# Patient Record
Sex: FEMALE | Race: WHITE | Employment: OTHER | URBAN - METROPOLITAN AREA
[De-identification: names, ages, dates, MRNs, and addresses within clinical notes are randomized per-mention and may not be internally consistent; named-entity substitution may affect disease eponyms.]

---

## 2017-01-01 DIAGNOSIS — E11.9 DIABETES TYPE 2, CONTROLLED (HCC): ICD-10-CM

## 2017-01-05 ENCOUNTER — OFFICE VISIT (OUTPATIENT)
Dept: INTERNAL MEDICINE CLINIC | Age: 67
End: 2017-01-05

## 2017-01-05 VITALS
BODY MASS INDEX: 29.57 KG/M2 | RESPIRATION RATE: 16 BRPM | DIASTOLIC BLOOD PRESSURE: 78 MMHG | OXYGEN SATURATION: 98 % | HEART RATE: 100 BPM | WEIGHT: 184 LBS | HEIGHT: 66 IN | SYSTOLIC BLOOD PRESSURE: 112 MMHG

## 2017-01-05 DIAGNOSIS — Z00.00 PREVENTATIVE HEALTH CARE: ICD-10-CM

## 2017-01-05 DIAGNOSIS — E11.9 CONTROLLED TYPE 2 DIABETES MELLITUS WITHOUT COMPLICATION, WITHOUT LONG-TERM CURRENT USE OF INSULIN (HCC): Primary | ICD-10-CM

## 2017-01-05 DIAGNOSIS — F32.A DEPRESSION, UNSPECIFIED DEPRESSION TYPE: ICD-10-CM

## 2017-01-05 DIAGNOSIS — F51.01 PRIMARY INSOMNIA: ICD-10-CM

## 2017-01-05 DIAGNOSIS — M81.0 OSTEOPOROSIS: ICD-10-CM

## 2017-01-05 DIAGNOSIS — E78.2 MIXED HYPERLIPIDEMIA: ICD-10-CM

## 2017-01-05 DIAGNOSIS — G43.009 MIGRAINE WITHOUT AURA AND WITHOUT STATUS MIGRAINOSUS, NOT INTRACTABLE: ICD-10-CM

## 2017-01-05 DIAGNOSIS — E11.9 CONTROLLED TYPE 2 DIABETES MELLITUS WITHOUT COMPLICATION, WITHOUT LONG-TERM CURRENT USE OF INSULIN (HCC): ICD-10-CM

## 2017-01-05 LAB
A/G RATIO: 1.7 (ref 1.1–2.2)
ALBUMIN SERPL-MCNC: 4.6 G/DL (ref 3.4–5)
ALP BLD-CCNC: 88 U/L (ref 40–129)
ALT SERPL-CCNC: 60 U/L (ref 10–40)
ANION GAP SERPL CALCULATED.3IONS-SCNC: 17 MMOL/L (ref 3–16)
AST SERPL-CCNC: 34 U/L (ref 15–37)
BASOPHILS ABSOLUTE: 0.1 K/UL (ref 0–0.2)
BASOPHILS RELATIVE PERCENT: 1.2 %
BILIRUB SERPL-MCNC: 0.5 MG/DL (ref 0–1)
BUN BLDV-MCNC: 17 MG/DL (ref 7–20)
CALCIUM SERPL-MCNC: 10.7 MG/DL (ref 8.3–10.6)
CHLORIDE BLD-SCNC: 101 MMOL/L (ref 99–110)
CHOLESTEROL, TOTAL: 280 MG/DL (ref 0–199)
CO2: 20 MMOL/L (ref 21–32)
CREAT SERPL-MCNC: 0.8 MG/DL (ref 0.6–1.2)
EOSINOPHILS ABSOLUTE: 0.1 K/UL (ref 0–0.6)
EOSINOPHILS RELATIVE PERCENT: 1.6 %
GFR AFRICAN AMERICAN: >60
GFR NON-AFRICAN AMERICAN: >60
GLOBULIN: 2.7 G/DL
GLUCOSE BLD-MCNC: 185 MG/DL (ref 70–99)
HCT VFR BLD CALC: 47.9 % (ref 36–48)
HDLC SERPL-MCNC: 45 MG/DL (ref 40–60)
HEMOGLOBIN: 15.9 G/DL (ref 12–16)
HEPATITIS C ANTIBODY INTERPRETATION: NORMAL
LDL CHOLESTEROL CALCULATED: ABNORMAL MG/DL
LDL CHOLESTEROL DIRECT: 187 MG/DL
LYMPHOCYTES ABSOLUTE: 1.6 K/UL (ref 1–5.1)
LYMPHOCYTES RELATIVE PERCENT: 34.8 %
MCH RBC QN AUTO: 31.3 PG (ref 26–34)
MCHC RBC AUTO-ENTMCNC: 33.2 G/DL (ref 31–36)
MCV RBC AUTO: 94.2 FL (ref 80–100)
MONOCYTES ABSOLUTE: 0.4 K/UL (ref 0–1.3)
MONOCYTES RELATIVE PERCENT: 8.4 %
NEUTROPHILS ABSOLUTE: 2.6 K/UL (ref 1.7–7.7)
NEUTROPHILS RELATIVE PERCENT: 54 %
PDW BLD-RTO: 13.4 % (ref 12.4–15.4)
PLATELET # BLD: 224 K/UL (ref 135–450)
PMV BLD AUTO: 9.8 FL (ref 5–10.5)
POTASSIUM SERPL-SCNC: 4.7 MMOL/L (ref 3.5–5.1)
RBC # BLD: 5.09 M/UL (ref 4–5.2)
SODIUM BLD-SCNC: 138 MMOL/L (ref 136–145)
TOTAL PROTEIN: 7.3 G/DL (ref 6.4–8.2)
TRIGL SERPL-MCNC: 432 MG/DL (ref 0–150)
VLDLC SERPL CALC-MCNC: ABNORMAL MG/DL
WBC # BLD: 4.7 K/UL (ref 4–11)

## 2017-01-05 PROCEDURE — 99214 OFFICE O/P EST MOD 30 MIN: CPT | Performed by: INTERNAL MEDICINE

## 2017-01-05 RX ORDER — TRAZODONE HYDROCHLORIDE 100 MG/1
TABLET ORAL
Qty: 30 TABLET | Refills: 5 | Status: SHIPPED | OUTPATIENT
Start: 2017-01-05 | End: 2017-07-10 | Stop reason: SDUPTHER

## 2017-01-05 RX ORDER — ROSUVASTATIN CALCIUM 10 MG/1
10 TABLET, COATED ORAL NIGHTLY
Qty: 30 TABLET | Refills: 5 | Status: SHIPPED | OUTPATIENT
Start: 2017-01-05 | End: 2017-07-10 | Stop reason: SDUPTHER

## 2017-01-05 RX ORDER — PAROXETINE HYDROCHLORIDE 20 MG/1
TABLET, FILM COATED ORAL
Qty: 30 TABLET | Refills: 5 | Status: SHIPPED | OUTPATIENT
Start: 2017-01-05 | End: 2017-07-10 | Stop reason: SDUPTHER

## 2017-01-05 RX ORDER — TOPIRAMATE 25 MG/1
25 TABLET ORAL DAILY
Qty: 60 TABLET | Refills: 5 | Status: SHIPPED | OUTPATIENT
Start: 2017-01-05 | End: 2017-07-10 | Stop reason: SDUPTHER

## 2017-01-06 LAB
ESTIMATED AVERAGE GLUCOSE: 165.7 MG/DL
HBA1C MFR BLD: 7.4 %
HIV-1 AND HIV-2 ANTIBODIES: NORMAL

## 2017-05-06 DIAGNOSIS — E11.9 DIABETES TYPE 2, CONTROLLED (HCC): ICD-10-CM

## 2017-07-10 ENCOUNTER — OFFICE VISIT (OUTPATIENT)
Dept: INTERNAL MEDICINE CLINIC | Age: 67
End: 2017-07-10

## 2017-07-10 VITALS
SYSTOLIC BLOOD PRESSURE: 120 MMHG | DIASTOLIC BLOOD PRESSURE: 72 MMHG | HEART RATE: 88 BPM | OXYGEN SATURATION: 97 % | HEIGHT: 66 IN | RESPIRATION RATE: 16 BRPM | WEIGHT: 178 LBS | BODY MASS INDEX: 28.61 KG/M2

## 2017-07-10 DIAGNOSIS — F32.A DEPRESSION, UNSPECIFIED DEPRESSION TYPE: ICD-10-CM

## 2017-07-10 DIAGNOSIS — F51.01 PRIMARY INSOMNIA: ICD-10-CM

## 2017-07-10 DIAGNOSIS — Z00.00 ROUTINE GENERAL MEDICAL EXAMINATION AT A HEALTH CARE FACILITY: Primary | ICD-10-CM

## 2017-07-10 DIAGNOSIS — E11.9 CONTROLLED TYPE 2 DIABETES MELLITUS WITHOUT COMPLICATION, WITHOUT LONG-TERM CURRENT USE OF INSULIN (HCC): ICD-10-CM

## 2017-07-10 DIAGNOSIS — G43.009 MIGRAINE WITHOUT AURA AND WITHOUT STATUS MIGRAINOSUS, NOT INTRACTABLE: ICD-10-CM

## 2017-07-10 DIAGNOSIS — K58.0 IRRITABLE BOWEL SYNDROME WITH DIARRHEA: ICD-10-CM

## 2017-07-10 DIAGNOSIS — E78.2 MIXED HYPERLIPIDEMIA: ICD-10-CM

## 2017-07-10 PROCEDURE — G0439 PPPS, SUBSEQ VISIT: HCPCS | Performed by: INTERNAL MEDICINE

## 2017-07-10 RX ORDER — TRAZODONE HYDROCHLORIDE 100 MG/1
TABLET ORAL
Qty: 30 TABLET | Refills: 5 | Status: SHIPPED | OUTPATIENT
Start: 2017-07-10 | End: 2018-01-16 | Stop reason: SDUPTHER

## 2017-07-10 RX ORDER — TOPIRAMATE 25 MG/1
25 TABLET ORAL DAILY
Qty: 60 TABLET | Refills: 5 | Status: SHIPPED | OUTPATIENT
Start: 2017-07-10 | End: 2018-01-16 | Stop reason: SDUPTHER

## 2017-07-10 RX ORDER — PSYLLIUM HUSK/CALCIUM CARB 1 G-60 MG
1 CAPSULE ORAL 2 TIMES DAILY
Qty: 30 CAPSULE | Refills: 0 | Status: SHIPPED | OUTPATIENT
Start: 2017-07-10 | End: 2018-01-16 | Stop reason: SDUPTHER

## 2017-07-10 RX ORDER — ROSUVASTATIN CALCIUM 10 MG/1
10 TABLET, COATED ORAL NIGHTLY
Qty: 30 TABLET | Refills: 5 | Status: SHIPPED | OUTPATIENT
Start: 2017-07-10 | End: 2018-01-16 | Stop reason: SDUPTHER

## 2017-07-10 RX ORDER — PAROXETINE HYDROCHLORIDE 20 MG/1
TABLET, FILM COATED ORAL
Qty: 30 TABLET | Refills: 5 | Status: SHIPPED | OUTPATIENT
Start: 2017-07-10 | End: 2018-01-16 | Stop reason: SDUPTHER

## 2017-07-10 RX ORDER — CHOLESTYRAMINE 4 G/9G
1 POWDER, FOR SUSPENSION ORAL DAILY
Qty: 30 PACKET | Refills: 5 | Status: SHIPPED | OUTPATIENT
Start: 2017-07-10 | End: 2018-01-16 | Stop reason: ALTCHOICE

## 2017-07-10 ASSESSMENT — PATIENT HEALTH QUESTIONNAIRE - PHQ9: SUM OF ALL RESPONSES TO PHQ QUESTIONS 1-9: 0

## 2017-07-10 ASSESSMENT — LIFESTYLE VARIABLES: HOW OFTEN DO YOU HAVE A DRINK CONTAINING ALCOHOL: 0

## 2017-07-10 ASSESSMENT — ANXIETY QUESTIONNAIRES: GAD7 TOTAL SCORE: 0

## 2018-01-16 ENCOUNTER — OFFICE VISIT (OUTPATIENT)
Dept: INTERNAL MEDICINE CLINIC | Age: 68
End: 2018-01-16

## 2018-01-16 VITALS
OXYGEN SATURATION: 98 % | RESPIRATION RATE: 16 BRPM | WEIGHT: 181 LBS | DIASTOLIC BLOOD PRESSURE: 83 MMHG | BODY MASS INDEX: 29.21 KG/M2 | HEART RATE: 99 BPM | SYSTOLIC BLOOD PRESSURE: 139 MMHG

## 2018-01-16 DIAGNOSIS — F51.01 PRIMARY INSOMNIA: ICD-10-CM

## 2018-01-16 DIAGNOSIS — J11.1 INFLUENZA: ICD-10-CM

## 2018-01-16 DIAGNOSIS — K58.0 IRRITABLE BOWEL SYNDROME WITH DIARRHEA: ICD-10-CM

## 2018-01-16 DIAGNOSIS — G43.009 MIGRAINE WITHOUT AURA AND WITHOUT STATUS MIGRAINOSUS, NOT INTRACTABLE: ICD-10-CM

## 2018-01-16 DIAGNOSIS — E11.9 CONTROLLED TYPE 2 DIABETES MELLITUS WITHOUT COMPLICATION, WITHOUT LONG-TERM CURRENT USE OF INSULIN (HCC): Primary | ICD-10-CM

## 2018-01-16 DIAGNOSIS — E78.2 MIXED HYPERLIPIDEMIA: ICD-10-CM

## 2018-01-16 DIAGNOSIS — M81.0 AGE-RELATED OSTEOPOROSIS WITHOUT CURRENT PATHOLOGICAL FRACTURE: ICD-10-CM

## 2018-01-16 DIAGNOSIS — F32.A DEPRESSION, UNSPECIFIED DEPRESSION TYPE: ICD-10-CM

## 2018-01-16 PROCEDURE — G8484 FLU IMMUNIZE NO ADMIN: HCPCS | Performed by: INTERNAL MEDICINE

## 2018-01-16 PROCEDURE — 1090F PRES/ABSN URINE INCON ASSESS: CPT | Performed by: INTERNAL MEDICINE

## 2018-01-16 PROCEDURE — G8427 DOCREV CUR MEDS BY ELIG CLIN: HCPCS | Performed by: INTERNAL MEDICINE

## 2018-01-16 PROCEDURE — 4040F PNEUMOC VAC/ADMIN/RCVD: CPT | Performed by: INTERNAL MEDICINE

## 2018-01-16 PROCEDURE — G8419 CALC BMI OUT NRM PARAM NOF/U: HCPCS | Performed by: INTERNAL MEDICINE

## 2018-01-16 PROCEDURE — 3014F SCREEN MAMMO DOC REV: CPT | Performed by: INTERNAL MEDICINE

## 2018-01-16 PROCEDURE — 99214 OFFICE O/P EST MOD 30 MIN: CPT | Performed by: INTERNAL MEDICINE

## 2018-01-16 PROCEDURE — 1036F TOBACCO NON-USER: CPT | Performed by: INTERNAL MEDICINE

## 2018-01-16 PROCEDURE — 3017F COLORECTAL CA SCREEN DOC REV: CPT | Performed by: INTERNAL MEDICINE

## 2018-01-16 PROCEDURE — G8399 PT W/DXA RESULTS DOCUMENT: HCPCS | Performed by: INTERNAL MEDICINE

## 2018-01-16 PROCEDURE — 1123F ACP DISCUSS/DSCN MKR DOCD: CPT | Performed by: INTERNAL MEDICINE

## 2018-01-16 PROCEDURE — 3046F HEMOGLOBIN A1C LEVEL >9.0%: CPT | Performed by: INTERNAL MEDICINE

## 2018-01-16 RX ORDER — PAROXETINE HYDROCHLORIDE 20 MG/1
TABLET, FILM COATED ORAL
Qty: 90 TABLET | Refills: 1 | Status: SHIPPED | OUTPATIENT
Start: 2018-01-16 | End: 2018-07-17 | Stop reason: SDUPTHER

## 2018-01-16 RX ORDER — PSYLLIUM HUSK/CALCIUM CARB 1 G-60 MG
1 CAPSULE ORAL 2 TIMES DAILY
Qty: 30 CAPSULE | Refills: 0 | Status: SHIPPED | OUTPATIENT
Start: 2018-01-16 | End: 2018-07-17 | Stop reason: SDUPTHER

## 2018-01-16 RX ORDER — TOPIRAMATE 25 MG/1
25 TABLET ORAL DAILY
Qty: 90 TABLET | Refills: 1 | Status: SHIPPED | OUTPATIENT
Start: 2018-01-16 | End: 2018-07-17 | Stop reason: SDUPTHER

## 2018-01-16 RX ORDER — TRAZODONE HYDROCHLORIDE 100 MG/1
TABLET ORAL
Qty: 90 TABLET | Refills: 1 | Status: SHIPPED | OUTPATIENT
Start: 2018-01-16 | End: 2018-07-17 | Stop reason: SDUPTHER

## 2018-01-16 RX ORDER — ROSUVASTATIN CALCIUM 10 MG/1
10 TABLET, COATED ORAL NIGHTLY
Qty: 90 TABLET | Refills: 1 | Status: SHIPPED | OUTPATIENT
Start: 2018-01-16 | End: 2018-07-17 | Stop reason: SDUPTHER

## 2018-01-16 NOTE — PROGRESS NOTES
Stephie Doe  1950 01/16/18    SUBJECTIVE:    Had onset L sided then diffuse headache approx a week ago, seen in ED, lab ok and head CT. The following day w flu sx. Now slowly better but still fatigued but w/o cough or fever which have resolved now. DM- glc high in ED, due for f'/u lab. Is overdue for appt/eye exam w Dr Jennifer Bledsoe. Lab Results   Component Value Date    LABA1C 7.4 01/05/2017    LABA1C 6.5 07/05/2016    LABA1C 6.6 01/21/2016     Lab Results   Component Value Date    LABMICR 1.70 07/05/2016    LDLCALC see below 01/05/2017    CREATININE 0.8 01/11/2018       IBS- stable w prn meds. Controlled w psillium and also seeing Dr Michaela Dan, on bentyl and for UGI. Lipids:  Is continuing statin therapy and low fat diet. Tolerating medications w/o myalgias or GI upset. On statin/crestor    Mood stable on paxil w/o current anxiety, depression or panic attacks. OBJECTIVE:    /83   Pulse 99   Resp 16   Wt 181 lb (82.1 kg)   SpO2 98%   BMI 29.21 kg/m²     Physical Exam   Constitutional: She appears well-developed and well-nourished. No distress. HENT:   Head: Normocephalic and atraumatic. Nose: Nose normal.   Mouth/Throat: Oropharynx is clear and moist. No oropharyngeal exudate. Eyes: Conjunctivae and EOM are normal. Pupils are equal, round, and reactive to light. Right eye exhibits no discharge. Left eye exhibits no discharge. No scleral icterus. Neck: Neck supple. No tracheal deviation present. Cardiovascular: Normal rate, regular rhythm, normal heart sounds and intact distal pulses. Exam reveals no gallop and no friction rub. No murmur heard. Pulmonary/Chest: Effort normal and breath sounds normal. No respiratory distress. She has no wheezes. She has no rales. Abdominal: Soft. Bowel sounds are normal. She exhibits no mass. There is no tenderness. There is no rebound and no guarding. Musculoskeletal: She exhibits no edema.    Lymphadenopathy:     She has no

## 2018-03-19 ENCOUNTER — HOSPITAL ENCOUNTER (OUTPATIENT)
Dept: GENERAL RADIOLOGY | Age: 68
Discharge: HOME | End: 2018-03-19
Attending: SPECIALIST | Admitting: SPECIALIST

## 2018-03-19 DIAGNOSIS — K58.9 IRRITABLE BOWEL SYNDROME, UNSPECIFIED TYPE: ICD-10-CM

## 2018-07-17 ENCOUNTER — OFFICE VISIT (OUTPATIENT)
Dept: INTERNAL MEDICINE CLINIC | Age: 68
End: 2018-07-17

## 2018-07-17 VITALS
HEIGHT: 66 IN | WEIGHT: 170 LBS | DIASTOLIC BLOOD PRESSURE: 78 MMHG | SYSTOLIC BLOOD PRESSURE: 112 MMHG | OXYGEN SATURATION: 97 % | HEART RATE: 82 BPM | RESPIRATION RATE: 16 BRPM | BODY MASS INDEX: 27.32 KG/M2

## 2018-07-17 DIAGNOSIS — E11.9 CONTROLLED TYPE 2 DIABETES MELLITUS WITHOUT COMPLICATION, WITHOUT LONG-TERM CURRENT USE OF INSULIN (HCC): ICD-10-CM

## 2018-07-17 DIAGNOSIS — Z00.00 ROUTINE GENERAL MEDICAL EXAMINATION AT A HEALTH CARE FACILITY: Primary | ICD-10-CM

## 2018-07-17 DIAGNOSIS — F51.01 PRIMARY INSOMNIA: ICD-10-CM

## 2018-07-17 DIAGNOSIS — F34.1 DYSTHYMIA: ICD-10-CM

## 2018-07-17 DIAGNOSIS — G43.009 MIGRAINE WITHOUT AURA AND WITHOUT STATUS MIGRAINOSUS, NOT INTRACTABLE: ICD-10-CM

## 2018-07-17 DIAGNOSIS — E78.2 MIXED HYPERLIPIDEMIA: ICD-10-CM

## 2018-07-17 DIAGNOSIS — K58.0 IRRITABLE BOWEL SYNDROME WITH DIARRHEA: ICD-10-CM

## 2018-07-17 LAB
A/G RATIO: 1.8 (ref 1.1–2.2)
ALBUMIN SERPL-MCNC: 4.6 G/DL (ref 3.4–5)
ALP BLD-CCNC: 86 U/L (ref 40–129)
ALT SERPL-CCNC: 26 U/L (ref 10–40)
ANION GAP SERPL CALCULATED.3IONS-SCNC: 14 MMOL/L (ref 3–16)
AST SERPL-CCNC: 18 U/L (ref 15–37)
BASOPHILS ABSOLUTE: 0.1 K/UL (ref 0–0.2)
BASOPHILS RELATIVE PERCENT: 1.1 %
BILIRUB SERPL-MCNC: 0.3 MG/DL (ref 0–1)
BUN BLDV-MCNC: 17 MG/DL (ref 7–20)
CALCIUM SERPL-MCNC: 10.9 MG/DL (ref 8.3–10.6)
CHLORIDE BLD-SCNC: 102 MMOL/L (ref 99–110)
CHOLESTEROL, TOTAL: 232 MG/DL (ref 0–199)
CO2: 25 MMOL/L (ref 21–32)
CREAT SERPL-MCNC: 0.8 MG/DL (ref 0.6–1.2)
CREATININE URINE: 82.7 MG/DL (ref 28–259)
EOSINOPHILS ABSOLUTE: 0.1 K/UL (ref 0–0.6)
EOSINOPHILS RELATIVE PERCENT: 1.3 %
GFR AFRICAN AMERICAN: >60
GFR NON-AFRICAN AMERICAN: >60
GLOBULIN: 2.6 G/DL
GLUCOSE BLD-MCNC: 133 MG/DL (ref 70–99)
HCT VFR BLD CALC: 44.9 % (ref 36–48)
HDLC SERPL-MCNC: 48 MG/DL (ref 40–60)
HEMOGLOBIN: 15.8 G/DL (ref 12–16)
LDL CHOLESTEROL CALCULATED: ABNORMAL MG/DL
LDL CHOLESTEROL DIRECT: 154 MG/DL
LYMPHOCYTES ABSOLUTE: 2.3 K/UL (ref 1–5.1)
LYMPHOCYTES RELATIVE PERCENT: 43.9 %
MCH RBC QN AUTO: 32.9 PG (ref 26–34)
MCHC RBC AUTO-ENTMCNC: 35.2 G/DL (ref 31–36)
MCV RBC AUTO: 93.4 FL (ref 80–100)
MICROALBUMIN UR-MCNC: <1.2 MG/DL
MICROALBUMIN/CREAT UR-RTO: NORMAL MG/G (ref 0–30)
MONOCYTES ABSOLUTE: 0.4 K/UL (ref 0–1.3)
MONOCYTES RELATIVE PERCENT: 8.3 %
NEUTROPHILS ABSOLUTE: 2.4 K/UL (ref 1.7–7.7)
NEUTROPHILS RELATIVE PERCENT: 45.4 %
PDW BLD-RTO: 13.3 % (ref 12.4–15.4)
PLATELET # BLD: 217 K/UL (ref 135–450)
PMV BLD AUTO: 10 FL (ref 5–10.5)
POTASSIUM SERPL-SCNC: 4.5 MMOL/L (ref 3.5–5.1)
RBC # BLD: 4.8 M/UL (ref 4–5.2)
SODIUM BLD-SCNC: 141 MMOL/L (ref 136–145)
TOTAL PROTEIN: 7.2 G/DL (ref 6.4–8.2)
TRIGL SERPL-MCNC: 313 MG/DL (ref 0–150)
VLDLC SERPL CALC-MCNC: ABNORMAL MG/DL
WBC # BLD: 5.3 K/UL (ref 4–11)

## 2018-07-17 PROCEDURE — G0439 PPPS, SUBSEQ VISIT: HCPCS | Performed by: INTERNAL MEDICINE

## 2018-07-17 PROCEDURE — 3046F HEMOGLOBIN A1C LEVEL >9.0%: CPT | Performed by: INTERNAL MEDICINE

## 2018-07-17 PROCEDURE — 4040F PNEUMOC VAC/ADMIN/RCVD: CPT | Performed by: INTERNAL MEDICINE

## 2018-07-17 RX ORDER — TRAZODONE HYDROCHLORIDE 100 MG/1
TABLET ORAL
Qty: 90 TABLET | Refills: 1 | Status: SHIPPED | OUTPATIENT
Start: 2018-07-17 | End: 2018-11-19 | Stop reason: SDUPTHER

## 2018-07-17 RX ORDER — ROSUVASTATIN CALCIUM 10 MG/1
10 TABLET, COATED ORAL NIGHTLY
Qty: 90 TABLET | Refills: 1 | Status: SHIPPED | OUTPATIENT
Start: 2018-07-17 | End: 2018-11-19 | Stop reason: SDUPTHER

## 2018-07-17 RX ORDER — PSYLLIUM HUSK/CALCIUM CARB 1 G-60 MG
1 CAPSULE ORAL 2 TIMES DAILY
Qty: 30 CAPSULE | Refills: 0 | Status: SHIPPED | OUTPATIENT
Start: 2018-07-17 | End: 2018-11-19

## 2018-07-17 RX ORDER — PAROXETINE HYDROCHLORIDE 20 MG/1
TABLET, FILM COATED ORAL
Qty: 90 TABLET | Refills: 1 | Status: SHIPPED | OUTPATIENT
Start: 2018-07-17 | End: 2018-11-19 | Stop reason: SDUPTHER

## 2018-07-17 RX ORDER — TOPIRAMATE 25 MG/1
25 TABLET ORAL DAILY
Qty: 90 TABLET | Refills: 1 | Status: SHIPPED | OUTPATIENT
Start: 2018-07-17 | End: 2018-11-19 | Stop reason: SDUPTHER

## 2018-07-17 ASSESSMENT — ANXIETY QUESTIONNAIRES: GAD7 TOTAL SCORE: 0

## 2018-07-17 ASSESSMENT — LIFESTYLE VARIABLES: HOW OFTEN DO YOU HAVE A DRINK CONTAINING ALCOHOL: 0

## 2018-07-17 ASSESSMENT — PATIENT HEALTH QUESTIONNAIRE - PHQ9: SUM OF ALL RESPONSES TO PHQ QUESTIONS 1-9: 0

## 2018-07-17 NOTE — PROGRESS NOTES
Medicare Annual Wellness Visit  Name: Darcy Jean Date: 2018   MRN: [id number] Sex: Female   Age: 80 y.o. Ethnicity: Non-/Non    : 1950 Race: Danae Caceres is here for Medicare AWV    Screenings for behavioral, psychosocial and functional/safety risks, and cognitive dysfunction are all negative except as indicated below. These results, as well as other patient data from the [address] form, are documented in Flowsheets linked to this Encounter. DM- due for lab, has cut out sugar and is down 11#. Lab Results   Component Value Date    LABA1C 7.4 2017    LABA1C 6.5 2016    LABA1C 6.6 2016     Lab Results   Component Value Date    LABMICR 1.70 2016    LDLCALC see below 2017    CREATININE 0.8 2018   due for eye exam, seeing Dr Amber Romo. Breast CA, following w [facility]. Migraine- controlled now on topamax, at most once every 6mo. Lipids:  Is continuing statin therapy and low fat diet. Tolerating medications w/o myalgias or GI upset. ,Mood stable on paxil w/o current anxiety, depression or panic attacks. IBS- stable on prn meds. Allergies   Allergen Reactions    Codeine      \"jittery\"  Feels anxoius     Prior to Visit Medications    Medication Sig Taking? Authorizing Provider   metFORMIN (GLUCOPHAGE) 500 MG tablet TAKE ONE TABLET BY MOUTH DAILY WITH BREAKFAST Yes Saul Morales MD   Psyllium-Calcium (METAMUCIL PLUS CALCIUM) CAPS Take 1 capsule by mouth 2 times daily Take with 8 oz water.  Yes Saul Morales MD   topiramate (TOPAMAX) 25 MG tablet Take 1 tablet by mouth daily Yes Saul Morales MD   rosuvastatin (CRESTOR) 10 MG tablet Take 1 tablet by mouth nightly Yes Saul Morales MD   traZODone (DESYREL) 100 MG tablet TAKE ONE TABLET BY MOUTH ONCE NIGHTLY ([address] [facility]) Yes Saul Morales MD   PARoxetine (PAXIL) 20 MG tablet TAKE ONE TABLET BY MOUTH EVERY MORNING complication, without long-term current use of insulin ([facility] [address] - DM relatively well controlled and will continue current regimen. Screening reviewed. See orders. -     metFORMIN (GLUCOPHAGE) 500 MG tablet; TAKE ONE TABLET BY MOUTH DAILY WITH BREAKFAST  -     Hemoglobin A1C; Future  -     Lipid Panel; Future  -     Comprehensive Metabolic Panel; Future  -     CBC Auto Differential; Future  -      DIABETES FOOT EXAM  -     Microalbumin / Creatinine Urine Ratio; Future    Mixed hyperlipidemia Pt will continue to work on a low fat diet and also exercise, wt loss as appropriate. Will continue periodic monitoring of fasting lipid profile, glucose, liver function. -     rosuvastatin (CRESTOR) 10 MG tablet; Take 1 tablet by mouth nightly    Migraine without aura and without status migrainosus, not intractable - Migraines sporadic but stable, controlled on meds as noted. Continue current therapy. -     topiramate (TOPAMAX) 25 MG tablet; Take 1 tablet by mouth daily    Primary insomnia- The current medical regimen is effective;  continue present plan and medications. -     traZODone (DESYREL) 100 MG tablet; TAKE ONE TABLET BY MOUTH ONCE NIGHTLY (GENERIC FOR DESYREL)    Irritable bowel syndrome with diarrhea - rf and cont rx  -     Psyllium-Calcium (METAMUCIL PLUS CALCIUM) CAPS; Take 1 capsule by mouth 2 times daily Take with 8 oz water. Dysthymia - Mood stable on current regimen w/o any significant manifestations of severe depression or anxiety noted at this time. Cont current meds.     -     PARoxetine (PAXIL) 20 MG tablet; TAKE ONE TABLET BY MOUTH EVERY MORNING

## 2018-07-18 LAB
ESTIMATED AVERAGE GLUCOSE: 154.2 MG/DL
HBA1C MFR BLD: 7 %

## 2018-11-19 ENCOUNTER — OFFICE VISIT (OUTPATIENT)
Dept: INTERNAL MEDICINE CLINIC | Age: 68
End: 2018-11-19
Payer: MEDICARE

## 2018-11-19 VITALS
HEART RATE: 89 BPM | SYSTOLIC BLOOD PRESSURE: 122 MMHG | RESPIRATION RATE: 14 BRPM | OXYGEN SATURATION: 96 % | BODY MASS INDEX: 27.76 KG/M2 | WEIGHT: 172 LBS | DIASTOLIC BLOOD PRESSURE: 75 MMHG

## 2018-11-19 DIAGNOSIS — G43.009 MIGRAINE WITHOUT AURA AND WITHOUT STATUS MIGRAINOSUS, NOT INTRACTABLE: ICD-10-CM

## 2018-11-19 DIAGNOSIS — F34.1 DYSTHYMIA: ICD-10-CM

## 2018-11-19 DIAGNOSIS — E78.2 MIXED HYPERLIPIDEMIA: ICD-10-CM

## 2018-11-19 DIAGNOSIS — M54.41 ACUTE RIGHT-SIDED LOW BACK PAIN WITH RIGHT-SIDED SCIATICA: ICD-10-CM

## 2018-11-19 DIAGNOSIS — E11.9 CONTROLLED TYPE 2 DIABETES MELLITUS WITHOUT COMPLICATION, WITHOUT LONG-TERM CURRENT USE OF INSULIN (HCC): Primary | ICD-10-CM

## 2018-11-19 DIAGNOSIS — F51.01 PRIMARY INSOMNIA: ICD-10-CM

## 2018-11-19 PROCEDURE — 4040F PNEUMOC VAC/ADMIN/RCVD: CPT | Performed by: INTERNAL MEDICINE

## 2018-11-19 PROCEDURE — 3017F COLORECTAL CA SCREEN DOC REV: CPT | Performed by: INTERNAL MEDICINE

## 2018-11-19 PROCEDURE — 1090F PRES/ABSN URINE INCON ASSESS: CPT | Performed by: INTERNAL MEDICINE

## 2018-11-19 PROCEDURE — G8427 DOCREV CUR MEDS BY ELIG CLIN: HCPCS | Performed by: INTERNAL MEDICINE

## 2018-11-19 PROCEDURE — 1036F TOBACCO NON-USER: CPT | Performed by: INTERNAL MEDICINE

## 2018-11-19 PROCEDURE — G8419 CALC BMI OUT NRM PARAM NOF/U: HCPCS | Performed by: INTERNAL MEDICINE

## 2018-11-19 PROCEDURE — 1101F PT FALLS ASSESS-DOCD LE1/YR: CPT | Performed by: INTERNAL MEDICINE

## 2018-11-19 PROCEDURE — G8399 PT W/DXA RESULTS DOCUMENT: HCPCS | Performed by: INTERNAL MEDICINE

## 2018-11-19 PROCEDURE — 1123F ACP DISCUSS/DSCN MKR DOCD: CPT | Performed by: INTERNAL MEDICINE

## 2018-11-19 PROCEDURE — 3045F PR MOST RECENT HEMOGLOBIN A1C LEVEL 7.0-9.0%: CPT | Performed by: INTERNAL MEDICINE

## 2018-11-19 PROCEDURE — 99214 OFFICE O/P EST MOD 30 MIN: CPT | Performed by: INTERNAL MEDICINE

## 2018-11-19 PROCEDURE — 2022F DILAT RTA XM EVC RTNOPTHY: CPT | Performed by: INTERNAL MEDICINE

## 2018-11-19 PROCEDURE — G8484 FLU IMMUNIZE NO ADMIN: HCPCS | Performed by: INTERNAL MEDICINE

## 2018-11-19 RX ORDER — TRAZODONE HYDROCHLORIDE 100 MG/1
TABLET ORAL
Qty: 90 TABLET | Refills: 1 | Status: SHIPPED | OUTPATIENT
Start: 2018-11-19 | End: 2019-05-20 | Stop reason: SDUPTHER

## 2018-11-19 RX ORDER — TOPIRAMATE 25 MG/1
25 TABLET ORAL DAILY
Qty: 90 TABLET | Refills: 1 | Status: SHIPPED | OUTPATIENT
Start: 2018-11-19 | End: 2019-05-20 | Stop reason: SDUPTHER

## 2018-11-19 RX ORDER — ROSUVASTATIN CALCIUM 10 MG/1
10 TABLET, COATED ORAL NIGHTLY
Qty: 90 TABLET | Refills: 1 | Status: SHIPPED | OUTPATIENT
Start: 2018-11-19 | End: 2019-05-20 | Stop reason: SDUPTHER

## 2018-11-19 RX ORDER — PAROXETINE HYDROCHLORIDE 20 MG/1
TABLET, FILM COATED ORAL
Qty: 90 TABLET | Refills: 1 | Status: SHIPPED | OUTPATIENT
Start: 2018-11-19 | End: 2019-05-20 | Stop reason: SDUPTHER

## 2018-11-19 RX ORDER — PREDNISONE 1 MG/1
TABLET ORAL
Qty: 36 TABLET | Refills: 0 | Status: SHIPPED | OUTPATIENT
Start: 2018-11-19 | End: 2019-01-10 | Stop reason: ALTCHOICE

## 2018-11-19 NOTE — PROGRESS NOTES
Collin Hoang  1950 11/19/18    SUBJECTIVE:  Strained back bending to  hair dryer getting ready 5d ago, now w constant aching R side w rad to R thigh. No weakness, numbness or tingling. Sx sl worse w bending, and espec getting up fr sitting. Had old script norco, no relief noted. DM- due for f/u lab, last glc ok. EYE APPT IS DUE  Lab Results   Component Value Date    LABA1C 7.0 07/17/2018    LABA1C 7.4 01/05/2017    LABA1C 6.5 07/05/2016     Lab Results   Component Value Date    LABMICR <1.20 07/17/2018    LDLCALC see below 07/17/2018    CREATININE 0.8 07/17/2018     Mood stable on paxil w/o current anxiety, depression or panic attacks. Lipids:  Is continuing statin therapy and low fat diet. Tolerating medications w/o myalgias or GI upset. OBJECTIVE:    /75   Pulse 89   Resp 14   Wt 172 lb (78 kg)   SpO2 96%   BMI 27.76 kg/m²     Physical Exam   Constitutional: She appears well-developed and well-nourished. Neck: Neck supple. Cardiovascular: Normal rate, regular rhythm and normal heart sounds. Exam reveals no gallop and no friction rub. No murmur heard. Pulmonary/Chest: Effort normal and breath sounds normal. No respiratory distress. She has no wheezes. She has no rales. Abdominal: Soft. Bowel sounds are normal. She exhibits no distension. There is no tenderness. Musculoskeletal: She exhibits tenderness (R lumbar region, R SLR negative. ). She exhibits no edema. Lumbar back: She exhibits tenderness and spasm. She exhibits no bony tenderness. Back:    Neurological: She is alert. Psychiatric: She has a normal mood and affect. Vitals reviewed. ASSESSMENT:    1. Controlled type 2 diabetes mellitus without complication, without long-term current use of insulin ([address]    2. Dysthymia    3. Primary insomnia    4. Mixed hyperlipidemia    5. Migraine without aura and without status migrainosus, not intractable    6.  Acute right-sided low

## 2019-01-10 ENCOUNTER — OFFICE VISIT (OUTPATIENT)
Dept: INTERNAL MEDICINE CLINIC | Age: 69
End: 2019-01-10
Payer: MEDICARE

## 2019-01-10 VITALS
OXYGEN SATURATION: 97 % | HEART RATE: 84 BPM | SYSTOLIC BLOOD PRESSURE: 118 MMHG | RESPIRATION RATE: 16 BRPM | DIASTOLIC BLOOD PRESSURE: 70 MMHG | TEMPERATURE: 98.2 F

## 2019-01-10 DIAGNOSIS — E11.9 CONTROLLED TYPE 2 DIABETES MELLITUS WITHOUT COMPLICATION, WITHOUT LONG-TERM CURRENT USE OF INSULIN (HCC): ICD-10-CM

## 2019-01-10 DIAGNOSIS — J01.00 ACUTE NON-RECURRENT MAXILLARY SINUSITIS: Primary | ICD-10-CM

## 2019-01-10 LAB — HBA1C MFR BLD: 7.2 %

## 2019-01-10 PROCEDURE — G8427 DOCREV CUR MEDS BY ELIG CLIN: HCPCS | Performed by: INTERNAL MEDICINE

## 2019-01-10 PROCEDURE — G8484 FLU IMMUNIZE NO ADMIN: HCPCS | Performed by: INTERNAL MEDICINE

## 2019-01-10 PROCEDURE — 2022F DILAT RTA XM EVC RTNOPTHY: CPT | Performed by: INTERNAL MEDICINE

## 2019-01-10 PROCEDURE — 1036F TOBACCO NON-USER: CPT | Performed by: INTERNAL MEDICINE

## 2019-01-10 PROCEDURE — 1101F PT FALLS ASSESS-DOCD LE1/YR: CPT | Performed by: INTERNAL MEDICINE

## 2019-01-10 PROCEDURE — 99213 OFFICE O/P EST LOW 20 MIN: CPT | Performed by: INTERNAL MEDICINE

## 2019-01-10 PROCEDURE — G8399 PT W/DXA RESULTS DOCUMENT: HCPCS | Performed by: INTERNAL MEDICINE

## 2019-01-10 PROCEDURE — 1090F PRES/ABSN URINE INCON ASSESS: CPT | Performed by: INTERNAL MEDICINE

## 2019-01-10 PROCEDURE — 4040F PNEUMOC VAC/ADMIN/RCVD: CPT | Performed by: INTERNAL MEDICINE

## 2019-01-10 PROCEDURE — 3017F COLORECTAL CA SCREEN DOC REV: CPT | Performed by: INTERNAL MEDICINE

## 2019-01-10 PROCEDURE — 1123F ACP DISCUSS/DSCN MKR DOCD: CPT | Performed by: INTERNAL MEDICINE

## 2019-01-10 PROCEDURE — 3045F PR MOST RECENT HEMOGLOBIN A1C LEVEL 7.0-9.0%: CPT | Performed by: INTERNAL MEDICINE

## 2019-01-10 PROCEDURE — 83036 HEMOGLOBIN GLYCOSYLATED A1C: CPT | Performed by: INTERNAL MEDICINE

## 2019-01-10 PROCEDURE — G8419 CALC BMI OUT NRM PARAM NOF/U: HCPCS | Performed by: INTERNAL MEDICINE

## 2019-01-10 RX ORDER — METHYLPREDNISOLONE 4 MG/1
TABLET ORAL
Qty: 1 KIT | Refills: 0 | Status: SHIPPED | OUTPATIENT
Start: 2019-01-10 | End: 2019-01-16

## 2019-01-10 RX ORDER — AZITHROMYCIN 250 MG/1
250 TABLET, FILM COATED ORAL SEE ADMIN INSTRUCTIONS
Qty: 6 TABLET | Refills: 0 | Status: SHIPPED | OUTPATIENT
Start: 2019-01-10 | End: 2019-01-15

## 2019-05-20 ENCOUNTER — OFFICE VISIT (OUTPATIENT)
Dept: INTERNAL MEDICINE CLINIC | Age: 69
End: 2019-05-20
Payer: MEDICARE

## 2019-05-20 VITALS
DIASTOLIC BLOOD PRESSURE: 68 MMHG | OXYGEN SATURATION: 98 % | WEIGHT: 176.6 LBS | SYSTOLIC BLOOD PRESSURE: 112 MMHG | HEART RATE: 68 BPM | BODY MASS INDEX: 28.5 KG/M2

## 2019-05-20 DIAGNOSIS — E78.2 MIXED HYPERLIPIDEMIA: ICD-10-CM

## 2019-05-20 DIAGNOSIS — M54.41 ACUTE RIGHT-SIDED LOW BACK PAIN WITH RIGHT-SIDED SCIATICA: ICD-10-CM

## 2019-05-20 DIAGNOSIS — F51.01 PRIMARY INSOMNIA: ICD-10-CM

## 2019-05-20 DIAGNOSIS — C50.911 CARCINOMA OF RIGHT FEMALE BREAST, UNSPECIFIED ESTROGEN RECEPTOR STATUS, UNSPECIFIED SITE OF BREAST (HCC): ICD-10-CM

## 2019-05-20 DIAGNOSIS — E11.9 CONTROLLED TYPE 2 DIABETES MELLITUS WITHOUT COMPLICATION, WITHOUT LONG-TERM CURRENT USE OF INSULIN (HCC): Primary | ICD-10-CM

## 2019-05-20 DIAGNOSIS — E11.9 CONTROLLED TYPE 2 DIABETES MELLITUS WITHOUT COMPLICATION, WITHOUT LONG-TERM CURRENT USE OF INSULIN (HCC): ICD-10-CM

## 2019-05-20 DIAGNOSIS — F34.1 DYSTHYMIA: ICD-10-CM

## 2019-05-20 DIAGNOSIS — G43.009 MIGRAINE WITHOUT AURA AND WITHOUT STATUS MIGRAINOSUS, NOT INTRACTABLE: ICD-10-CM

## 2019-05-20 LAB
BASOPHILS ABSOLUTE: 0.1 K/UL (ref 0–0.2)
BASOPHILS RELATIVE PERCENT: 1.2 %
CREATININE URINE: 91.8 MG/DL (ref 28–259)
EOSINOPHILS ABSOLUTE: 0.1 K/UL (ref 0–0.6)
EOSINOPHILS RELATIVE PERCENT: 1.2 %
HCT VFR BLD CALC: 44.4 % (ref 36–48)
HEMOGLOBIN: 15.3 G/DL (ref 12–16)
LYMPHOCYTES ABSOLUTE: 1.7 K/UL (ref 1–5.1)
LYMPHOCYTES RELATIVE PERCENT: 36.7 %
MCH RBC QN AUTO: 31.5 PG (ref 26–34)
MCHC RBC AUTO-ENTMCNC: 34.5 G/DL (ref 31–36)
MCV RBC AUTO: 91.4 FL (ref 80–100)
MICROALBUMIN UR-MCNC: <1.2 MG/DL
MICROALBUMIN/CREAT UR-RTO: NORMAL MG/G (ref 0–30)
MONOCYTES ABSOLUTE: 0.3 K/UL (ref 0–1.3)
MONOCYTES RELATIVE PERCENT: 6.7 %
NEUTROPHILS ABSOLUTE: 2.5 K/UL (ref 1.7–7.7)
NEUTROPHILS RELATIVE PERCENT: 54.2 %
PDW BLD-RTO: 13.1 % (ref 12.4–15.4)
PLATELET # BLD: 202 K/UL (ref 135–450)
PMV BLD AUTO: 10 FL (ref 5–10.5)
RBC # BLD: 4.86 M/UL (ref 4–5.2)
WBC # BLD: 4.6 K/UL (ref 4–11)

## 2019-05-20 PROCEDURE — G8419 CALC BMI OUT NRM PARAM NOF/U: HCPCS | Performed by: INTERNAL MEDICINE

## 2019-05-20 PROCEDURE — 3017F COLORECTAL CA SCREEN DOC REV: CPT | Performed by: INTERNAL MEDICINE

## 2019-05-20 PROCEDURE — G8427 DOCREV CUR MEDS BY ELIG CLIN: HCPCS | Performed by: INTERNAL MEDICINE

## 2019-05-20 PROCEDURE — 3045F PR MOST RECENT HEMOGLOBIN A1C LEVEL 7.0-9.0%: CPT | Performed by: INTERNAL MEDICINE

## 2019-05-20 PROCEDURE — 1123F ACP DISCUSS/DSCN MKR DOCD: CPT | Performed by: INTERNAL MEDICINE

## 2019-05-20 PROCEDURE — 1090F PRES/ABSN URINE INCON ASSESS: CPT | Performed by: INTERNAL MEDICINE

## 2019-05-20 PROCEDURE — G8399 PT W/DXA RESULTS DOCUMENT: HCPCS | Performed by: INTERNAL MEDICINE

## 2019-05-20 PROCEDURE — 1036F TOBACCO NON-USER: CPT | Performed by: INTERNAL MEDICINE

## 2019-05-20 PROCEDURE — 4040F PNEUMOC VAC/ADMIN/RCVD: CPT | Performed by: INTERNAL MEDICINE

## 2019-05-20 PROCEDURE — 2022F DILAT RTA XM EVC RTNOPTHY: CPT | Performed by: INTERNAL MEDICINE

## 2019-05-20 PROCEDURE — 99214 OFFICE O/P EST MOD 30 MIN: CPT | Performed by: INTERNAL MEDICINE

## 2019-05-20 RX ORDER — TRAZODONE HYDROCHLORIDE 100 MG/1
TABLET ORAL
Qty: 90 TABLET | Refills: 1 | Status: SHIPPED | OUTPATIENT
Start: 2019-05-20 | End: 2019-11-20 | Stop reason: SDUPTHER

## 2019-05-20 RX ORDER — PAROXETINE HYDROCHLORIDE 20 MG/1
TABLET, FILM COATED ORAL
Qty: 90 TABLET | Refills: 1 | Status: SHIPPED | OUTPATIENT
Start: 2019-05-20 | End: 2019-11-20 | Stop reason: SDUPTHER

## 2019-05-20 RX ORDER — TOPIRAMATE 25 MG/1
25 TABLET ORAL DAILY
Qty: 90 TABLET | Refills: 1 | Status: SHIPPED | OUTPATIENT
Start: 2019-05-20 | End: 2019-11-20 | Stop reason: SDUPTHER

## 2019-05-20 RX ORDER — ROSUVASTATIN CALCIUM 10 MG/1
10 TABLET, COATED ORAL NIGHTLY
Qty: 90 TABLET | Refills: 1 | Status: SHIPPED | OUTPATIENT
Start: 2019-05-20 | End: 2019-11-20 | Stop reason: SDUPTHER

## 2019-05-20 ASSESSMENT — PATIENT HEALTH QUESTIONNAIRE - PHQ9
SUM OF ALL RESPONSES TO PHQ9 QUESTIONS 1 & 2: 0
1. LITTLE INTEREST OR PLEASURE IN DOING THINGS: 0
SUM OF ALL RESPONSES TO PHQ QUESTIONS 1-9: 0
2. FEELING DOWN, DEPRESSED OR HOPELESS: 0
SUM OF ALL RESPONSES TO PHQ QUESTIONS 1-9: 0

## 2019-05-20 NOTE — PROGRESS NOTES
Villa Snellen  1950 05/20/19    SUBJECTIVE:   DM-  Been exercising, walking the dog. Last a1c stable, lab done today and pending  Lab Results   Component Value Date    LABA1C 7.2 01/10/2019    LABA1C 7.0 07/17/2018    LABA1C 7.4 01/05/2017     Lab Results   Component Value Date    LABMICR <1.20 07/17/2018    LDLCALC see below 07/17/2018    CREATININE 0.8 07/17/2018     Breast CA- following w [facility] next for 5/30. Mood stable on paxil w/o current anxiety, depression or panic attacks. Migraines steady on topamax      OBJECTIVE:    /68   Pulse 68   Wt 176 lb 9.6 oz (80.1 kg)   SpO2 98%   BMI 28.50 kg/m²     Physical Exam   Constitutional: She appears well-developed and well-nourished. No distress. HENT:   Head: Normocephalic and atraumatic. Nose: Nose normal.   Mouth/Throat: Oropharynx is clear and moist. No oropharyngeal exudate. Eyes: Pupils are equal, round, and reactive to light. Conjunctivae and EOM are normal. Right eye exhibits no discharge. Left eye exhibits no discharge. No scleral icterus. Neck: Neck supple. No tracheal deviation present. Cardiovascular: Normal rate, regular rhythm, normal heart sounds and intact distal pulses. Exam reveals no gallop and no friction rub. No murmur heard. Pulmonary/Chest: Effort normal and breath sounds normal. No respiratory distress. She has no wheezes. She has no rales. Abdominal: Soft. Bowel sounds are normal. She exhibits no distension and no mass. There is no tenderness. There is no rebound and no guarding. Musculoskeletal: She exhibits no edema. Lymphadenopathy:     She has no cervical adenopathy. Neurological: She is alert. She has normal reflexes. Skin: Skin is warm and dry. Psychiatric: She has a normal mood and affect. Vitals reviewed. ASSESSMENT:    1. Controlled type 2 diabetes mellitus without complication, without long-term current use of insulin ([address]    2. Mixed hyperlipidemia    3.  Carcinoma of right female breast, unspecified estrogen receptor status, unspecified site of breast ([facility]    4. Migraine without aura and without status migrainosus, not intractable    5. Dysthymia    6. Primary insomnia        PLAN:    Charlotte Kaufman was seen today for 6 month follow-up. Diagnoses and all orders for this visit:    Controlled type 2 diabetes mellitus without complication, without long-term current use of insulin ([facility] - DM relatively well controlled and will continue current regimen. Screening reviewed. See orders.    -     Lipid, Fasting; Future  -     Hemoglobin A1C; Future  -     Cancel: Comprehensive Metabolic Panel; Future  -      DIABETES FOOT EXAM  -     metFORMIN (GLUCOPHAGE) 500 MG tablet; TAKE ONE TABLET BY MOUTH DAILY WITH BREAKFAST    Mixed hyperlipidemia - Pt will continue to work on a low fat diet and also exercise, wt loss as appropriate. Will continue periodic monitoring of fasting lipid profile, glucose, liver function.    -     Lipid, Fasting; Future  -     Cancel: Comprehensive Metabolic Panel; Future  -     rosuvastatin (CRESTOR) 10 MG tablet; Take 1 tablet by mouth nightly    Carcinoma of right female breast, unspecified estrogen receptor status, unspecified site of breast ([facility] - for cont monitoring [facility]    Migraine without aura and without status migrainosus, not intractable - The current medical regimen is effective;  continue present plan and medications. -     topiramate (TOPAMAX) 25 MG tablet; Take 1 tablet by mouth daily    Dysthymia - Mood stable on current regimen w/o any significant manifestations of severe depression or anxiety noted at this time. Cont current meds. -     PARoxetine (PAXIL) 20 MG tablet; TAKE ONE TABLET BY MOUTH EVERY MORNING    Primary insomnia - The current medical regimen is effective;  continue present plan and medications.     -     traZODone (DESYREL) 100 MG tablet; TAKE ONE TABLET BY MOUTH ONCE NIGHTLY (GENERIC FOR DESYREL)

## 2019-05-21 LAB
A/G RATIO: 1.6 (ref 1.1–2.2)
ALBUMIN SERPL-MCNC: 4.7 G/DL (ref 3.4–5)
ALP BLD-CCNC: 81 U/L (ref 40–129)
ALT SERPL-CCNC: 26 U/L (ref 10–40)
ANION GAP SERPL CALCULATED.3IONS-SCNC: 17 MMOL/L (ref 3–16)
AST SERPL-CCNC: 19 U/L (ref 15–37)
BILIRUB SERPL-MCNC: 0.5 MG/DL (ref 0–1)
BUN BLDV-MCNC: 15 MG/DL (ref 7–20)
CALCIUM SERPL-MCNC: 10.5 MG/DL (ref 8.3–10.6)
CHLORIDE BLD-SCNC: 105 MMOL/L (ref 99–110)
CHOLESTEROL, FASTING: 241 MG/DL (ref 0–199)
CO2: 23 MMOL/L (ref 21–32)
CREAT SERPL-MCNC: 0.9 MG/DL (ref 0.6–1.2)
ESTIMATED AVERAGE GLUCOSE: 180 MG/DL
GFR AFRICAN AMERICAN: >60
GFR NON-AFRICAN AMERICAN: >60
GLOBULIN: 2.9 G/DL
GLUCOSE BLD-MCNC: 196 MG/DL (ref 70–99)
HBA1C MFR BLD: 7.9 %
HDLC SERPL-MCNC: 48 MG/DL (ref 40–60)
LDL CHOLESTEROL CALCULATED: 135 MG/DL
POTASSIUM SERPL-SCNC: 4.7 MMOL/L (ref 3.5–5.1)
SODIUM BLD-SCNC: 145 MMOL/L (ref 136–145)
TOTAL PROTEIN: 7.6 G/DL (ref 6.4–8.2)
TRIGLYCERIDE, FASTING: 290 MG/DL (ref 0–150)
VLDLC SERPL CALC-MCNC: 58 MG/DL

## 2019-11-20 ENCOUNTER — OFFICE VISIT (OUTPATIENT)
Dept: INTERNAL MEDICINE CLINIC | Age: 69
End: 2019-11-20
Payer: MEDICARE

## 2019-11-20 VITALS
BODY MASS INDEX: 27.83 KG/M2 | SYSTOLIC BLOOD PRESSURE: 116 MMHG | DIASTOLIC BLOOD PRESSURE: 84 MMHG | WEIGHT: 173.2 LBS | HEART RATE: 92 BPM | HEIGHT: 66 IN | OXYGEN SATURATION: 98 %

## 2019-11-20 DIAGNOSIS — Z12.31 VISIT FOR SCREENING MAMMOGRAM: ICD-10-CM

## 2019-11-20 DIAGNOSIS — K62.3 RECTAL PROLAPSE: ICD-10-CM

## 2019-11-20 DIAGNOSIS — F34.1 DYSTHYMIA: ICD-10-CM

## 2019-11-20 DIAGNOSIS — C50.911 CARCINOMA OF RIGHT FEMALE BREAST, UNSPECIFIED ESTROGEN RECEPTOR STATUS, UNSPECIFIED SITE OF BREAST (HCC): ICD-10-CM

## 2019-11-20 DIAGNOSIS — F51.01 PRIMARY INSOMNIA: ICD-10-CM

## 2019-11-20 DIAGNOSIS — M81.0 AGE-RELATED OSTEOPOROSIS WITHOUT CURRENT PATHOLOGICAL FRACTURE: ICD-10-CM

## 2019-11-20 DIAGNOSIS — G43.009 MIGRAINE WITHOUT AURA AND WITHOUT STATUS MIGRAINOSUS, NOT INTRACTABLE: ICD-10-CM

## 2019-11-20 DIAGNOSIS — E78.2 MIXED HYPERLIPIDEMIA: ICD-10-CM

## 2019-11-20 DIAGNOSIS — E11.9 CONTROLLED TYPE 2 DIABETES MELLITUS WITHOUT COMPLICATION, WITHOUT LONG-TERM CURRENT USE OF INSULIN (HCC): ICD-10-CM

## 2019-11-20 DIAGNOSIS — Z00.00 ROUTINE GENERAL MEDICAL EXAMINATION AT A HEALTH CARE FACILITY: Primary | ICD-10-CM

## 2019-11-20 PROCEDURE — 3017F COLORECTAL CA SCREEN DOC REV: CPT | Performed by: INTERNAL MEDICINE

## 2019-11-20 PROCEDURE — 4040F PNEUMOC VAC/ADMIN/RCVD: CPT | Performed by: INTERNAL MEDICINE

## 2019-11-20 PROCEDURE — 3045F PR MOST RECENT HEMOGLOBIN A1C LEVEL 7.0-9.0%: CPT | Performed by: INTERNAL MEDICINE

## 2019-11-20 PROCEDURE — 1123F ACP DISCUSS/DSCN MKR DOCD: CPT | Performed by: INTERNAL MEDICINE

## 2019-11-20 PROCEDURE — G0439 PPPS, SUBSEQ VISIT: HCPCS | Performed by: INTERNAL MEDICINE

## 2019-11-20 PROCEDURE — G8484 FLU IMMUNIZE NO ADMIN: HCPCS | Performed by: INTERNAL MEDICINE

## 2019-11-20 RX ORDER — ROSUVASTATIN CALCIUM 10 MG/1
10 TABLET, COATED ORAL NIGHTLY
Qty: 90 TABLET | Refills: 1 | Status: SHIPPED | OUTPATIENT
Start: 2019-11-20 | End: 2020-08-18 | Stop reason: SDUPTHER

## 2019-11-20 RX ORDER — TRAZODONE HYDROCHLORIDE 100 MG/1
TABLET ORAL
Qty: 90 TABLET | Refills: 1 | Status: SHIPPED | OUTPATIENT
Start: 2019-11-20 | End: 2020-07-06

## 2019-11-20 RX ORDER — TOPIRAMATE 25 MG/1
25 TABLET ORAL DAILY
Qty: 90 TABLET | Refills: 1 | Status: SHIPPED | OUTPATIENT
Start: 2019-11-20 | End: 2020-08-18

## 2019-11-20 RX ORDER — PAROXETINE HYDROCHLORIDE 20 MG/1
TABLET, FILM COATED ORAL
Qty: 90 TABLET | Refills: 1 | Status: SHIPPED | OUTPATIENT
Start: 2019-11-20 | End: 2020-07-27

## 2019-11-20 ASSESSMENT — PATIENT HEALTH QUESTIONNAIRE - PHQ9
SUM OF ALL RESPONSES TO PHQ QUESTIONS 1-9: 0
SUM OF ALL RESPONSES TO PHQ QUESTIONS 1-9: 0

## 2019-11-20 ASSESSMENT — LIFESTYLE VARIABLES: HOW OFTEN DO YOU HAVE A DRINK CONTAINING ALCOHOL: 0

## 2019-11-25 ENCOUNTER — TELEPHONE (OUTPATIENT)
Dept: INTERNAL MEDICINE CLINIC | Age: 69
End: 2019-11-25

## 2020-04-09 ENCOUNTER — OFFICE VISIT (OUTPATIENT)
Dept: INTERNAL MEDICINE CLINIC | Age: 70
End: 2020-04-09
Payer: MEDICARE

## 2020-04-09 VITALS
OXYGEN SATURATION: 95 % | WEIGHT: 157 LBS | RESPIRATION RATE: 16 BRPM | SYSTOLIC BLOOD PRESSURE: 122 MMHG | HEART RATE: 92 BPM | DIASTOLIC BLOOD PRESSURE: 68 MMHG | BODY MASS INDEX: 25.53 KG/M2

## 2020-04-09 PROCEDURE — 1036F TOBACCO NON-USER: CPT | Performed by: INTERNAL MEDICINE

## 2020-04-09 PROCEDURE — 1090F PRES/ABSN URINE INCON ASSESS: CPT | Performed by: INTERNAL MEDICINE

## 2020-04-09 PROCEDURE — 2022F DILAT RTA XM EVC RTNOPTHY: CPT | Performed by: INTERNAL MEDICINE

## 2020-04-09 PROCEDURE — 4040F PNEUMOC VAC/ADMIN/RCVD: CPT | Performed by: INTERNAL MEDICINE

## 2020-04-09 PROCEDURE — 3046F HEMOGLOBIN A1C LEVEL >9.0%: CPT | Performed by: INTERNAL MEDICINE

## 2020-04-09 PROCEDURE — 1123F ACP DISCUSS/DSCN MKR DOCD: CPT | Performed by: INTERNAL MEDICINE

## 2020-04-09 PROCEDURE — 99214 OFFICE O/P EST MOD 30 MIN: CPT | Performed by: INTERNAL MEDICINE

## 2020-04-09 PROCEDURE — 3017F COLORECTAL CA SCREEN DOC REV: CPT | Performed by: INTERNAL MEDICINE

## 2020-04-09 PROCEDURE — G8427 DOCREV CUR MEDS BY ELIG CLIN: HCPCS | Performed by: INTERNAL MEDICINE

## 2020-04-09 PROCEDURE — G8399 PT W/DXA RESULTS DOCUMENT: HCPCS | Performed by: INTERNAL MEDICINE

## 2020-04-09 PROCEDURE — G8417 CALC BMI ABV UP PARAM F/U: HCPCS | Performed by: INTERNAL MEDICINE

## 2020-04-09 RX ORDER — GLIPIZIDE 5 MG/1
5 TABLET ORAL 2 TIMES DAILY
Qty: 60 TABLET | Refills: 3 | Status: SHIPPED | OUTPATIENT
Start: 2020-04-09 | End: 2020-08-03

## 2020-04-09 ASSESSMENT — PATIENT HEALTH QUESTIONNAIRE - PHQ9
SUM OF ALL RESPONSES TO PHQ QUESTIONS 1-9: 0
SUM OF ALL RESPONSES TO PHQ QUESTIONS 1-9: 0
2. FEELING DOWN, DEPRESSED OR HOPELESS: 0
1. LITTLE INTEREST OR PLEASURE IN DOING THINGS: 0
SUM OF ALL RESPONSES TO PHQ9 QUESTIONS 1 & 2: 0

## 2020-04-13 ENCOUNTER — TELEPHONE (OUTPATIENT)
Dept: INTERNAL MEDICINE CLINIC | Age: 70
End: 2020-04-13

## 2020-04-13 RX ORDER — LANCETS 30 GAUGE
1 EACH MISCELLANEOUS 2 TIMES DAILY
Qty: 100 EACH | Refills: 0 | Status: SHIPPED | OUTPATIENT
Start: 2020-04-13 | End: 2022-02-16 | Stop reason: SDUPTHER

## 2020-04-13 RX ORDER — GLUCOSAMINE HCL/CHONDROITIN SU 500-400 MG
CAPSULE ORAL
Qty: 100 STRIP | Refills: 3 | Status: SHIPPED | OUTPATIENT
Start: 2020-04-13 | End: 2022-02-16 | Stop reason: SDUPTHER

## 2020-05-20 ENCOUNTER — OFFICE VISIT (OUTPATIENT)
Dept: INTERNAL MEDICINE CLINIC | Age: 70
End: 2020-05-20
Payer: MEDICARE

## 2020-05-20 VITALS
SYSTOLIC BLOOD PRESSURE: 120 MMHG | OXYGEN SATURATION: 95 % | WEIGHT: 157 LBS | HEART RATE: 82 BPM | BODY MASS INDEX: 25.53 KG/M2 | RESPIRATION RATE: 14 BRPM | DIASTOLIC BLOOD PRESSURE: 72 MMHG

## 2020-05-20 PROCEDURE — 3017F COLORECTAL CA SCREEN DOC REV: CPT | Performed by: INTERNAL MEDICINE

## 2020-05-20 PROCEDURE — 1123F ACP DISCUSS/DSCN MKR DOCD: CPT | Performed by: INTERNAL MEDICINE

## 2020-05-20 PROCEDURE — 3046F HEMOGLOBIN A1C LEVEL >9.0%: CPT | Performed by: INTERNAL MEDICINE

## 2020-05-20 PROCEDURE — G8399 PT W/DXA RESULTS DOCUMENT: HCPCS | Performed by: INTERNAL MEDICINE

## 2020-05-20 PROCEDURE — 2022F DILAT RTA XM EVC RTNOPTHY: CPT | Performed by: INTERNAL MEDICINE

## 2020-05-20 PROCEDURE — 4040F PNEUMOC VAC/ADMIN/RCVD: CPT | Performed by: INTERNAL MEDICINE

## 2020-05-20 PROCEDURE — G8427 DOCREV CUR MEDS BY ELIG CLIN: HCPCS | Performed by: INTERNAL MEDICINE

## 2020-05-20 PROCEDURE — 1090F PRES/ABSN URINE INCON ASSESS: CPT | Performed by: INTERNAL MEDICINE

## 2020-05-20 PROCEDURE — 99213 OFFICE O/P EST LOW 20 MIN: CPT | Performed by: INTERNAL MEDICINE

## 2020-05-20 PROCEDURE — 1036F TOBACCO NON-USER: CPT | Performed by: INTERNAL MEDICINE

## 2020-05-20 PROCEDURE — G8417 CALC BMI ABV UP PARAM F/U: HCPCS | Performed by: INTERNAL MEDICINE

## 2020-05-20 NOTE — PATIENT INSTRUCTIONS
For more wt loss, suggest intermittent fasting--- meaning fasting for 16 hrs and an eating window of 8 hrs. Suggest eating from 11am to finishing by 7pm, only water in between.       If sugars start dropping below 110 in am, then can decrease your metformin from 1000 to 500mg twice/day

## 2020-07-06 RX ORDER — TRAZODONE HYDROCHLORIDE 100 MG/1
TABLET ORAL
Qty: 90 TABLET | Refills: 0 | Status: SHIPPED | OUTPATIENT
Start: 2020-07-06 | End: 2020-08-18 | Stop reason: SDUPTHER

## 2020-07-27 RX ORDER — PAROXETINE HYDROCHLORIDE 20 MG/1
TABLET, FILM COATED ORAL
Qty: 90 TABLET | Refills: 0 | Status: SHIPPED | OUTPATIENT
Start: 2020-07-27 | End: 2020-08-18 | Stop reason: SDUPTHER

## 2020-08-18 ENCOUNTER — OFFICE VISIT (OUTPATIENT)
Dept: INTERNAL MEDICINE CLINIC | Age: 70
End: 2020-08-18
Payer: MEDICARE

## 2020-08-18 VITALS
SYSTOLIC BLOOD PRESSURE: 112 MMHG | OXYGEN SATURATION: 97 % | BODY MASS INDEX: 23.91 KG/M2 | DIASTOLIC BLOOD PRESSURE: 68 MMHG | WEIGHT: 147 LBS | RESPIRATION RATE: 14 BRPM | HEART RATE: 79 BPM

## 2020-08-18 LAB
A/G RATIO: 1.9 (ref 1.1–2.2)
ALBUMIN SERPL-MCNC: 4.7 G/DL (ref 3.4–5)
ALP BLD-CCNC: 59 U/L (ref 40–129)
ALT SERPL-CCNC: 16 U/L (ref 10–40)
ANION GAP SERPL CALCULATED.3IONS-SCNC: 14 MMOL/L (ref 3–16)
AST SERPL-CCNC: 15 U/L (ref 15–37)
BASOPHILS ABSOLUTE: 0.1 K/UL (ref 0–0.2)
BASOPHILS RELATIVE PERCENT: 1 %
BILIRUB SERPL-MCNC: 0.5 MG/DL (ref 0–1)
BUN BLDV-MCNC: 18 MG/DL (ref 7–20)
CALCIUM SERPL-MCNC: 10.3 MG/DL (ref 8.3–10.6)
CHLORIDE BLD-SCNC: 100 MMOL/L (ref 99–110)
CHOLESTEROL, TOTAL: 146 MG/DL (ref 0–199)
CO2: 25 MMOL/L (ref 21–32)
CREAT SERPL-MCNC: 0.6 MG/DL (ref 0.6–1.2)
EOSINOPHILS ABSOLUTE: 0.1 K/UL (ref 0–0.6)
EOSINOPHILS RELATIVE PERCENT: 2.5 %
GFR AFRICAN AMERICAN: >60
GFR NON-AFRICAN AMERICAN: >60
GLOBULIN: 2.5 G/DL
GLUCOSE BLD-MCNC: 133 MG/DL (ref 70–99)
HCT VFR BLD CALC: 45.5 % (ref 36–48)
HDLC SERPL-MCNC: 54 MG/DL (ref 40–60)
HEMOGLOBIN: 15.5 G/DL (ref 12–16)
LDL CHOLESTEROL CALCULATED: 70 MG/DL
LYMPHOCYTES ABSOLUTE: 1.6 K/UL (ref 1–5.1)
LYMPHOCYTES RELATIVE PERCENT: 31.1 %
MCH RBC QN AUTO: 31.7 PG (ref 26–34)
MCHC RBC AUTO-ENTMCNC: 34 G/DL (ref 31–36)
MCV RBC AUTO: 93.2 FL (ref 80–100)
MONOCYTES ABSOLUTE: 0.4 K/UL (ref 0–1.3)
MONOCYTES RELATIVE PERCENT: 7.9 %
NEUTROPHILS ABSOLUTE: 3 K/UL (ref 1.7–7.7)
NEUTROPHILS RELATIVE PERCENT: 57.5 %
PDW BLD-RTO: 13.3 % (ref 12.4–15.4)
PLATELET # BLD: 203 K/UL (ref 135–450)
PMV BLD AUTO: 9.8 FL (ref 5–10.5)
POTASSIUM SERPL-SCNC: 4.6 MMOL/L (ref 3.5–5.1)
RBC # BLD: 4.88 M/UL (ref 4–5.2)
SODIUM BLD-SCNC: 139 MMOL/L (ref 136–145)
TOTAL PROTEIN: 7.2 G/DL (ref 6.4–8.2)
TRIGL SERPL-MCNC: 112 MG/DL (ref 0–150)
TSH SERPL DL<=0.05 MIU/L-ACNC: 1.28 UIU/ML (ref 0.27–4.2)
VITAMIN D 25-HYDROXY: 47 NG/ML
VLDLC SERPL CALC-MCNC: 22 MG/DL
WBC # BLD: 5.2 K/UL (ref 4–11)

## 2020-08-18 PROCEDURE — G8420 CALC BMI NORM PARAMETERS: HCPCS | Performed by: INTERNAL MEDICINE

## 2020-08-18 PROCEDURE — 1036F TOBACCO NON-USER: CPT | Performed by: INTERNAL MEDICINE

## 2020-08-18 PROCEDURE — 2022F DILAT RTA XM EVC RTNOPTHY: CPT | Performed by: INTERNAL MEDICINE

## 2020-08-18 PROCEDURE — 1090F PRES/ABSN URINE INCON ASSESS: CPT | Performed by: INTERNAL MEDICINE

## 2020-08-18 PROCEDURE — 36415 COLL VENOUS BLD VENIPUNCTURE: CPT | Performed by: INTERNAL MEDICINE

## 2020-08-18 PROCEDURE — G8427 DOCREV CUR MEDS BY ELIG CLIN: HCPCS | Performed by: INTERNAL MEDICINE

## 2020-08-18 PROCEDURE — 1123F ACP DISCUSS/DSCN MKR DOCD: CPT | Performed by: INTERNAL MEDICINE

## 2020-08-18 PROCEDURE — 3017F COLORECTAL CA SCREEN DOC REV: CPT | Performed by: INTERNAL MEDICINE

## 2020-08-18 PROCEDURE — 4040F PNEUMOC VAC/ADMIN/RCVD: CPT | Performed by: INTERNAL MEDICINE

## 2020-08-18 PROCEDURE — G8399 PT W/DXA RESULTS DOCUMENT: HCPCS | Performed by: INTERNAL MEDICINE

## 2020-08-18 PROCEDURE — 99214 OFFICE O/P EST MOD 30 MIN: CPT | Performed by: INTERNAL MEDICINE

## 2020-08-18 PROCEDURE — 3046F HEMOGLOBIN A1C LEVEL >9.0%: CPT | Performed by: INTERNAL MEDICINE

## 2020-08-18 RX ORDER — ROSUVASTATIN CALCIUM 10 MG/1
10 TABLET, COATED ORAL NIGHTLY
Qty: 90 TABLET | Refills: 1 | Status: SHIPPED | OUTPATIENT
Start: 2020-08-18 | End: 2021-02-18 | Stop reason: SDUPTHER

## 2020-08-18 RX ORDER — GLIPIZIDE 5 MG/1
5 TABLET ORAL
Qty: 180 TABLET | Refills: 1 | Status: SHIPPED | OUTPATIENT
Start: 2020-08-18 | End: 2021-02-18 | Stop reason: SDUPTHER

## 2020-08-18 RX ORDER — TRAZODONE HYDROCHLORIDE 100 MG/1
100 TABLET ORAL NIGHTLY
Qty: 90 TABLET | Refills: 1 | Status: SHIPPED | OUTPATIENT
Start: 2020-08-18 | End: 2021-02-18 | Stop reason: SDUPTHER

## 2020-08-18 RX ORDER — PAROXETINE HYDROCHLORIDE 20 MG/1
20 TABLET, FILM COATED ORAL EVERY MORNING
Qty: 90 TABLET | Refills: 1 | Status: SHIPPED | OUTPATIENT
Start: 2020-08-18 | End: 2021-02-18 | Stop reason: SDUPTHER

## 2020-08-18 RX ORDER — TOPIRAMATE 25 MG/1
25 TABLET ORAL DAILY
Qty: 90 TABLET | Refills: 1 | Status: CANCELLED | OUTPATIENT
Start: 2020-08-18

## 2020-08-19 LAB
ESTIMATED AVERAGE GLUCOSE: 108.3 MG/DL
HBA1C MFR BLD: 5.4 %

## 2020-08-19 NOTE — RESULT ENCOUNTER NOTE
Chol, sugars, labs appear in stable range, DM is controlled, vit D level nl.    Thyroid fxn also normal.    Overall her chol is markedly improved and sugars, chol dropping from 290--146, sugar avg a1c from 7.9--5.4

## 2020-08-25 ENCOUNTER — HOSPITAL ENCOUNTER (OUTPATIENT)
Dept: WOMENS IMAGING | Age: 70
Discharge: HOME | End: 2020-08-25
Payer: MEDICARE

## 2020-08-25 PROCEDURE — 77080 DXA BONE DENSITY AXIAL: CPT

## 2020-08-25 PROCEDURE — 77063 BREAST TOMOSYNTHESIS BI: CPT

## 2020-08-25 NOTE — RESULT ENCOUNTER NOTE
Call pt, HER RECENT BONE DENSITY INDICATED MILD THINNING, OSTEOPENIA. THIS APPEARS TO BE IMPROVED FROM A PRIOR HX OF OSTEOPOROSIS.   CONT SUPPLEMENTAL VIT D AND CALCIUM

## 2021-02-18 ENCOUNTER — OFFICE VISIT (OUTPATIENT)
Dept: INTERNAL MEDICINE CLINIC | Age: 71
End: 2021-02-18
Payer: MEDICARE

## 2021-02-18 VITALS
HEART RATE: 81 BPM | OXYGEN SATURATION: 98 % | HEIGHT: 66 IN | SYSTOLIC BLOOD PRESSURE: 120 MMHG | WEIGHT: 144 LBS | DIASTOLIC BLOOD PRESSURE: 70 MMHG | RESPIRATION RATE: 12 BRPM | BODY MASS INDEX: 23.14 KG/M2

## 2021-02-18 DIAGNOSIS — Z00.00 ROUTINE GENERAL MEDICAL EXAMINATION AT A HEALTH CARE FACILITY: Primary | ICD-10-CM

## 2021-02-18 DIAGNOSIS — F34.1 DYSTHYMIA: ICD-10-CM

## 2021-02-18 DIAGNOSIS — F51.01 PRIMARY INSOMNIA: ICD-10-CM

## 2021-02-18 DIAGNOSIS — E11.9 CONTROLLED TYPE 2 DIABETES MELLITUS WITHOUT COMPLICATION, WITHOUT LONG-TERM CURRENT USE OF INSULIN (HCC): ICD-10-CM

## 2021-02-18 DIAGNOSIS — E78.2 MIXED HYPERLIPIDEMIA: ICD-10-CM

## 2021-02-18 DIAGNOSIS — C50.911 CARCINOMA OF RIGHT FEMALE BREAST, UNSPECIFIED ESTROGEN RECEPTOR STATUS, UNSPECIFIED SITE OF BREAST (HCC): ICD-10-CM

## 2021-02-18 LAB
A/G RATIO: 1.6 (ref 1.1–2.2)
ALBUMIN SERPL-MCNC: 4.6 G/DL (ref 3.4–5)
ALP BLD-CCNC: 69 U/L (ref 40–129)
ALT SERPL-CCNC: 14 U/L (ref 10–40)
ANION GAP SERPL CALCULATED.3IONS-SCNC: 8 MMOL/L (ref 3–16)
AST SERPL-CCNC: 12 U/L (ref 15–37)
BASOPHILS ABSOLUTE: 0.1 K/UL (ref 0–0.2)
BASOPHILS RELATIVE PERCENT: 1.2 %
BILIRUB SERPL-MCNC: 0.5 MG/DL (ref 0–1)
BUN BLDV-MCNC: 16 MG/DL (ref 7–20)
CALCIUM SERPL-MCNC: 11 MG/DL (ref 8.3–10.6)
CHLORIDE BLD-SCNC: 100 MMOL/L (ref 99–110)
CHOLESTEROL, TOTAL: 163 MG/DL (ref 0–199)
CO2: 30 MMOL/L (ref 21–32)
CREAT SERPL-MCNC: 0.6 MG/DL (ref 0.6–1.2)
CREATININE URINE: 130.5 MG/DL (ref 28–259)
EOSINOPHILS ABSOLUTE: 0.1 K/UL (ref 0–0.6)
EOSINOPHILS RELATIVE PERCENT: 1.3 %
GFR AFRICAN AMERICAN: >60
GFR NON-AFRICAN AMERICAN: >60
GLOBULIN: 2.9 G/DL
GLUCOSE BLD-MCNC: 69 MG/DL (ref 70–99)
HCT VFR BLD CALC: 44.4 % (ref 36–48)
HDLC SERPL-MCNC: 61 MG/DL (ref 40–60)
HEMOGLOBIN: 15.3 G/DL (ref 12–16)
LDL CHOLESTEROL CALCULATED: 74 MG/DL
LYMPHOCYTES ABSOLUTE: 1.8 K/UL (ref 1–5.1)
LYMPHOCYTES RELATIVE PERCENT: 32.9 %
MCH RBC QN AUTO: 31.4 PG (ref 26–34)
MCHC RBC AUTO-ENTMCNC: 34.4 G/DL (ref 31–36)
MCV RBC AUTO: 91.4 FL (ref 80–100)
MICROALBUMIN UR-MCNC: <1.2 MG/DL
MICROALBUMIN/CREAT UR-RTO: NORMAL MG/G (ref 0–30)
MONOCYTES ABSOLUTE: 0.4 K/UL (ref 0–1.3)
MONOCYTES RELATIVE PERCENT: 7.8 %
NEUTROPHILS ABSOLUTE: 3.2 K/UL (ref 1.7–7.7)
NEUTROPHILS RELATIVE PERCENT: 56.8 %
PDW BLD-RTO: 13.1 % (ref 12.4–15.4)
PLATELET # BLD: 237 K/UL (ref 135–450)
PMV BLD AUTO: 9.9 FL (ref 5–10.5)
POTASSIUM SERPL-SCNC: 4.3 MMOL/L (ref 3.5–5.1)
RBC # BLD: 4.86 M/UL (ref 4–5.2)
SODIUM BLD-SCNC: 138 MMOL/L (ref 136–145)
TOTAL PROTEIN: 7.5 G/DL (ref 6.4–8.2)
TRIGL SERPL-MCNC: 139 MG/DL (ref 0–150)
TSH SERPL DL<=0.05 MIU/L-ACNC: 1.19 UIU/ML (ref 0.27–4.2)
VLDLC SERPL CALC-MCNC: 28 MG/DL
WBC # BLD: 5.6 K/UL (ref 4–11)

## 2021-02-18 PROCEDURE — 3017F COLORECTAL CA SCREEN DOC REV: CPT | Performed by: INTERNAL MEDICINE

## 2021-02-18 PROCEDURE — G0439 PPPS, SUBSEQ VISIT: HCPCS | Performed by: INTERNAL MEDICINE

## 2021-02-18 PROCEDURE — 36415 COLL VENOUS BLD VENIPUNCTURE: CPT | Performed by: INTERNAL MEDICINE

## 2021-02-18 PROCEDURE — 1123F ACP DISCUSS/DSCN MKR DOCD: CPT | Performed by: INTERNAL MEDICINE

## 2021-02-18 PROCEDURE — G8484 FLU IMMUNIZE NO ADMIN: HCPCS | Performed by: INTERNAL MEDICINE

## 2021-02-18 PROCEDURE — 4040F PNEUMOC VAC/ADMIN/RCVD: CPT | Performed by: INTERNAL MEDICINE

## 2021-02-18 PROCEDURE — 3046F HEMOGLOBIN A1C LEVEL >9.0%: CPT | Performed by: INTERNAL MEDICINE

## 2021-02-18 RX ORDER — TRAZODONE HYDROCHLORIDE 100 MG/1
100 TABLET ORAL NIGHTLY
Qty: 90 TABLET | Refills: 1 | Status: SHIPPED | OUTPATIENT
Start: 2021-02-18 | End: 2021-08-16 | Stop reason: SDUPTHER

## 2021-02-18 RX ORDER — ROSUVASTATIN CALCIUM 10 MG/1
10 TABLET, COATED ORAL NIGHTLY
Qty: 90 TABLET | Refills: 1 | Status: SHIPPED | OUTPATIENT
Start: 2021-02-18 | End: 2021-08-16 | Stop reason: SDUPTHER

## 2021-02-18 RX ORDER — GLIPIZIDE 5 MG/1
5 TABLET ORAL
Qty: 180 TABLET | Refills: 1 | Status: SHIPPED | OUTPATIENT
Start: 2021-02-18 | End: 2021-02-19 | Stop reason: SDUPTHER

## 2021-02-18 RX ORDER — PAROXETINE HYDROCHLORIDE 20 MG/1
20 TABLET, FILM COATED ORAL EVERY MORNING
Qty: 90 TABLET | Refills: 1 | Status: SHIPPED | OUTPATIENT
Start: 2021-02-18 | End: 2021-08-16 | Stop reason: SDUPTHER

## 2021-02-18 ASSESSMENT — LIFESTYLE VARIABLES
AUDIT TOTAL SCORE: INCOMPLETE
HOW OFTEN DO YOU HAVE A DRINK CONTAINING ALCOHOL: NEVER
HOW OFTEN DO YOU HAVE A DRINK CONTAINING ALCOHOL: 0
AUDIT-C TOTAL SCORE: INCOMPLETE

## 2021-02-18 ASSESSMENT — PATIENT HEALTH QUESTIONNAIRE - PHQ9
1. LITTLE INTEREST OR PLEASURE IN DOING THINGS: 0
SUM OF ALL RESPONSES TO PHQ QUESTIONS 1-9: 0
SUM OF ALL RESPONSES TO PHQ QUESTIONS 1-9: 0

## 2021-02-18 NOTE — PATIENT INSTRUCTIONS
Personalized Preventive Plan for Ankur Eisenberg - 2/18/2021  Medicare offers a range of preventive health benefits. Some of the tests and screenings are paid in full while other may be subject to a deductible, co-insurance, and/or copay. Some of these benefits include a comprehensive review of your medical history including lifestyle, illnesses that may run in your family, and various assessments and screenings as appropriate. After reviewing your medical record and screening and assessments performed today your provider may have ordered immunizations, labs, imaging, and/or referrals for you. A list of these orders (if applicable) as well as your Preventive Care list are included within your After Visit Summary for your review. Other Preventive Recommendations:    · A preventive eye exam performed by an eye specialist is recommended every 1-2 years to screen for glaucoma; cataracts, macular degeneration, and other eye disorders. · A preventive dental visit is recommended every 6 months. · Try to get at least 150 minutes of exercise per week or 10,000 steps per day on a pedometer . · Order or download the FREE \"Exercise & Physical Activity: Your Everyday Guide\" from [employer]. Call 2-897.423.7589 or search [employer] online. · You need 3180-0941 mg of calcium and 9245-3720 IU of vitamin D per day. It is possible to meet your calcium requirement with diet alone, but a vitamin D supplement is usually necessary to meet this goal.  · When exposed to the sun, use a sunscreen that protects against both UVA and UVB radiation with an SPF of 30 or greater. Reapply every 2 to 3 hours or after sweating, drying off with a towel, or swimming. · Always wear a seat belt when traveling in a car. Always wear a helmet when riding a bicycle or motorcycle.

## 2021-02-18 NOTE — PROGRESS NOTES
Medicare Annual Wellness Visit  Name: Clarance Canavan Date: 2021   MRN: [id number] Sex: Female   Age: 72 y.o. Ethnicity: Non-/Non    : 1950 Race: Cheryl Valles is here for Medicare AWV    Screenings for behavioral, psychosocial and functional/safety risks, and cognitive dysfunction are all negative except as indicated below. These results, as well as other patient data from the [address] form, are documented in Flowsheets linked to this Encounter. DM- is maintaining diet and last a1c was much improved. Lab Results   Component Value Date    LABA1C 5.4 2020    LABA1C 7.9 2019    LABA1C 7.2 01/10/2019     Lab Results   Component Value Date    LABMICR <1.20 2019    LDLCALC 70 2020    CREATININE 0.6 2020     Lipids:  Is continuing statin therapy and low fat diet. Tolerating medications w/o myalgias or GI upset. Mood stable on paxil w/o current anxiety, depression or panic attacks. Breast CA, following w [facility] now prn since 20+ yrs. Last mammo ok . Allergies   Allergen Reactions    Codeine      \"jittery\"  Feels anxoius       Prior to Visit Medications    Medication Sig Taking? Authorizing Provider   glipiZIDE (GLUCOTROL) 5 MG tablet Take 1 tablet by mouth 2 times daily (before meals) Yes Eric Staples MD   PARoxetine (PAXIL) 20 MG tablet Take 1 tablet by mouth every morning Yes Eric Staples MD   traZODone (DESYREL) 100 MG tablet Take 1 tablet by mouth nightly Yes Eric Staples MD   metFORMIN (GLUCOPHAGE) 1000 MG tablet Take 1 tablet by mouth 2 times daily (with meals) Yes Eric Staples MD   rosuvastatin (CRESTOR) 10 MG tablet Take 1 tablet by mouth nightly Yes Eric Staples MD   Lancets MISC 1 each by Does not apply route 2 times daily Yes Eric Staples MD   blood glucose monitor strips Test twice a day & as needed for symptoms of irregular blood glucose.  Yes Aleyda Vasquez Debra Ordonez MD       Past Medical History:   Diagnosis Date    Anxiety     Breast carcinoma ([address] 1997    R breast - MAMMOGRAM ANNUALLY, NEXT DUE 5/2020.  Cervical radiculitis     Right     Depression     Diabetes type 2, controlled (HCC)     w two random sugars >126    Family history of diabetes mellitus (DM)     Mother and Father    GERD (gastroesophageal reflux disease)     UGI- sm HH , [facility] 3/26/14    Hiatal hernia     Hyperglycemia     Hyperlipidemia     Insomnia     Irritable bowel syndrome     LBP (low back pain)     Lumbar herniated disc     L L4-5 HNP noted on MRI    Menopause     female exam every 2 yrs, due for recheck w me 2015    Migraine headache     ON TOPAMAX    Osteoporosis     Prediabetes     a1c 6.4 in Dec 2014    S/P colonoscopic polypectomy 06/07/2019    Dr Donell Driver, recheck 5 yrs    TIA (transient ischemic attack)     INACTIVE PROBLEM       Past Surgical History:   Procedure Laterality Date    BREAST RECONSTRUCTION Right 4/2013    Dr Sissy Kaba  5/1995    LIANG/BSO    MASTECTOMY  7/1997    Right breast for breast cancer    TUBAL LIGATION         Family History   Problem Relation Age of Onset    High Blood Pressure Mother     Diabetes Mother     Diabetes Father     Heart Disease Father         open heart surgery    High Blood Pressure Father     Diabetes Sister     High Cholesterol Sister     Stroke Paternal Grandmother        CareTeam (Including outside providers/suppliers regularly involved in providing care):   Patient Care Team:  Mayi Graham MD as PCP - Eron Vargas MD as PCP - [facility] Empaneled Provider    Wt Readings from Last 3 Encounters:   02/18/21 144 lb (65.3 kg)   08/18/20 147 lb (66.7 kg)   05/20/20 157 lb (71.2 kg)     Vitals:    02/18/21 0954   BP: 120/70   Pulse: 81   Resp: 12   SpO2: 98%   Weight: 144 lb (65.3 kg)   Height: 5' 5.75\" (1.67 m)     Body mass index is 23.42 kg/m².     Based upon direct observation of the patient, evaluation of cognition reveals recent and remote memory intact. General Appearance: alert and oriented to person, place and time, well developed and well- nourished, in no acute distress  Skin: warm and dry, no rash or erythema  Head: normocephalic and atraumatic  Eyes: pupils equal, round, and reactive to light, extraocular eye movements intact, conjunctivae normal  Neck: supple and non-tender without mass, no thyromegaly or thyroid nodules, no cervical lymphadenopathy  Pulmonary/Chest: clear to auscultation bilaterally- no wheezes, rales or rhonchi, normal air movement, no respiratory distress  Cardiovascular: normal rate, regular rhythm, normal S1 and S2, no murmurs, rubs, clicks, or gallops, distal pulses intact, no carotid bruits  Abdomen: soft, non-tender, non-distended, normal bowel sounds, no masses or organomegaly  Extremities: no cyanosis, clubbing or edema  Musculoskeletal: normal range of motion, no joint swelling, deformity or tenderness  Neurologic:   no cranial nerve deficit, gait, coordination and speech normal  FOOT EXAM  Visual inspection:  Deformity/amputation: absent  Skin lesions/pre-ulcerative calluses: absent  Edema: right- negative, left- negative    Sensory exam:  Monofilament sensation: normal  (minimum of 5 random plantar locations tested, avoiding callused areas - > 1 area with absence of sensation is + for neuropathy)      Pulses: normal          Patient's complete Health Risk Assessment and screening values have been reviewed and are found in Flowsheets. The following problems were reviewed today and where indicated follow up appointments were made and/or referrals ordered. Positive Risk Factor Screenings with Interventions:          General Health and ACP:  General  In general, how would you say your health is?: Very Good  In the past 7 days, have you experienced any of the following?  New or Increased Pain, New or Increased Fatigue, Loneliness, Social Isolation, Stress or Anger?: None of These  Do you get the social and emotional support that you need?: Yes  Do you have a Living Will?: Yes  Advance Directives     Power of Claudia Carranza Will ACP-Advance Directive ACP-Power of     Not on File Not on File Not on File Not on File      General Health Risk Interventions:  · no issues above    Health Habits/Nutrition:  Health Habits/Nutrition  Do you exercise for at least 20 minutes 2-3 times per week?: (!) No  Have you lost any weight without trying in the past 3 months?: (!) Yes  Do you eat only one meal per day?: No  Have you seen the dentist within the past year?: Yes  Body mass index: 23.42  Health Habits/Nutrition Interventions:  · is losing wt w adherence to diet, did suggest trying more regular exercise. walks dog 2-3x/day       Personalized Preventive Plan   Current Health Maintenance Status    There is no immunization history on file for this patient. Health Maintenance   Topic Date Due    COVID-19 Vaccine (1 of 2) 07/08/1967    DTaP/Tdap/Td vaccine (1 - Tdap) 07/08/1970    Shingles Vaccine (1 of 2) 07/08/2001    Diabetic retinal exam  03/11/2016    Pneumococcal 65+ years Vaccine (1 of 1 - PPSV23) 07/08/2016    Annual Wellness Visit (AWV)  05/29/2019    Diabetic foot exam  05/20/2020    Diabetic microalbuminuria test  05/20/2020    Flu vaccine (1) 09/01/2020    A1C test (Diabetic or Prediabetic)  08/18/2021    Lipid screen  08/18/2021    Breast cancer screen  08/25/2021    Colon cancer screen colonoscopy  06/07/2029    DEXA (modify frequency per FRAX score)  Completed    Hepatitis C screen  Completed    Hepatitis A vaccine  Aged Out    Hib vaccine  Aged Out    Meningococcal (ACWY) vaccine  Aged Out     Recommendations for [employer] Due: see orders and patient instructions/AVS.  .   Recommended screening schedule for the next 5-10 years is provided to the patient in written form: see Patient Instructions/AVS.    Anisa Doty was seen today for medicare awv. Diagnoses and all orders for this visit:    Routine general medical examination at a health care facility - remains independent, functional and active, no indications/needs for other interventions noted at this time- except as noted below and also findings noted on screening medicare questions. Controlled type 2 diabetes mellitus without complication, without long-term current use of insulin ([facility] - DM relatively well controlled and will continue current regimen. Screening reviewed. See orders. -     metFORMIN (GLUCOPHAGE) 1000 MG tablet; Take 1 tablet by mouth 2 times daily (with meals)  -     glipiZIDE (GLUCOTROL) 5 MG tablet; Take 1 tablet by mouth 2 times daily (before meals)  -     Comprehensive Metabolic Panel; Future  -     CBC Auto Differential; Future  -     Lipid Panel; Future  -     Hemoglobin A1C; Future  -     Microalbumin / Creatinine Urine Ratio; Future  -      DIABETES FOOT EXAM    Mixed hyperlipidemia  - Pt will continue to work on a low fat diet and also exercise, wt loss as appropriate. Will continue periodic monitoring of fasting lipid profile, glucose, liver function. -     rosuvastatin (CRESTOR) 10 MG tablet; Take 1 tablet by mouth nightly  -     Comprehensive Metabolic Panel; Future  -     CBC Auto Differential; Future  -     Lipid Panel; Future  -     TSH without Reflex; Future    Dysthymia - Mood stable on current regimen w/o any significant manifestations of severe depression or anxiety noted at this time. Cont current meds. -     PARoxetine (PAXIL) 20 MG tablet; Take 1 tablet by mouth every morning    Primary insomnia- continue present management. Will monitor.    -     traZODone (DESYREL) 100 MG tablet;  Take 1 tablet by mouth nightly    Carcinoma of right female breast, unspecified estrogen receptor status, unspecified site of breast ([facility]- CONT ANNUAL MAMMOGRAM FOR REMAINING BREAST

## 2021-02-19 DIAGNOSIS — E11.9 CONTROLLED TYPE 2 DIABETES MELLITUS WITHOUT COMPLICATION, WITHOUT LONG-TERM CURRENT USE OF INSULIN (HCC): ICD-10-CM

## 2021-02-19 LAB
ESTIMATED AVERAGE GLUCOSE: 108.3 MG/DL
HBA1C MFR BLD: 5.4 %

## 2021-02-19 RX ORDER — GLIPIZIDE 5 MG/1
5 TABLET ORAL
Qty: 90 TABLET | Refills: 1
Start: 2021-02-19 | End: 2021-08-16 | Stop reason: SDUPTHER

## 2021-02-19 NOTE — RESULT ENCOUNTER NOTE
Chol, sugars, labs appear in stable range, DM is controlled- ACTUALLY SUGARS ARE BORDERLINE LOW SO I'D REC WE DECREASE HER GLIPIZIDE FROM 5MG BID TO 1 DAILY IN AM- Place med changes/corrections on EPIC medlist please  . notify pt please.

## 2021-03-04 ENCOUNTER — TELEPHONE (OUTPATIENT)
Dept: INTERNAL MEDICINE CLINIC | Age: 71
End: 2021-03-04

## 2021-03-04 DIAGNOSIS — E11.9 CONTROLLED TYPE 2 DIABETES MELLITUS WITHOUT COMPLICATION, WITHOUT LONG-TERM CURRENT USE OF INSULIN (HCC): ICD-10-CM

## 2021-03-04 NOTE — TELEPHONE ENCOUNTER
Spoke with patient. Verbal understanding. Writer attempted to reach patient, no answer, letter sent.

## 2021-06-29 ENCOUNTER — OFFICE VISIT (OUTPATIENT)
Dept: INTERNAL MEDICINE CLINIC | Age: 71
End: 2021-06-29
Payer: MEDICARE

## 2021-06-29 VITALS
WEIGHT: 146.2 LBS | RESPIRATION RATE: 16 BRPM | HEART RATE: 84 BPM | HEIGHT: 66 IN | DIASTOLIC BLOOD PRESSURE: 72 MMHG | BODY MASS INDEX: 23.5 KG/M2 | OXYGEN SATURATION: 97 % | SYSTOLIC BLOOD PRESSURE: 120 MMHG

## 2021-06-29 DIAGNOSIS — M75.42 SHOULDER IMPINGEMENT, LEFT: Primary | ICD-10-CM

## 2021-06-29 PROCEDURE — 4040F PNEUMOC VAC/ADMIN/RCVD: CPT | Performed by: NURSE PRACTITIONER

## 2021-06-29 PROCEDURE — 99213 OFFICE O/P EST LOW 20 MIN: CPT | Performed by: NURSE PRACTITIONER

## 2021-06-29 PROCEDURE — G8427 DOCREV CUR MEDS BY ELIG CLIN: HCPCS | Performed by: NURSE PRACTITIONER

## 2021-06-29 PROCEDURE — 1123F ACP DISCUSS/DSCN MKR DOCD: CPT | Performed by: NURSE PRACTITIONER

## 2021-06-29 PROCEDURE — G8399 PT W/DXA RESULTS DOCUMENT: HCPCS | Performed by: NURSE PRACTITIONER

## 2021-06-29 PROCEDURE — 1036F TOBACCO NON-USER: CPT | Performed by: NURSE PRACTITIONER

## 2021-06-29 PROCEDURE — G8420 CALC BMI NORM PARAMETERS: HCPCS | Performed by: NURSE PRACTITIONER

## 2021-06-29 PROCEDURE — 3017F COLORECTAL CA SCREEN DOC REV: CPT | Performed by: NURSE PRACTITIONER

## 2021-06-29 PROCEDURE — 1090F PRES/ABSN URINE INCON ASSESS: CPT | Performed by: NURSE PRACTITIONER

## 2021-06-29 RX ORDER — TIZANIDINE 2 MG/1
2 TABLET ORAL 3 TIMES DAILY PRN
Qty: 30 TABLET | Refills: 0 | Status: SHIPPED | OUTPATIENT
Start: 2021-06-29 | End: 2021-08-16

## 2021-06-29 RX ORDER — MELOXICAM 15 MG/1
15 TABLET ORAL DAILY PRN
Qty: 30 TABLET | Refills: 0 | Status: SHIPPED | OUTPATIENT
Start: 2021-06-29 | End: 2021-08-16

## 2021-06-29 ASSESSMENT — ENCOUNTER SYMPTOMS
NAUSEA: 0
DIARRHEA: 0
ABDOMINAL PAIN: 0
COUGH: 0
SINUS PAIN: 0
CHEST TIGHTNESS: 0
SHORTNESS OF BREATH: 0
APNEA: 0
SINUS PRESSURE: 0
COLOR CHANGE: 0
VOMITING: 0

## 2021-06-29 NOTE — PATIENT INSTRUCTIONS
other hand, hold your injured arm (palm outward) behind your back by the wrist. Pull your arm up gently to stretch your shoulder. 3. Advanced stretch: Put a towel over your other shoulder. Put the hand of your injured arm behind your back. Now hold the back end of the towel. With the other hand, hold the front end of the towel in front of your body. Pull gently on the front end of the towel. This will bring your hand farther up your back to stretch your shoulder. Overhead stretch   1. Standing about an arm's length away, grasp onto a solid surface. You could use a countertop, a doorknob, or the back of a sturdy chair. 2. With your knees slightly bent, bend forward with your arms straight. Lower your upper body, and let your shoulders stretch. 3. As your shoulders are able to stretch farther, you may need to take a step or two backward. 4. Hold for at least 15 to 30 seconds. Then stand up and relax. If you had stepped back during your stretch, step forward so you can keep your hands on the solid surface. 5. Repeat 2 to 4 times. Shoulder flexion (lying down)   To make a wand for this exercise, use a piece of PVC pipe or a broom handle with the broom removed. Make the wand about a foot wider than your shoulders. 1. Lie on your back, holding a wand with both hands. Your palms should face down as you hold the wand. 2. Keeping your elbows straight, slowly raise your arms over your head. Raise them until you feel a stretch in your shoulders, upper back, and chest.  3. Hold for 15 to 30 seconds. 4. Repeat 2 to 4 times. Shoulder rotation (lying down)   To make a wand for this exercise, use a piece of PVC pipe or a broom handle with the broom removed. Make the wand about a foot wider than your shoulders. 1. Lie on your back. Hold a wand with both hands with your elbows bent and palms up. 2. Keep your elbows close to your body, and move the wand across your body toward the sore arm.   3. Hold for 8 to 12 seconds. 4. Repeat 2 to 4 times. Wall climbing (to the side)   Avoid any movement that is straight to your side, and be careful not to arch your back. Your arm should stay about 30 degrees to the front of your side. 1. Stand with your side to a wall so that your fingers can just touch it at an angle about 30 degrees toward the front of your body. 2. Walk the fingers of your injured arm up the wall as high as pain permits. Try not to shrug your shoulder up toward your ear as you move your arm up. 3. Hold that position for a count of at least 15 to 20.  4. Walk your fingers back down to the starting position. 5. Repeat at least 2 to 4 times. Try to reach higher each time. Wall climbing (to the front)   During this stretching exercise, be careful not to arch your back. 1. Face a wall, and stand so your fingers can just touch it. 2. Keeping your shoulder down, walk the fingers of your injured arm up the wall as high as pain permits. (Don't shrug your shoulder up toward your ear.)  3. Hold your arm in that position for at least 15 to 30 seconds. 4. Slowly walk your fingers back down to where you started. 5. Repeat at least 2 to 4 times. Try to reach higher each time. Shoulder blade squeeze   1. Stand with your arms at your sides, and squeeze your shoulder blades together. Do not raise your shoulders up as you squeeze. 2. Hold 6 seconds. 3. Repeat 8 to 12 times. Scapular exercise: Arm reach   1. Lie flat on your back. This exercise is a very slight motion that starts with your arms raised (elbows straight, arms straight). 2. From this position, reach higher toward the [place] or ceiling. Keep your elbows straight. All motion should be from your shoulder blade only. 3. Relax your arms back to where you started. 4. Repeat 8 to 12 times. Arm raise to the side   During this strengthening exercise, your arm should stay about 30 degrees to the front of your side.   1. Slowly raise your injured arm to the side, with your thumb facing up. Raise your arm 60 degrees at the most (shoulder level is 90 degrees). 2. Hold the position for 3 to 5 seconds. Then lower your arm back to your side. If you need to, bring your \"good\" arm across your body and place it under the elbow as you lower your injured arm. Use your good arm to keep your injured arm from dropping down too fast.  3. Repeat 8 to 12 times. 4. When you first start out, don't hold any extra weight in your hand. As you get stronger, you may use a 1-pound to 2-pound dumbbell or a small can of food. Shoulder flexor and extensor exercise   These are isometric exercises. That means you contract your muscles without actually moving. 1. Push forward (flex): Stand facing a wall or doorjamb, about 6 inches or less back. Hold your injured arm against your body. Make a closed fist with your thumb on top. Then gently push your hand forward into the wall with about 25% to 50% of your strength. Don't let your body move backward as you push. Hold for about 6 seconds. Relax for a few seconds. Repeat 8 to 12 times. 2. Push backward (extend): Stand with your back flat against a wall. Your upper arm should be against the wall, with your elbow bent 90 degrees (your hand straight ahead). Push your elbow gently back against the wall with about 25% to 50% of your strength. Don't let your body move forward as you push. Hold for about 6 seconds. Relax for a few seconds. Repeat 8 to 12 times. Scapular exercise: Wall push-ups   This exercise is best done with your fingers somewhat turned out, rather than straight up and down. 1. Stand facing a wall, about 12 inches to 18 inches away. 2. Place your hands on the wall at shoulder height. 3. Slowly bend your elbows and bring your face to the wall. Keep your back and hips straight. 4. Push back to where you started. 5. Repeat 8 to 12 times.   6. When you can do this exercise against a wall comfortably, you can try it against a counter. You can then slowly progress to the end of a couch, then to a sturdy chair, and finally to the floor. Scapular exercise: Retraction   For this exercise, you will need elastic exercise material, such as surgical tubing or Thera-Band. 1. Put the band around a solid object at about waist level. (A bedpost will work well.) Each hand should hold an end of the band. 2. With your elbows at your sides and bent to 90 degrees, pull the band back. Your shoulder blades should move toward each other. Then move your arms back where you started. 3. Repeat 8 to 12 times. 4. If you have good range of motion in your shoulders, try this exercise with your arms lifted out to the sides. Keep your elbows at a 90-degree angle. Raise the elastic band up to about shoulder level. Pull the band back to move your shoulder blades toward each other. Then move your arms back where you started. Internal rotator strengthening exercise   1. Start by tying a piece of elastic exercise material to a doorknob. You can use surgical tubing or Thera-Band. 2. Stand or sit with your shoulder relaxed and your elbow bent 90 degrees. Your upper arm should rest comfortably against your side. Squeeze a rolled towel between your elbow and your body for comfort. This will help keep your arm at your side. 3. Hold one end of the elastic band in the hand of the painful arm. 4. Slowly rotate your forearm toward your body until it touches your belly. Slowly move it back to where you started. 5. Keep your elbow and upper arm firmly tucked against the towel roll or at your side. 6. Repeat 8 to 12 times. External rotator strengthening exercise   1. Start by tying a piece of elastic exercise material to a doorknob. You can use surgical tubing or Thera-Band. (You may also hold one end of the band in each hand.)  2. Stand or sit with your shoulder relaxed and your elbow bent 90 degrees. Your upper arm should rest comfortably against your side. Squeeze a rolled towel between your elbow and your body for comfort. This will help keep your arm at your side. 3. Hold one end of the elastic band with the hand of the painful arm. 4. Start with your forearm across your belly. Slowly rotate the forearm out away from your body. Keep your elbow and upper arm tucked against the towel roll or the side of your body until you begin to feel tightness in your shoulder. Slowly move your arm back to where you started. 5. Repeat 8 to 12 times. Follow-up care is a key part of your treatment and safety. Be sure to make and go to all appointments, and call your doctor if you are having problems. It's also a good idea to know your test results and keep a list of the medicines you take. Where can you learn more? Go to https://[employer]ink Searchmarianneb.[employer]. org and sign in to your [employer] account. Enter Khoa Gibson in the Celt[employer] box to learn more about \"Rotator Cuff: Exercises. \"     If you do not have an account, please click on the \"Sign Up Now\" link. Current as of: November 16, 2020               Content Version: 12.9  © 1419-5247 Healthwise, Incorporated. Care instructions adapted under license by [facility] ([facility]). If you have questions about a medical condition or this instruction, always ask your healthcare professional. [address] any warranty or liability for your use of this information.

## 2021-06-29 NOTE — PROGRESS NOTES
Zainab Handy  1950 06/29/21    Chief Complaint   Patient presents with    Shoulder Pain     left shoulder pain sx started 5 weeks ago. Pt states that she had helped her  lift a heavy piece if wood and it started hurting. Pt reports that pain got better but she recently put her arms up to stretch and reinjured her shoulder       SUBJECTIVE:      Mrs Tim Choi is a current patient of Dr Jody Han who presents to the office this morning for c/o recent left shoulder pain. States about 5 weeks ago she was helping her  move some ply wood and immediate pain to the posterior shoulder. The pain improved some over the last few weeks, but last week after stretching noticed the same pain returning. Denies any weakness, numbness of tingling in this extremity. Has been taking Ibuprofen and heat with modest benefit. Review of Systems   Constitutional: Negative for activity change, appetite change, fatigue and fever. HENT: Negative for congestion, nosebleeds, sinus pressure and sinus pain. Respiratory: Negative for apnea, cough, chest tightness and shortness of breath. Cardiovascular: Negative for chest pain and palpitations. Gastrointestinal: Negative for abdominal pain, diarrhea, nausea and vomiting. Genitourinary: Negative for difficulty urinating, flank pain and hematuria. Musculoskeletal: Positive for arthralgias. Negative for joint swelling and myalgias. Skin: Negative for color change and rash. Neurological: Negative for dizziness, light-headedness and headaches. Psychiatric/Behavioral: Negative. Negative for behavioral problems. OBJECTIVE:    /72   Pulse 84   Resp 16   Ht 5' 6\" (1.676 m)   Wt 146 lb 3.2 oz (66.3 kg)   SpO2 97%   BMI 23.60 kg/m²     Physical Exam  Constitutional:       General: She is not in acute distress. Appearance: She is well-developed. She is not diaphoretic. HENT:      Head: Normocephalic and atraumatic.    Cardiovascular: Rate and Rhythm: Normal rate and regular rhythm. Pulmonary:      Effort: Pulmonary effort is normal. No respiratory distress. Breath sounds: Normal breath sounds. Abdominal:      General: Bowel sounds are normal.      Palpations: Abdomen is soft. Tenderness: There is no abdominal tenderness. Musculoskeletal:      Left shoulder: No bony tenderness. Decreased range of motion. Cervical back: Normal range of motion and neck supple. No edema or erythema. No muscular tenderness. Comments: (+)Hawkin's/Neers   Skin:     General: Skin is warm and dry. Capillary Refill: Capillary refill takes less than 2 seconds. Neurological:      Mental Status: She is alert and oriented to person, place, and time. Coordination: Coordination normal.   Psychiatric:         Behavior: Behavior normal.         Thought Content: Thought content normal.         ASSESSMENT:    1. Shoulder impingement, left        PLAN:    Ruddy Mauricio was seen today for shoulder pain. Diagnoses and all orders for this visit:    Shoulder impingement, left- concern for likely impingement syndrome given exam findings and pain with overhead movements exclusively; start daily Mobic, Tizanidine and Physical therapy. If no improvement over next few weeks, then consider imaging.   -     meloxicam (MOBIC) 15 MG tablet; Take 1 tablet by mouth daily as needed for Pain  -     tiZANidine (ZANAFLEX) 2 MG tablet; Take 1 tablet by mouth 3 times daily as needed (shoulder pain)  -     [facility] [place]        No flowsheet data found. Return if symptoms worsen or fail to improve. Cabrera note this reports has been produced speech recognition software and may contain errors related to that system including errors in grammar, punctuation, and spelling, as well as words and phrases that may be appropriate. If there are any questions or concerns please feel free to contact the dictating provider for clarification.

## 2021-07-16 ENCOUNTER — HOSPITAL ENCOUNTER (OUTPATIENT)
Dept: PHYSICAL THERAPY | Age: 71
Setting detail: THERAPIES SERIES
Discharge: HOME | End: 2021-07-16
Payer: MEDICARE

## 2021-07-16 PROCEDURE — 97140 MANUAL THERAPY 1/> REGIONS: CPT

## 2021-07-16 PROCEDURE — 97161 PT EVAL LOW COMPLEX 20 MIN: CPT

## 2021-07-16 ASSESSMENT — PAIN DESCRIPTION - DESCRIPTORS: DESCRIPTORS: SHARP;ACHING

## 2021-07-16 ASSESSMENT — PAIN - FUNCTIONAL ASSESSMENT: PAIN_FUNCTIONAL_ASSESSMENT: PREVENTS OR INTERFERES WITH ALL ACTIVE AND SOME PASSIVE ACTIVITIES

## 2021-07-16 ASSESSMENT — PAIN SCALES - GENERAL: PAINLEVEL_OUTOF10: 0

## 2021-07-16 ASSESSMENT — PAIN DESCRIPTION - PAIN TYPE: TYPE: ACUTE PAIN

## 2021-07-16 ASSESSMENT — PAIN DESCRIPTION - ORIENTATION: ORIENTATION: LEFT

## 2021-07-16 ASSESSMENT — PAIN DESCRIPTION - ONSET: ONSET: SUDDEN

## 2021-07-16 ASSESSMENT — PAIN DESCRIPTION - PROGRESSION: CLINICAL_PROGRESSION: GRADUALLY IMPROVING

## 2021-07-16 ASSESSMENT — PAIN DESCRIPTION - FREQUENCY: FREQUENCY: INTERMITTENT

## 2021-07-16 ASSESSMENT — PAIN DESCRIPTION - LOCATION: LOCATION: SHOULDER

## 2021-07-16 NOTE — PLAN OF CARE
Outpatient Physical Therapy           [place]           [x] Phone: [phone]   Fax: [phone]  [facility]           [] Phone: [phone]   Fax: [phone]     To: Referring Practitioner: Alesha Whittaker CNP    From: Hernán Vanegas PT, PT     Patient: Chely Canseco       : 1950  Diagnosis: Diagnosis: L shoulder impingement   Treatment Diagnosis: Treatment Diagnosis: L shoulder pain, stiffness, weakness   Date: 2021    Physical Therapy Certification/Re-Certification Form  Dear Alesha Whittaker,   The following patient has been evaluated for physical therapy services and for therapy to continue, insurance requires physician review of the treatment plan initially and every 90 days. Please review the attached evaluation and/or summary of the patient's plan of care, and verify that you agree therapy should continue by signing the attached document and sending it back to our office. Assessment:    Assessment: Pt is a 81 yo female who presents w/ L shoulder pain. She has no Hx of prior pain, but this episode started 3-4 weeks ago and is resolving w/ meds/time. She demonstrates mild ROM and strength deficits w/ tissue restrictions posterior shoulder and negative impingement testing this date. She will benefit from a couple sessions of PT to address soft tissue restrictions and educate for HEP in order to restore PLOF. Prior to  of this year she had no L shoulder pain. Patient agrees with established plan of care and assisted in the development of their short term and long term goals. Patient had no adverse reaction with initial treatment and there are no barriers to learning. Demonstrates no mental or cognitive disorder.       Plan of Care/Treatment to date:  [x] Therapeutic Exercise  [x] Modalities:  [x] Therapeutic Activity     [] Ultrasound  [] Electrical Stimulation  [] Gait Training      [] Cervical Traction [] Lumbar Traction  [x] Neuromuscular Re-education    [x] Cold/hotpack [] Iontophoresis   [x] Instruction in HEP      [] Vasopneumatic    [] Dry Needling  [x] Manual Therapy               [] Aquatic Therapy       Other:          Frequency/Duration:  # Days per week: [x] 1 day # Weeks: [] 1 week [] 5 weeks     [] 2 days   [] 2 weeks [] 6 weeks     [] 3 days   [x] 3 weeks [] 7 weeks     [] 4 days   [] 4 weeks [] 8 weeks         [] 9 weeks [] 10 weeks         [] 11 weeks [] 12 weeks    Rehab Potential/Progress: [] Excellent [x] Good [] Fair  [] Poor     Goals:    Patient goals : not sure since feeling better, just stay painfree w/ normal activity  Short term goals  Time Frame for Short term goals: defer to LTG's  Long term goals  Time Frame for Long term goals : 2-3 sessions  Long term goal 1: Pt will be able to return to full activity w/o pain  Long term goal 2: Pt will be independent w/ home pain management, functional progression  Long term goal 3: Pt will have AROM and strength in L UE that is at least 90% of R UE for return to prior activity      Electronically signed by:  Hal Amin, PT, PT, MPT, ATC  7/16/2021, 8:43 AM     7/16/2021, 8:44 AM   If you have any questions or concerns, please don't hesitate to call.   Thank you for your referral.      Physician Signature:________________________________Date:_________ TIME: _____  By signing above, therapists plan is approved by physician

## 2021-07-16 NOTE — PROGRESS NOTES
Physical Therapy  Initial Assessment  Date: 2021  Patient Name: Adan Dubin  MRN: [id number]  : 1950     Treatment Diagnosis: L shoulder pain, stiffness, weakness    Restrictions       Subjective   General  Chart Reviewed: Yes  Patient assessed for rehabilitation services?: Yes  Additional Pertinent Hx: Breast CA, mastectomy, reconstruction  Referring Practitioner: Rajat Muro CNP  Diagnosis: L shoulder impingement  PT Visit Information  PT Insurance Information: [employer]  Subjective  Subjective: pain started in mid , was given muscle relaxers and this is helping. Had garage sale and was lifting and next day felt like knife in shoulder. No prior Hx shoulder issue. Pt R handed. Sometimes pain w/ OH reaching still and at the time even deep breath. sleep is ok now, but was limited. Pain Screening  Patient Currently in Pain: Yes  Pain Assessment  Pain Assessment: 0-10  Pain Level: 0 (max in last 2 weeks 2/10)  Patient's Stated Pain Goal: No pain  Pain Type: Acute pain  Pain Location: Shoulder (lateral scap border)  Pain Orientation: Left  Pain Descriptors: Arnold Held; Aching  Pain Frequency: Intermittent  Pain Onset: Sudden  Clinical Progression: Gradually improving  Functional Pain Assessment: Prevents or interferes with all active and some passive activities  Vital Signs  Patient Currently in Pain: Yes    Vision/Hearing  Vision  Vision: Impaired    Orientation  Orientation  Overall Orientation Status: Within Normal Limits    Social/Functional History  Social/Functional History  Lives With: Spouse  Type of Home: House  Occupation: Retired  Leisure & Hobbies: houswork mostly, walking    Objective     Observation/Palpation  Palpation: Mild TTP post shoulder, teres w/ TP noted    AROM LUE (degrees)  LUE General AROM: ER scratch mod limit vs R w/ some pain, T3 vs T 6 on R    Strength RUE  Strength RUE: WNL  Strength LUE  Comment: Shoulder ER 4+/5 no pain     Additional Measures  Special Tests: Negative impingement testing this date     Assessment   Conditions Requiring Skilled Therapeutic Intervention  Body structures, Functions, Activity limitations: Decreased functional mobility ; Increased pain;Decreased ROM; Decreased strength;Decreased high-level IADLs  Assessment: Pt is a 81 yo female who presents w/ L shoulder pain. She has no Hx of prior pain, but this episode started 3-4 weeks ago and is resolving w/ meds/time. She demonstrates mild ROM and strength deficits w/ tissue restrictions posterior shoulder and negative impingement testing this date. She will benefit from a couple sessions of PT to address soft tissue restrictions and educate for HEP in order to restore PLOF. Prior to June of this year she had no L shoulder pain. Patient agrees with established plan of care and assisted in the development of their short term and long term goals. Patient had no adverse reaction with initial treatment and there are no barriers to learning. Demonstrates no mental or cognitive disorder.     Treatment Diagnosis: L shoulder pain, stiffness, weakness  Prognosis: Good  Decision Making: Low Complexity  Barriers to Learning: none  REQUIRES PT FOLLOW UP: Yes  Treatment Initiated : see flow sheet         Plan   Plan  Times per week: 1x  Plan weeks: 2-3 weeks, every other week prn  Specific instructions for Next Treatment: re-check, soft tissue prn, dc as approp  Current Treatment Recommendations: Strengthening, ROM, Functional Mobility Training, Manual Therapy - Soft Tissue Mobilization, Home Exercise Program, Patient/Caregiver Education & Training       OutComes Score     DASH 9%     Goals  Short term goals  Time Frame for Short term goals: defer to LTG's  Long term goals  Time Frame for Long term goals : 2-3 sessions  Long term goal 1: Pt will be able to return to full activity w/o pain  Long term goal 2: Pt will be independent w/ home pain management, functional progression  Long term goal 3: Pt will have AROM and strength in L UE that is at least 90% of R UE for return to prior activity  Patient Goals   Patient goals : not sure since feeling better, just stay painfree w/ normal activity         Herbert Ernandez, PT   PT, MPT, ATC     7/16/2021, 8:41 AM

## 2021-07-16 NOTE — FLOWSHEET NOTE
Outpatient Physical Therapy  [place]           [x] Phone: [phone]   Fax: [phone]  Tucker Styles           [] Phone: [phone]   Fax: [phone]        Physical Therapy Daily Treatment Note  Date:  2021    Patient Name:  Sree Alston    :  1950  MRN: [id number]  Restrictions/Precautions:    Diagnosis:   Diagnosis: L shoulder impingement  Date of Injury/Surgery:   Treatment Diagnosis: Treatment Diagnosis: L shoulder pain, stiffness, weakness    Insurance/Certification information: PT Insurance Information:  [address] [employer]   Referring Physician:  Referring Practitioner: Tye Aquino CNP  Next Doctor Visit:    Plan of care signed (Y/N): pendign cosign   Outcome Measure: DASH 9%  Visit# / total visits:   -3 POC  Pain level: -2/10   Goals:     Patient goals : not sure since feeling better, just stay painfree w/ normal activity  Short term goals  Time Frame for Short term goals: defer to LTG's  Long term goals  Time Frame for Long term goals : 2-3 sessions  Long term goal 1: Pt will be able to return to full activity w/o pain  Long term goal 2: Pt will be independent w/ home pain management, functional progression  Long term goal 3: Pt will have AROM and strength in L UE that is at least 90% of R UE for return to prior activity    Summary of Evaluation: Assessment: Pt is a 81 yo female who presents w/ L shoulder pain. She has no Hx of prior pain, but this episode started 3-4 weeks ago and is resolving w/ meds/time. She demonstrates mild ROM and strength deficits w/ tissue restrictions posterior shoulder and negative impingement testing this date. She will benefit from a couple sessions of PT to address soft tissue restrictions and educate for HEP in order to restore PLOF. Prior to  of this year she had no L shoulder pain. Subjective:  See eval         Any changes in Ambulatory Summary Sheet?   None        Objective:  See thompson   COVID screening questions were asked and patient attested that there had been no contact or symptoms        Exercises: (No more than 4 columns)   Exercise/Equipment 7/16/21  #1 Date Date           WARM UP                     TABLE      Posterior shoulder stretch 30\"     tricep stretch 30\"     Taffy pull RTB 10x     B Horiz Abd RTB 10x              STANDING                                                     PROPRIOCEPTION                                    MODALITIES                      Other Therapeutic Activities/Education:  7/16/2021: Patient received education on their current pathology and how their condition effects them with their functional activities. Patient understood discussion and questions were answered. Patient understands their activity limitations and understands rational for treatment progression. Home Exercise Program:  Issued, practiced and pt demo ability to perform 7/16/2021         Manual Treatments:  STM, CFM, MFR to posterior L shoulder/scap pt in S/L      Modalities:  Use CP later prn      Communication with other providers:  POC set for cosign 7/16/21      Assessment:  Pt is a 81 yo female who presents w/ L shoulder pain. She has no Hx of prior pain, but this episode started 3-4 weeks ago and is resolving w/ meds/time. She demonstrates mild ROM and strength deficits w/ tissue restrictions posterior shoulder and negative impingement testing this date. She will benefit from a couple sessions of PT to address soft tissue restrictions and educate for HEP in order to restore PLOF. Prior to June of this year she had no L shoulder pain.       Plan for Next Session:  : re-check, soft tissue prn, oren as approp      Time In / Time Out:    3581/0709      Timed Code/Total Treatment Minutes:  20/40  1 Man 20', 1 Eval 20'      Next Progress Note due:  oren      Plan of Care Interventions:  [x] Therapeutic Exercise  [x] Modalities:  [] Therapeutic Activity     [] Ultrasound  [] Estim  [] Gait Training      [] Cervical Traction [] Lumbar Traction  [x] Neuromuscular Re-education    [x] Cold/hotpack [] Iontophoresis   [x] Instruction in HEP      [] Vasopneumatic   [] Dry Needling    [x] Manual Therapy               [] Aquatic Therapy              Electronically signed by:  Jessica Deleon PT,  PT, MPT, ATC  7/16/2021, 8:42 AM    7/16/2021, 8:43 AM

## 2021-08-05 ENCOUNTER — VIRTUAL VISIT (OUTPATIENT)
Dept: INTERNAL MEDICINE CLINIC | Age: 71
End: 2021-08-05
Payer: MEDICARE

## 2021-08-05 DIAGNOSIS — R68.89 FLU-LIKE SYMPTOMS: Primary | ICD-10-CM

## 2021-08-05 PROCEDURE — 3017F COLORECTAL CA SCREEN DOC REV: CPT | Performed by: INTERNAL MEDICINE

## 2021-08-05 PROCEDURE — G8427 DOCREV CUR MEDS BY ELIG CLIN: HCPCS | Performed by: INTERNAL MEDICINE

## 2021-08-05 PROCEDURE — G8399 PT W/DXA RESULTS DOCUMENT: HCPCS | Performed by: INTERNAL MEDICINE

## 2021-08-05 PROCEDURE — 1036F TOBACCO NON-USER: CPT | Performed by: INTERNAL MEDICINE

## 2021-08-05 PROCEDURE — G8420 CALC BMI NORM PARAMETERS: HCPCS | Performed by: INTERNAL MEDICINE

## 2021-08-05 PROCEDURE — 1090F PRES/ABSN URINE INCON ASSESS: CPT | Performed by: INTERNAL MEDICINE

## 2021-08-05 PROCEDURE — 1123F ACP DISCUSS/DSCN MKR DOCD: CPT | Performed by: INTERNAL MEDICINE

## 2021-08-05 PROCEDURE — 99213 OFFICE O/P EST LOW 20 MIN: CPT | Performed by: INTERNAL MEDICINE

## 2021-08-05 PROCEDURE — 4040F PNEUMOC VAC/ADMIN/RCVD: CPT | Performed by: INTERNAL MEDICINE

## 2021-08-05 RX ORDER — AZITHROMYCIN 250 MG/1
250 TABLET, FILM COATED ORAL SEE ADMIN INSTRUCTIONS
Qty: 6 TABLET | Refills: 0 | Status: SHIPPED | OUTPATIENT
Start: 2021-08-05 | End: 2021-08-10

## 2021-08-05 RX ORDER — PREDNISONE 1 MG/1
TABLET ORAL
Qty: 21 TABLET | Refills: 0 | Status: SHIPPED | OUTPATIENT
Start: 2021-08-05 | End: 2021-08-16 | Stop reason: ALTCHOICE

## 2021-08-05 NOTE — PROGRESS NOTES
2021    TELEHEALTH EVALUATION -- Audio/Visual (During - public health emergency)    HPI:    Yenny Razo (:  1950) has requested an audio/video evaluation for the following concern(s):    [address], MILD. NO FEVER OR CHILLS. NO SINUS CONGESTION OR COUGH, ST.  NO ILL CONTACTS, NO N/V/D.  DENIES MOSQUITO BITES. TASTE AND SMELL ARE INTACT. DENIES NECK STIFFNESS OR PHOTOPHOBIA. Review of Systems    Prior to Visit Medications    Medication Sig Taking? Authorizing Provider   meloxicam (MOBIC) 15 MG tablet Take 1 tablet by mouth daily as needed for Pain  DORIS Wood CNP   tiZANidine (ZANAFLEX) 2 MG tablet Take 1 tablet by mouth 3 times daily as needed (shoulder pain)  DORIS Wood CNP   metFORMIN (GLUCOPHAGE) 500 MG tablet Take 1 tablet by mouth daily (with breakfast)  Myah Garza MD   glipiZIDE (GLUCOTROL) 5 MG tablet Take 1 tablet by mouth every morning (before breakfast)  Myah Garza MD   rosuvastatin (CRESTOR) 10 MG tablet Take 1 tablet by mouth nightly  Myah Garza MD   PARoxetine (PAXIL) 20 MG tablet Take 1 tablet by mouth every morning  Myah Garza MD   traZODone (DESYREL) 100 MG tablet Take 1 tablet by mouth nightly  Myah Garza MD   Lancets MISC 1 each by Does not apply route 2 times daily  Myah Garza MD   blood glucose monitor strips Test twice a day & as needed for symptoms of irregular blood glucose.   Myah Garza MD       Social History     Tobacco Use    Smoking status: Never Smoker    Smokeless tobacco: Never Used   Substance Use Topics    Alcohol use: No    Drug use: No           PHYSICAL EXAMINATION:  [ INSTRUCTIONS:  \"[x]\" Indicates a positive item  \"[]\" Indicates a negative item  -- DELETE ALL ITEMS NOT EXAMINED]  Vital Signs: (As obtained by patient/caregiver or practitioner observation)    Blood pressure-  Heart rate-    Respiratory rate-    Temperature-  Pulse oximetry-     Constitutional: [] Appears well-developed and well-nourished [] No apparent distress      [] Abnormal-   Mental status  [x] Alert and awake  [] Oriented to person/place/time []Able to follow commands      Eyes:  EOM    []  Normal  [] Abnormal-  Sclera  []  Normal  [] Abnormal -         Discharge []  None visible  [] Abnormal -    HENT:   [] Normocephalic, atraumatic. [] Abnormal   [] Mouth/Throat: Mucous membranes are moist.     External Ears [] Normal  [] Abnormal-     Neck: [] No visualized mass     Pulmonary/Chest: [] Respiratory effort normal.  [] No visualized signs of difficulty breathing or respiratory distress        [] Abnormal-      Musculoskeletal:   [] Normal gait with no signs of ataxia         [] Normal range of motion of neck        [] Abnormal-       Neurological:        [] No Facial Asymmetry (Cranial nerve 7 motor function) (limited exam to video visit)          [] No gaze palsy        [] Abnormal-         Skin:        [] No significant exanthematous lesions or discoloration noted on facial skin         [] Abnormal-            Psychiatric:       [] Normal Affect [] No Hallucinations        [] Abnormal-     Other pertinent observable physical exam findings-     ASSESSMENT/PLAN:  1. Flu-like symptoms  VIRAL SYNDROME SUSPECTED AND NEED TO R/O COVID W TESTING. EMPIRIC RX ALSO AS BELOW, IF ANY WORSENING HEADACHE OR HIGH FEVERS WE'D NEED TO ALSO LATER CONSIDER FOR ED EVAL AND LP W CONCERN FOR [place]. REC FLUIDS, REST AND To call back one week if not improving.      - Covid-19 Ambulatory; Future  - azithromycin (ZITHROMAX) 250 MG tablet; Take 1 tablet by mouth See Admin Instructions for 5 days 500mg on day 1 followed by 250mg on days 2 - 5  Dispense: 6 tablet; Refill: 0  - predniSONE (DELTASONE) 5 MG tablet; Take 6 tablets by mouth on day 1, 5 on day 2, 4 on day 3, 3 on day 4, 2 on day 5, 1 on day 6. Dispense: 21 tablet;  Refill: 0      Return if symptoms worsen or fail to paulette Streeter, was evaluated through a synchronous (real-time) audio-video encounter. The patient (or guardian if applicable) is aware that this is a billable service. Verbal consent to proceed has been obtained within the past 12 months. The visit was conducted pursuant to the emergency declaration under the [address] [place] [address] and the [employer] Act. Patient identification was verified, and a caregiver was present when appropriate. The patient was located in a state where the provider was credentialed to provide care. Total time spent on this encounter: Not billed by time    --Elvis Zhong MD on 8/5/2021 at 9:48 AM    An electronic signature was used to authenticate this note.

## 2021-08-16 ENCOUNTER — OFFICE VISIT (OUTPATIENT)
Dept: INTERNAL MEDICINE CLINIC | Age: 71
End: 2021-08-16
Payer: MEDICARE

## 2021-08-16 VITALS
DIASTOLIC BLOOD PRESSURE: 74 MMHG | WEIGHT: 145 LBS | BODY MASS INDEX: 23.4 KG/M2 | RESPIRATION RATE: 14 BRPM | HEART RATE: 82 BPM | SYSTOLIC BLOOD PRESSURE: 120 MMHG | OXYGEN SATURATION: 97 %

## 2021-08-16 DIAGNOSIS — E78.2 MIXED HYPERLIPIDEMIA: ICD-10-CM

## 2021-08-16 DIAGNOSIS — E11.9 CONTROLLED TYPE 2 DIABETES MELLITUS WITHOUT COMPLICATION, WITHOUT LONG-TERM CURRENT USE OF INSULIN (HCC): Primary | ICD-10-CM

## 2021-08-16 DIAGNOSIS — F34.1 DYSTHYMIA: ICD-10-CM

## 2021-08-16 DIAGNOSIS — F51.01 PRIMARY INSOMNIA: ICD-10-CM

## 2021-08-16 DIAGNOSIS — E83.52 HYPERCALCEMIA: ICD-10-CM

## 2021-08-16 PROCEDURE — G8427 DOCREV CUR MEDS BY ELIG CLIN: HCPCS | Performed by: INTERNAL MEDICINE

## 2021-08-16 PROCEDURE — 3017F COLORECTAL CA SCREEN DOC REV: CPT | Performed by: INTERNAL MEDICINE

## 2021-08-16 PROCEDURE — 99214 OFFICE O/P EST MOD 30 MIN: CPT | Performed by: INTERNAL MEDICINE

## 2021-08-16 PROCEDURE — 1090F PRES/ABSN URINE INCON ASSESS: CPT | Performed by: INTERNAL MEDICINE

## 2021-08-16 PROCEDURE — 4040F PNEUMOC VAC/ADMIN/RCVD: CPT | Performed by: INTERNAL MEDICINE

## 2021-08-16 PROCEDURE — G8420 CALC BMI NORM PARAMETERS: HCPCS | Performed by: INTERNAL MEDICINE

## 2021-08-16 PROCEDURE — 1123F ACP DISCUSS/DSCN MKR DOCD: CPT | Performed by: INTERNAL MEDICINE

## 2021-08-16 PROCEDURE — 2022F DILAT RTA XM EVC RTNOPTHY: CPT | Performed by: INTERNAL MEDICINE

## 2021-08-16 PROCEDURE — 1036F TOBACCO NON-USER: CPT | Performed by: INTERNAL MEDICINE

## 2021-08-16 PROCEDURE — 3044F HG A1C LEVEL LT 7.0%: CPT | Performed by: INTERNAL MEDICINE

## 2021-08-16 PROCEDURE — G8399 PT W/DXA RESULTS DOCUMENT: HCPCS | Performed by: INTERNAL MEDICINE

## 2021-08-16 RX ORDER — TRAZODONE HYDROCHLORIDE 100 MG/1
100 TABLET ORAL NIGHTLY
Qty: 90 TABLET | Refills: 1 | Status: SHIPPED | OUTPATIENT
Start: 2021-08-16 | End: 2022-02-16 | Stop reason: SDUPTHER

## 2021-08-16 RX ORDER — GLIPIZIDE 5 MG/1
2.5 TABLET ORAL
Qty: 90 TABLET | Refills: 1 | Status: SHIPPED | OUTPATIENT
Start: 2021-08-16 | End: 2022-02-16 | Stop reason: SDUPTHER

## 2021-08-16 RX ORDER — PAROXETINE HYDROCHLORIDE 20 MG/1
20 TABLET, FILM COATED ORAL EVERY MORNING
Qty: 90 TABLET | Refills: 1 | Status: SHIPPED | OUTPATIENT
Start: 2021-08-16 | End: 2022-02-16 | Stop reason: SDUPTHER

## 2021-08-16 RX ORDER — ROSUVASTATIN CALCIUM 10 MG/1
10 TABLET, COATED ORAL NIGHTLY
Qty: 90 TABLET | Refills: 1 | Status: SHIPPED | OUTPATIENT
Start: 2021-08-16 | End: 2022-02-16 | Stop reason: SDUPTHER

## 2021-08-16 NOTE — PROGRESS NOTES
Obinna Rivera  1950 08/16/21    SUBJECTIVE:    DM-  Walking regularly, only on 500mg metformin daily. glc at home ~140-160 but also her a1c has been low. She can have low sugar spells in the afternoon ~4 so we discussed now trying split 500mg am to 250mg BID, also glipizide 5mg qm to 2.5mg BID. Lab Results   Component Value Date    LABA1C 5.4 02/18/2021    LABA1C 5.4 08/18/2020    LABA1C 7.9 05/20/2019     Lab Results   Component Value Date    LABMICR <1.20 02/18/2021    LDLCALC 74 02/18/2021    CREATININE 0.6 02/18/2021       Has had sl high  Calcium, we'll scr for levels of iPTH. Lipids:  Is continuing statin therapy and low fat diet. Tolerating medications w/o myalgias or GI upset. Mood stable w/o current anxiety, depression or panic attacks. On trazodone for sleep    Defers covid shot, did strongly rec to consider    OBJECTIVE:    /74   Pulse 82   Resp 14   Wt 145 lb (65.8 kg)   SpO2 97%   BMI 23.40 kg/m²     Physical Exam  Vitals reviewed. Constitutional:       Appearance: She is well-developed. HENT:      Head: Normocephalic and atraumatic. Eyes:      General: No scleral icterus. Right eye: No discharge. Left eye: No discharge. Conjunctiva/sclera: Conjunctivae normal.      Pupils: Pupils are equal, round, and reactive to light. Neck:      Thyroid: No thyromegaly. Vascular: No JVD. Trachea: No tracheal deviation. Cardiovascular:      Rate and Rhythm: Normal rate and regular rhythm. Heart sounds: Normal heart sounds. No murmur heard. No friction rub. No gallop. Pulmonary:      Effort: Pulmonary effort is normal. No respiratory distress. Breath sounds: Normal breath sounds. No wheezing or rales. Abdominal:      General: Bowel sounds are normal. There is no distension. Palpations: Abdomen is soft. There is no mass. Tenderness: There is no abdominal tenderness. There is no guarding or rebound.       Hernia: No VIT D AND iPTH LEVELS. -     PTH, INTACT; Future  -     Vitamin D 25 Hydroxy;  Future

## 2021-08-20 DIAGNOSIS — E78.2 MIXED HYPERLIPIDEMIA: ICD-10-CM

## 2021-08-20 DIAGNOSIS — E11.9 CONTROLLED TYPE 2 DIABETES MELLITUS WITHOUT COMPLICATION, WITHOUT LONG-TERM CURRENT USE OF INSULIN (HCC): ICD-10-CM

## 2021-08-20 DIAGNOSIS — E83.52 HYPERCALCEMIA: Primary | ICD-10-CM

## 2021-08-20 DIAGNOSIS — E83.52 HYPERCALCEMIA: ICD-10-CM

## 2021-08-20 LAB
A/G RATIO: 1.6 (ref 1.1–2.2)
ALBUMIN SERPL-MCNC: 4.5 G/DL (ref 3.4–5)
ALP BLD-CCNC: 89 U/L (ref 40–129)
ALT SERPL-CCNC: 13 U/L (ref 10–40)
ANION GAP SERPL CALCULATED.3IONS-SCNC: 12 MMOL/L (ref 3–16)
AST SERPL-CCNC: 17 U/L (ref 15–37)
BASOPHILS ABSOLUTE: 0 K/UL (ref 0–0.2)
BASOPHILS RELATIVE PERCENT: 0.6 %
BILIRUB SERPL-MCNC: 0.7 MG/DL (ref 0–1)
BUN BLDV-MCNC: 17 MG/DL (ref 7–20)
CALCIUM SERPL-MCNC: 11.1 MG/DL (ref 8.3–10.6)
CHLORIDE BLD-SCNC: 102 MMOL/L (ref 99–110)
CHOLESTEROL, TOTAL: 160 MG/DL (ref 0–199)
CO2: 26 MMOL/L (ref 21–32)
CREAT SERPL-MCNC: 0.9 MG/DL (ref 0.6–1.2)
EOSINOPHILS ABSOLUTE: 0 K/UL (ref 0–0.6)
EOSINOPHILS RELATIVE PERCENT: 0.9 %
GFR AFRICAN AMERICAN: >60
GFR NON-AFRICAN AMERICAN: >60
GLOBULIN: 2.8 G/DL
GLUCOSE BLD-MCNC: 150 MG/DL (ref 70–99)
HCT VFR BLD CALC: 44.4 % (ref 36–48)
HDLC SERPL-MCNC: 57 MG/DL (ref 40–60)
HEMOGLOBIN: 15.2 G/DL (ref 12–16)
LDL CHOLESTEROL CALCULATED: 81 MG/DL
LYMPHOCYTES ABSOLUTE: 1.4 K/UL (ref 1–5.1)
LYMPHOCYTES RELATIVE PERCENT: 26.7 %
MCH RBC QN AUTO: 31.5 PG (ref 26–34)
MCHC RBC AUTO-ENTMCNC: 34.3 G/DL (ref 31–36)
MCV RBC AUTO: 91.6 FL (ref 80–100)
MONOCYTES ABSOLUTE: 0.3 K/UL (ref 0–1.3)
MONOCYTES RELATIVE PERCENT: 5.8 %
NEUTROPHILS ABSOLUTE: 3.4 K/UL (ref 1.7–7.7)
NEUTROPHILS RELATIVE PERCENT: 66 %
PARATHYROID HORMONE INTACT: 38.6 PG/ML (ref 14–72)
PDW BLD-RTO: 13 % (ref 12.4–15.4)
PLATELET # BLD: 209 K/UL (ref 135–450)
PMV BLD AUTO: 9.5 FL (ref 5–10.5)
POTASSIUM SERPL-SCNC: 4.7 MMOL/L (ref 3.5–5.1)
RBC # BLD: 4.85 M/UL (ref 4–5.2)
SODIUM BLD-SCNC: 140 MMOL/L (ref 136–145)
TOTAL PROTEIN: 7.3 G/DL (ref 6.4–8.2)
TRIGL SERPL-MCNC: 112 MG/DL (ref 0–150)
VITAMIN D 25-HYDROXY: 51.7 NG/ML
VLDLC SERPL CALC-MCNC: 22 MG/DL
WBC # BLD: 5.2 K/UL (ref 4–11)

## 2021-08-20 PROCEDURE — 36415 COLL VENOUS BLD VENIPUNCTURE: CPT | Performed by: INTERNAL MEDICINE

## 2021-08-21 LAB
ESTIMATED AVERAGE GLUCOSE: 114 MG/DL
HBA1C MFR BLD: 5.6 %

## 2021-08-23 NOTE — RESULT ENCOUNTER NOTE
Chol, sugars, labs appear in stable range, DM is controlled, calcium remains sl high but her parathyroid hormone level is nl. So for now we'll monitor and rec to also try to regularly drink at least 3 glasses of water/day. notify pt please.

## 2022-02-16 ENCOUNTER — OFFICE VISIT (OUTPATIENT)
Dept: INTERNAL MEDICINE CLINIC | Age: 72
End: 2022-02-16
Payer: MEDICARE

## 2022-02-16 VITALS
HEIGHT: 66 IN | WEIGHT: 156 LBS | DIASTOLIC BLOOD PRESSURE: 62 MMHG | HEART RATE: 72 BPM | SYSTOLIC BLOOD PRESSURE: 110 MMHG | OXYGEN SATURATION: 97 % | BODY MASS INDEX: 25.07 KG/M2

## 2022-02-16 DIAGNOSIS — M19.041 PRIMARY OSTEOARTHRITIS OF RIGHT HAND: ICD-10-CM

## 2022-02-16 DIAGNOSIS — M81.0 AGE-RELATED OSTEOPOROSIS WITHOUT CURRENT PATHOLOGICAL FRACTURE: ICD-10-CM

## 2022-02-16 DIAGNOSIS — F51.01 PRIMARY INSOMNIA: ICD-10-CM

## 2022-02-16 DIAGNOSIS — E11.9 CONTROLLED TYPE 2 DIABETES MELLITUS WITHOUT COMPLICATION, WITHOUT LONG-TERM CURRENT USE OF INSULIN (HCC): ICD-10-CM

## 2022-02-16 DIAGNOSIS — E11.9 CONTROLLED TYPE 2 DIABETES MELLITUS WITHOUT COMPLICATION, WITHOUT LONG-TERM CURRENT USE OF INSULIN (HCC): Primary | ICD-10-CM

## 2022-02-16 DIAGNOSIS — E78.2 MIXED HYPERLIPIDEMIA: ICD-10-CM

## 2022-02-16 DIAGNOSIS — F34.1 DYSTHYMIA: ICD-10-CM

## 2022-02-16 DIAGNOSIS — Z12.31 VISIT FOR SCREENING MAMMOGRAM: ICD-10-CM

## 2022-02-16 DIAGNOSIS — C50.911 CARCINOMA OF RIGHT FEMALE BREAST, UNSPECIFIED ESTROGEN RECEPTOR STATUS, UNSPECIFIED SITE OF BREAST (HCC): ICD-10-CM

## 2022-02-16 LAB — HBA1C MFR BLD: 5.9 %

## 2022-02-16 PROCEDURE — 1036F TOBACCO NON-USER: CPT | Performed by: INTERNAL MEDICINE

## 2022-02-16 PROCEDURE — 83036 HEMOGLOBIN GLYCOSYLATED A1C: CPT | Performed by: INTERNAL MEDICINE

## 2022-02-16 PROCEDURE — 1123F ACP DISCUSS/DSCN MKR DOCD: CPT | Performed by: INTERNAL MEDICINE

## 2022-02-16 PROCEDURE — G8427 DOCREV CUR MEDS BY ELIG CLIN: HCPCS | Performed by: INTERNAL MEDICINE

## 2022-02-16 PROCEDURE — 3017F COLORECTAL CA SCREEN DOC REV: CPT | Performed by: INTERNAL MEDICINE

## 2022-02-16 PROCEDURE — 2022F DILAT RTA XM EVC RTNOPTHY: CPT | Performed by: INTERNAL MEDICINE

## 2022-02-16 PROCEDURE — 3044F HG A1C LEVEL LT 7.0%: CPT | Performed by: INTERNAL MEDICINE

## 2022-02-16 PROCEDURE — 1090F PRES/ABSN URINE INCON ASSESS: CPT | Performed by: INTERNAL MEDICINE

## 2022-02-16 PROCEDURE — 99214 OFFICE O/P EST MOD 30 MIN: CPT | Performed by: INTERNAL MEDICINE

## 2022-02-16 PROCEDURE — 4040F PNEUMOC VAC/ADMIN/RCVD: CPT | Performed by: INTERNAL MEDICINE

## 2022-02-16 PROCEDURE — G8417 CALC BMI ABV UP PARAM F/U: HCPCS | Performed by: INTERNAL MEDICINE

## 2022-02-16 PROCEDURE — G8399 PT W/DXA RESULTS DOCUMENT: HCPCS | Performed by: INTERNAL MEDICINE

## 2022-02-16 PROCEDURE — G8484 FLU IMMUNIZE NO ADMIN: HCPCS | Performed by: INTERNAL MEDICINE

## 2022-02-16 RX ORDER — GLIPIZIDE 5 MG/1
TABLET ORAL
Qty: 90 TABLET | Refills: 1 | OUTPATIENT
Start: 2022-02-16

## 2022-02-16 RX ORDER — ROSUVASTATIN CALCIUM 10 MG/1
10 TABLET, COATED ORAL NIGHTLY
Qty: 90 TABLET | Refills: 1 | Status: SHIPPED | OUTPATIENT
Start: 2022-02-16 | End: 2022-10-07 | Stop reason: SDUPTHER

## 2022-02-16 RX ORDER — LANCETS 30 GAUGE
1 EACH MISCELLANEOUS 2 TIMES DAILY
Qty: 100 EACH | Refills: 0 | Status: SHIPPED | OUTPATIENT
Start: 2022-02-16

## 2022-02-16 RX ORDER — TRAZODONE HYDROCHLORIDE 100 MG/1
100 TABLET ORAL NIGHTLY
Qty: 90 TABLET | Refills: 1 | Status: SHIPPED | OUTPATIENT
Start: 2022-02-16 | End: 2022-09-21

## 2022-02-16 RX ORDER — GLIPIZIDE 5 MG/1
2.5 TABLET ORAL
Qty: 90 TABLET | Refills: 1 | Status: SHIPPED | OUTPATIENT
Start: 2022-02-16 | End: 2022-05-06 | Stop reason: DRUGHIGH

## 2022-02-16 RX ORDER — PAROXETINE HYDROCHLORIDE 20 MG/1
20 TABLET, FILM COATED ORAL EVERY MORNING
Qty: 90 TABLET | Refills: 1 | Status: SHIPPED | OUTPATIENT
Start: 2022-02-16 | End: 2022-09-23

## 2022-02-16 RX ORDER — GLUCOSAMINE HCL/CHONDROITIN SU 500-400 MG
CAPSULE ORAL
Qty: 100 STRIP | Refills: 3 | Status: SHIPPED | OUTPATIENT
Start: 2022-02-16

## 2022-02-16 SDOH — ECONOMIC STABILITY: TRANSPORTATION INSECURITY
IN THE PAST 12 MONTHS, HAS LACK OF TRANSPORTATION KEPT YOU FROM MEETINGS, WORK, OR FROM GETTING THINGS NEEDED FOR DAILY LIVING?: NO

## 2022-02-16 SDOH — ECONOMIC STABILITY: HOUSING INSECURITY: IN THE LAST 12 MONTHS, HOW MANY PLACES HAVE YOU LIVED?: OVER 6

## 2022-02-16 SDOH — ECONOMIC STABILITY: INCOME INSECURITY: IN THE LAST 12 MONTHS, WAS THERE A TIME WHEN YOU WERE NOT ABLE TO PAY THE MORTGAGE OR RENT ON TIME?: NO

## 2022-02-16 SDOH — HEALTH STABILITY: PHYSICAL HEALTH: ON AVERAGE, HOW MANY MINUTES DO YOU ENGAGE IN EXERCISE AT THIS LEVEL?: 30 MIN

## 2022-02-16 SDOH — ECONOMIC STABILITY: TRANSPORTATION INSECURITY
IN THE PAST 12 MONTHS, HAS THE LACK OF TRANSPORTATION KEPT YOU FROM MEDICAL APPOINTMENTS OR FROM GETTING MEDICATIONS?: NO

## 2022-02-16 SDOH — ECONOMIC STABILITY: HOUSING INSECURITY
IN THE LAST 12 MONTHS, WAS THERE A TIME WHEN YOU DID NOT HAVE A STEADY PLACE TO SLEEP OR SLEPT IN A SHELTER (INCLUDING NOW)?: NO

## 2022-02-16 SDOH — ECONOMIC STABILITY: FOOD INSECURITY: WITHIN THE PAST 12 MONTHS, YOU WORRIED THAT YOUR FOOD WOULD RUN OUT BEFORE YOU GOT MONEY TO BUY MORE.: NEVER TRUE

## 2022-02-16 SDOH — ECONOMIC STABILITY: HOUSING INSECURITY: IN THE LAST 12 MONTHS, HOW MANY PLACES HAVE YOU LIVED?: 1

## 2022-02-16 SDOH — ECONOMIC STABILITY: FOOD INSECURITY: WITHIN THE PAST 12 MONTHS, THE FOOD YOU BOUGHT JUST DIDN'T LAST AND YOU DIDN'T HAVE MONEY TO GET MORE.: NEVER TRUE

## 2022-02-16 SDOH — HEALTH STABILITY: PHYSICAL HEALTH: ON AVERAGE, HOW MANY DAYS PER WEEK DO YOU ENGAGE IN MODERATE TO STRENUOUS EXERCISE (LIKE A BRISK WALK)?: 3 DAYS

## 2022-02-16 ASSESSMENT — LIFESTYLE VARIABLES
HOW OFTEN DO YOU HAVE A DRINK CONTAINING ALCOHOL: NEVER
HOW MANY STANDARD DRINKS CONTAINING ALCOHOL DO YOU HAVE ON A TYPICAL DAY: 1 OR 2

## 2022-02-16 ASSESSMENT — SOCIAL DETERMINANTS OF HEALTH (SDOH)
WITHIN THE LAST YEAR, HAVE YOU BEEN AFRAID OF YOUR PARTNER OR EX-PARTNER?: NO
WITHIN THE LAST YEAR, HAVE YOU BEEN HUMILIATED OR EMOTIONALLY ABUSED IN OTHER WAYS BY YOUR PARTNER OR EX-PARTNER?: NO
HOW HARD IS IT FOR YOU TO PAY FOR THE VERY BASICS LIKE FOOD, HOUSING, MEDICAL CARE, AND HEATING?: NOT HARD AT ALL
WITHIN THE LAST YEAR, HAVE TO BEEN RAPED OR FORCED TO HAVE ANY KIND OF SEXUAL ACTIVITY BY YOUR PARTNER OR EX-PARTNER?: NO
WITHIN THE LAST YEAR, HAVE YOU BEEN KICKED, HIT, SLAPPED, OR OTHERWISE PHYSICALLY HURT BY YOUR PARTNER OR EX-PARTNER?: NO

## 2022-02-16 NOTE — PROGRESS NOTES
Tomas Day  1950 02/16/22    SUBJECTIVE:  DM- sugars higher w snacking on pumpkin bread thr the holidays past few months but now quit. Sugars higher to 160-180. Lab Results   Component Value Date    LABA1C 5.6 08/20/2021    LABA1C 5.4 02/18/2021    LABA1C 5.4 08/18/2020     Lab Results   Component Value Date    LABMICR <1.20 02/18/2021    LDLCALC 81 08/20/2021    CREATININE 0.9 08/20/2021     BREAST CA, NO LONGER FOLLOWING W [facility], MAMMO DUE THIS YR. Sprained r knuckle/mcp #2 two months ago W SOME PERSISTENT SWELLING. R HANDED, WORSE W GRIPPING AND USE. BETTER Sameera Edman. INSOMNIA STABLE ALSO W TRAZODONE PRN. Lipids:  Is continuing statin therapy and low fat diet. Tolerating medications w/o myalgias or GI upset. Mood stable on PAXIL w/o current anxiety, depression or panic attacks. OBJECTIVE:    /62   Pulse 72   Ht 5' 6\" (1.676 m)   Wt 156 lb (70.8 kg)   SpO2 97%   BMI 25.18 kg/m²     Physical Exam  Vitals reviewed. Constitutional:       Appearance: She is well-developed. Cardiovascular:      Rate and Rhythm: Normal rate and regular rhythm. Heart sounds: Normal heart sounds. No murmur heard. No friction rub. No gallop. Pulmonary:      Effort: Pulmonary effort is normal. No respiratory distress. Breath sounds: Normal breath sounds. No wheezing or rales. Abdominal:      General: Bowel sounds are normal. There is no distension. Palpations: Abdomen is soft. Tenderness: There is no abdominal tenderness. Musculoskeletal:      Cervical back: Neck supple.    Skin:     Comments: FOOT EXAM  Visual inspection:  Deformity/amputation: absent  Skin lesions/pre-ulcerative calluses: absent  Edema: right- negative, left- negative    Sensory exam:  Monofilament sensation: normal  (minimum of 5 random plantar locations tested, avoiding callused areas - > 1 area with absence of sensation is + for neuropathy)      Pulses: normal       Neurological: General: No focal deficit present. Mental Status: She is alert. Psychiatric:         Mood and Affect: Mood normal.         ASSESSMENT:    1. Controlled type 2 diabetes mellitus without complication, without long-term current use of insulin ([address]    2. Primary osteoarthritis of right hand    3. Mixed hyperlipidemia    4. Dysthymia    5. Age-related osteoporosis without current pathological fracture    6. Primary insomnia    7. Carcinoma of right female breast, unspecified estrogen receptor status, unspecified site of breast ([address]    8. Visit for screening mammogram        PLAN:    Erick Ritter was seen today for 6 month follow-up and arthritis. Diagnoses and all orders for this visit:    Controlled type 2 diabetes mellitus without complication, without long-term current use of insulin (HCC) -  A1C STILL IN GOOD RANGE, DM relatively well controlled and will continue current regimen. Screening reviewed. See orders. NOW OFF PUMPKIN BREAD SNACKS  -     metFORMIN (GLUCOPHAGE) 500 MG tablet; Take 0.5 tablets by mouth 2 times daily (with meals)  -     Lancets MISC; 1 each by Does not apply route 2 times daily  -     glipiZIDE (GLUCOTROL) 5 MG tablet; Take 0.5 tablets by mouth 2 times daily (before meals)  -     blood glucose monitor strips; Test twice a day & as needed for symptoms of irregular blood glucose. -     Comprehensive Metabolic Panel; Future  -     CBC with Auto Differential; Future  -     Lipid Panel; Future  -     Cancel: Hemoglobin A1C; Future  -     Microalbumin / Creatinine Urine Ratio; Future  -      DIABETES FOOT EXAM  -     POCT glycosylated hemoglobin (Hb A1C)    Primary osteoarthritis of right hand-  LIKELY SPRAIN AND ALSO CHECK XRAY  -     XR HAND RIGHT (2 VIEWS); Future    Mixed hyperlipidemia - Pt will continue to work on a low fat diet and also exercise, wt loss as appropriate. Will continue periodic monitoring of fasting lipid profile, glucose, liver function.     -     rosuvastatin (CRESTOR) 10 MG tablet; Take 1 tablet by mouth nightly  -     Comprehensive Metabolic Panel; Future  -     CBC with Auto Differential; Future  -     Lipid Panel; Future    Dysthymia - Mood stable on current regimen w/o any significant manifestations of severe depression or anxiety noted at this time. Cont current meds. -     PARoxetine (PAXIL) 20 MG tablet; Take 1 tablet by mouth every morning    Age-related osteoporosis without current pathological fracture- DUE LATER THIS YR  -     DEXA BONE DENSITY AXIAL SKELETON; Future    Primary insomnia - continue present management. Will monitor.    -     traZODone (DESYREL) 100 MG tablet; Take 1 tablet by mouth nightly    Carcinoma of right female breast, unspecified estrogen receptor status, unspecified site of breast ([facility] -   -     CBC with Auto Differential; Future    Visit for screening mammogram  -     [facility] DIGITAL SCREEN W CAD BILATERAL;  Future

## 2022-03-02 ENCOUNTER — HOSPITAL ENCOUNTER (OUTPATIENT)
Dept: GENERAL RADIOLOGY | Age: 72
Discharge: HOME | End: 2022-03-02
Payer: MEDICARE

## 2022-03-02 ENCOUNTER — HOSPITAL ENCOUNTER (OUTPATIENT)
Age: 72
Discharge: HOME | End: 2022-03-02
Payer: MEDICARE

## 2022-03-02 DIAGNOSIS — M19.041 PRIMARY OSTEOARTHRITIS OF RIGHT HAND: ICD-10-CM

## 2022-03-02 PROCEDURE — 73130 X-RAY EXAM OF HAND: CPT

## 2022-03-17 DIAGNOSIS — E78.2 MIXED HYPERLIPIDEMIA: ICD-10-CM

## 2022-03-17 DIAGNOSIS — E11.9 CONTROLLED TYPE 2 DIABETES MELLITUS WITHOUT COMPLICATION, WITHOUT LONG-TERM CURRENT USE OF INSULIN (HCC): ICD-10-CM

## 2022-03-17 DIAGNOSIS — C50.911 CARCINOMA OF RIGHT FEMALE BREAST, UNSPECIFIED ESTROGEN RECEPTOR STATUS, UNSPECIFIED SITE OF BREAST (HCC): ICD-10-CM

## 2022-03-17 LAB
A/G RATIO: 1.6 (ref 1.1–2.2)
ALBUMIN SERPL-MCNC: 4.1 G/DL (ref 3.4–5)
ALP BLD-CCNC: 74 U/L (ref 40–129)
ALT SERPL-CCNC: 11 U/L (ref 10–40)
ANION GAP SERPL CALCULATED.3IONS-SCNC: 13 MMOL/L (ref 3–16)
AST SERPL-CCNC: 13 U/L (ref 15–37)
BASOPHILS ABSOLUTE: 0.1 K/UL (ref 0–0.2)
BASOPHILS RELATIVE PERCENT: 1.3 %
BILIRUB SERPL-MCNC: 0.5 MG/DL (ref 0–1)
BUN BLDV-MCNC: 17 MG/DL (ref 7–20)
CALCIUM SERPL-MCNC: 10.3 MG/DL (ref 8.3–10.6)
CHLORIDE BLD-SCNC: 104 MMOL/L (ref 99–110)
CHOLESTEROL, TOTAL: 164 MG/DL (ref 0–199)
CO2: 22 MMOL/L (ref 21–32)
CREAT SERPL-MCNC: 0.7 MG/DL (ref 0.6–1.2)
CREATININE URINE: 105.7 MG/DL (ref 28–259)
EOSINOPHILS ABSOLUTE: 0.1 K/UL (ref 0–0.6)
EOSINOPHILS RELATIVE PERCENT: 2.8 %
GFR AFRICAN AMERICAN: >60
GFR NON-AFRICAN AMERICAN: >60
GLUCOSE BLD-MCNC: 164 MG/DL (ref 70–99)
HCT VFR BLD CALC: 42.6 % (ref 36–48)
HDLC SERPL-MCNC: 43 MG/DL (ref 40–60)
HEMOGLOBIN: 14.5 G/DL (ref 12–16)
LDL CHOLESTEROL CALCULATED: 73 MG/DL
LYMPHOCYTES ABSOLUTE: 1.2 K/UL (ref 1–5.1)
LYMPHOCYTES RELATIVE PERCENT: 23.8 %
MCH RBC QN AUTO: 31.3 PG (ref 26–34)
MCHC RBC AUTO-ENTMCNC: 34 G/DL (ref 31–36)
MCV RBC AUTO: 92.1 FL (ref 80–100)
MICROALBUMIN UR-MCNC: <1.2 MG/DL
MICROALBUMIN/CREAT UR-RTO: NORMAL MG/G (ref 0–30)
MONOCYTES ABSOLUTE: 0.5 K/UL (ref 0–1.3)
MONOCYTES RELATIVE PERCENT: 9.2 %
NEUTROPHILS ABSOLUTE: 3.2 K/UL (ref 1.7–7.7)
NEUTROPHILS RELATIVE PERCENT: 62.9 %
PDW BLD-RTO: 13.2 % (ref 12.4–15.4)
PLATELET # BLD: 235 K/UL (ref 135–450)
PMV BLD AUTO: 9 FL (ref 5–10.5)
POTASSIUM SERPL-SCNC: 4.2 MMOL/L (ref 3.5–5.1)
RBC # BLD: 4.63 M/UL (ref 4–5.2)
SODIUM BLD-SCNC: 139 MMOL/L (ref 136–145)
TOTAL PROTEIN: 6.6 G/DL (ref 6.4–8.2)
TRIGL SERPL-MCNC: 242 MG/DL (ref 0–150)
VLDLC SERPL CALC-MCNC: 48 MG/DL
WBC # BLD: 5 K/UL (ref 4–11)

## 2022-03-17 PROCEDURE — 36415 COLL VENOUS BLD VENIPUNCTURE: CPT | Performed by: INTERNAL MEDICINE

## 2022-03-21 ENCOUNTER — NURSE TRIAGE (OUTPATIENT)
Dept: OTHER | Facility: CLINIC | Age: 72
End: 2022-03-21

## 2022-03-21 ENCOUNTER — OFFICE VISIT (OUTPATIENT)
Dept: INTERNAL MEDICINE CLINIC | Age: 72
End: 2022-03-21
Payer: MEDICARE

## 2022-03-21 VITALS
SYSTOLIC BLOOD PRESSURE: 122 MMHG | HEART RATE: 84 BPM | HEIGHT: 66 IN | WEIGHT: 158.25 LBS | OXYGEN SATURATION: 99 % | RESPIRATION RATE: 14 BRPM | BODY MASS INDEX: 25.43 KG/M2 | DIASTOLIC BLOOD PRESSURE: 70 MMHG

## 2022-03-21 DIAGNOSIS — R60.9 PERIPHERAL EDEMA: Primary | ICD-10-CM

## 2022-03-21 PROCEDURE — G8427 DOCREV CUR MEDS BY ELIG CLIN: HCPCS | Performed by: INTERNAL MEDICINE

## 2022-03-21 PROCEDURE — 4040F PNEUMOC VAC/ADMIN/RCVD: CPT | Performed by: INTERNAL MEDICINE

## 2022-03-21 PROCEDURE — 1123F ACP DISCUSS/DSCN MKR DOCD: CPT | Performed by: INTERNAL MEDICINE

## 2022-03-21 PROCEDURE — 1036F TOBACCO NON-USER: CPT | Performed by: INTERNAL MEDICINE

## 2022-03-21 PROCEDURE — 99213 OFFICE O/P EST LOW 20 MIN: CPT | Performed by: INTERNAL MEDICINE

## 2022-03-21 PROCEDURE — G8484 FLU IMMUNIZE NO ADMIN: HCPCS | Performed by: INTERNAL MEDICINE

## 2022-03-21 PROCEDURE — G8417 CALC BMI ABV UP PARAM F/U: HCPCS | Performed by: INTERNAL MEDICINE

## 2022-03-21 PROCEDURE — 3017F COLORECTAL CA SCREEN DOC REV: CPT | Performed by: INTERNAL MEDICINE

## 2022-03-21 PROCEDURE — G8399 PT W/DXA RESULTS DOCUMENT: HCPCS | Performed by: INTERNAL MEDICINE

## 2022-03-21 PROCEDURE — 1090F PRES/ABSN URINE INCON ASSESS: CPT | Performed by: INTERNAL MEDICINE

## 2022-03-21 RX ORDER — FUROSEMIDE 20 MG/1
20 TABLET ORAL DAILY PRN
Qty: 90 TABLET | Refills: 0 | Status: SHIPPED | OUTPATIENT
Start: 2022-03-21 | End: 2022-04-04

## 2022-03-21 ASSESSMENT — PATIENT HEALTH QUESTIONNAIRE - PHQ9
SUM OF ALL RESPONSES TO PHQ QUESTIONS 1-9: 0
SUM OF ALL RESPONSES TO PHQ9 QUESTIONS 1 & 2: 0
10. IF YOU CHECKED OFF ANY PROBLEMS, HOW DIFFICULT HAVE THESE PROBLEMS MADE IT FOR YOU TO DO YOUR WORK, TAKE CARE OF THINGS AT HOME, OR GET ALONG WITH OTHER PEOPLE: 0
SUM OF ALL RESPONSES TO PHQ QUESTIONS 1-9: 0
2. FEELING DOWN, DEPRESSED OR HOPELESS: 0
SUM OF ALL RESPONSES TO PHQ QUESTIONS 1-9: 0
8. MOVING OR SPEAKING SO SLOWLY THAT OTHER PEOPLE COULD HAVE NOTICED. OR THE OPPOSITE, BEING SO FIGETY OR RESTLESS THAT YOU HAVE BEEN MOVING AROUND A LOT MORE THAN USUAL: 0
SUM OF ALL RESPONSES TO PHQ QUESTIONS 1-9: 0
3. TROUBLE FALLING OR STAYING ASLEEP: 0
1. LITTLE INTEREST OR PLEASURE IN DOING THINGS: 0
6. FEELING BAD ABOUT YOURSELF - OR THAT YOU ARE A FAILURE OR HAVE LET YOURSELF OR YOUR FAMILY DOWN: 0
9. THOUGHTS THAT YOU WOULD BE BETTER OFF DEAD, OR OF HURTING YOURSELF: 0
4. FEELING TIRED OR HAVING LITTLE ENERGY: 0
7. TROUBLE CONCENTRATING ON THINGS, SUCH AS READING THE NEWSPAPER OR WATCHING TELEVISION: 0
5. POOR APPETITE OR OVEREATING: 0

## 2022-03-21 NOTE — TELEPHONE ENCOUNTER
Received call from Luann Pride at [facility] with Red Flag Complaint. Subjective: Caller states \"swelling in hands, feet, eyes, and legs bilaterally\"     Current Symptoms: Reports swelling in hands, feet, face. Recent R hand inj but XR (-). Erythema present in hands bilaterally. Feet/ankles/legs pain with palpation and at rest. Erythema present in LE bilaterally and gets more swollen towards end of day. Swelling in eyes and pain with palpation. Denies blurry vision, warm to touch at any area, CP, SOB, recent inj, weeping fluid, inability to walk, fever, thigh/calf pain unilaterally, swelling above knee. Onset: 5 weeks ago; worsening over the weekend    Associated Symptoms: NA    Pain Severity: 5/10; pressure; constant    Temperature: denies     What has been tried: tylenol arthritis - advised not to take due to kidney function     LMP: NA Pregnant: NA    Recommended disposition: Go to [facility]    Care advice provided, patient verbalizes understanding; denies any other questions or concerns; instructed to call back for any new or worsening symptoms. Patient/Caller agrees with recommended disposition; writer provided warm transfer to Jah Khan at [facility] for appointment scheduling     Attention Provider: Thank you for allowing me to participate in the care of your patient. The patient was connected to triage in response to information provided to the ECC/PSC. Please do not respond through this encounter as the response is not directed to a shared pool.         Reason for Disposition   Swelling of face, arm or hands (Exception: slight puffiness of fingers during hot weather)    Protocols used: LEG SWELLING AND EDEMA-ADULT-[place]

## 2022-03-21 NOTE — PROGRESS NOTES
Ramandeep Kevin  1950 03/21/22    SUBJECTIVE:  The past few months has had episodic swelling of hands and feet. Eyes can also feel puffy. Does not add much salt to food. Recheck L arm bp 120/70    OBJECTIVE:    /70   Pulse 84   Resp 14   Ht 5' 6\" (1.676 m)   Wt 158 lb 4 oz (71.8 kg)   SpO2 99%   BMI 25.54 kg/m²     Physical Exam  Vitals reviewed. Constitutional:       Appearance: She is well-developed. Cardiovascular:      Rate and Rhythm: Normal rate and regular rhythm. Heart sounds: Normal heart sounds. No murmur heard. No friction rub. No gallop. Pulmonary:      Effort: Pulmonary effort is normal. No respiratory distress. Breath sounds: Normal breath sounds. No wheezing or rales. Abdominal:      General: Bowel sounds are normal. There is no distension. Palpations: Abdomen is soft. Tenderness: There is no abdominal tenderness. Musculoskeletal:      Cervical back: Neck supple. Right lower leg: Edema present. Left lower leg: Edema present. Neurological:      Mental Status: She is alert. ASSESSMENT:    1. Peripheral edema        PLAN:    [facility] was seen today for edema. Diagnoses and all orders for this visit:    Peripheral edema- BP OK, BUT DO SUSPECT MILD FLUID OVERLOAD SO WE'LL TRY GENTLE LOW DOSE PRN LASIX, REC MAX 1-2X/WEEK, F/U LAB PRIOR TO APPT ONE MONTH FOR RE-EVAL  -     furosemide (LASIX) 20 MG tablet; Take 1 tablet by mouth daily as needed (edema, swelling of extremities)  -     Basic Metabolic Panel;  Future

## 2022-04-04 ENCOUNTER — OFFICE VISIT (OUTPATIENT)
Dept: INTERNAL MEDICINE CLINIC | Age: 72
End: 2022-04-04
Payer: MEDICARE

## 2022-04-04 ENCOUNTER — NURSE TRIAGE (OUTPATIENT)
Dept: OTHER | Facility: CLINIC | Age: 72
End: 2022-04-04

## 2022-04-04 VITALS
BODY MASS INDEX: 25.57 KG/M2 | WEIGHT: 159.13 LBS | HEIGHT: 66 IN | DIASTOLIC BLOOD PRESSURE: 74 MMHG | HEART RATE: 88 BPM | SYSTOLIC BLOOD PRESSURE: 120 MMHG | OXYGEN SATURATION: 99 % | RESPIRATION RATE: 16 BRPM

## 2022-04-04 DIAGNOSIS — R60.9 PERIPHERAL EDEMA: Primary | ICD-10-CM

## 2022-04-04 DIAGNOSIS — M25.50 ARTHRALGIA, UNSPECIFIED JOINT: ICD-10-CM

## 2022-04-04 PROCEDURE — G8427 DOCREV CUR MEDS BY ELIG CLIN: HCPCS | Performed by: INTERNAL MEDICINE

## 2022-04-04 PROCEDURE — G8399 PT W/DXA RESULTS DOCUMENT: HCPCS | Performed by: INTERNAL MEDICINE

## 2022-04-04 PROCEDURE — 99213 OFFICE O/P EST LOW 20 MIN: CPT | Performed by: INTERNAL MEDICINE

## 2022-04-04 PROCEDURE — 4040F PNEUMOC VAC/ADMIN/RCVD: CPT | Performed by: INTERNAL MEDICINE

## 2022-04-04 PROCEDURE — 1090F PRES/ABSN URINE INCON ASSESS: CPT | Performed by: INTERNAL MEDICINE

## 2022-04-04 PROCEDURE — 1123F ACP DISCUSS/DSCN MKR DOCD: CPT | Performed by: INTERNAL MEDICINE

## 2022-04-04 PROCEDURE — G8417 CALC BMI ABV UP PARAM F/U: HCPCS | Performed by: INTERNAL MEDICINE

## 2022-04-04 PROCEDURE — 3017F COLORECTAL CA SCREEN DOC REV: CPT | Performed by: INTERNAL MEDICINE

## 2022-04-04 PROCEDURE — 1036F TOBACCO NON-USER: CPT | Performed by: INTERNAL MEDICINE

## 2022-04-04 RX ORDER — HYDROCHLOROTHIAZIDE 12.5 MG/1
12.5 CAPSULE, GELATIN COATED ORAL DAILY PRN
Qty: 30 CAPSULE | Refills: 3 | Status: SHIPPED
Start: 2022-04-04 | End: 2022-04-13

## 2022-04-04 NOTE — TELEPHONE ENCOUNTER
Received call from [facility] at [facility] with [employer]. Subjective: Caller states \"she has swelling in both hands and feet and both eyes\" Was seen for this 2 weeks ago and was given lasix    Current Symptoms: see above, and it is all painful, bs has been running high    Onset: 2 weeks ago; worsening    Associated Symptoms: reduced appetite    Pain Severity: 6/10; throbbing; constant    Temperature: denies fever n/a    What has been tried: lasix she was prescribed, ibuprofen    LMP: NA Pregnant: NA    Recommended disposition: See PCP within 24 Hours , was told walk in/[facility] if no appointments    Care advice provided, patient verbalizes understanding; denies any other questions or concerns; instructed to call back for any new or worsening symptoms. Patient/Caller agrees with recommended disposition; writer provided warm transfer to chat at [facility] for appointment scheduling     Attention Provider: Thank you for allowing me to participate in the care of your patient. The patient was connected to triage in response to information provided to the ECC/PSC. Please do not respond through this encounter as the response is not directed to a shared pool.         Reason for Disposition   MODERATE hand swelling (e.g., visible swelling of hand and fingers; pitting edema)    Protocols used: HAND [facility]

## 2022-04-04 NOTE — PROGRESS NOTES
Tapan Mini  1950 04/04/22    SUBJECTIVE:    Has started lasix 20mg daily but no change in her overall edema in hands, legs, eyes/face, wrists/arms. No lt headedness. Wt is actually up a #. Sl incr urination noted. Not tried HCTZ diuretic. She also c/o some diffuse arthralgias and wondered about possible RA. DM- LAST A1CS CONSISTENTLY INDICATING GOOD CONTROL, WE'LL CONT MONITOR  Lab Results   Component Value Date    LABA1C 5.9 02/16/2022    LABA1C 5.6 08/20/2021    LABA1C 5.4 02/18/2021     Lab Results   Component Value Date    LABMICR <1.20 03/17/2022    LDLCALC 73 03/17/2022    CREATININE 0.7 03/17/2022         OBJECTIVE:    /74   Pulse 88   Resp 16   Ht 5' 6\" (1.676 m)   Wt 159 lb 2 oz (72.2 kg)   SpO2 99%   BMI 25.68 kg/m²     Physical Exam  Vitals reviewed. Constitutional:       Appearance: She is well-developed. Cardiovascular:      Rate and Rhythm: Normal rate and regular rhythm. Heart sounds: Normal heart sounds. No murmur heard. No friction rub. No gallop. Pulmonary:      Effort: Pulmonary effort is normal. No respiratory distress. Breath sounds: Normal breath sounds. No wheezing or rales. Abdominal:      General: Bowel sounds are normal. There is no distension. Palpations: Abdomen is soft. Tenderness: There is no abdominal tenderness. Musculoskeletal:         General: No tenderness (no swelling of MCPs hands). Cervical back: Neck supple. Right lower leg: Edema (TR) present. Left lower leg: Edema (TR) present. Neurological:      Mental Status: She is alert. ASSESSMENT:    1. Peripheral edema    2. Arthralgia, unspecified joint        PLAN:    Kelsea Rousseau was seen today for follow-up, edema and generalized body aches. Diagnoses and all orders for this visit:    Arthralgia, unspecified joint- W HER CONCERN FOR RA, MOTHER APPARENTLY W HX OF THIS, WE'LL CHECK LAB AS BELOW.    -     Rheumatoid Factor;  Future  - RODOLFO; Future  -     Sedimentation Rate; Future  -     C-Reactive Protein; Future  -     Uric Acid; Future    Peripheral edema- NOT RESPONDED WELL TO LOW DOSE LASIX, WE'LL TRY ALT HCTZ THEN F/U LAB IN APPROX 2WEEKS  -     Comprehensive Metabolic Panel; Future  -     hydroCHLOROthiazide (MICROZIDE) 12.5 MG capsule;  Take 1 capsule by mouth daily as needed (edema, swelling in hands and legs)

## 2022-04-13 ENCOUNTER — TELEPHONE (OUTPATIENT)
Dept: INTERNAL MEDICINE CLINIC | Age: 72
End: 2022-04-13

## 2022-04-13 DIAGNOSIS — R60.9 PERIPHERAL EDEMA: ICD-10-CM

## 2022-04-13 RX ORDER — FUROSEMIDE 40 MG/1
40 TABLET ORAL DAILY PRN
Qty: 30 TABLET | Refills: 0 | Status: SHIPPED | OUTPATIENT
Start: 2022-04-13 | End: 2022-04-26 | Stop reason: SDUPTHER

## 2022-04-13 RX ORDER — POTASSIUM CHLORIDE 20 MEQ/1
20 TABLET, EXTENDED RELEASE ORAL DAILY
Qty: 30 TABLET | Refills: 0 | Status: SHIPPED
Start: 2022-04-13 | End: 2022-04-26 | Stop reason: SINTOL

## 2022-04-13 NOTE — TELEPHONE ENCOUNTER
Patient calls- she is still having edema in her hands, ankles, feet and eyes. She has tried Lasix 20mg with no benefit and she was recently prescribed HCTZ 12.5mg and that's not working either. Please advise.

## 2022-04-13 NOTE — TELEPHONE ENCOUNTER
Karen, let's stop the hctz, switch back to lasix but TRYING A HIGHER DOSE 40MG DAILY ALONG W KCL 20MEQ DAILY, Place med changes/corrections on EPIC medlist please    IF NO BETTER IN A WEEK ASK PEG TO CALL US BACK AND WE'LL ALSO CONSIDER A SHORT STEROID TAPER IF SOME OF HER SWELLING MIGHT BE FROM AN ALLERGIC TYPE REACTION?

## 2022-04-13 NOTE — TELEPHONE ENCOUNTER
Tried to reach patient. Unsuccessful., I sent patient a [employer] message and routed scripts to pharmacy.

## 2022-04-18 ENCOUNTER — TELEPHONE (OUTPATIENT)
Dept: INTERNAL MEDICINE CLINIC | Age: 72
End: 2022-04-18

## 2022-04-18 NOTE — TELEPHONE ENCOUNTER
For three days only can try incr lasix to 80mg/day, then back to 40mg/day. If by WED no better w the leg swelling then to pls call us back. She needs to watch bp too and not take 80mg but only 97WV dose if systolic bp is <632.

## 2022-04-21 ENCOUNTER — TELEPHONE (OUTPATIENT)
Dept: INTERNAL MEDICINE CLINIC | Age: 72
End: 2022-04-21

## 2022-04-21 DIAGNOSIS — R06.00 DYSPNEA, UNSPECIFIED TYPE: Primary | ICD-10-CM

## 2022-04-21 NOTE — TELEPHONE ENCOUNTER
[id number] Mya Curran for getting all lab ordered earlier this Apr, but also ADD BNP, dx dyspnea, to screen for CHF and lastly rec we sched for f/u appt next week. I may need to do additoinal testing to be sure this is not cardiac related.

## 2022-04-21 NOTE — TELEPHONE ENCOUNTER
Patient has been taking Lasix 80mg x 3 days. She said it does help but in the mornings all the edema is back. She also wanted to know if she can have th labs done now that you ordered last OV.

## 2022-04-26 ENCOUNTER — OFFICE VISIT (OUTPATIENT)
Dept: INTERNAL MEDICINE CLINIC | Age: 72
End: 2022-04-26
Payer: MEDICARE

## 2022-04-26 VITALS
RESPIRATION RATE: 16 BRPM | HEART RATE: 101 BPM | WEIGHT: 152 LBS | HEIGHT: 66 IN | SYSTOLIC BLOOD PRESSURE: 122 MMHG | BODY MASS INDEX: 24.43 KG/M2 | DIASTOLIC BLOOD PRESSURE: 78 MMHG | OXYGEN SATURATION: 98 %

## 2022-04-26 DIAGNOSIS — R60.9 PERIPHERAL EDEMA: Primary | ICD-10-CM

## 2022-04-26 DIAGNOSIS — R60.9 PERIPHERAL EDEMA: ICD-10-CM

## 2022-04-26 DIAGNOSIS — C50.911 CARCINOMA OF RIGHT FEMALE BREAST, UNSPECIFIED ESTROGEN RECEPTOR STATUS, UNSPECIFIED SITE OF BREAST (HCC): ICD-10-CM

## 2022-04-26 DIAGNOSIS — M25.50 ARTHRALGIA, UNSPECIFIED JOINT: ICD-10-CM

## 2022-04-26 DIAGNOSIS — R06.02 SHORTNESS OF BREATH: ICD-10-CM

## 2022-04-26 LAB
A/G RATIO: 1.6 (ref 1.1–2.2)
ALBUMIN SERPL-MCNC: 4 G/DL (ref 3.4–5)
ALP BLD-CCNC: 68 U/L (ref 40–129)
ALT SERPL-CCNC: 10 U/L (ref 10–40)
ANION GAP SERPL CALCULATED.3IONS-SCNC: 12 MMOL/L (ref 3–16)
AST SERPL-CCNC: 14 U/L (ref 15–37)
BILIRUB SERPL-MCNC: 0.5 MG/DL (ref 0–1)
BUN BLDV-MCNC: 18 MG/DL (ref 7–20)
C-REACTIVE PROTEIN: 5.7 MG/L (ref 0–5.1)
CALCIUM SERPL-MCNC: 10.3 MG/DL (ref 8.3–10.6)
CHLORIDE BLD-SCNC: 100 MMOL/L (ref 99–110)
CO2: 24 MMOL/L (ref 21–32)
CREAT SERPL-MCNC: 0.8 MG/DL (ref 0.6–1.2)
GFR AFRICAN AMERICAN: >60
GFR NON-AFRICAN AMERICAN: >60
GLUCOSE BLD-MCNC: 224 MG/DL (ref 70–99)
POTASSIUM SERPL-SCNC: 4 MMOL/L (ref 3.5–5.1)
PRO-BNP: 203 PG/ML (ref 0–124)
RHEUMATOID FACTOR: 11 IU/ML
SEDIMENTATION RATE, ERYTHROCYTE: 26 MM/HR (ref 0–30)
SODIUM BLD-SCNC: 136 MMOL/L (ref 136–145)
TOTAL PROTEIN: 6.5 G/DL (ref 6.4–8.2)
URIC ACID, SERUM: 8.3 MG/DL (ref 2.6–6)

## 2022-04-26 PROCEDURE — 1090F PRES/ABSN URINE INCON ASSESS: CPT | Performed by: INTERNAL MEDICINE

## 2022-04-26 PROCEDURE — 99214 OFFICE O/P EST MOD 30 MIN: CPT | Performed by: INTERNAL MEDICINE

## 2022-04-26 PROCEDURE — 1123F ACP DISCUSS/DSCN MKR DOCD: CPT | Performed by: INTERNAL MEDICINE

## 2022-04-26 PROCEDURE — 36415 COLL VENOUS BLD VENIPUNCTURE: CPT | Performed by: INTERNAL MEDICINE

## 2022-04-26 PROCEDURE — 4040F PNEUMOC VAC/ADMIN/RCVD: CPT | Performed by: INTERNAL MEDICINE

## 2022-04-26 PROCEDURE — 3017F COLORECTAL CA SCREEN DOC REV: CPT | Performed by: INTERNAL MEDICINE

## 2022-04-26 PROCEDURE — G8399 PT W/DXA RESULTS DOCUMENT: HCPCS | Performed by: INTERNAL MEDICINE

## 2022-04-26 PROCEDURE — G8427 DOCREV CUR MEDS BY ELIG CLIN: HCPCS | Performed by: INTERNAL MEDICINE

## 2022-04-26 PROCEDURE — G8420 CALC BMI NORM PARAMETERS: HCPCS | Performed by: INTERNAL MEDICINE

## 2022-04-26 PROCEDURE — 1036F TOBACCO NON-USER: CPT | Performed by: INTERNAL MEDICINE

## 2022-04-26 RX ORDER — FUROSEMIDE 40 MG/1
40 TABLET ORAL DAILY
Qty: 90 TABLET | Refills: 1 | Status: SHIPPED | OUTPATIENT
Start: 2022-04-26 | End: 2022-09-23

## 2022-04-26 NOTE — PATIENT INSTRUCTIONS
For arthritis, joint aches stop the ibuprofen and can try extra strength tylenol up to 3x/day or 2 regular strength tylenol up to 3x/day

## 2022-04-26 NOTE — PROGRESS NOTES
John Navy  1950 04/26/22    SUBJECTIVE:  Continues to have edema, swelling of legs/ankles and also L>R hand despite incr doses of diuretic, even for 3d of lasix. No sx cp but can have sporadic SOB. She also c/o some night sweat off and on for past few months    Remote hx of breast CA is noted. NOW FOLLOWING W [facility] PRN    Of note, past few months for her swelling has also been on ibuprofen ~2/d, advised to stop and switch to tylenol as NSAIDs can cause fluid retention  OBJECTIVE:    /78   Pulse 101   Resp 16   Ht 5' 6\" (1.676 m)   Wt 152 lb (68.9 kg)   SpO2 98%   Breastfeeding No   BMI 24.53 kg/m²     Physical Exam  Vitals reviewed. Constitutional:       Appearance: She is well-developed. Cardiovascular:      Rate and Rhythm: Normal rate and regular rhythm. Heart sounds: Normal heart sounds. No murmur heard. No friction rub. No gallop. Pulmonary:      Effort: Pulmonary effort is normal. No respiratory distress. Breath sounds: Normal breath sounds. No wheezing or rales. Abdominal:      General: Bowel sounds are normal. There is no distension. Palpations: Abdomen is soft. Tenderness: There is no abdominal tenderness. Musculoskeletal:         General: Swelling: L hand w mild edema as well>R. Cervical back: Neck supple. Right lower leg: Edema present. Left lower leg: Edema present. Neurological:      Mental Status: She is alert. ASSESSMENT:    1. Peripheral edema    2. Shortness of breath    3. Carcinoma of right female breast, unspecified estrogen receptor status, unspecified site of breast [facility])        PLAN:    Cinthya Fay was seen today for edema. Diagnoses and all orders for this visit:    Peripheral edema - CONT LASIX AND CHECK RENAL FXN, LYTES ESPEC SODIUM AND POTASSIUM. AM CONCERNED ABOUT POSSIBLE CARDIAC CAUSE AND WE'LL REFER FOR CONSULTATION ALSO  -     furosemide (LASIX) 40 MG tablet;  Take 1 tablet by mouth daily  - Cancel: Basic Metabolic Panel; Future  -     Viktor Capps MD, Cardiology, [place]    Shortness of breath CHECK CXR AND ALSO BNP SCR FOR CHF  -     Brain Natriuretic Peptide; Future  -     Viktor Capps MD, Cardiology, [place]  -     XR CHEST STANDARD (2 VW);  Future    Carcinoma of right female breast, unspecified estrogen receptor status, unspecified site of breast [facility])- ALSO CHECK CXR

## 2022-04-27 LAB — ANTI-NUCLEAR ANTIBODY (ANA): NEGATIVE

## 2022-04-28 DIAGNOSIS — R60.9 SWELLING: Primary | ICD-10-CM

## 2022-04-28 DIAGNOSIS — R68.89 FLU-LIKE SYMPTOMS: ICD-10-CM

## 2022-04-28 RX ORDER — PREDNISONE 1 MG/1
TABLET ORAL
Qty: 21 TABLET | Refills: 0 | Status: SHIPPED | OUTPATIENT
Start: 2022-04-28 | End: 2022-05-06 | Stop reason: ALTCHOICE

## 2022-05-03 ENCOUNTER — INITIAL CONSULT (OUTPATIENT)
Dept: CARDIOLOGY CLINIC | Age: 72
End: 2022-05-03
Payer: MEDICARE

## 2022-05-03 ENCOUNTER — TELEPHONE (OUTPATIENT)
Dept: CARDIOLOGY CLINIC | Age: 72
End: 2022-05-03

## 2022-05-03 VITALS
HEIGHT: 66 IN | SYSTOLIC BLOOD PRESSURE: 116 MMHG | WEIGHT: 155 LBS | BODY MASS INDEX: 24.91 KG/M2 | DIASTOLIC BLOOD PRESSURE: 68 MMHG | HEART RATE: 86 BPM

## 2022-05-03 DIAGNOSIS — R06.02 SOBOE (SHORTNESS OF BREATH ON EXERTION): Primary | ICD-10-CM

## 2022-05-03 DIAGNOSIS — E11.9 CONTROLLED TYPE 2 DIABETES MELLITUS WITHOUT COMPLICATION, WITHOUT LONG-TERM CURRENT USE OF INSULIN (HCC): ICD-10-CM

## 2022-05-03 DIAGNOSIS — R60.0 EDEMA OF LOWER EXTREMITY: ICD-10-CM

## 2022-05-03 DIAGNOSIS — I87.2 VENOUS INSUFFICIENCY: ICD-10-CM

## 2022-05-03 DIAGNOSIS — E78.5 DYSLIPIDEMIA: ICD-10-CM

## 2022-05-03 PROCEDURE — G8399 PT W/DXA RESULTS DOCUMENT: HCPCS | Performed by: INTERNAL MEDICINE

## 2022-05-03 PROCEDURE — 1123F ACP DISCUSS/DSCN MKR DOCD: CPT | Performed by: INTERNAL MEDICINE

## 2022-05-03 PROCEDURE — G8427 DOCREV CUR MEDS BY ELIG CLIN: HCPCS | Performed by: INTERNAL MEDICINE

## 2022-05-03 PROCEDURE — 1090F PRES/ABSN URINE INCON ASSESS: CPT | Performed by: INTERNAL MEDICINE

## 2022-05-03 PROCEDURE — 3017F COLORECTAL CA SCREEN DOC REV: CPT | Performed by: INTERNAL MEDICINE

## 2022-05-03 PROCEDURE — 99204 OFFICE O/P NEW MOD 45 MIN: CPT | Performed by: INTERNAL MEDICINE

## 2022-05-03 PROCEDURE — 4040F PNEUMOC VAC/ADMIN/RCVD: CPT | Performed by: INTERNAL MEDICINE

## 2022-05-03 PROCEDURE — G8417 CALC BMI ABV UP PARAM F/U: HCPCS | Performed by: INTERNAL MEDICINE

## 2022-05-03 PROCEDURE — 3044F HG A1C LEVEL LT 7.0%: CPT | Performed by: INTERNAL MEDICINE

## 2022-05-03 PROCEDURE — 1036F TOBACCO NON-USER: CPT | Performed by: INTERNAL MEDICINE

## 2022-05-03 PROCEDURE — 2022F DILAT RTA XM EVC RTNOPTHY: CPT | Performed by: INTERNAL MEDICINE

## 2022-05-03 PROCEDURE — 93000 ELECTROCARDIOGRAM COMPLETE: CPT | Performed by: INTERNAL MEDICINE

## 2022-05-03 NOTE — TELEPHONE ENCOUNTER
Called to set up appointment for ECHO, GXT & U/S x 2 as well as 1 month follow up. Left message asking for call back. Provided number.

## 2022-05-03 NOTE — PROGRESS NOTES
Louis Bang MD                                  CARDIOLOGY  NOTE        Referring Physician: Madina Roberson MD    Thank you for consultation. Chief Complaint:    Chief Complaint   Patient presents with    Consultation     Pt states she has SOB on exertion. Some swelling in feet and No surgeries or procedures scheduled. HPI:     Amber Gupta is a 80y.o. year old female presents to the clinic today to be evaluated for lower extremity edema and shortness of breath. Patient has by medical history significant for hyperlipidemia diabetes mellitus, remote history of breast cancer 22 years ago requiring chemotherapy. .      Patient mentions for the past 4 to 5 weeks is been experiencing bilateral lower extremity swelling left more than right. She also complains of occasional claudication symptoms in the left lower extremity. Patient mentions she had a dog before and she used to drink for walk however since losing her dog she has not been exerting herself. Recently she took a long walk and and experienced exertional dyspnea. Patient denies any PND or orthopnea. Denies any chest pain or palpitations. EKG shows normal sinus rhythm without any significant ST-T changes    Patient is a lifelong non-smoker denies any alcohol or drug abuse.   No significant coronary disease in immediate blood relatives      Current Outpatient Medications   Medication Sig Dispense Refill    predniSONE (DELTASONE) 5 MG tablet Take 6 tablets by mouth on day 1, 5 on day 2, 4 on day 3, 3 on day 4, 2 on day 5, 1 on day 6. 21 tablet 0    furosemide (LASIX) 40 MG tablet Take 1 tablet by mouth daily 90 tablet 1    traZODone (DESYREL) 100 MG tablet Take 1 tablet by mouth nightly 90 tablet 1    rosuvastatin (CRESTOR) 10 MG tablet Take 1 tablet by mouth nightly 90 tablet 1    PARoxetine (PAXIL) 20 MG tablet Take 1 tablet by mouth every morning 90 tablet 1    metFORMIN (GLUCOPHAGE) 500 MG tablet Take 0.5 tablets by mouth 2 times daily (with meals) (Patient taking differently: Take 500 mg by mouth every morning (before breakfast) ) 90 tablet 1    Lancets MISC 1 each by Does not apply route 2 times daily 100 each 0    glipiZIDE (GLUCOTROL) 5 MG tablet Take 0.5 tablets by mouth 2 times daily (before meals) (Patient taking differently: Take 5 mg by mouth daily ) 90 tablet 1    blood glucose monitor strips Test twice a day & as needed for symptoms of irregular blood glucose. 100 strip 3     No current facility-administered medications for this visit. Allergies:     Codeine    Patient History:    Past Medical History:   Diagnosis Date    Anxiety     Breast carcinoma ([facility] [address] 1997    R breast - MAMMOGRAM ANNUALLY, NEXT DUE 5/2020.     Cervical radiculitis     Right     Depression     Diabetes type 2, controlled ([facility] [address] 2016    w two random sugars >126    Family history of diabetes mellitus (DM)     Mother and Father    GERD (gastroesophageal reflux disease)     UGI- sm HH , done at [address] [place] [address],[address] 3/26/14    Hiatal hernia     Hypercalcemia     mild ~11, iPTH nl 8/2021    Hyperglycemia     Hyperlipidemia     Insomnia     Irritable bowel syndrome     LBP (low back pain)     Lumbar herniated disc     L L4-5 HNP noted on MRI    Menopause     female exam every 2 yrs, due for recheck w me 2015    Migraine headache     ON TOPAMAX    Osteoporosis     Prediabetes     a1c 6.4 in Dec 2014    S/P colonoscopic polypectomy 06/07/2019    Dr Glenys Farr, recheck 5 yrs    TIA (transient ischemic attack)     INACTIVE PROBLEM     Past Surgical History:   Procedure Laterality Date    BREAST RECONSTRUCTION Right 4/2013    Dr Maame Meadows  5/1995    LIANG/BSO    MASTECTOMY  7/1997    Right breast for breast cancer    TUBAL LIGATION       Family History   Problem Relation Age of Onset    High Blood Pressure Mother     Diabetes Mother     Diabetes Father     Heart Disease Father         open heart surgery    High Blood Pressure Father     Diabetes Sister     High Cholesterol Sister     Stroke Paternal Grandmother      Social History     Tobacco Use    Smoking status: Never Smoker    Smokeless tobacco: Never Used   Substance Use Topics    Alcohol use: No        Review of Systems:     · Constitutional:  No Fever or Weight Loss   · Eyes: No Decreased Vision  · ENT: No Headaches, Hearing Loss or Vertigo  · Cardiovascular: No Chest Pain,  No Shortness of breath, No Palpitations. No Edema   · Respiratory: No cough or wheezing . No Respiratory distress   · Gastrointestinal: No abdominal pain, appetite loss, blood in stools, constipation, diarrhea or heartburn  · Genitourinary: No dysuria, trouble voiding, or hematuria  · Musculoskeletal:  denies any new  joint aches , or pain   · Integumentary: No rash or pruritis  · Neurological: No TIA or stroke symptoms  · Psychiatric: No anxiety or depression  · Endocrine: No malaise, fatigue or temperature intolerance  · Hematologic/Lymphatic: No bleeding problems, blood clots or swollen lymph nodes  · Allergic/Immunologic: No nasal congestion or hives        Objective:      Physical Exam:    /68   Pulse 86   Ht 5' 6\" (1.676 m)   Wt 155 lb (70.3 kg)   BMI 25.02 kg/m²   Wt Readings from Last 3 Encounters:   05/03/22 155 lb (70.3 kg)   04/26/22 152 lb (68.9 kg)   04/04/22 159 lb 2 oz (72.2 kg)     Body mass index is 25.02 kg/m². Vitals:    05/03/22 1138   BP: 116/68   Pulse: 86        General Appearance and Constitutional: Conversant, Well developed, Well nourished, No acute distress, Non-toxic appearance. HEENT:  Normocephalic, Atraumatic, Bilateral external ears normal, Oropharynx moist, No oral exudates,   Nose normal.   Neck- Normal range of motion, No tenderness, Supple  Eyes:  EOMI, Conjunctiva normal, No discharge. Respiratory:  Normal breath sounds, No respiratory distress, No wheezing, No Rales, No Ronchi. No chest tenderness.    Cardiovascular: S1-S2, no added heart sounds, No Mumurs appreciated. No gallops, rubs. + Left > Right Pedal Edema   GI:  Bowel sounds normal, Soft, No tenderness,  :  No costovertebral angle tenderness   Musculoskeletal:  No gross deformities. Back- No tenderness  Integument:  Well hydrated, no rash   Lymphatic:  No lymphadenopathy noted   Neurologic:  Alert & oriented x 3, Normal motor function, normal sensory function, no focal deficits noted   Psychiatric:  Speech and behavior appropriate       Medical decision making and Data review:    DATA:    No results found for: TROPONINT  BNP:    Lab Results   Component Value Date    PROBNP 203 (H) 04/26/2022     PT/INR:  No results found for: PTINR  Lab Results   Component Value Date    LABA1C 5.9 02/16/2022    LABA1C 5.6 08/20/2021     Lab Results   Component Value Date    CHOL 164 03/17/2022    TRIG 242 (H) 03/17/2022    HDL 43 03/17/2022    LDLCALC 73 03/17/2022    LDLDIRECT 154 (H) 07/17/2018     Lab Results   Component Value Date    WBC 5.0 03/17/2022    HGB 14.5 03/17/2022    HCT 42.6 03/17/2022    MCV 92.1 03/17/2022     03/17/2022     TSH:   Lab Results   Component Value Date    TSH 1.19 02/18/2021     Lab Results   Component Value Date    AST 14 (L) 04/26/2022    ALT 10 04/26/2022    BILITOT 0.5 04/26/2022    ALKPHOS 68 04/26/2022         All labs, medications and tests reviewed by myself including data and history from outside source , patient and available family . 1. SOBOE (shortness of breath on exertion)    2. Edema of lower extremity    3. Controlled type 2 diabetes mellitus without complication, without long-term current use of insulin ([address]    4. Dyslipidemia    5. Venous insufficiency         Impression and Plan:      1. Lower extremity edema left more than right  2. Occasional bradycardia patient brought extremities  3. Shortness of breath with exertion    Continue with Lasix as prescribed.   Obtain lower extremity venous ultrasound to rule out venous insufficiency  Check JOS for screening  Echocardiogram to rule out structural heart disease  Exercise stress test for restratification    4. Diabetes mellitus: On metformin. Last HbA1c 5.9. Excellent glycemic controlled. 5. History of breast cancer requiring chemotherapy 23 years ago. Obtain echo to rule out cardiomyopathy associated with chemotherapy      Return in about 1 month (around 6/3/2022). Counseled extensively and medication compliance urged. We discussed that for the  prevention of ASCVD our  goal is aggressive risk modification. Patient is encouraged to exercise even a brisk walk for 30 minutes  at least 3 to 4 times a week   Various goals were discussed and questions answered. Continue current medications. Appropriate prescriptions are addressed and refills ordered. Questions answered and patient verbalizes understanding. Call for any problems, questions, or concerns.

## 2022-05-06 ENCOUNTER — TELEPHONE (OUTPATIENT)
Dept: INTERNAL MEDICINE CLINIC | Age: 72
End: 2022-05-06

## 2022-05-06 DIAGNOSIS — E11.9 CONTROLLED TYPE 2 DIABETES MELLITUS WITHOUT COMPLICATION, WITHOUT LONG-TERM CURRENT USE OF INSULIN (HCC): ICD-10-CM

## 2022-05-06 RX ORDER — ALLOPURINOL 100 MG/1
100 TABLET ORAL DAILY
Qty: 90 TABLET | Refills: 1 | Status: SHIPPED | OUTPATIENT
Start: 2022-05-06 | End: 2022-07-21 | Stop reason: SDUPTHER

## 2022-05-06 RX ORDER — GLIPIZIDE 5 MG/1
5 TABLET ORAL DAILY
Qty: 90 TABLET | Refills: 1 | Status: ON HOLD
Start: 2022-05-06 | End: 2022-10-03 | Stop reason: SDUPTHER

## 2022-05-06 NOTE — TELEPHONE ENCOUNTER
Let's start allopurinol 100mg daily. If having any significant pain also for now just rec otc ibuprofen 200mg up to 3x/day as needed.   Not for swelling but only if having pain of her joints

## 2022-05-06 NOTE — TELEPHONE ENCOUNTER
Patient calls- she wanted to know since she has finished the Prednisone taper and it didn't help she wanted to know if she needed to be on another medication for gout since her recent uric acid levels were elevated. Please advise.

## 2022-05-10 RX ORDER — POTASSIUM CHLORIDE 1500 MG/1
TABLET, EXTENDED RELEASE ORAL
Qty: 30 TABLET | Refills: 0 | OUTPATIENT
Start: 2022-05-10

## 2022-05-12 ENCOUNTER — PROCEDURE VISIT (OUTPATIENT)
Dept: CARDIOLOGY CLINIC | Age: 72
End: 2022-05-12
Payer: MEDICARE

## 2022-05-12 ENCOUNTER — PROCEDURE VISIT (OUTPATIENT)
Dept: CARDIOLOGY CLINIC | Age: 72
End: 2022-05-12

## 2022-05-12 VITALS
DIASTOLIC BLOOD PRESSURE: 60 MMHG | BODY MASS INDEX: 24.91 KG/M2 | WEIGHT: 155 LBS | HEART RATE: 106 BPM | HEIGHT: 66 IN | SYSTOLIC BLOOD PRESSURE: 118 MMHG

## 2022-05-12 DIAGNOSIS — R06.02 SOB (SHORTNESS OF BREATH): ICD-10-CM

## 2022-05-12 DIAGNOSIS — E11.9 CONTROLLED TYPE 2 DIABETES MELLITUS WITHOUT COMPLICATION, WITHOUT LONG-TERM CURRENT USE OF INSULIN (HCC): ICD-10-CM

## 2022-05-12 DIAGNOSIS — R06.02 SOBOE (SHORTNESS OF BREATH ON EXERTION): Primary | ICD-10-CM

## 2022-05-12 DIAGNOSIS — I87.2 VENOUS INSUFFICIENCY: ICD-10-CM

## 2022-05-12 DIAGNOSIS — R07.9 CHEST PAIN, UNSPECIFIED TYPE: ICD-10-CM

## 2022-05-12 DIAGNOSIS — R60.0 EDEMA OF LOWER EXTREMITY: ICD-10-CM

## 2022-05-12 DIAGNOSIS — E78.5 DYSLIPIDEMIA: ICD-10-CM

## 2022-05-12 LAB
LV EF: 58 %
LVEF MODALITY: NORMAL

## 2022-05-12 PROCEDURE — 93015 CV STRESS TEST SUPVJ I&R: CPT | Performed by: INTERNAL MEDICINE

## 2022-05-12 PROCEDURE — 93306 TTE W/DOPPLER COMPLETE: CPT | Performed by: INTERNAL MEDICINE

## 2022-05-17 ENCOUNTER — PROCEDURE VISIT (OUTPATIENT)
Dept: CARDIOLOGY CLINIC | Age: 72
End: 2022-05-17

## 2022-05-17 ENCOUNTER — TELEPHONE (OUTPATIENT)
Dept: CARDIOLOGY CLINIC | Age: 72
End: 2022-05-17

## 2022-05-17 ENCOUNTER — PROCEDURE VISIT (OUTPATIENT)
Dept: CARDIOLOGY CLINIC | Age: 72
End: 2022-05-17
Payer: MEDICARE

## 2022-05-17 DIAGNOSIS — E11.9 CONTROLLED TYPE 2 DIABETES MELLITUS WITHOUT COMPLICATION, WITHOUT LONG-TERM CURRENT USE OF INSULIN (HCC): ICD-10-CM

## 2022-05-17 DIAGNOSIS — I87.2 VENOUS INSUFFICIENCY: ICD-10-CM

## 2022-05-17 DIAGNOSIS — E78.5 DYSLIPIDEMIA: ICD-10-CM

## 2022-05-17 DIAGNOSIS — R60.0 EDEMA OF LOWER EXTREMITY: ICD-10-CM

## 2022-05-17 DIAGNOSIS — I73.9 PAD (PERIPHERAL ARTERY DISEASE) (HCC): ICD-10-CM

## 2022-05-17 DIAGNOSIS — R06.02 SOBOE (SHORTNESS OF BREATH ON EXERTION): ICD-10-CM

## 2022-05-17 PROCEDURE — 93970 EXTREMITY STUDY: CPT | Performed by: INTERNAL MEDICINE

## 2022-05-17 PROCEDURE — 93922 UPR/L XTREMITY ART 2 LEVELS: CPT | Performed by: INTERNAL MEDICINE

## 2022-05-17 NOTE — TELEPHONE ENCOUNTER
5/12/2022 ECHO        Summary   Left ventricular function and size is normal, EF is estimated at 55-60%. Mild left ventricular hypertrophy. Grade I diastolic dysfunction. No regional wall motion abnormalities were detected. Sclerotic, but non-stenotic aortic valve. Mitral annular calcification is present. Mild mitral, tricuspid and moderate aortic regurgitation is present. Mild Pulmonary hypertension noted with RVSP of 43mmHg.    There is a small (trivial) pericardial effusion    PT NOTIFIED OF RESULTS AND ADVISED TO KEEP APPT

## 2022-05-18 ENCOUNTER — TELEPHONE (OUTPATIENT)
Dept: CARDIOLOGY CLINIC | Age: 72
End: 2022-05-18

## 2022-05-19 NOTE — TELEPHONE ENCOUNTER
5/17/2022  JOS     Normal bilateral lower extremity ankle brachial indices. 5/17/2022  VL DUP LOWER EXTREMITY VENOUS BILATERAL        Summary        No evidence of DVT or SVT in the bilateral common femoral vein, femoral    vein, popliteal vein, greater saphenous vein or small saphenous vein.    Significant reflux noted of the Right GSV SFJ (0.7s), GSV Knee (2.0s).  Significant reflux noted in the Left GSV SFJ (1.8s).      Recommendations        Advice thigh high pressure stockings, 20 to 30 mm of Hg.    Keep feet elevated.    Increase walking.    Significant changes or pathology noted. Schedule for OV for follow up. Pt notified of results and advised to keep appt.

## 2022-05-26 ENCOUNTER — OFFICE VISIT (OUTPATIENT)
Dept: INTERNAL MEDICINE CLINIC | Age: 72
End: 2022-05-26
Payer: MEDICARE

## 2022-05-26 VITALS
RESPIRATION RATE: 14 BRPM | WEIGHT: 151 LBS | SYSTOLIC BLOOD PRESSURE: 122 MMHG | BODY MASS INDEX: 24.37 KG/M2 | HEART RATE: 92 BPM | OXYGEN SATURATION: 97 % | DIASTOLIC BLOOD PRESSURE: 78 MMHG

## 2022-05-26 DIAGNOSIS — M79.641 PAIN IN BOTH HANDS: ICD-10-CM

## 2022-05-26 DIAGNOSIS — R60.9 PERIPHERAL EDEMA: ICD-10-CM

## 2022-05-26 DIAGNOSIS — E11.9 CONTROLLED TYPE 2 DIABETES MELLITUS WITHOUT COMPLICATION, WITHOUT LONG-TERM CURRENT USE OF INSULIN (HCC): ICD-10-CM

## 2022-05-26 DIAGNOSIS — E79.0 HYPERURICEMIA: Primary | ICD-10-CM

## 2022-05-26 DIAGNOSIS — M79.642 PAIN IN BOTH HANDS: ICD-10-CM

## 2022-05-26 PROCEDURE — 3017F COLORECTAL CA SCREEN DOC REV: CPT | Performed by: INTERNAL MEDICINE

## 2022-05-26 PROCEDURE — 99214 OFFICE O/P EST MOD 30 MIN: CPT | Performed by: INTERNAL MEDICINE

## 2022-05-26 PROCEDURE — G8399 PT W/DXA RESULTS DOCUMENT: HCPCS | Performed by: INTERNAL MEDICINE

## 2022-05-26 PROCEDURE — 1036F TOBACCO NON-USER: CPT | Performed by: INTERNAL MEDICINE

## 2022-05-26 PROCEDURE — 2022F DILAT RTA XM EVC RTNOPTHY: CPT | Performed by: INTERNAL MEDICINE

## 2022-05-26 PROCEDURE — 1123F ACP DISCUSS/DSCN MKR DOCD: CPT | Performed by: INTERNAL MEDICINE

## 2022-05-26 PROCEDURE — 3044F HG A1C LEVEL LT 7.0%: CPT | Performed by: INTERNAL MEDICINE

## 2022-05-26 PROCEDURE — G8427 DOCREV CUR MEDS BY ELIG CLIN: HCPCS | Performed by: INTERNAL MEDICINE

## 2022-05-26 PROCEDURE — G8420 CALC BMI NORM PARAMETERS: HCPCS | Performed by: INTERNAL MEDICINE

## 2022-05-26 PROCEDURE — 1090F PRES/ABSN URINE INCON ASSESS: CPT | Performed by: INTERNAL MEDICINE

## 2022-05-26 NOTE — PROGRESS NOTES
John Navy  1950 05/26/22    SUBJECTIVE:    Her ankles and hands still swell but on diuretic her wt is down 7-8# the past three mo. Did have sl high uric acid level and now on allopurinol  Also does have diuresis on lasix. Glc, sugars run 160-220. Lab Results   Component Value Date    LABA1C 5.9 02/16/2022    LABA1C 5.6 08/20/2021    LABA1C 5.4 02/18/2021     Lab Results   Component Value Date    LABMICR <1.20 03/17/2022    LDLCALC 73 03/17/2022    CREATININE 0.8 04/26/2022     Did see cardiology and started compression stockings, stress testing and echo were benign. OBJECTIVE:    /78   Pulse 92   Resp 14   Wt 151 lb (68.5 kg)   SpO2 97%   BMI 24.37 kg/m²     Physical Exam  Vitals reviewed. Constitutional:       Appearance: She is well-developed. Cardiovascular:      Rate and Rhythm: Normal rate and regular rhythm. Heart sounds: Normal heart sounds. No murmur heard. No friction rub. No gallop. Pulmonary:      Effort: Pulmonary effort is normal. No respiratory distress. Breath sounds: Normal breath sounds. No wheezing or rales. Abdominal:      General: Bowel sounds are normal. There is no distension. Palpations: Abdomen is soft. Tenderness: There is no abdominal tenderness. Musculoskeletal:         General: No tenderness (NO DEFINITE SYNOVITIS, SWELLING/TENDERNESS OF HAND JOINTS/WRISTS). Cervical back: Neck supple. Right lower leg: Edema (TR) present. Left lower leg: Edema (TR) present. Neurological:      Mental Status: She is alert. ASSESSMENT:    1. Hyperuricemia    2. Controlled type 2 diabetes mellitus without complication, without long-term current use of insulin ([address]    3. Peripheral edema    4. Pain in both hands        PLAN:    Cinthya Fay was seen today for edema and discuss medications. Diagnoses and all orders for this visit:    Hyperuricemia- HAD ELEVATED UR ACID LEVEL, ARTHRALGIAS AND MILDLY ELEVATED CRP, ? POSSIBLE GOUT, POSSIBLE SERONEG RA OR OTHER RHEUMATOLOGIC ILLNESS? RECHECK LAB, REFER FOR EVAL RHEUMATOLOGY  -     Uric Acid; Future  -     C-Reactive Protein; Future  -     Amb External Referral To Rheumatology    Controlled type 2 diabetes mellitus without complication, without long-term current use of insulin ([facility] [address] - DM relatively well controlled and will continue current regimen. Screening reviewed. See orders. -     Comprehensive Metabolic Panel; Future  -     Lipid Panel; Future  -     Hemoglobin A1C; Future    Peripheral edema- WT IS DOWN, CONT DIURETIC, MONITOR RENAL FXN  -     Comprehensive Metabolic Panel; Future    Pain in both hands- POSSIBLE SUE/VASC DZ AS NOTED?   REFER RHEUMA  -     Amb External Referral To Rheumatology

## 2022-05-31 ENCOUNTER — TELEPHONE (OUTPATIENT)
Dept: INTERNAL MEDICINE CLINIC | Age: 72
End: 2022-05-31

## 2022-05-31 NOTE — TELEPHONE ENCOUNTER
Called patient for AWV since she was due on 2/19/21. States she wants to wait to see PCP for her scheduled AWV in 8/2022.

## 2022-06-07 ENCOUNTER — OFFICE VISIT (OUTPATIENT)
Dept: CARDIOLOGY CLINIC | Age: 72
End: 2022-06-07
Payer: MEDICARE

## 2022-06-07 VITALS
HEIGHT: 66 IN | DIASTOLIC BLOOD PRESSURE: 56 MMHG | HEART RATE: 64 BPM | WEIGHT: 149.2 LBS | BODY MASS INDEX: 23.98 KG/M2 | SYSTOLIC BLOOD PRESSURE: 110 MMHG | OXYGEN SATURATION: 99 %

## 2022-06-07 DIAGNOSIS — E78.5 DYSLIPIDEMIA: ICD-10-CM

## 2022-06-07 DIAGNOSIS — I87.2 VENOUS INSUFFICIENCY: ICD-10-CM

## 2022-06-07 DIAGNOSIS — R60.0 EDEMA OF LOWER EXTREMITY: Primary | ICD-10-CM

## 2022-06-07 DIAGNOSIS — R06.02 SHORTNESS OF BREATH: ICD-10-CM

## 2022-06-07 PROCEDURE — 1090F PRES/ABSN URINE INCON ASSESS: CPT | Performed by: INTERNAL MEDICINE

## 2022-06-07 PROCEDURE — 99214 OFFICE O/P EST MOD 30 MIN: CPT | Performed by: INTERNAL MEDICINE

## 2022-06-07 PROCEDURE — 1036F TOBACCO NON-USER: CPT | Performed by: INTERNAL MEDICINE

## 2022-06-07 PROCEDURE — 1123F ACP DISCUSS/DSCN MKR DOCD: CPT | Performed by: INTERNAL MEDICINE

## 2022-06-07 PROCEDURE — G8399 PT W/DXA RESULTS DOCUMENT: HCPCS | Performed by: INTERNAL MEDICINE

## 2022-06-07 PROCEDURE — 3017F COLORECTAL CA SCREEN DOC REV: CPT | Performed by: INTERNAL MEDICINE

## 2022-06-07 PROCEDURE — G8420 CALC BMI NORM PARAMETERS: HCPCS | Performed by: INTERNAL MEDICINE

## 2022-06-07 PROCEDURE — G8427 DOCREV CUR MEDS BY ELIG CLIN: HCPCS | Performed by: INTERNAL MEDICINE

## 2022-06-07 NOTE — PROGRESS NOTES
Jessica Saldana MD                                  CARDIOLOGY  NOTE         Chief Complaint:    Chief Complaint   Patient presents with    Follow-up     Swelling in legs, ankles and feet bilat. Worse in the left    Results       Echocardiogram 5/12/2022     Left ventricular function and size is normal, EF is estimated at 55-60%. Mild left ventricular hypertrophy. Grade I diastolic dysfunction. No regional wall motion abnormalities were detected. Sclerotic, but non-stenotic aortic valve. Mitral annular calcification is present. Mild mitral, tricuspid and moderate aortic regurgitation is present. Mild Pulmonary hypertension noted with RVSP of 43mmHg. There is a small (trivial) pericardial effusion. JOS 5/17/2022        Normal bilateral lower extremity ankle brachial indices. Venous UItrasound 5/17/2022     No evidence of DVT or SVT in the bilateral common femoral vein, femoral    vein, popliteal vein, greater saphenous vein or small saphenous vein.    Significant reflux noted of the Right GSV SFJ (0.7s), GSV Knee (2.0s).  Significant reflux noted in the Left GSV SFJ (1.8s).  Recommendations        Advice thigh high pressure stockings, 20 to 30 mm of Hg.    Keep feet elevated.    Increase walking.    Significant changes or pathology noted. Schedule for OV for follow up. ETT 5/12/2022     Limited Exercise capacity. 5 METs work load. Physiological BP response to exercise. ETT NEGATIVE FOR iSCHEMIA / ARRHYTHMIA    HPI:     Karlene Bermudez is a 80y.o. year old female presents to the clinic today to be evaluated for lower extremity edema and shortness of breath. Patient has by medical history significant for hyperlipidemia diabetes mellitus, remote history of breast cancer 22 years ago requiring chemotherapy. .      Patient mentions for the past 4 to 5 weeks is been experiencing bilateral lower extremity swelling left more than right.   She also complains of occasional claudication symptoms in the left lower extremity. Patient mentions she had a dog before and she used to drink for walk however since losing her dog she has not been exerting herself. Recently she took a long walk and and experienced exertional dyspnea. Patient denies any PND or orthopnea. Denies any chest pain or palpitations. EKG shows normal sinus rhythm without any significant ST-T changes    Patient is a lifelong non-smoker denies any alcohol or drug abuse. No significant coronary disease in immediate blood relatives      Current Outpatient Medications   Medication Sig Dispense Refill    metFORMIN (GLUCOPHAGE) 500 MG tablet Take 1 tablet by mouth every morning (before breakfast) 90 tablet 1    glipiZIDE (GLUCOTROL) 5 MG tablet Take 1 tablet by mouth daily 90 tablet 1    allopurinol (ZYLOPRIM) 100 MG tablet Take 1 tablet by mouth daily 90 tablet 1    furosemide (LASIX) 40 MG tablet Take 1 tablet by mouth daily 90 tablet 1    traZODone (DESYREL) 100 MG tablet Take 1 tablet by mouth nightly 90 tablet 1    rosuvastatin (CRESTOR) 10 MG tablet Take 1 tablet by mouth nightly 90 tablet 1    PARoxetine (PAXIL) 20 MG tablet Take 1 tablet by mouth every morning 90 tablet 1    Lancets MISC 1 each by Does not apply route 2 times daily 100 each 0    blood glucose monitor strips Test twice a day & as needed for symptoms of irregular blood glucose. 100 strip 3     No current facility-administered medications for this visit. Allergies:     Codeine    Patient History:    Past Medical History:   Diagnosis Date    Anxiety     Breast carcinoma ([address] 1997    R breast - MAMMOGRAM ANNUALLY, NEXT DUE 5/2020.     Cervical radiculitis     Right     Chest pain     Depression     Diabetes type 2, controlled ([address] 2016    w two random sugars >126    Family history of diabetes mellitus (DM)     Mother and Father    GERD (gastroesophageal reflux disease)     UGI- sm HH , [facility] 3/26/14    Hiatal hernia     Hx of Doppler ultrasound 05/17/2022    Normal bilateral lower extremity ankle brachial indices.  Hx of Doppler ultrasound 05/17/2022    Significant reflux noted of the Right GSV SFJ (0.7s), GSV Knee (2.0s). Significant reflux noted in the Left GSV SFJ (1.8s).  Hx of echocardiogram 05/12/2022    EF is estimated at 55-60%. Mitral annular calcification is present. Mild mitral, tricuspid and moderate aortic regurgitation is present. Mild Pulmonary hypertension noted with RVSP of 43mmHg.  Hypercalcemia     mild ~11, iPTH nl 8/2021    Hyperglycemia     Hyperlipidemia     Insomnia     Irritable bowel syndrome     LBP (low back pain)     Lumbar herniated disc     L L4-5 HNP noted on MRI    Menopause     female exam every 2 yrs, due for recheck w me 2015    Migraine headache     ON TOPAMAX    Osteoporosis     Prediabetes     a1c 6.4 in Dec 2014    S/P colonoscopic polypectomy 06/07/2019    Dr Janice Murry, recheck 5 yrs    TIA (transient ischemic attack)     INACTIVE PROBLEM     Past Surgical History:   Procedure Laterality Date    BREAST RECONSTRUCTION Right 4/2013    Dr Jordi Mccartney  5/1995    LIANG/BSO    MASTECTOMY  7/1997    Right breast for breast cancer    TUBAL LIGATION       Family History   Problem Relation Age of Onset    High Blood Pressure Mother     Diabetes Mother     Diabetes Father     Heart Disease Father         open heart surgery    High Blood Pressure Father     Diabetes Sister     High Cholesterol Sister     Stroke Paternal Grandmother      Social History     Tobacco Use    Smoking status: Never Smoker    Smokeless tobacco: Never Used   Substance Use Topics    Alcohol use: No        Review of Systems:     · Constitutional:  No Fever or Weight Loss   · Eyes: No Decreased Vision  · ENT: No Headaches, Hearing Loss or Vertigo  · Cardiovascular: No Chest Pain,  No Shortness of breath, No Palpitations. No Edema   · Respiratory: No cough or wheezing .  No Respiratory distress   · Gastrointestinal: No abdominal pain, appetite loss, blood in stools, constipation, diarrhea or heartburn  · Genitourinary: No dysuria, trouble voiding, or hematuria  · Musculoskeletal:  denies any new  joint aches , or pain   · Integumentary: No rash or pruritis  · Neurological: No TIA or stroke symptoms  · Psychiatric: No anxiety or depression  · Endocrine: No malaise, fatigue or temperature intolerance  · Hematologic/Lymphatic: No bleeding problems, blood clots or swollen lymph nodes  · Allergic/Immunologic: No nasal congestion or hives        Objective:      Physical Exam:    BP (!) 110/56 (Site: Left Upper Arm, Position: Sitting, Cuff Size: Medium Adult)   Pulse 64   Ht 5' 6\" (1.676 m)   Wt 149 lb 3.2 oz (67.7 kg)   SpO2 99%   BMI 24.08 kg/m²   Wt Readings from Last 3 Encounters:   06/07/22 149 lb 3.2 oz (67.7 kg)   05/26/22 151 lb (68.5 kg)   05/12/22 155 lb (70.3 kg)     Body mass index is 24.08 kg/m². Vitals:    06/07/22 1548   BP: (!) 110/56   Pulse:    SpO2:         General Appearance and Constitutional: Conversant, Well developed, Well nourished, No acute distress, Non-toxic appearance. HEENT:  Normocephalic, Atraumatic, Bilateral external ears normal, Oropharynx moist, No oral exudates,   Nose normal.   Neck- Normal range of motion, No tenderness, Supple  Eyes:  EOMI, Conjunctiva normal, No discharge. Respiratory:  Normal breath sounds, No respiratory distress, No wheezing, No Rales, No Ronchi. No chest tenderness. Cardiovascular: S1-S2, no added heart sounds, No Mumurs appreciated. No gallops, rubs. + Left > Right Pedal Edema   GI:  Bowel sounds normal, Soft, No tenderness,  :  No costovertebral angle tenderness   Musculoskeletal:  No gross deformities.  Back- No tenderness  Integument:  Well hydrated, no rash   Lymphatic:  No lymphadenopathy noted   Neurologic:  Alert & oriented x 3, Normal motor function, normal sensory function, no focal deficits noted   Psychiatric:  Speech and behavior appropriate       Medical decision making and Data review:    DATA:    No results found for: TROPONINT  BNP:    Lab Results   Component Value Date    PROBNP 203 (H) 04/26/2022     PT/INR:  No results found for: PTINR  Lab Results   Component Value Date    LABA1C 5.9 02/16/2022    LABA1C 5.6 08/20/2021     Lab Results   Component Value Date    CHOL 164 03/17/2022    TRIG 242 (H) 03/17/2022    HDL 43 03/17/2022    LDLCALC 73 03/17/2022    LDLDIRECT 154 (H) 07/17/2018     Lab Results   Component Value Date    WBC 5.0 03/17/2022    HGB 14.5 03/17/2022    HCT 42.6 03/17/2022    MCV 92.1 03/17/2022     03/17/2022     TSH:   Lab Results   Component Value Date    TSH 1.19 02/18/2021     Lab Results   Component Value Date    AST 14 (L) 04/26/2022    ALT 10 04/26/2022    BILITOT 0.5 04/26/2022    ALKPHOS 68 04/26/2022         All labs, medications and tests reviewed by myself including data and history from outside source , patient and available family . 1. Edema of lower extremity    2. Shortness of breath    3. Venous insufficiency    4. Dyslipidemia         Impression and Plan:      1. Lower extremity edema left more than right  2. Caludication   3. Shortness of breath with exertion  4. Mild pulmonary hypertension    Continue with Lasix   Significant venous insufficiency noted lower extremity. Advise compression stocking 20 to 30 mmHg, increase exercise, keep legs elevated. Refer to Dr. Jonathan Cho for evaluation, possible ablation. ABIs screening is negative for PAD  Echocardiogram shows preserved LV ejection fraction. Mild valvular heart disease. Pulmonary hypertension with RVSP of 43 mmHg. Exercise stress test negative for ischemia on EKG tracing. Patient received ~ 5 METS of workload. Poor functional capacity is only exercised for 3 minutes      5. Diabetes mellitus: On metformin. Last HbA1c 5.9. Excellent glycemic controlled. 6. Hyperlipidemia: Continue with Crestor 10 mg p.o. daily    7. History of breast cancer requiring chemotherapy 23 years ago. Obtain echo to rule out cardiomyopathy associated with chemotherapy      Return in about 6 months (around 12/7/2022). Counseled extensively and medication compliance urged. We discussed that for the  prevention of ASCVD our  goal is aggressive risk modification. Patient is encouraged to exercise even a brisk walk for 30 minutes  at least 3 to 4 times a week   Various goals were discussed and questions answered. Continue current medications. Appropriate prescriptions are addressed and refills ordered. Questions answered and patient verbalizes understanding. Call for any problems, questions, or concerns.

## 2022-06-08 ENCOUNTER — TELEPHONE (OUTPATIENT)
Dept: CARDIOLOGY CLINIC | Age: 72
End: 2022-06-08

## 2022-06-08 NOTE — TELEPHONE ENCOUNTER
Patient states that Dr. Ricks Manner referred and that a [facility] would be calling .

## 2022-06-10 DIAGNOSIS — E11.9 CONTROLLED TYPE 2 DIABETES MELLITUS WITHOUT COMPLICATION, WITHOUT LONG-TERM CURRENT USE OF INSULIN (HCC): ICD-10-CM

## 2022-06-10 DIAGNOSIS — E79.0 HYPERURICEMIA: ICD-10-CM

## 2022-06-10 DIAGNOSIS — R60.9 PERIPHERAL EDEMA: ICD-10-CM

## 2022-06-10 LAB
A/G RATIO: 1.7 (ref 1.1–2.2)
ALBUMIN SERPL-MCNC: 4 G/DL (ref 3.4–5)
ALP BLD-CCNC: 72 U/L (ref 40–129)
ALT SERPL-CCNC: 8 U/L (ref 10–40)
ANION GAP SERPL CALCULATED.3IONS-SCNC: 12 MMOL/L (ref 3–16)
AST SERPL-CCNC: 15 U/L (ref 15–37)
BILIRUB SERPL-MCNC: 0.3 MG/DL (ref 0–1)
BUN BLDV-MCNC: 24 MG/DL (ref 7–20)
C-REACTIVE PROTEIN: 9 MG/L (ref 0–5.1)
CALCIUM SERPL-MCNC: 11.1 MG/DL (ref 8.3–10.6)
CHLORIDE BLD-SCNC: 101 MMOL/L (ref 99–110)
CHOLESTEROL, TOTAL: 190 MG/DL (ref 0–199)
CO2: 24 MMOL/L (ref 21–32)
CREAT SERPL-MCNC: 0.9 MG/DL (ref 0.6–1.2)
GFR AFRICAN AMERICAN: >60
GFR NON-AFRICAN AMERICAN: >60
GLUCOSE BLD-MCNC: 143 MG/DL (ref 70–99)
HDLC SERPL-MCNC: 33 MG/DL (ref 40–60)
LDL CHOLESTEROL CALCULATED: ABNORMAL MG/DL
LDL CHOLESTEROL DIRECT: 87 MG/DL
POTASSIUM SERPL-SCNC: 5 MMOL/L (ref 3.5–5.1)
SODIUM BLD-SCNC: 137 MMOL/L (ref 136–145)
TOTAL PROTEIN: 6.4 G/DL (ref 6.4–8.2)
TRIGL SERPL-MCNC: 402 MG/DL (ref 0–150)
URIC ACID, SERUM: 7.1 MG/DL (ref 2.6–6)
VLDLC SERPL CALC-MCNC: ABNORMAL MG/DL

## 2022-06-10 PROCEDURE — 36415 COLL VENOUS BLD VENIPUNCTURE: CPT | Performed by: INTERNAL MEDICINE

## 2022-06-11 LAB
ESTIMATED AVERAGE GLUCOSE: 148.5 MG/DL
HBA1C MFR BLD: 6.8 %

## 2022-06-28 ENCOUNTER — INITIAL CONSULT (OUTPATIENT)
Dept: CARDIOLOGY CLINIC | Age: 72
End: 2022-06-28
Payer: MEDICARE

## 2022-06-28 VITALS
HEART RATE: 81 BPM | BODY MASS INDEX: 23.85 KG/M2 | HEIGHT: 66 IN | WEIGHT: 148.4 LBS | SYSTOLIC BLOOD PRESSURE: 120 MMHG | DIASTOLIC BLOOD PRESSURE: 70 MMHG

## 2022-06-28 DIAGNOSIS — I87.2 VENOUS INSUFFICIENCY: ICD-10-CM

## 2022-06-28 DIAGNOSIS — E78.2 MIXED HYPERLIPIDEMIA: ICD-10-CM

## 2022-06-28 DIAGNOSIS — R60.0 EDEMA OF LOWER EXTREMITY: ICD-10-CM

## 2022-06-28 DIAGNOSIS — I83.893 VARICOSE VEINS OF BOTH LEGS WITH EDEMA: Primary | ICD-10-CM

## 2022-06-28 PROCEDURE — 99213 OFFICE O/P EST LOW 20 MIN: CPT | Performed by: INTERNAL MEDICINE

## 2022-06-28 PROCEDURE — 3017F COLORECTAL CA SCREEN DOC REV: CPT | Performed by: INTERNAL MEDICINE

## 2022-06-28 PROCEDURE — 1090F PRES/ABSN URINE INCON ASSESS: CPT | Performed by: INTERNAL MEDICINE

## 2022-06-28 PROCEDURE — 1123F ACP DISCUSS/DSCN MKR DOCD: CPT | Performed by: INTERNAL MEDICINE

## 2022-06-28 PROCEDURE — G8420 CALC BMI NORM PARAMETERS: HCPCS | Performed by: INTERNAL MEDICINE

## 2022-06-28 PROCEDURE — G8399 PT W/DXA RESULTS DOCUMENT: HCPCS | Performed by: INTERNAL MEDICINE

## 2022-06-28 PROCEDURE — 1036F TOBACCO NON-USER: CPT | Performed by: INTERNAL MEDICINE

## 2022-06-28 PROCEDURE — G8427 DOCREV CUR MEDS BY ELIG CLIN: HCPCS | Performed by: INTERNAL MEDICINE

## 2022-06-28 NOTE — LETTER
2022  1:30 PM  Patient Name: Irvin Quintana  : 1950  MRN# [id number]    REASON FOR VISIT: venous consult alam pt     Patient Active Problem List    Diagnosis Date Noted    Venous insufficiency 2022    Shortness of breath 2022    Edema of lower extremity 2022    Chest pain 2022    Peripheral edema 2022    Carcinoma of right female breast, unspecified estrogen receptor status, unspecified site of breast [facility] 2022    Hypercalcemia     Diabetes [facility], [facility]     Migraine headache     GERD (gastroesophageal reflux disease)     Irritable bowel syndrome     Lumbar herniated disc     Low back pain     Hyperlipidemia     History of breast cancer        Allergies: Codeine      Current Outpatient Medications   Medication Sig Dispense Refill    metFORMIN (GLUCOPHAGE) 500 MG tablet Take 1 tablet by mouth every morning (before breakfast) 90 tablet 1    glipiZIDE (GLUCOTROL) 5 MG tablet Take 1 tablet by mouth daily 90 tablet 1    allopurinol (ZYLOPRIM) 100 MG tablet Take 1 tablet by mouth daily 90 tablet 1    furosemide (LASIX) 40 MG tablet Take 1 tablet by mouth daily 90 tablet 1    traZODone (DESYREL) 100 MG tablet Take 1 tablet by mouth nightly 90 tablet 1    rosuvastatin (CRESTOR) 10 MG tablet Take 1 tablet by mouth nightly 90 tablet 1    PARoxetine (PAXIL) 20 MG tablet Take 1 tablet by mouth every morning 90 tablet 1    Lancets MISC 1 each by Does not apply route 2 times daily 100 each 0    blood glucose monitor strips Test twice a day & as needed for symptoms of irregular blood glucose. 100 strip 3     No current facility-administered medications for this visit.          Lab Review   No results found for: CKTOTAL, CKMB, CKMBINDEX, TROPONINT  BNP:    Lab Results   Component Value Date    PROBNP 203 2022     PT/INR:  No results found for: INR  Lab Results   Component Value Date    LABA1C 6.8 06/10/2022    LABA1C 5.9 2022     Lab Results   Component Value Date    WBC 5.0 03/17/2022    WBC 5.2 08/20/2021    HCT 42.6 03/17/2022    HCT 44.4 08/20/2021    MCV 92.1 03/17/2022    MCV 91.6 08/20/2021     03/17/2022     08/20/2021     Lab Results   Component Value Date    CHOL 190 06/10/2022    CHOL 164 03/17/2022    TRIG 402 (H) 06/10/2022    TRIG 242 (H) 03/17/2022    HDL 33 (L) 06/10/2022    HDL 43 03/17/2022    LDLCALC see below 06/10/2022    LDLCALC 73 03/17/2022    LDLDIRECT 87 06/10/2022    LDLDIRECT 154 (H) 07/17/2018     Lab Results   Component Value Date    ALT 8 (L) 06/10/2022    ALT 10 04/26/2022    AST 15 06/10/2022    AST 14 (L) 04/26/2022     BMP:    Lab Results   Component Value Date     06/10/2022     04/26/2022    K 5.0 06/10/2022    K 4.0 04/26/2022     06/10/2022     04/26/2022    CO2 24 06/10/2022    CO2 24 04/26/2022    BUN 24 06/10/2022    BUN 18 04/26/2022    CREATININE 0.9 06/10/2022    CREATININE 0.8 04/26/2022     CMP:   Lab Results   Component Value Date     06/10/2022     04/26/2022    K 5.0 06/10/2022    K 4.0 04/26/2022     06/10/2022     04/26/2022    CO2 24 06/10/2022    CO2 24 04/26/2022    BUN 24 06/10/2022    BUN 18 04/26/2022    CREATININE 0.9 06/10/2022    CREATININE 0.8 04/26/2022    PROT 6.4 06/10/2022    PROT 6.5 04/26/2022    PROT 7.4 09/14/2012     TSH:    Lab Results   Component Value Date    TSH 1.19 02/18/2021    TSH 1.28 08/18/2020       Smoke: What:                           How much:    Alcohol: How Much:     Caffeine: Pop:         Tea:            Coffee:                Chocolate:    Exercise:    Last Visit:  Complaints:  Changes:    LAST EKG: NONE    VENOUS DOPPLER: 5/17/22  No evidence of DVT or SVT in the bilateral common femoral vein, femoral    vein, popliteal vein, greater saphenous vein or small saphenous vein.    Significant reflux noted of the Right GSV SFJ (0.7s), GSV Knee (2.0s).     Significant reflux noted in the Left GSV SFJ (1.8s). HOLTER/ EVENT MONITOR: NONE       STRESS TEST:  5/2022  Overall Impression:   Limited Exercise capacity. 5 METs work load. Physiological BP response to exercise. ETT NEGATIVE FOR iSCHEMIA / ARRHYTHMIA    ECHO: 5/22  Left ventricular function and size is normal, EF is estimated at 55-60%. Mild left ventricular hypertrophy. Grade I diastolic dysfunction. No regional wall motion abnormalities were detected. Sclerotic, but non-stenotic aortic valve. Mitral annular calcification is present. Mild mitral, tricuspid and moderate aortic regurgitation is present. Mild Pulmonary hypertension noted with RVSP of 43mmHg. There is a small (trivial) pericardial effusion. CAROTID: NONE    MUGA: NONE    LAST PACER CHECK: NONE    CARDIAC CATH: NONE    Amio Protocol:    CHADS: [id number] Score for Atrial Fibrillation Stroke Risk   Risk   Factors  Component Value   C CHF No 0   H HTN No 0   A2 Age >= 76 No,  (70 y.o.) 0   D DM Yes 1   S2 Prior Stroke/TIA No 0   V Vascular Disease No 0   A Age 74-69 Yes,  (70 y.o.) 1   Sc Sex female 1    [id number]  Score  3   Score last updated 6/28/22 0:26 AM EDT    Click here for a link to the UpToDate guideline \"Atrial Fibrillation: Anticoagulation therapy to prevent embolization    Disclaimer: Risk Score calculation is dependent on accuracy of patient problem list and past encounter diagnosis.

## 2022-06-28 NOTE — PROGRESS NOTES
CARDIAC CONSULT NOTE       Darryl Judge  80 y.o.  female    Chief Complaint   Patient presents with    Edema       Referring physician:  Miguel Angel Marquez MD     Primary care physician:  Miguel Angel Marquez MD    History of Present Illness:     Darryl Judge is a 80 y.o. female referred for evaluation and management of CVI. Patient C/O Leg edema, skin discoloration, night time cramps, aching itching & tired legs. This has been going on several months. She has been wearing compression stockings. Does not smoke. has a past medical history of Anxiety, Breast carcinoma ([facility] Cervical radiculitis, Chest pain, Depression, Diabetes type 2, controlled ([facility] Family history of diabetes mellitus (DM), GERD (gastroesophageal reflux disease), Hiatal hernia, Hx of Doppler ultrasound, Hx of Doppler ultrasound, Hx of echocardiogram, Hypercalcemia, Hyperglycemia, Hyperlipidemia, Insomnia, Irritable bowel syndrome, LBP (low back pain), Lumbar herniated disc, Menopause, Migraine headache, Osteoporosis, Prediabetes, S/P colonoscopic polypectomy, and TIA (transient ischemic attack). has a past surgical history that includes Tubal ligation; Mastectomy (7/1997); Hysterectomy (5/1995); and Breast reconstruction (Right, 4/2013). reports that she has never smoked. She has never used smokeless tobacco. She reports that she does not drink alcohol and does not use drugs. family history includes Diabetes in her father, mother, and sister; Heart Disease in her father; High Blood Pressure in her father and mother; High Cholesterol in her sister; Stroke in her paternal grandmother. Review of Systems:   1. Cardiovascular: No chest pain, dyspnea on exertion, palpitations or loss of consciousness  2. Respiratory: No cough or wheezing    3. Musculoskeletal:  No gait disturbance, weakness, muscle cramps, aches & pains or joint complaints  4.  Neurological: No TIA or stroke symptoms  5. Psychiatric: No anxiety or depression  6. Hematologic/Lymphatic: No bleeding problems, blood clots or swollen lymph nodes    Physical Examination:    /70   Pulse 81   Ht 5' 6\" (1.676 m)   Wt 148 lb 6.4 oz (67.3 kg)   BMI 23.95 kg/m²    Wt Readings from Last 3 Encounters:   06/28/22 148 lb 6.4 oz (67.3 kg)   06/07/22 149 lb 3.2 oz (67.7 kg)   05/26/22 151 lb (68.5 kg)     Body mass index is 23.95 kg/m². General Appearance:  Non-obese/Well Nourished  1. Skin: It is warm & dry. No rashes noted. 2. Eyes: No conjunctival Pallor seen. No jaundice noted. 3. Neck: is supple there is no elevation of JVD. No thyromegaly  4. Respiratory:  · Resp Assessment: No abnormal findings. · Resp Auscultation: Vesicular breath sounds without rales or wheezing. 5. Cardiovascular:  · Auscultation: Normal S1 & S2, No prominent murmurs  · Carotid Arteries: No bruits present  · Abdominal Aorta: Non-palpable  · Pedal Pulses: 2+ and equal   6. Abdomen:  · No masses or tenderness  · Liver/Spleen: No Abnormalities Noted, no organomegaly. 7. Musculoskeletal: No joint deformities. No muscle wasting  8. Extremities:  ·  No Cyanosis or Clubbing. No significant edema   9. Rectal / genital:  ·  Deferred  10.  Neurological/Psychiatric:  · Oriented to time, place, and person  · Non-anxious    No results found for: CKTOTAL, CKMB, CKMBINDEX, TROPONINT  BNP:    Lab Results   Component Value Date    PROBNP 203 04/26/2022     PT/INR:  No results found for: [facility]  Lab Results   Component Value Date    LABA1C 6.8 06/10/2022    LABA1C 5.9 02/16/2022     Lab Results   Component Value Date    CHOL 190 06/10/2022    CHOL 164 03/17/2022    TRIG 402 (H) 06/10/2022    TRIG 242 (H) 03/17/2022    HDL 33 (L) 06/10/2022    HDL 43 03/17/2022    LDLCALC see below 06/10/2022    LDLCALC 73 03/17/2022    LDLDIRECT 87 06/10/2022    LDLDIRECT 154 (H) 07/17/2018     Lab Results   Component Value Date    ALT 8 (L) 06/10/2022    ALT 10 04/26/2022 AST 15 06/10/2022    AST 14 (L) 04/26/2022     BMP:    Lab Results   Component Value Date     06/10/2022     04/26/2022    K 5.0 06/10/2022    K 4.0 04/26/2022     06/10/2022     04/26/2022    CO2 24 06/10/2022    CO2 24 04/26/2022    BUN 24 06/10/2022    BUN 18 04/26/2022    CREATININE 0.9 06/10/2022    CREATININE 0.8 04/26/2022     CMP:    Lab Results   Component Value Date     06/10/2022     04/26/2022    K 5.0 06/10/2022    K 4.0 04/26/2022     06/10/2022     04/26/2022    CO2 24 06/10/2022    CO2 24 04/26/2022    BUN 24 06/10/2022    BUN 18 04/26/2022    CREATININE 0.9 06/10/2022    CREATININE 0.8 04/26/2022    PROT 6.4 06/10/2022    PROT 6.5 04/26/2022    PROT 7.4 09/14/2012     TSH:    Lab Results   Component Value Date    TSH 1.19 02/18/2021    TSH 1.28 08/18/2020     VENOUS US 5/2022      Significant reflux noted of the Right GSV SFJ (0.7s), GSV Knee (2.0s).  Significant reflux noted in the Left GSV SFJ (1.8s). QUALITY MEASURES REVIEWED:  1.CAD:Patient is taking anti platelet agent:No  Patient does not have Hx of documented CAD  2. DYSLIPIDEMIA: Patient is on cholesterol lowering medication:Yes   3. Beta-Blocker therapy for CAD, if prior Myocardial Infarction:No   4. Counselled regarding smoking cessation. No   Patient does not Smoke. 5.Anticoagulation therapy (for A.Fib) No   Does Not have A.Fib.  6.Discussed weight management strategies. Assessment, Recommendations & Tests:     CVI: Patient appear to have C4 venous disease. Patient has been using compression stockings. Recommend serial ablations of both GSVs.    I spent about 30 min. of time in review of the available data, chart Prep., interviewing patient, obtaining history, performing physical exam, going through decision making analysis for assessment & plans of management on this patient. Office Visit a month after the procedures.      Jared Posey MD, 6/28/2022 1:44 PM

## 2022-06-28 NOTE — PATIENT INSTRUCTIONS
CVI: Patient appear to have C4 venous disease. Patient has been using compression stockings. Recommend serial ablations of both GSVs.    I spent about 30 min. of time in review of the available data, chart Prep., interviewing patient, obtaining history, performing physical exam, going through decision making analysis for assessment & plans of management on this patient. Office Visit a month after the procedures.

## 2022-06-28 NOTE — LETTER
[address]  [address] [address]  Phone: [phone]  Fax: [phone]    Andreas Trevino MD    June 28, 2022     Amber Mahmood MD  [address] [facility] [address]    Patient: John Womack   MR Number: [id number]   YOB: 1950   Date of Visit: 6/28/2022       Dear Amber Mahmood: Thank you for referring Bhavana Thompson to me for evaluation/treatment. Below are the relevant portions of my assessment and plan of care. If you have questions, please do not hesitate to call me. I look forward to following Cinthya Fay along with you.     Sincerely,      Andreas Trevino MD

## 2022-06-28 NOTE — PROGRESS NOTES
Vein \"LEG PROBLEM Questionnaire\"  1. Do you have prominent leg veins? No   2. Do you have any skin discoloration? Yes  3. Do you have any healed or active sores? No  4. Do you have swelling of the legs? Yes  5. Do you have a family history of varicose veins? Yes , dad   6. Does your profession involve pro-longed        standing or heavy lifting? No  7. Have you been fighting overweight problems? No  8. Do you have restless legs? No  9. Do you have any night time cramps? Yes, sometimes  10. Do you have any of the following in your legs:        Aching, Itching and Tiredness weakness  11. If Yes - Have they worn compression stockings Yes  12.  If they have worn compression stockings 1 Months

## 2022-06-30 ENCOUNTER — TELEPHONE (OUTPATIENT)
Dept: CARDIOLOGY CLINIC | Age: 72
End: 2022-06-30

## 2022-06-30 RX ORDER — COLCHICINE 0.6 MG/1
0.6 TABLET ORAL 2 TIMES DAILY
Qty: 60 TABLET | Refills: 1 | Status: SHIPPED | OUTPATIENT
Start: 2022-06-30 | End: 2022-07-21

## 2022-06-30 NOTE — TELEPHONE ENCOUNTER
Pt.'s spouse Farida Diaz called wanting to ask further questions in reference to the procedure discussed at her visit.  Please call

## 2022-06-30 NOTE — TELEPHONE ENCOUNTER
Ref #[id number]    I called [facility] to get PA.     [id number]: no pa needed  [id number]: no pa needed  [id number]: no pa needed

## 2022-07-05 ENCOUNTER — TELEPHONE (OUTPATIENT)
Dept: CARDIOLOGY CLINIC | Age: 72
End: 2022-07-05

## 2022-07-05 NOTE — TELEPHONE ENCOUNTER
Valium 5 mg was called into the Pharmacy   5mg tablets   2 tablets with 0 refills  Patient needs to take one pill 30 mins before ablation  Then the 2nd pill when they arrive for the ablation

## 2022-07-06 ENCOUNTER — PROCEDURE VISIT (OUTPATIENT)
Dept: CARDIOLOGY CLINIC | Age: 72
End: 2022-07-06
Payer: MEDICARE

## 2022-07-06 DIAGNOSIS — E11.9 CONTROLLED TYPE 2 DIABETES MELLITUS WITHOUT COMPLICATION, WITHOUT LONG-TERM CURRENT USE OF INSULIN (HCC): ICD-10-CM

## 2022-07-06 DIAGNOSIS — E78.2 MIXED HYPERLIPIDEMIA: ICD-10-CM

## 2022-07-06 DIAGNOSIS — I83.893 VARICOSE VEINS OF BOTH LEGS WITH EDEMA: Primary | ICD-10-CM

## 2022-07-06 PROCEDURE — 36475 ENDOVENOUS RF 1ST VEIN: CPT | Performed by: INTERNAL MEDICINE

## 2022-07-06 NOTE — PROGRESS NOTES
Endovenous Radiofrequency Ablation Operative Report    7/6/2022    Subjective:  Mariano Nicholas is a 80 y.o. female    Procedure Performed:    Endovenous Radiofrequency Ablation [facility]) of the small saphenous vein on the  Left side. Indication:  Varicose veins of the Bilateral LE with inflammation    The patient was transferred to the procedure suite and the insufficient saphenous vein(s) to be treated was documented. The varicose tributary veins and suitable access sites were identified and mapped as well. The patient was then positioned supine on the procedure table. The affected limb was prepped and draped in the usual sterile fashion. The RF catheter was placed on the sterile field,flushed and wiped down, prepared, and connected by a sterile cable. The patient was placed in reverse - Trendelenburg position and local anesthesia was instilled in the skin overlying the access site. A skin incision was made overlying the identified and mapped great saphenous vein entry site. The vein was accessed using ultrasound guidance and the Seldinger technique, a guide wire was introduced through the needle, which was then exchanged over the guide wire for a 7F sheath, which was secured in place. The guide wire was removed and the sheath was flushed. The RF catheter was placed into the vein through the sheath and Preferentially, imaging was used to place the catheter tip just inferior to the superficial epigastric vein to preserve normal physiological flow in that vein. Additionally, it was confirmed by ultrasound guidance that the catheter tip was also placed a minimum of 2cm distal to the saphenofemoral junction.           After the RF catheter position was verified by ultrasound, tumescent anesthesia was infiltrated, under ultrasound guidance, precisely into the perivenous compartment along the entire length of vein from the entry site to the saphenofemoral junction until a \"halo\" of fluid was noted around the vein.    The patient was then placed in Trendelenburg position to further exsanguinate the superficial venous system. After RF catheter position was again confirmed with ultrasound imaging, and under direct external compression along the length of the heating element, RF energy was applied. The vein was segmentally ablated by heating a 7 cm sefment and then indexing the catheter forward by 6.5 cm until the treatment length is completed. Device temperature was maintained at 120+5 °C with an initial power level of 40W dropping to below 20W for each treatment. Total vein length treated 39.5 cm Total cycles of RF 7. Repeat ultrasound of the saphenous vein was performed, confirming successful treatment. The catheter and sheath were withdrawn and hemostasis established with direct pressure. After assuring hemostasis, the skin incision over the saphenous vein was closed with a bandage and a compression wrap, and/or graduated compression stocking was applied from the level of the foot to the most proximal level of the thigh. Complications: none immediate    Blood Loss: minimal    Conclusion:   Successful Endovenous Radiofrequency Ablation (EVRFA) of the Left Great saphenous.     Phuc Champagne MD, [facility]

## 2022-07-08 ENCOUNTER — PROCEDURE VISIT (OUTPATIENT)
Dept: CARDIOLOGY CLINIC | Age: 72
End: 2022-07-08
Payer: MEDICARE

## 2022-07-08 DIAGNOSIS — I87.302 IDIOPATHIC CHRONIC VENOUS HYPERTENSION OF LEFT LOWER EXTREMITY WITHOUT COMPLICATIONS: Primary | ICD-10-CM

## 2022-07-08 PROCEDURE — 93971 EXTREMITY STUDY: CPT | Performed by: INTERNAL MEDICINE

## 2022-07-11 ENCOUNTER — TELEPHONE (OUTPATIENT)
Dept: CARDIOLOGY CLINIC | Age: 72
End: 2022-07-11

## 2022-07-11 NOTE — TELEPHONE ENCOUNTER
Doppler:  Left CFV is patent with good compressibility and respirophasic signal with    good augmentation.    The Left GSV is non-compressible with no evidence of flow just past the    saphenofemoral junction to the knee     Patient verbally understood

## 2022-07-21 ENCOUNTER — OFFICE VISIT (OUTPATIENT)
Dept: INTERNAL MEDICINE CLINIC | Age: 72
End: 2022-07-21
Payer: MEDICARE

## 2022-07-21 VITALS
RESPIRATION RATE: 16 BRPM | DIASTOLIC BLOOD PRESSURE: 68 MMHG | BODY MASS INDEX: 23.24 KG/M2 | WEIGHT: 144 LBS | HEART RATE: 79 BPM | OXYGEN SATURATION: 97 % | SYSTOLIC BLOOD PRESSURE: 122 MMHG

## 2022-07-21 DIAGNOSIS — E11.9 CONTROLLED TYPE 2 DIABETES MELLITUS WITHOUT COMPLICATION, WITHOUT LONG-TERM CURRENT USE OF INSULIN (HCC): ICD-10-CM

## 2022-07-21 DIAGNOSIS — R06.00 DYSPNEA, UNSPECIFIED TYPE: ICD-10-CM

## 2022-07-21 DIAGNOSIS — E78.2 MIXED HYPERLIPIDEMIA: ICD-10-CM

## 2022-07-21 DIAGNOSIS — R60.9 PERIPHERAL EDEMA: ICD-10-CM

## 2022-07-21 DIAGNOSIS — M10.09 IDIOPATHIC GOUT OF MULTIPLE SITES, UNSPECIFIED CHRONICITY: Primary | ICD-10-CM

## 2022-07-21 DIAGNOSIS — M10.09 IDIOPATHIC GOUT OF MULTIPLE SITES, UNSPECIFIED CHRONICITY: ICD-10-CM

## 2022-07-21 DIAGNOSIS — F51.01 PRIMARY INSOMNIA: ICD-10-CM

## 2022-07-21 LAB
ANION GAP SERPL CALCULATED.3IONS-SCNC: 14 MMOL/L (ref 3–16)
BUN BLDV-MCNC: 17 MG/DL (ref 7–20)
C-REACTIVE PROTEIN: 8.3 MG/L (ref 0–5.1)
CALCIUM SERPL-MCNC: 10.2 MG/DL (ref 8.3–10.6)
CHLORIDE BLD-SCNC: 103 MMOL/L (ref 99–110)
CO2: 24 MMOL/L (ref 21–32)
CREAT SERPL-MCNC: 0.7 MG/DL (ref 0.6–1.2)
GFR AFRICAN AMERICAN: >60
GFR NON-AFRICAN AMERICAN: >60
GLUCOSE BLD-MCNC: 139 MG/DL (ref 70–99)
POTASSIUM SERPL-SCNC: 4.1 MMOL/L (ref 3.5–5.1)
SEDIMENTATION RATE, ERYTHROCYTE: 45 MM/HR (ref 0–30)
SODIUM BLD-SCNC: 141 MMOL/L (ref 136–145)
TSH SERPL DL<=0.05 MIU/L-ACNC: 2.9 UIU/ML (ref 0.27–4.2)
URIC ACID, SERUM: 5.2 MG/DL (ref 2.6–6)

## 2022-07-21 PROCEDURE — 1123F ACP DISCUSS/DSCN MKR DOCD: CPT | Performed by: INTERNAL MEDICINE

## 2022-07-21 PROCEDURE — 36415 COLL VENOUS BLD VENIPUNCTURE: CPT | Performed by: INTERNAL MEDICINE

## 2022-07-21 PROCEDURE — 3044F HG A1C LEVEL LT 7.0%: CPT | Performed by: INTERNAL MEDICINE

## 2022-07-21 PROCEDURE — 99214 OFFICE O/P EST MOD 30 MIN: CPT | Performed by: INTERNAL MEDICINE

## 2022-07-21 RX ORDER — ALLOPURINOL 100 MG/1
200 TABLET ORAL DAILY
Qty: 180 TABLET | Refills: 1 | Status: SHIPPED
Start: 2022-07-21 | End: 2022-08-23

## 2022-07-21 RX ORDER — HYDROXYZINE HYDROCHLORIDE 25 MG/1
25 TABLET, FILM COATED ORAL NIGHTLY
Qty: 30 TABLET | Refills: 3 | Status: ON HOLD
Start: 2022-07-21 | End: 2022-08-23 | Stop reason: HOSPADM

## 2022-07-21 NOTE — PROGRESS NOTES
Rebecca Crigler  1950 07/21/22    SUBJECTIVE:  cont to have swelling in legs and hands. Has had ablation on L but states no improvement  is to have R side done next month? BP is stable, on lasix and wt is also down 11# the past few months on this. Gout, no active hot joints noted and w'll f/u ur acid. Does have continued arthralgias in hands and feet. GI upset and no improvement on colcrys so this was stopped. DM - last a1c higher but was on steroids and still in acceptable range. Lab Results   Component Value Date    LABA1C 6.8 06/10/2022    LABA1C 5.9 02/16/2022    LABA1C 5.6 08/20/2021     Lab Results   Component Value Date    LABMICR <1.20 03/17/2022    LDLCALC see below 06/10/2022    CREATININE 0.9 06/10/2022     C/o periodic mild SOB and not done CXR ordered earlier this yr, we'll proceed. Insomnia persistent, minimal benefit fr trazodone, we'll also try atarax    OBJECTIVE:    /68   Pulse 79   Resp 16   Wt 144 lb (65.3 kg)   SpO2 97%   BMI 23.24 kg/m²     Physical Exam  Vitals reviewed. Constitutional:       General: She is not in acute distress. Appearance: She is well-developed. HENT:      Head: Normocephalic and atraumatic. Right Ear: External ear normal.      Left Ear: External ear normal.   Eyes:      General: No scleral icterus. Right eye: No discharge. Left eye: No discharge. Conjunctiva/sclera: Conjunctivae normal.      Pupils: Pupils are equal, round, and reactive to light. Neck:      Thyroid: No thyromegaly. Vascular: No JVD. Trachea: No tracheal deviation. Cardiovascular:      Rate and Rhythm: Normal rate and regular rhythm. Heart sounds: Normal heart sounds. No murmur heard. No friction rub. No gallop. Pulmonary:      Effort: Pulmonary effort is normal. No respiratory distress. Breath sounds: Normal breath sounds. No wheezing or rales.    Abdominal:      General: Bowel sounds are normal. There is no distension. Palpations: Abdomen is soft. There is no mass. Tenderness: There is no abdominal tenderness. There is no guarding or rebound. Musculoskeletal:      Cervical back: Neck supple. Right lower leg: Edema (tr) present. Left lower leg: Edema (tr) present. Lymphadenopathy:      Cervical: No cervical adenopathy. Skin:     General: Skin is warm and dry. Neurological:      Mental Status: She is alert. Psychiatric:         Mood and Affect: Mood normal.       ASSESSMENT:    1. Idiopathic gout of multiple sites, unspecified chronicity    2. Peripheral edema    3. Dyspnea, unspecified type    4. Mixed hyperlipidemia    5. Controlled type 2 diabetes mellitus without complication, without long-term current use of insulin ([address]    6. Primary insomnia        PLAN:    Gabe Henson was seen today for edema and fatigue. Diagnoses and all orders for this visit:    Idiopathic gout of multiple sites, unspecified chronicity- we'll recheck levels ur acid and also try incr allopurinol. If sx persist also consider refer Rheum later, Dr Kristi Ojeda is coming later this yr.  -     Uric Acid; Future  -     allopurinol (ZYLOPRIM) 100 MG tablet; Take 2 tablets by mouth in the morning.  -     Sedimentation Rate; Future  -     C-Reactive Protein; Future  -     Basic Metabolic Panel; Future    Peripheral edema- wt is down likely correlating w lasix, cont monitor. She states wants to hold off R leg ablation for now    Dyspnea, unspecified type- also check cxr    -     XR CHEST STANDARD (2 VW); Future    Mixed hyperlipidemia  - Pt will continue to work on a low fat diet and also exercise, wt loss as appropriate. Will continue periodic monitoring of fasting lipid profile, glucose, liver function.    -     TSH; Future    Controlled type 2 diabetes mellitus without complication, without long-term current use of insulin ([address]  - DM relatively well controlled and will continue current regimen. Screening reviewed.  See orders. Primary insomnia- adding prn rx for insomnia as trazodone not providing enough relief  -     hydrOXYzine HCl (ATARAX) 25 MG tablet;  Take 1 tablet by mouth nightly

## 2022-07-22 ENCOUNTER — HOSPITAL ENCOUNTER (OUTPATIENT)
Age: 72
Discharge: HOME | End: 2022-07-22
Payer: MEDICARE

## 2022-07-22 ENCOUNTER — HOSPITAL ENCOUNTER (OUTPATIENT)
Dept: GENERAL RADIOLOGY | Age: 72
Discharge: HOME | End: 2022-07-22
Payer: MEDICARE

## 2022-07-22 DIAGNOSIS — J18.9 PNEUMONIA OF RIGHT LOWER LOBE DUE TO INFECTIOUS ORGANISM: Primary | ICD-10-CM

## 2022-07-22 DIAGNOSIS — R06.00 DYSPNEA, UNSPECIFIED TYPE: ICD-10-CM

## 2022-07-22 DIAGNOSIS — J18.9 PNEUMONIA OF RIGHT LUNG DUE TO INFECTIOUS ORGANISM, UNSPECIFIED PART OF LUNG: Primary | ICD-10-CM

## 2022-07-22 PROCEDURE — 71046 X-RAY EXAM CHEST 2 VIEWS: CPT

## 2022-07-22 RX ORDER — DOXYCYCLINE HYCLATE 100 MG
100 TABLET ORAL 2 TIMES DAILY
Qty: 20 TABLET | Refills: 0 | Status: SHIPPED | OUTPATIENT
Start: 2022-07-22 | End: 2022-08-12 | Stop reason: SDUPTHER

## 2022-07-22 NOTE — RESULT ENCOUNTER NOTE
Please call pt, overall labs are improved, has nl thyroid fxn and sugars better from 143-139, uric acid level for gout also nl range now from 7.1--5. 2. STILL HAS SL HIGH INFLAMMATION, W STIFFNESS IN HER JOINTS DESPITE NEGATIVE TESTS FOR RHEUMATOID ARTHRITIS, COULD STILL HAVE AN UNDERLYING INFLAMMATORY ARTHRITIS PROCESS- UNLIKELY GOUT W NL URIC ACID LEVELS. I'D REC WE SEEK AN OPINION FROM RHEUMATOLOGY. IF SHE'S WILLING, THE BEST GROUP WOULD BE IN [place], DR [facility]. IF OK W PEG LET'S REFER, DX ARTHRALGIAS. WE'D NEED TO SEND THE LAST RODOLFO, RF, CRPs AND ESRs ALSO.

## 2022-07-22 NOTE — RESULT ENCOUNTER NOTE
Please call pt, cxr suggests may have a R sided pneumonia, less likely congestive heart failure and she already has had an extensive cardiac work up and is on diuretic pills. Rec we start doxycyline 100mg po BID for 10d then recheck CXR in two weeks, dx R sided pneumonia.

## 2022-07-29 DIAGNOSIS — M25.50 ARTHRALGIA, UNSPECIFIED JOINT: Primary | ICD-10-CM

## 2022-08-04 ENCOUNTER — TELEPHONE (OUTPATIENT)
Dept: INTERNAL MEDICINE CLINIC | Age: 72
End: 2022-08-04

## 2022-08-04 NOTE — TELEPHONE ENCOUNTER
Pt called to report the Atarax is not helping her sleep. She is taking 25 mg nightly. Please adivse.

## 2022-08-08 ENCOUNTER — PROCEDURE VISIT (OUTPATIENT)
Dept: CARDIOLOGY CLINIC | Age: 72
End: 2022-08-08
Payer: MEDICARE

## 2022-08-08 DIAGNOSIS — I87.302 IDIOPATHIC CHRONIC VENOUS HYPERTENSION OF LEFT LOWER EXTREMITY WITHOUT COMPLICATIONS: Primary | ICD-10-CM

## 2022-08-08 PROCEDURE — 93971 EXTREMITY STUDY: CPT | Performed by: INTERNAL MEDICINE

## 2022-08-10 ENCOUNTER — TELEPHONE (OUTPATIENT)
Dept: CARDIOLOGY CLINIC | Age: 72
End: 2022-08-10

## 2022-08-10 ENCOUNTER — OFFICE VISIT (OUTPATIENT)
Dept: CARDIOLOGY CLINIC | Age: 72
End: 2022-08-10
Payer: MEDICARE

## 2022-08-10 VITALS
SYSTOLIC BLOOD PRESSURE: 128 MMHG | HEIGHT: 66 IN | HEART RATE: 89 BPM | DIASTOLIC BLOOD PRESSURE: 78 MMHG | BODY MASS INDEX: 24.46 KG/M2 | WEIGHT: 152.2 LBS

## 2022-08-10 DIAGNOSIS — I83.893 VARICOSE VEINS OF BOTH LEGS WITH EDEMA: Primary | ICD-10-CM

## 2022-08-10 DIAGNOSIS — R60.0 EDEMA OF LOWER EXTREMITY: ICD-10-CM

## 2022-08-10 DIAGNOSIS — E11.9 CONTROLLED TYPE 2 DIABETES MELLITUS WITHOUT COMPLICATION, WITHOUT LONG-TERM CURRENT USE OF INSULIN (HCC): ICD-10-CM

## 2022-08-10 DIAGNOSIS — E78.2 MIXED HYPERLIPIDEMIA: ICD-10-CM

## 2022-08-10 PROCEDURE — 99213 OFFICE O/P EST LOW 20 MIN: CPT | Performed by: INTERNAL MEDICINE

## 2022-08-10 PROCEDURE — 1123F ACP DISCUSS/DSCN MKR DOCD: CPT | Performed by: INTERNAL MEDICINE

## 2022-08-10 PROCEDURE — 3044F HG A1C LEVEL LT 7.0%: CPT | Performed by: INTERNAL MEDICINE

## 2022-08-10 NOTE — TELEPHONE ENCOUNTER
Venous doppler 1 month post  Left CFV is patent with good compressibility and respirophasic signal with    good augmentation.     The Left GSV is non-compressible with no evidence of flow just past the    saphenofemoral junction to the knee     Patient verbally understood

## 2022-08-10 NOTE — LETTER
[address]  [address] [address]  Phone: [phone]  Fax: [phone]    Stacey Delcid MD    August 10, 2022     Alena Osborn MD  [address] [facility] [address]    Patient: Lissette Max   MR Number: [id number]   YOB: 1950   Date of Visit: 8/10/2022       Dear Alena Osborn: Thank you for referring Suma Diaz to me for evaluation/treatment. Below are the relevant portions of my assessment and plan of care. If you have questions, please do not hesitate to call me. I look forward to following Goldberg Phyllis along with you.     Sincerely,      Stacey Delcid MD

## 2022-08-10 NOTE — PROGRESS NOTES
OFFICE PROGRESS NOTES      Chilo Damon is a 71 y.o. female who has    CHIEF COMPLAINT AS FOLLOWS:  CHEST PAIN: Patient denies any C/O chest pains at this time. SOB: No C/O SOB at this time. LEG EDEMA: B/L Lower extremity edema is present but no change over previous. Cramoing improved. PALPITATIONS: Denies any C/O Palpitations   DIZZINESS: No C/O Dizziness   SYNCOPE: None   OTHER/ ADDITIONAL COMPLAINTS:                                     HPI: Patient is here for F/U on her  CVI S/P Ablation. Current Outpatient Medications   Medication Sig Dispense Refill    allopurinol (ZYLOPRIM) 100 MG tablet Take 2 tablets by mouth in the morning. 180 tablet 1    hydrOXYzine HCl (ATARAX) 25 MG tablet Take 1 tablet by mouth nightly 30 tablet 3    metFORMIN (GLUCOPHAGE) 500 MG tablet Take 1 tablet by mouth every morning (before breakfast) 90 tablet 1    glipiZIDE (GLUCOTROL) 5 MG tablet Take 1 tablet by mouth daily 90 tablet 1    furosemide (LASIX) 40 MG tablet Take 1 tablet by mouth daily 90 tablet 1    traZODone (DESYREL) 100 MG tablet Take 1 tablet by mouth nightly 90 tablet 1    rosuvastatin (CRESTOR) 10 MG tablet Take 1 tablet by mouth nightly 90 tablet 1    PARoxetine (PAXIL) 20 MG tablet Take 1 tablet by mouth every morning 90 tablet 1    Lancets MISC 1 each by Does not apply route 2 times daily 100 each 0    blood glucose monitor strips Test twice a day & as needed for symptoms of irregular blood glucose. 100 strip 3     No current facility-administered medications for this visit.      Allergies: Codeine  Review of Systems:    Constitutional: Negative for diaphoresis and fatigue  Respiratory: Negative for shortness of breath  Cardiovascular: Negative for chest pain, dyspnea on exertion, claudication, edema, irregular heartbeat, murmur, palpitations or shortness of breath  Musculoskeletal: Negative for muscle pain, muscular weakness, negative for pain in arm and leg or swelling in foot and leg    Objective:  /78   Pulse 89   Ht 5' 6\" (1.676 m)   Wt 152 lb 3.2 oz (69 kg)   BMI 24.57 kg/m²   Wt Readings from Last 3 Encounters:   08/10/22 152 lb 3.2 oz (69 kg)   07/21/22 144 lb (65.3 kg)   06/28/22 148 lb 6.4 oz (67.3 kg)     Body mass index is 24.57 kg/m². GENERAL - Alert, oriented, pleasant, in no apparent distress. EYES: No jaundice, no conjunctival pallor. Neck - Supple. No jugular venous distention noted. No carotid bruits. Cardiovascular - Normal S1 and S2 without obvious murmur or gallop. Extremities - No cyanosis, clubbing, THERE is significant edema with C4 changes. Pulmonary - No respiratory distress. No wheezes or rales.       MEDICAL DECISION MAKING & DATA REVIEW:    Lab Review   No results found for: TROPONINT  Lab Results   Component Value Date/Time    PROBNP 203 04/26/2022 08:34 AM     No results found for: INR  Lab Results   Component Value Date    LABA1C 6.8 06/10/2022    LABA1C 5.9 02/16/2022     Lab Results   Component Value Date    WBC 5.0 03/17/2022    WBC 5.2 08/20/2021    HCT 42.6 03/17/2022    HCT 44.4 08/20/2021    MCV 92.1 03/17/2022    MCV 91.6 08/20/2021     03/17/2022     08/20/2021     Lab Results   Component Value Date    CHOL 190 06/10/2022    CHOL 164 03/17/2022    TRIG 402 (H) 06/10/2022    TRIG 242 (H) 03/17/2022    HDL 33 (L) 06/10/2022    HDL 43 03/17/2022    LDLCALC see below 06/10/2022    LDLCALC 73 03/17/2022    LDLDIRECT 87 06/10/2022    LDLDIRECT 154 (H) 07/17/2018     Lab Results   Component Value Date    ALT 8 (L) 06/10/2022    ALT 10 04/26/2022    AST 15 06/10/2022    AST 14 (L) 04/26/2022     BMP:    Lab Results   Component Value Date/Time     07/21/2022 09:55 AM     06/10/2022 08:40 AM    K 4.1 07/21/2022 09:55 AM    K 5.0 06/10/2022 08:40 AM     07/21/2022 09:55 AM     06/10/2022 08:40 AM    CO2 24 07/21/2022 09:55 AM    CO2 24 06/10/2022 08:40 AM    BUN 17 07/21/2022 09:55 AM    BUN 24 06/10/2022 08:40 AM    CREATININE 0.7 07/21/2022 09:55 AM    CREATININE 0.9 06/10/2022 08:40 AM     CMP:   Lab Results   Component Value Date/Time     07/21/2022 09:55 AM     06/10/2022 08:40 AM    K 4.1 07/21/2022 09:55 AM    K 5.0 06/10/2022 08:40 AM     07/21/2022 09:55 AM     06/10/2022 08:40 AM    CO2 24 07/21/2022 09:55 AM    CO2 24 06/10/2022 08:40 AM    BUN 17 07/21/2022 09:55 AM    BUN 24 06/10/2022 08:40 AM    CREATININE 0.7 07/21/2022 09:55 AM    CREATININE 0.9 06/10/2022 08:40 AM    PROT 6.4 06/10/2022 08:40 AM    PROT 6.5 04/26/2022 08:34 AM    PROT 7.4 09/14/2012 08:31 AM     Lab Results   Component Value Date/Time    TSH 2.90 07/21/2022 09:55 AM    TSH 1.19 02/18/2021 10:36 AM       QUALITY MEASURES REVIEWED:  1.CAD:Patient is taking anti platelet agent:No  Patient does not have Hx of documented CAD  2. DYSLIPIDEMIA: Patient is on cholesterol lowering medication:Yes   3. Beta-Blocker therapy for CAD, if prior Myocardial Infarction:No   4. Counselled regarding smoking cessation. No   Patient does not Smoke. 5.Anticoagulation therapy (for A.Fib) No   Does Not have A.Fib.  6.Discussed weight management strategies. Assessment & Plan:  Primary / Secondary prevention is the goal by aggressive risk modification, healthy and therapeutic life style changes for cardiovascular risk reduction. CVI: S/P Left GSV ablation. 5/3/2022    No evidence of DVT or SVT in the bilateral common femoral vein, femoral    vein, popliteal vein, greater saphenous vein or small saphenous vein. Significant reflux noted of the Right GSV SFJ (0.7s), GSV Knee (2.0s). Significant reflux noted in the Left GSV SFJ (1.8s).     8/8/2022    Left CFV is patent with good compressibility and respirophasic signal with    good augmentation. The Left GSV is non-compressible with no evidence of flow just past the    saphenofemoral junction to the knee.        TESTS ORDERED:Patient wants to wait till after her visit to Rheumatologist before scheduling right GSV ABLATION. PREVIOUSLY ORDERED TESTS REVIEWED & DISCUSSED WITH THE PATIENT:     I personally reviewed & interpreted, all previously ordered tests as copied above. Latest Labs are pulled in to the note with dates. Labs, specially in Reference to Lipid profile, Cardiac testing in the form of Echo ( dated: ), stress tests ( dated: ) & other relevant cardiac testing reviewed with patient & recommendations made based on assessment of the results. Discussed role of Cardiac risk factors & effects + treatment of co morbidities with patient & advised accordingly. MEDICATIONS: List of medications patient is currently taking is reviewed in detail with the patient & family member present. Discussed any side effects or problems taking the medication. Recommend Continue present management & medications as listed. AFFIRMATION: I spent at least 20 minutes of time reviewing patient's history, previous & current medical problems & all Labs + testing. This includes chart prep even prior to the vosit. Various goals are discussed and multiple questions answered. Relevant concelling performed. Office follow up in 2 month.

## 2022-08-10 NOTE — LETTER
8/10/2022  2:45 PM     Patient Name: Veda Crigler  : 1950  MRN# [id number]    REASON FOR VISIT: 1 month post LGSv    Patient Active Problem List    Diagnosis Date Noted    Varicose veins of both legs with edema 2022    Venous insufficiency 2022    Shortness of breath 2022    Edema of lower extremity 2022    Chest pain 2022    Peripheral edema 2022    Carcinoma of right female breast, unspecified estrogen receptor status, unspecified site of breast ([facility] 2022    Hypercalcemia     Diabetes type 2, controlled ([facility]     Migraine headache     GERD (gastroesophageal reflux disease)     Irritable bowel syndrome     Lumbar herniated disc     Low back pain     Hyperlipidemia     History of breast cancer        Allergies: Codeine      Current Outpatient Medications   Medication Sig Dispense Refill    allopurinol (ZYLOPRIM) 100 MG tablet Take 2 tablets by mouth in the morning. 180 tablet 1    hydrOXYzine HCl (ATARAX) 25 MG tablet Take 1 tablet by mouth nightly 30 tablet 3    metFORMIN (GLUCOPHAGE) 500 MG tablet Take 1 tablet by mouth every morning (before breakfast) 90 tablet 1    glipiZIDE (GLUCOTROL) 5 MG tablet Take 1 tablet by mouth daily 90 tablet 1    furosemide (LASIX) 40 MG tablet Take 1 tablet by mouth daily 90 tablet 1    traZODone (DESYREL) 100 MG tablet Take 1 tablet by mouth nightly 90 tablet 1    rosuvastatin (CRESTOR) 10 MG tablet Take 1 tablet by mouth nightly 90 tablet 1    PARoxetine (PAXIL) 20 MG tablet Take 1 tablet by mouth every morning 90 tablet 1    Lancets MISC 1 each by Does not apply route 2 times daily 100 each 0    blood glucose monitor strips Test twice a day & as needed for symptoms of irregular blood glucose. 100 strip 3     No current facility-administered medications for this visit.          Lab Review   No results found for: CKTOTAL, CKMB, CKMBINDEX, TROPONINT  BNP:    Lab Results   Component Value Date/Time PROBNP 203 04/26/2022 08:34 AM     PT/INR:  No results found for: INR  Lab Results   Component Value Date    LABA1C 6.8 06/10/2022    LABA1C 5.9 02/16/2022     Lab Results   Component Value Date    WBC 5.0 03/17/2022    WBC 5.2 08/20/2021    HCT 42.6 03/17/2022    HCT 44.4 08/20/2021    MCV 92.1 03/17/2022    MCV 91.6 08/20/2021     03/17/2022     08/20/2021     Lab Results   Component Value Date    CHOL 190 06/10/2022    CHOL 164 03/17/2022    TRIG 402 (H) 06/10/2022    TRIG 242 (H) 03/17/2022    HDL 33 (L) 06/10/2022    HDL 43 03/17/2022    LDLCALC see below 06/10/2022    LDLCALC 73 03/17/2022    LDLDIRECT 87 06/10/2022    LDLDIRECT 154 (H) 07/17/2018     Lab Results   Component Value Date    ALT 8 (L) 06/10/2022    ALT 10 04/26/2022    AST 15 06/10/2022    AST 14 (L) 04/26/2022     BMP:    Lab Results   Component Value Date/Time     07/21/2022 09:55 AM     06/10/2022 08:40 AM    K 4.1 07/21/2022 09:55 AM    K 5.0 06/10/2022 08:40 AM     07/21/2022 09:55 AM     06/10/2022 08:40 AM    CO2 24 07/21/2022 09:55 AM    CO2 24 06/10/2022 08:40 AM    BUN 17 07/21/2022 09:55 AM    BUN 24 06/10/2022 08:40 AM    CREATININE 0.7 07/21/2022 09:55 AM    CREATININE 0.9 06/10/2022 08:40 AM     CMP:   Lab Results   Component Value Date/Time     07/21/2022 09:55 AM     06/10/2022 08:40 AM    K 4.1 07/21/2022 09:55 AM    K 5.0 06/10/2022 08:40 AM     07/21/2022 09:55 AM     06/10/2022 08:40 AM    CO2 24 07/21/2022 09:55 AM    CO2 24 06/10/2022 08:40 AM    BUN 17 07/21/2022 09:55 AM    BUN 24 06/10/2022 08:40 AM    CREATININE 0.7 07/21/2022 09:55 AM    CREATININE 0.9 06/10/2022 08:40 AM    PROT 6.4 06/10/2022 08:40 AM    PROT 6.5 04/26/2022 08:34 AM    PROT 7.4 09/14/2012 08:31 AM     TSH:    Lab Results   Component Value Date/Time    TSH 2.90 07/21/2022 09:55 AM    TSH 1.19 02/18/2021 10:36 AM       Smoke: What:                           How much:    Alcohol: How Much:     Caffeine: Pop:         Tea:            Coffee:                Chocolate:    Exercise:    Last Visit:2022  Complaints:   Edema   Changes:Recommend serial ablations of both GSVs    LAST EK2022    VENOUS DOPPLER: 2022  Left CFV is patent with good compressibility and respirophasic signal with    good augmentation.    The Left GSV is non-compressible with no evidence of flow just past the    saphenofemoral junction to the knee.         Still need RGSV     HOLTER/ EVENT MONITOR: NONE    STRESS TEST:  2022   Overall Impression:   Limited Exercise capacity. 5 METs work load. Physiological BP response to exercise. ETT NEGATIVE FOR iSCHEMIA / ARRHYTHMIA    ECHO:    Left ventricular function and size is normal, EF is estimated at 55-60%. Mild left ventricular hypertrophy. Grade I diastolic dysfunction. No regional wall motion abnormalities were detected. Sclerotic, but non-stenotic aortic valve. Mitral annular calcification is present. Mild mitral, tricuspid and moderate aortic regurgitation is present. Mild Pulmonary hypertension noted with RVSP of 43mmHg. There is a small (trivial) pericardial effusion    CAROTID: NONE    MUGA: NONE    LAST PACER CHECK: NONE    CARDIAC CATH: NONE    Amio Protocol:    CHADS: [id number] Score for Atrial Fibrillation Stroke Risk   Risk   Factors  Component Value   C CHF No 0   H HTN No 0   A2 Age >= 76 No,  (76 y.o.) 0   D DM Yes 1   S2 Prior Stroke/TIA No 0   V Vascular Disease No 0   A Age 74-69 Yes,  (76 y.o.) 1   Sc Sex female 1    [id number]  Score  3   Score last updated 8/10/22 4:34 AM EDT    Click here for a link to the UpToDate guideline \"Atrial Fibrillation: Anticoagulation therapy to prevent embolization    Disclaimer: Risk Score calculation is dependent on accuracy of patient problem list and past encounter diagnosis.

## 2022-08-10 NOTE — PATIENT INSTRUCTIONS
CVI: S/P Left GSV ablation. 5/3/2022    No evidence of DVT or SVT in the bilateral common femoral vein, femoral    vein, popliteal vein, greater saphenous vein or small saphenous vein. Significant reflux noted of the Right GSV SFJ (0.7s), GSV Knee (2.0s). Significant reflux noted in the Left GSV SFJ (1.8s).     8/8/2022    Left CFV is patent with good compressibility and respirophasic signal with    good augmentation. The Left GSV is non-compressible with no evidence of flow just past the    saphenofemoral junction to the knee. TESTS ORDERED:Patient wants to wait till after her visit to Rheumatologist before scheduling right GSV ABLATION. PREVIOUSLY ORDERED TESTS REVIEWED & DISCUSSED WITH THE PATIENT:     I personally reviewed & interpreted, all previously ordered tests as copied above. Latest Labs are pulled in to the note with dates. Labs, specially in Reference to Lipid profile, Cardiac testing in the form of Echo ( dated: ), stress tests ( dated: ) & other relevant cardiac testing reviewed with patient & recommendations made based on assessment of the results. Discussed role of Cardiac risk factors & effects + treatment of co morbidities with patient & advised accordingly. MEDICATIONS: List of medications patient is currently taking is reviewed in detail with the patient & family member present. Discussed any side effects or problems taking the medication. Recommend Continue present management & medications as listed. AFFIRMATION: I spent at least 20 minutes of time reviewing patient's history, previous & current medical problems & all Labs + testing. This includes chart prep even prior to the vosit. Various goals are discussed and multiple questions answered. Relevant concelling performed. Office follow up in 2 month.

## 2022-08-12 DIAGNOSIS — J18.9 PNEUMONIA OF RIGHT LOWER LOBE DUE TO INFECTIOUS ORGANISM: ICD-10-CM

## 2022-08-12 DIAGNOSIS — F51.01 PRIMARY INSOMNIA: ICD-10-CM

## 2022-08-12 RX ORDER — HYDROXYZINE HYDROCHLORIDE 25 MG/1
25 TABLET, FILM COATED ORAL NIGHTLY
Qty: 30 TABLET | Refills: 3 | OUTPATIENT
Start: 2022-08-12 | End: 2022-09-11

## 2022-08-12 RX ORDER — DOXYCYCLINE HYCLATE 100 MG
100 TABLET ORAL 2 TIMES DAILY
Qty: 20 TABLET | Refills: 0 | Status: ON HOLD
Start: 2022-08-12 | End: 2022-08-23 | Stop reason: HOSPADM

## 2022-08-15 ENCOUNTER — TELEPHONE (OUTPATIENT)
Dept: CARDIOLOGY CLINIC | Age: 72
End: 2022-08-15

## 2022-08-15 NOTE — TELEPHONE ENCOUNTER
Patient states swelling has been happening for 5 months.  She will see Dr Clifford Lake tomorrow to discuss

## 2022-08-15 NOTE — TELEPHONE ENCOUNTER
Pt called in with complaints of being SOB has been diagnosed with Pneumonia . Also having swelling of lower extremities. Scheduled appt for tomorrow but concerned if she should be doing something before then.  Please advise

## 2022-08-16 ENCOUNTER — OFFICE VISIT (OUTPATIENT)
Dept: CARDIOLOGY CLINIC | Age: 72
End: 2022-08-16
Payer: MEDICARE

## 2022-08-16 VITALS
DIASTOLIC BLOOD PRESSURE: 84 MMHG | SYSTOLIC BLOOD PRESSURE: 162 MMHG | HEIGHT: 66 IN | OXYGEN SATURATION: 96 % | WEIGHT: 153.4 LBS | BODY MASS INDEX: 24.65 KG/M2 | HEART RATE: 107 BPM

## 2022-08-16 DIAGNOSIS — I50.30 DIASTOLIC HEART FAILURE, UNSPECIFIED HF CHRONICITY (HCC): ICD-10-CM

## 2022-08-16 DIAGNOSIS — E78.5 DYSLIPIDEMIA: ICD-10-CM

## 2022-08-16 DIAGNOSIS — I83.893 VARICOSE VEINS OF BOTH LEGS WITH EDEMA: ICD-10-CM

## 2022-08-16 DIAGNOSIS — R06.02 SHORTNESS OF BREATH: Primary | ICD-10-CM

## 2022-08-16 DIAGNOSIS — I10 ESSENTIAL HYPERTENSION: ICD-10-CM

## 2022-08-16 PROCEDURE — 1123F ACP DISCUSS/DSCN MKR DOCD: CPT | Performed by: INTERNAL MEDICINE

## 2022-08-16 PROCEDURE — 99214 OFFICE O/P EST MOD 30 MIN: CPT | Performed by: INTERNAL MEDICINE

## 2022-08-16 PROCEDURE — 93000 ELECTROCARDIOGRAM COMPLETE: CPT | Performed by: INTERNAL MEDICINE

## 2022-08-16 RX ORDER — AMLODIPINE BESYLATE AND BENAZEPRIL HYDROCHLORIDE 5; 10 MG/1; MG/1
1 CAPSULE ORAL DAILY
Qty: 30 CAPSULE | Refills: 3 | Status: SHIPPED
Start: 2022-08-16 | End: 2022-08-23

## 2022-08-16 NOTE — PROGRESS NOTES
Thomas Silveira MD                                  CARDIOLOGY  NOTE         Chief Complaint:    Chief Complaint   Patient presents with    Edema     Extremities     Shortness of Breath     Can hardly breath     Follow-up: Dyspnea with exertion, pulm hypertension, essential hypertension, hyperlipidemia        Post Ablation Left GSV  8/8/2022 8/8/2022    Left CFV is patent with good compressibility and respirophasic signal with    good augmentation. The Left GSV is non-compressible with no evidence of flow just past the    saphenofemoral junction to the knee. Echocardiogram 5/12/2022     Left ventricular function and size is normal, EF is estimated at 55-60%. Mild left ventricular hypertrophy. Grade I diastolic dysfunction. No regional wall motion abnormalities were detected. Sclerotic, but non-stenotic aortic valve. Mitral annular calcification is present. Mild mitral, tricuspid and moderate aortic regurgitation is present. Mild Pulmonary hypertension noted with RVSP of 43mmHg. There is a small (trivial) pericardial effusion. JOS 5/17/2022        Normal bilateral lower extremity ankle brachial indices. Venous UItrasound 5/17/2022     No evidence of DVT or SVT in the bilateral common femoral vein, femoral    vein, popliteal vein, greater saphenous vein or small saphenous vein. Significant reflux noted of the Right GSV SFJ (0.7s), GSV Knee (2.0s). Significant reflux noted in the Left GSV SFJ (1.8s). Recommendations        Advice thigh high pressure stockings, 20 to 30 mm of Hg. Keep feet elevated. Increase walking. Significant changes or pathology noted. Schedule for OV for follow up. ETT 5/12/2022     Limited Exercise capacity. 5 METs work load. Physiological BP response to exercise.    ETT NEGATIVE FOR iSCHEMIA / ARRHYTHMIA    HPI:     Zaheer Sandra is a 71y.o. year old female presents to the clinic today to be evaluated for lower extremity edema and shortness of breath. Patient has by medical history significant for hyperlipidemia diabetes mellitus, remote history of breast cancer 22 years ago requiring chemotherapy. .      Patient mentions for the past 4 to 5 weeks is been experiencing bilateral lower extremity swelling left more than right. She also complains of occasional claudication symptoms in the left lower extremity. Patient mentions she had a dog before and she used to drink for walk however since losing her dog she has not been exerting herself. Recently she took a long walk and and experienced exertional dyspnea. Patient denies any PND or orthopnea. Denies any chest pain or palpitations. EKG shows normal sinus rhythm without any significant ST-T changes    Patient is a lifelong non-smoker denies any alcohol or drug abuse. No significant coronary disease in immediate blood relatives      Current Outpatient Medications   Medication Sig Dispense Refill    doxycycline hyclate (VIBRA-TABS) 100 MG tablet Take 1 tablet by mouth in the morning and 1 tablet before bedtime. Do all this for 10 days. 20 tablet 0    allopurinol (ZYLOPRIM) 100 MG tablet Take 2 tablets by mouth in the morning.  180 tablet 1    hydrOXYzine HCl (ATARAX) 25 MG tablet Take 1 tablet by mouth nightly 30 tablet 3    metFORMIN (GLUCOPHAGE) 500 MG tablet Take 1 tablet by mouth every morning (before breakfast) 90 tablet 1    glipiZIDE (GLUCOTROL) 5 MG tablet Take 1 tablet by mouth daily 90 tablet 1    furosemide (LASIX) 40 MG tablet Take 1 tablet by mouth daily 90 tablet 1    traZODone (DESYREL) 100 MG tablet Take 1 tablet by mouth nightly 90 tablet 1    rosuvastatin (CRESTOR) 10 MG tablet Take 1 tablet by mouth nightly 90 tablet 1    PARoxetine (PAXIL) 20 MG tablet Take 1 tablet by mouth every morning 90 tablet 1    Lancets MISC 1 each by Does not apply route 2 times daily 100 each 0    blood glucose monitor strips Test twice a day & as needed for symptoms of irregular blood glucose. 100 strip 3     No current facility-administered medications for this visit. Allergies:     Codeine    Patient History:    Past Medical History:   Diagnosis Date    Anxiety     Breast carcinoma ([facility] [address] 1997    R breast - MAMMOGRAM ANNUALLY, NEXT DUE 5/2020. Cervical radiculitis     Right     Chest pain     Depression     Diabetes type 2, controlled ([facility] [address] 2016    w two random sugars >126    Family history of diabetes mellitus (DM)     Mother and Father    GERD (gastroesophageal reflux disease)     UGI- sm HH , done at [address] [place] [address],[address] 3/26/14    Hiatal hernia     Hx of Doppler ultrasound 05/17/2022    Normal bilateral lower extremity ankle brachial indices. Hx of Doppler ultrasound 05/17/2022    Significant reflux noted of the Right GSV SFJ (0.7s), GSV Knee (2.0s). Significant reflux noted in the Left GSV SFJ (1.8s). Hx of echocardiogram 05/12/2022    EF is estimated at 55-60%. Mitral annular calcification is present. Mild mitral, tricuspid and moderate aortic regurgitation is present. Mild Pulmonary hypertension noted with RVSP of 43mmHg.     Hypercalcemia     mild ~11, iPTH nl 8/2021    Hyperglycemia     Hyperlipidemia     Insomnia     Irritable bowel syndrome     LBP (low back pain)     Lumbar herniated disc     L L4-5 HNP noted on MRI    Menopause     female exam every 2 yrs, due for recheck w me 2015    Migraine headache     ON TOPAMAX    Osteoporosis     Prediabetes     a1c 6.4 in Dec 2014    S/P colonoscopic polypectomy 06/07/2019    Dr Abril lBanca, recheck 5 yrs    TIA (transient ischemic attack)     INACTIVE PROBLEM     Past Surgical History:   Procedure Laterality Date    BREAST RECONSTRUCTION Right 4/2013    Dr José Miguel Hill ([address] [place] [address])  5/1995    LIANG/BSO    MASTECTOMY  7/1997    Right breast for breast cancer    TUBAL LIGATION       Family History   Problem Relation Age of Onset    High Blood Pressure Mother     Diabetes Mother     Diabetes Father     Heart Disease Father open heart surgery    High Blood Pressure Father     Diabetes Sister     High Cholesterol Sister     Stroke Paternal Grandmother      Social History     Tobacco Use    Smoking status: Never    Smokeless tobacco: Never   Substance Use Topics    Alcohol use: No        Review of Systems:     Constitutional:  No Fever or Weight Loss   Eyes: No Decreased Vision  ENT: No Headaches, Hearing Loss or Vertigo  Cardiovascular: No Chest Pain,  No Shortness of breath, No Palpitations. No Edema   Respiratory: No cough or wheezing . No Respiratory distress   Gastrointestinal: No abdominal pain, appetite loss, blood in stools, constipation, diarrhea or heartburn  Genitourinary: No dysuria, trouble voiding, or hematuria  Musculoskeletal:  denies any new  joint aches , or pain   Integumentary: No rash or pruritis  Neurological: No TIA or stroke symptoms  Psychiatric: No anxiety or depression  Endocrine: No malaise, fatigue or temperature intolerance  Hematologic/Lymphatic: No bleeding problems, blood clots or swollen lymph nodes  Allergic/Immunologic: No nasal congestion or hives        Objective:      Physical Exam:    BP (!) 162/84   Pulse (!) 107   Ht 5' 6\" (1.676 m)   Wt 153 lb 6.4 oz (69.6 kg)   SpO2 96%   BMI 24.76 kg/m²   Wt Readings from Last 3 Encounters:   08/16/22 153 lb 6.4 oz (69.6 kg)   08/10/22 152 lb 3.2 oz (69 kg)   07/21/22 144 lb (65.3 kg)     Body mass index is 24.76 kg/m². Vitals:    08/16/22 1452   BP: (!) 162/84   Pulse:    SpO2:         General Appearance and Constitutional: Conversant, Well developed, Well nourished, No acute distress, Non-toxic appearance. HEENT:  Normocephalic, Atraumatic, Bilateral external ears normal, Oropharynx moist, No oral exudates,   Nose normal.   Neck- Normal range of motion, No tenderness, Supple  Eyes:  EOMI, Conjunctiva normal, No discharge. Respiratory:  Normal breath sounds, No respiratory distress, No wheezing, No Rales, No Ronchi. No chest tenderness. Cardiovascular: S1-S2, no added heart sounds, No Mumurs appreciated. No gallops, rubs. + Left > Right Pedal Edema   GI:  Bowel sounds normal, Soft, No tenderness,  :  No costovertebral angle tenderness   Musculoskeletal:  No gross deformities. Back- No tenderness  Integument:  Well hydrated, no rash   Lymphatic:  No lymphadenopathy noted   Neurologic:  Alert & oriented x 3, Normal motor function, normal sensory function, no focal deficits noted   Psychiatric:  Speech and behavior appropriate       Medical decision making and Data review:    DATA:    No results found for: TROPONINT  BNP:    Lab Results   Component Value Date    PROBNP 203 (H) 04/26/2022     PT/INR:  No results found for: PTINR  Lab Results   Component Value Date    LABA1C 6.8 06/10/2022    LABA1C 5.9 02/16/2022     Lab Results   Component Value Date    CHOL 190 06/10/2022    TRIG 402 (H) 06/10/2022    HDL 33 (L) 06/10/2022    LDLCALC see below 06/10/2022    LDLDIRECT 87 06/10/2022     Lab Results   Component Value Date    WBC 5.0 03/17/2022    HGB 14.5 03/17/2022    HCT 42.6 03/17/2022    MCV 92.1 03/17/2022     03/17/2022     TSH:   Lab Results   Component Value Date    TSH 2.90 07/21/2022     Lab Results   Component Value Date    AST 15 06/10/2022    ALT 8 (L) 06/10/2022    BILITOT 0.3 06/10/2022    ALKPHOS 72 06/10/2022         All labs, medications and tests reviewed by myself including data and history from outside source , patient and available family . 1. Shortness of breath    2. Varicose veins of both legs with edema    3. Essential hypertension    4. Dyslipidemia    5.  Diastolic heart failure, unspecified HF chronicity (HCC)         Impression and Plan:      Recurrent worsening dyspnea with exertion, out of proportion to physical activity  Essential HTN - poorly controlled   Sinus Tachycardia  (EKG in office today ~ 108 bpm)  Pulm HTN   Diastolic heart failure    BP in office today 176/90  On Lasix daily, 20 mg daily  Start patient on Lotrel - Amlodipine/benazepril 5/10 mg p.o. daily    Schedule patient for left and right heart cardiac catheterization  May need pulm evaluation if work-up is unremarkable    Lower extremity edema left more than right / Venous insuffiency   Continue with Lasix   S/p CVI Ablation veins Left GSV        ABIs screening is negative for PAD  Echocardiogram shows preserved LV ejection fraction. Mild valvular heart disease. Pulmonary hypertension with RVSP of 43 mmHg. Exercise stress test negative for ischemia on EKG tracing. Patient received ~ 5 METS of workload. Poor functional capacity, only exercised for 3 minutes      Diabetes mellitus: On metformin. Last HbA1c 5.9. Excellent glycemic controlled. Hyperlipidemia: Continue with Crestor 10 mg p.o. daily    History of breast cancer requiring chemotherapy 23 years ago. No follow-ups on file. Counseled extensively and medication compliance urged. We discussed that for the  prevention of ASCVD our  goal is aggressive risk modification. Patient is encouraged to exercise even a brisk walk for 30 minutes  at least 3 to 4 times a week   Various goals were discussed and questions answered. Continue current medications. Appropriate prescriptions are addressed and refills ordered. Questions answered and patient verbalizes understanding. Call for any problems, questions, or concerns.

## 2022-08-18 ENCOUNTER — HOSPITAL ENCOUNTER (INPATIENT)
Age: 72
LOS: 5 days | Discharge: HOME | DRG: 291 | End: 2022-08-23
Attending: STUDENT IN AN ORGANIZED HEALTH CARE EDUCATION/TRAINING PROGRAM | Admitting: INTERNAL MEDICINE
Payer: MEDICARE

## 2022-08-18 ENCOUNTER — APPOINTMENT (OUTPATIENT)
Dept: INTERVENTIONAL RADIOLOGY/VASCULAR | Age: 72
DRG: 291 | End: 2022-08-18
Payer: MEDICARE

## 2022-08-18 ENCOUNTER — APPOINTMENT (OUTPATIENT)
Dept: GENERAL RADIOLOGY | Age: 72
DRG: 291 | End: 2022-08-18
Payer: MEDICARE

## 2022-08-18 ENCOUNTER — APPOINTMENT (OUTPATIENT)
Dept: CT IMAGING | Age: 72
DRG: 291 | End: 2022-08-18
Payer: MEDICARE

## 2022-08-18 DIAGNOSIS — R06.89 DYSPNEA AND RESPIRATORY ABNORMALITIES: ICD-10-CM

## 2022-08-18 DIAGNOSIS — I48.91 ATRIAL FIBRILLATION WITH RAPID VENTRICULAR RESPONSE (HCC): ICD-10-CM

## 2022-08-18 DIAGNOSIS — I31.39 PERICARDIAL EFFUSION: ICD-10-CM

## 2022-08-18 DIAGNOSIS — J90 PLEURAL EFFUSION, BILATERAL: Primary | ICD-10-CM

## 2022-08-18 DIAGNOSIS — R06.00 DYSPNEA AND RESPIRATORY ABNORMALITIES: ICD-10-CM

## 2022-08-18 LAB
ALBUMIN SERPL-MCNC: 3.6 GM/DL (ref 3.4–5)
ALP BLD-CCNC: 72 IU/L (ref 40–129)
ALT SERPL-CCNC: 8 U/L (ref 10–40)
ANION GAP SERPL CALCULATED.3IONS-SCNC: 18 MMOL/L (ref 4–16)
APTT: 32.1 SECONDS (ref 25.1–37.1)
AST SERPL-CCNC: 18 IU/L (ref 15–37)
BACTERIA: NEGATIVE /HPF
BASOPHILS ABSOLUTE: 0.1 K/CU MM
BASOPHILS RELATIVE PERCENT: 0.7 % (ref 0–1)
BILIRUB SERPL-MCNC: 0.5 MG/DL (ref 0–1)
BILIRUBIN URINE: NEGATIVE MG/DL
BLOOD, URINE: NEGATIVE
BUN BLDV-MCNC: 22 MG/DL (ref 6–23)
CALCIUM SERPL-MCNC: 9.5 MG/DL (ref 8.3–10.6)
CHLORIDE BLD-SCNC: 103 MMOL/L (ref 99–110)
CLARITY: CLEAR
CO2: 19 MMOL/L (ref 21–32)
COLOR: YELLOW
CREAT SERPL-MCNC: 0.7 MG/DL (ref 0.6–1.1)
DIFFERENTIAL TYPE: ABNORMAL
EKG ATRIAL RATE: 100 BPM
EKG ATRIAL RATE: 102 BPM
EKG ATRIAL RATE: 102 BPM
EKG ATRIAL RATE: 150 BPM
EKG DIAGNOSIS: NORMAL
EKG P AXIS: 55 DEGREES
EKG P-R INTERVAL: 126 MS
EKG P-R INTERVAL: 152 MS
EKG P-R INTERVAL: 152 MS
EKG Q-T INTERVAL: 250 MS
EKG Q-T INTERVAL: 334 MS
EKG Q-T INTERVAL: 334 MS
EKG Q-T INTERVAL: 360 MS
EKG QRS DURATION: 64 MS
EKG QRS DURATION: 70 MS
EKG QTC CALCULATION (BAZETT): 407 MS
EKG QTC CALCULATION (BAZETT): 435 MS
EKG QTC CALCULATION (BAZETT): 435 MS
EKG QTC CALCULATION (BAZETT): 464 MS
EKG R AXIS: 22 DEGREES
EKG R AXIS: 24 DEGREES
EKG R AXIS: 24 DEGREES
EKG R AXIS: 31 DEGREES
EKG T AXIS: -8 DEGREES
EKG T AXIS: 36 DEGREES
EKG T AXIS: 36 DEGREES
EKG T AXIS: 43 DEGREES
EKG VENTRICULAR RATE: 100 BPM
EKG VENTRICULAR RATE: 102 BPM
EKG VENTRICULAR RATE: 102 BPM
EKG VENTRICULAR RATE: 160 BPM
EOSINOPHILS ABSOLUTE: 0.1 K/CU MM
EOSINOPHILS RELATIVE PERCENT: 0.5 % (ref 0–3)
FLUID TYPE: NORMAL INDEX
FLUID TYPE: NORMAL INDEX
GFR AFRICAN AMERICAN: >60 ML/MIN/1.73M2
GFR NON-AFRICAN AMERICAN: >60 ML/MIN/1.73M2
GLUCOSE BLD-MCNC: 131 MG/DL (ref 70–99)
GLUCOSE BLD-MCNC: 149 MG/DL (ref 70–99)
GLUCOSE BLD-MCNC: 179 MG/DL (ref 70–99)
GLUCOSE BLD-MCNC: 198 MG/DL (ref 70–99)
GLUCOSE, FLUID: 164 MG/DL
GLUCOSE, FLUID: 166 MG/DL
GLUCOSE, URINE: NEGATIVE MG/DL
HCT VFR BLD CALC: 39.2 % (ref 37–47)
HEMOGLOBIN: 12.9 GM/DL (ref 12.5–16)
HYALINE CASTS: 5 /LPF
IMMATURE NEUTROPHIL %: 0.5 % (ref 0–0.43)
INR BLD: 1.07 INDEX
KETONES, URINE: ABNORMAL MG/DL
LACTATE DEHYDROGENASE, FLUID: 117 IU/L
LACTATE DEHYDROGENASE, FLUID: 138 IU/L
LACTATE: 1.8 MMOL/L (ref 0.4–2)
LEUKOCYTE ESTERASE, URINE: NEGATIVE
LYMPHOCYTES ABSOLUTE: 1.1 K/CU MM
LYMPHOCYTES RELATIVE PERCENT: 11 % (ref 24–44)
LYMPHOCYTES, BODY FLUID: 92 %
LYMPHOCYTES, BODY FLUID: 97 %
MAGNESIUM: 1.7 MG/DL (ref 1.8–2.4)
MCH RBC QN AUTO: 29.1 PG (ref 27–31)
MCHC RBC AUTO-ENTMCNC: 32.9 % (ref 32–36)
MCV RBC AUTO: 88.5 FL (ref 78–100)
MESOTHELIAL FLUID: 7 /100 WBC
MESOTHELIAL FLUID: 8 /100 WBC
MONOCYTE, FLUID: 3 %
MONOCYTE, FLUID: 8 %
MONOCYTES ABSOLUTE: 0.9 K/CU MM
MONOCYTES RELATIVE PERCENT: 9.4 % (ref 0–4)
MUCUS: ABNORMAL HPF
NEUTROPHIL, FLUID: 0 %
NEUTROPHIL, FLUID: 0 %
NITRITE URINE, QUANTITATIVE: NEGATIVE
NUCLEATED RBC %: 0 %
OTHER CELLS FLUID: 0
OTHER CELLS FLUID: 0
PDW BLD-RTO: 15.1 % (ref 11.7–14.9)
PH, URINE: 5.5 (ref 5–8)
PLATELET # BLD: 353 K/CU MM (ref 140–440)
PMV BLD AUTO: 10.4 FL (ref 7.5–11.1)
POTASSIUM SERPL-SCNC: 3.8 MMOL/L (ref 3.5–5.1)
PRO-BNP: 2096 PG/ML
PROTEIN FLUID: 2.3 G/DL
PROTEIN FLUID: 2.4 G/DL
PROTEIN UA: NEGATIVE MG/DL
PROTHROMBIN TIME: 13.8 SECONDS (ref 11.7–14.5)
RBC # BLD: 4.43 M/CU MM (ref 4.2–5.4)
RBC FLUID: 150 /CU MM
RBC FLUID: 598 /CU MM
RBC URINE: ABNORMAL /HPF (ref 0–6)
RENAL EPITHELIAL, UA: <1 /HPF
SARS-COV-2, NAAT: NOT DETECTED
SEGMENTED NEUTROPHILS ABSOLUTE COUNT: 7.7 K/CU MM
SEGMENTED NEUTROPHILS RELATIVE PERCENT: 77.9 % (ref 36–66)
SODIUM BLD-SCNC: 140 MMOL/L (ref 135–145)
SOURCE: NORMAL
SPECIFIC GRAVITY UA: 1.01 (ref 1–1.03)
SQUAMOUS EPITHELIAL: <1 /HPF
TOTAL IMMATURE NEUTOROPHIL: 0.05 K/CU MM
TOTAL NUCLEATED RBC: 0 K/CU MM
TOTAL PROTEIN: 6.3 GM/DL (ref 6.4–8.2)
TRANSITIONAL EPITHELIAL: <1 /HPF
TRICHOMONAS: ABNORMAL /HPF
TROPONIN T: <0.01 NG/ML
TSH HIGH SENSITIVITY: 4.41 UIU/ML (ref 0.27–4.2)
UROBILINOGEN, URINE: 0.2 MG/DL (ref 0.2–1)
WBC # BLD: 9.8 K/CU MM (ref 4–10.5)
WBC FLUID: 1059 /CU MM
WBC FLUID: 416 /CU MM
WBC UA: 1 /HPF (ref 0–5)

## 2022-08-18 PROCEDURE — 83735 ASSAY OF MAGNESIUM: CPT

## 2022-08-18 PROCEDURE — 6360000002 HC RX W HCPCS: Performed by: INTERNAL MEDICINE

## 2022-08-18 PROCEDURE — 6370000000 HC RX 637 (ALT 250 FOR IP): Performed by: PHYSICIAN ASSISTANT

## 2022-08-18 PROCEDURE — 84157 ASSAY OF PROTEIN OTHER: CPT

## 2022-08-18 PROCEDURE — 32555 ASPIRATE PLEURA W/ IMAGING: CPT

## 2022-08-18 PROCEDURE — 2140000000 HC CCU INTERMEDIATE R&B

## 2022-08-18 PROCEDURE — 93005 ELECTROCARDIOGRAM TRACING: CPT | Performed by: INTERNAL MEDICINE

## 2022-08-18 PROCEDURE — 87070 CULTURE OTHR SPECIMN AEROBIC: CPT

## 2022-08-18 PROCEDURE — 85025 COMPLETE CBC W/AUTO DIFF WBC: CPT

## 2022-08-18 PROCEDURE — 81001 URINALYSIS AUTO W/SCOPE: CPT

## 2022-08-18 PROCEDURE — 87150 DNA/RNA AMPLIFIED PROBE: CPT

## 2022-08-18 PROCEDURE — 93005 ELECTROCARDIOGRAM TRACING: CPT | Performed by: STUDENT IN AN ORGANIZED HEALTH CARE EDUCATION/TRAINING PROGRAM

## 2022-08-18 PROCEDURE — 96375 TX/PRO/DX INJ NEW DRUG ADDON: CPT

## 2022-08-18 PROCEDURE — 99285 EMERGENCY DEPT VISIT HI MDM: CPT

## 2022-08-18 PROCEDURE — 71045 X-RAY EXAM CHEST 1 VIEW: CPT

## 2022-08-18 PROCEDURE — 87205 SMEAR GRAM STAIN: CPT

## 2022-08-18 PROCEDURE — 0W993ZZ DRAINAGE OF RIGHT PLEURAL CAVITY, PERCUTANEOUS APPROACH: ICD-10-PCS | Performed by: RADIOLOGY

## 2022-08-18 PROCEDURE — 2500000003 HC RX 250 WO HCPCS: Performed by: PHYSICIAN ASSISTANT

## 2022-08-18 PROCEDURE — 80053 COMPREHEN METABOLIC PANEL: CPT

## 2022-08-18 PROCEDURE — 85730 THROMBOPLASTIN TIME PARTIAL: CPT

## 2022-08-18 PROCEDURE — 6370000000 HC RX 637 (ALT 250 FOR IP): Performed by: NURSE PRACTITIONER

## 2022-08-18 PROCEDURE — 6370000000 HC RX 637 (ALT 250 FOR IP): Performed by: INTERNAL MEDICINE

## 2022-08-18 PROCEDURE — 93010 ELECTROCARDIOGRAM REPORT: CPT | Performed by: INTERNAL MEDICINE

## 2022-08-18 PROCEDURE — 83615 LACTATE (LD) (LDH) ENZYME: CPT

## 2022-08-18 PROCEDURE — 96367 TX/PROPH/DG ADDL SEQ IV INF: CPT

## 2022-08-18 PROCEDURE — 83880 ASSAY OF NATRIURETIC PEPTIDE: CPT

## 2022-08-18 PROCEDURE — 93005 ELECTROCARDIOGRAM TRACING: CPT | Performed by: PHYSICIAN ASSISTANT

## 2022-08-18 PROCEDURE — 89051 BODY FLUID CELL COUNT: CPT

## 2022-08-18 PROCEDURE — 93308 TTE F-UP OR LMTD: CPT

## 2022-08-18 PROCEDURE — 82945 GLUCOSE OTHER FLUID: CPT

## 2022-08-18 PROCEDURE — 99223 1ST HOSP IP/OBS HIGH 75: CPT | Performed by: INTERNAL MEDICINE

## 2022-08-18 PROCEDURE — 87635 SARS-COV-2 COVID-19 AMP PRB: CPT

## 2022-08-18 PROCEDURE — 83605 ASSAY OF LACTIC ACID: CPT

## 2022-08-18 PROCEDURE — 6360000004 HC RX CONTRAST MEDICATION: Performed by: STUDENT IN AN ORGANIZED HEALTH CARE EDUCATION/TRAINING PROGRAM

## 2022-08-18 PROCEDURE — 87186 SC STD MICRODIL/AGAR DIL: CPT

## 2022-08-18 PROCEDURE — 87040 BLOOD CULTURE FOR BACTERIA: CPT

## 2022-08-18 PROCEDURE — 83036 HEMOGLOBIN GLYCOSYLATED A1C: CPT

## 2022-08-18 PROCEDURE — 0W9B3ZZ DRAINAGE OF LEFT PLEURAL CAVITY, PERCUTANEOUS APPROACH: ICD-10-PCS | Performed by: RADIOLOGY

## 2022-08-18 PROCEDURE — 84443 ASSAY THYROID STIM HORMONE: CPT

## 2022-08-18 PROCEDURE — 71275 CT ANGIOGRAPHY CHEST: CPT

## 2022-08-18 PROCEDURE — 88108 CYTOPATH CONCENTRATE TECH: CPT | Performed by: PATHOLOGY

## 2022-08-18 PROCEDURE — 82962 GLUCOSE BLOOD TEST: CPT

## 2022-08-18 PROCEDURE — 2500000003 HC RX 250 WO HCPCS: Performed by: NURSE PRACTITIONER

## 2022-08-18 PROCEDURE — 6360000002 HC RX W HCPCS: Performed by: PHYSICIAN ASSISTANT

## 2022-08-18 PROCEDURE — 2580000003 HC RX 258: Performed by: PHYSICIAN ASSISTANT

## 2022-08-18 PROCEDURE — 85610 PROTHROMBIN TIME: CPT

## 2022-08-18 PROCEDURE — 88305 TISSUE EXAM BY PATHOLOGIST: CPT | Performed by: PATHOLOGY

## 2022-08-18 PROCEDURE — 84484 ASSAY OF TROPONIN QUANT: CPT

## 2022-08-18 PROCEDURE — 96365 THER/PROPH/DIAG IV INF INIT: CPT

## 2022-08-18 RX ORDER — ACETAMINOPHEN 650 MG/1
650 SUPPOSITORY RECTAL EVERY 6 HOURS PRN
Status: DISCONTINUED | OUTPATIENT
Start: 2022-08-18 | End: 2022-08-23 | Stop reason: HOSPADM

## 2022-08-18 RX ORDER — ENOXAPARIN SODIUM 100 MG/ML
1 INJECTION SUBCUTANEOUS 2 TIMES DAILY
Status: DISCONTINUED | OUTPATIENT
Start: 2022-08-18 | End: 2022-08-18

## 2022-08-18 RX ORDER — ASPIRIN 81 MG/1
324 TABLET, CHEWABLE ORAL ONCE
Status: COMPLETED | OUTPATIENT
Start: 2022-08-18 | End: 2022-08-18

## 2022-08-18 RX ORDER — METOPROLOL TARTRATE 50 MG/1
50 TABLET, FILM COATED ORAL 2 TIMES DAILY
Status: DISCONTINUED | OUTPATIENT
Start: 2022-08-18 | End: 2022-08-23 | Stop reason: HOSPADM

## 2022-08-18 RX ORDER — FUROSEMIDE 20 MG/1
20 TABLET ORAL DAILY
Status: DISCONTINUED | OUTPATIENT
Start: 2022-08-19 | End: 2022-08-18

## 2022-08-18 RX ORDER — LISINOPRIL 5 MG/1
10 TABLET ORAL DAILY
Status: DISCONTINUED | OUTPATIENT
Start: 2022-08-18 | End: 2022-08-18

## 2022-08-18 RX ORDER — DILTIAZEM HYDROCHLORIDE 120 MG/1
120 CAPSULE, COATED, EXTENDED RELEASE ORAL DAILY
Status: DISCONTINUED | OUTPATIENT
Start: 2022-08-19 | End: 2022-08-23 | Stop reason: HOSPADM

## 2022-08-18 RX ORDER — PAROXETINE HYDROCHLORIDE 20 MG/1
20 TABLET, FILM COATED ORAL EVERY MORNING
Status: DISCONTINUED | OUTPATIENT
Start: 2022-08-19 | End: 2022-08-23 | Stop reason: HOSPADM

## 2022-08-18 RX ORDER — POLYETHYLENE GLYCOL 3350 17 G
2 POWDER IN PACKET (EA) ORAL
Status: DISCONTINUED | OUTPATIENT
Start: 2022-08-18 | End: 2022-08-23 | Stop reason: HOSPADM

## 2022-08-18 RX ORDER — ACETAMINOPHEN 325 MG/1
650 TABLET ORAL EVERY 6 HOURS PRN
Status: DISCONTINUED | OUTPATIENT
Start: 2022-08-18 | End: 2022-08-23 | Stop reason: HOSPADM

## 2022-08-18 RX ORDER — SODIUM CHLORIDE 0.9 % (FLUSH) 0.9 %
5-40 SYRINGE (ML) INJECTION EVERY 12 HOURS SCHEDULED
Status: DISCONTINUED | OUTPATIENT
Start: 2022-08-18 | End: 2022-08-23 | Stop reason: HOSPADM

## 2022-08-18 RX ORDER — ONDANSETRON 2 MG/ML
4 INJECTION INTRAMUSCULAR; INTRAVENOUS EVERY 6 HOURS PRN
Status: DISCONTINUED | OUTPATIENT
Start: 2022-08-18 | End: 2022-08-23 | Stop reason: HOSPADM

## 2022-08-18 RX ORDER — FUROSEMIDE 10 MG/ML
40 INJECTION INTRAMUSCULAR; INTRAVENOUS 2 TIMES DAILY
Status: DISCONTINUED | OUTPATIENT
Start: 2022-08-18 | End: 2022-08-22

## 2022-08-18 RX ORDER — ENOXAPARIN SODIUM 100 MG/ML
40 INJECTION SUBCUTANEOUS EVERY 24 HOURS
Status: DISCONTINUED | OUTPATIENT
Start: 2022-08-18 | End: 2022-08-18

## 2022-08-18 RX ORDER — ROSUVASTATIN CALCIUM 5 MG/1
10 TABLET, COATED ORAL NIGHTLY
Status: DISCONTINUED | OUTPATIENT
Start: 2022-08-18 | End: 2022-08-23 | Stop reason: HOSPADM

## 2022-08-18 RX ORDER — SODIUM CHLORIDE 9 MG/ML
INJECTION, SOLUTION INTRAVENOUS PRN
Status: DISCONTINUED | OUTPATIENT
Start: 2022-08-18 | End: 2022-08-23 | Stop reason: HOSPADM

## 2022-08-18 RX ORDER — AMLODIPINE BESYLATE 5 MG/1
5 TABLET ORAL DAILY
Status: DISCONTINUED | OUTPATIENT
Start: 2022-08-18 | End: 2022-08-18

## 2022-08-18 RX ORDER — INSULIN LISPRO 100 [IU]/ML
0-4 INJECTION, SOLUTION INTRAVENOUS; SUBCUTANEOUS NIGHTLY
Status: DISCONTINUED | OUTPATIENT
Start: 2022-08-18 | End: 2022-08-23 | Stop reason: HOSPADM

## 2022-08-18 RX ORDER — POLYETHYLENE GLYCOL 3350 17 G/17G
17 POWDER, FOR SOLUTION ORAL DAILY PRN
Status: DISCONTINUED | OUTPATIENT
Start: 2022-08-18 | End: 2022-08-23 | Stop reason: HOSPADM

## 2022-08-18 RX ORDER — DILTIAZEM HYDROCHLORIDE 5 MG/ML
20 INJECTION INTRAVENOUS ONCE
Status: COMPLETED | OUTPATIENT
Start: 2022-08-18 | End: 2022-08-18

## 2022-08-18 RX ORDER — TRAZODONE HYDROCHLORIDE 50 MG/1
100 TABLET ORAL NIGHTLY
Status: DISCONTINUED | OUTPATIENT
Start: 2022-08-18 | End: 2022-08-23 | Stop reason: HOSPADM

## 2022-08-18 RX ORDER — MAGNESIUM SULFATE 1 G/100ML
1000 INJECTION INTRAVENOUS ONCE
Status: COMPLETED | OUTPATIENT
Start: 2022-08-18 | End: 2022-08-18

## 2022-08-18 RX ORDER — HYDROXYZINE HYDROCHLORIDE 25 MG/1
25 TABLET, FILM COATED ORAL NIGHTLY
Status: DISCONTINUED | OUTPATIENT
Start: 2022-08-18 | End: 2022-08-23 | Stop reason: HOSPADM

## 2022-08-18 RX ORDER — ONDANSETRON 4 MG/1
4 TABLET, ORALLY DISINTEGRATING ORAL EVERY 8 HOURS PRN
Status: DISCONTINUED | OUTPATIENT
Start: 2022-08-18 | End: 2022-08-23 | Stop reason: HOSPADM

## 2022-08-18 RX ORDER — ALLOPURINOL 100 MG/1
200 TABLET ORAL DAILY
Status: DISCONTINUED | OUTPATIENT
Start: 2022-08-18 | End: 2022-08-23 | Stop reason: HOSPADM

## 2022-08-18 RX ORDER — LISINOPRIL 20 MG/1
20 TABLET ORAL DAILY
Status: DISCONTINUED | OUTPATIENT
Start: 2022-08-19 | End: 2022-08-23 | Stop reason: HOSPADM

## 2022-08-18 RX ORDER — AMLODIPINE BESYLATE AND BENAZEPRIL HYDROCHLORIDE 5; 10 MG/1; MG/1
1 CAPSULE ORAL DAILY
Status: DISCONTINUED | OUTPATIENT
Start: 2022-08-18 | End: 2022-08-18 | Stop reason: CLARIF

## 2022-08-18 RX ORDER — SODIUM CHLORIDE 0.9 % (FLUSH) 0.9 %
10 SYRINGE (ML) INJECTION PRN
Status: DISCONTINUED | OUTPATIENT
Start: 2022-08-18 | End: 2022-08-23 | Stop reason: HOSPADM

## 2022-08-18 RX ORDER — INSULIN LISPRO 100 [IU]/ML
0-4 INJECTION, SOLUTION INTRAVENOUS; SUBCUTANEOUS
Status: DISCONTINUED | OUTPATIENT
Start: 2022-08-18 | End: 2022-08-23 | Stop reason: HOSPADM

## 2022-08-18 RX ADMIN — TRAZODONE HYDROCHLORIDE 100 MG: 50 TABLET ORAL at 20:38

## 2022-08-18 RX ADMIN — DILTIAZEM HYDROCHLORIDE 30 MG: 30 TABLET, FILM COATED ORAL at 18:40

## 2022-08-18 RX ADMIN — FUROSEMIDE 40 MG: 10 INJECTION, SOLUTION INTRAMUSCULAR; INTRAVENOUS at 18:41

## 2022-08-18 RX ADMIN — ASPIRIN 81 MG CHEWABLE TABLET 324 MG: 81 TABLET CHEWABLE at 07:38

## 2022-08-18 RX ADMIN — CEFTRIAXONE SODIUM 1000 MG: 1 INJECTION, POWDER, FOR SOLUTION INTRAMUSCULAR; INTRAVENOUS at 08:38

## 2022-08-18 RX ADMIN — FUROSEMIDE 40 MG: 10 INJECTION, SOLUTION INTRAMUSCULAR; INTRAVENOUS at 13:08

## 2022-08-18 RX ADMIN — DEXTROSE MONOHYDRATE 5 MG/HR: 50 INJECTION, SOLUTION INTRAVENOUS at 12:45

## 2022-08-18 RX ADMIN — HYDROXYZINE HYDROCHLORIDE 25 MG: 25 TABLET, FILM COATED ORAL at 20:38

## 2022-08-18 RX ADMIN — MAGNESIUM SULFATE IN DEXTROSE 1000 MG: 10 INJECTION, SOLUTION INTRAVENOUS at 09:31

## 2022-08-18 RX ADMIN — DILTIAZEM HYDROCHLORIDE 20 MG: 5 INJECTION INTRAVENOUS at 07:38

## 2022-08-18 RX ADMIN — METOPROLOL TARTRATE 50 MG: 50 TABLET, FILM COATED ORAL at 15:45

## 2022-08-18 RX ADMIN — ROSUVASTATIN CALCIUM 10 MG: 5 TABLET, FILM COATED ORAL at 20:37

## 2022-08-18 RX ADMIN — ENOXAPARIN SODIUM 70 MG: 100 INJECTION SUBCUTANEOUS at 15:46

## 2022-08-18 RX ADMIN — METOPROLOL TARTRATE 50 MG: 50 TABLET, FILM COATED ORAL at 20:37

## 2022-08-18 RX ADMIN — APIXABAN 5 MG: 5 TABLET, FILM COATED ORAL at 20:37

## 2022-08-18 RX ADMIN — IOPAMIDOL 75 ML: 755 INJECTION, SOLUTION INTRAVENOUS at 09:27

## 2022-08-18 ASSESSMENT — ENCOUNTER SYMPTOMS
COUGH: 0
VOMITING: 0
EYE PAIN: 0
GASTROINTESTINAL NEGATIVE: 1
ABDOMINAL PAIN: 0
BACK PAIN: 0
ALLERGIC/IMMUNOLOGIC NEGATIVE: 1
SHORTNESS OF BREATH: 1
RHINORRHEA: 0
EYES NEGATIVE: 1
NAUSEA: 0
DIARRHEA: 0

## 2022-08-18 NOTE — H&P
V2.0  History and Physical      Name:  Amber Alvarenga /Age/Sex: 1950  (71 y.o. female)   MRN & CSN:  [id number] & [id number] Encounter Date/Time: 2022 10:41 AM EDT   Location:  ED29/ED-29 PCP: Gagan Roque MD       Hospital Day: 1    Assessment and Plan:   Amber Alvarenga is a 71 y.o. female with a pmh of hypertension, prediabetes who presents with Atrial fibrillation with RVR [facility])    Hospital Problems             Last Modified POA    * (Principal) Atrial fibrillation with RVR ([address] 2022 Yes     Atrial fibrillation with RVR  -new onset, heart rate was 170s in ER  -received cardizem 20 mg IV, current sinus rhythm with heart rate 100  -admit to medical surgical floor with cardiac monitor  -cardiology consult  -update Echocardiogram    Acute respiratory failure  Pleural effusion  -Tachypnea, O2 sat in lower 90s in room air  -CTA pulmonary indicated moderate pleural effusion in bilateral lungs, right side worse than left side  -IR consulted for possible thoracentesis and  body fluids study    Diastolic congestive heart failure  -bilateral legs and arms swelling for 4 months  -2022 echocardiogram: LVEF 55-60%   -update echocardiogram  -continue ACEI and Lasix  -cardiology will follow up     Hypertension  -continue Amlodipine-benazepril  -appreciate cardiology recommendation     Pre-diabetes  -hemoglobin a1c was 6. 8 in [place] 10/2022  -update hemoglobin a1c  -insulin sliding scale    PAD?   -reported bilateral finger tips cold and purple color  -capillary refill time 2-3 seconds  -appreciate cardiology evaluation    DVT prophylaxis  -Lovenox      Disposition:   Current Living situation: home  Expected Disposition: home  Estimated D/C: 2-3    Diet No diet orders on file   DVT Prophylaxis [x] Lovenox, []  Heparin, [] SCDs, [] Ambulation,  [] Eliquis, [] Xarelto   Code Status No Order   Surrogate Decision Maker/ POA      History from:     patient, spouse    History of Present Illness: Chief Complaint: Atrial fibrillation with RVR ([address]    Gigi Diaz is a 71 y.o. female with pmh of  hypertension, prediabetes who presents with shortness of breath. Pt reported bilateral legs and hand swelling for 4 months. Shortness of breath for one week and got worse today. Pt decided to visit ER. Pt was found atrial fibrillation with RVR in ER with heart rate 170s. Pt received one dose of Cardizem and returned to sinus rhythm with heart rate 100s. Pt stated she felt much better now. Her CTA pulmonary ruled out PE. But indicated moderate bilateral pleural effusion with adjacent partial collapse of the lower lobes, mild cardiomegaly and a small pericardial effusion measuring up to 3 cm. Pt will be admitted to hospital for further evaluation. Review of Systems: Need 10 Elements   Review of Systems   Constitutional: Negative. HENT: Negative. Eyes: Negative. Respiratory:  Positive for shortness of breath. Cardiovascular:  Positive for chest pain. Gastrointestinal: Negative. Endocrine: Negative. Genitourinary: Negative. Musculoskeletal: Negative. Skin: Negative. Allergic/Immunologic: Negative. Neurological: Negative. Hematological: Negative. Psychiatric/Behavioral: Negative. Objective:   No intake or output data in the 24 hours ending 08/18/22 1143   Vitals:   Vitals:    08/18/22 0721 08/18/22 0744 08/18/22 1035 08/18/22 1131   BP:   (!) 139/106 (!) 143/84   Pulse: (!) 160  (!) 131 (!) 135   Resp: 29  25 28   Temp:  97.8 °F (36.6 °C)     TempSrc:  Oral     SpO2:   100% 96%       Medications Prior to Admission     Prior to Admission medications    Medication Sig Start Date End Date Taking? Authorizing Provider   amLODIPine-benazepril (LOTREL) 5-10 MG per capsule Take 1 capsule by mouth in the morning. . 8/16/22   Alexa De La Cruz MD   doxycycline hyclate (VIBRA-TABS) 100 MG tablet Take 1 tablet by mouth in the morning and 1 tablet before bedtime.  Do all this for 10 days. 8/12/22 8/22/22  Jean Diallo MD   allopurinol (ZYLOPRIM) 100 MG tablet Take 2 tablets by mouth in the morning. 7/21/22   Jean Diallo MD   hydrOXYzine HCl (ATARAX) 25 MG tablet Take 1 tablet by mouth nightly 7/21/22 8/20/22  Jean Diallo MD   metFORMIN (GLUCOPHAGE) 500 MG tablet Take 1 tablet by mouth every morning (before breakfast) 5/6/22   Jean Diallo MD   glipiZIDE (GLUCOTROL) 5 MG tablet Take 1 tablet by mouth daily 5/6/22   Jean Diallo MD   furosemide (LASIX) 40 MG tablet Take 1 tablet by mouth daily  Patient taking differently: Take 20 mg by mouth daily 4/26/22   Jean Diallo MD   traZODone (DESYREL) 100 MG tablet Take 1 tablet by mouth nightly 2/16/22   Jean Diallo MD   rosuvastatin (CRESTOR) 10 MG tablet Take 1 tablet by mouth nightly 2/16/22   Jean Diallo MD   PARoxetine (PAXIL) 20 MG tablet Take 1 tablet by mouth every morning 2/16/22   Jean Diallo MD   Lancets MISC 1 each by Does not apply route 2 times daily 2/16/22   Jean Diallo MD   blood glucose monitor strips Test twice a day & as needed for symptoms of irregular blood glucose. 2/16/22   Jean Diallo MD       Physical Exam: Need 8 Elements   Physical Exam  HENT:      Head: Normocephalic. Nose: Nose normal.      Mouth/Throat:      Mouth: Mucous membranes are moist.   Eyes:      Pupils: Pupils are equal, round, and reactive to light. Cardiovascular:      Rate and Rhythm: Tachycardia present. Pulmonary:      Comments: Tachypnea, diminished breathing sound in bilateral lower lungs  Abdominal:      General: Abdomen is flat. Palpations: Abdomen is soft. Musculoskeletal:         General: Swelling present. Normal range of motion. Cervical back: Normal range of motion. Comments: Mild edema in bilateral lower extremities. Skin:     General: Skin is warm. Capillary Refill: Capillary refill takes 2 to 3 seconds.       Comments: family history includes Diabetes in her father, mother, and sister; Heart Disease in her father; High Blood Pressure in her father and mother; High Cholesterol in her sister; Stroke in her paternal grandmother. Soc HX:   Social History     Socioeconomic History    Marital status:    Occupational History    Occupation: - [employer]     Comment: retired Sept 2015   Tobacco Use    Smoking status: Never    Smokeless tobacco: Never   Substance and Sexual Activity    Alcohol use: No    Drug use: No     Social Determinants of Health     Financial Resource Strain: Low Risk     Difficulty of Paying Living Expenses: Not hard at all   Food Insecurity: No Food Insecurity    Worried About [address] in the Last Year: Never true    [address] in the Last Year: Never true   Transportation Needs: No Transportation Needs    Lack of Transportation (Medical): No    Lack of Transportation (Non-Medical):  No   Physical Activity: Insufficiently Active    Days of Exercise per Week: 3 days    Minutes of Exercise per Session: 30 min   Stress: No Stress Concern Present    Feeling of Stress : Not at all   Intimate Partner Violence: Not At Risk    Fear of Current or Ex-Partner: No    Emotionally Abused: No    Physically Abused: No    Sexually Abused: No   Housing Stability: Low Risk     Unable to Pay for Housing in the Last Year: No    Number of Places Lived in the Last Year: 1    Unstable Housing in the Last Year: No       Medications:   Medications:    Infusions:   PRN Meds:     Labs      CBC:   Recent Labs     08/18/22  0710   WBC 9.8   HGB 12.9        BMP:    Recent Labs     08/18/22  0710      K 3.8      CO2 19*   BUN 22   CREATININE 0.7   GLUCOSE 198*     Hepatic:   Recent Labs     08/18/22  0710   AST 18   ALT 8*   BILITOT 0.5   ALKPHOS 72     Lipids:   Lab Results   Component Value Date/Time    CHOL 190 06/10/2022 08:40 AM    HDL 33 06/10/2022 08:40 AM    TRIG Cardiac and mediastinal silhouettes are within normal limits. No acute bony abnormality. Mild pulmonary vascular congestion without overt pulmonary edema. New bilateral pleural effusions, right larger than left along with bibasilar airspace opacities, right worse than left, which may relate to atelectasis, pneumonia or aspiration pneumonitis. CTA PULMONARY W CONTRAST    Result Date: 8/18/2022  EXAMINATION: CTA OF THE CHEST 8/18/2022 9:03 am TECHNIQUE: CTA of the chest was performed after the administration of intravenous contrast.  Multiplanar reformatted images are provided for review. MIP images are provided for review. Automated exposure control, iterative reconstruction, and/or weight based adjustment of the mA/kV was utilized to reduce the radiation dose to as low as reasonably achievable. COMPARISON: None. HISTORY: ORDERING SYSTEM PROVIDED HISTORY: LUE pain, SOB, tachycardia, eval for PE TECHNOLOGIST PROVIDED HISTORY: Reason for exam:->LUE pain, SOB, tachycardia, eval for PE Decision Support Exception - unselect if not a suspected or confirmed emergency medical condition->Emergency Medical Condition (MA) Reason for Exam: LUE pain, SOB, tachycardia, eval for PE FINDINGS: Limited evaluation of the subsegmental branches of the pulmonary arteries secondary to motion artifact. Pulmonary Arteries: Pulmonary arteries are adequately opacified for evaluation. No evidence of intraluminal filling defect to suggest pulmonary embolism. Main pulmonary artery is normal in caliber. Mediastinum: No evidence of mediastinal lymphadenopathy. Mild cardiomegaly. Simple pericardial effusion measuring up to 3 cm inferiorly. There is no acute abnormality of the thoracic aorta. Lungs/pleura: Moderate bilateral pleural effusions with adjacent partial collapse of the lower lobes bilaterally. No focal consolidation or pulmonary edema. No pneumothorax.  Upper Abdomen: A 3 cm lesion in the left hepatic lobe demonstrates peripheral, discontinuous nodular enhancement suggestive of a hemangioma. Soft Tissues/Bones: No acute bone or soft tissue abnormality. Healing lateral right 8th rib fracture. Limited evaluation of the subsegmental branches of the pulmonary arteries secondary to motion artifact. No evidence of pulmonary embolism or acute pulmonary abnormality. Moderate bilateral pleural effusions with adjacent partial collapse of the lower lobes. Mild cardiomegaly and a small pericardial effusion measuring up to 3 cm. Small hemangioma in the left hepatic lobe.        Personally reviewed Lab Studies, Imaging, and discussed case with dr. Beba Hoang    Electronically signed by Beatrice Hong CNP on 8/18/2022 at 11:43 AM

## 2022-08-18 NOTE — ED PROVIDER NOTES
[address] ENCOUNTER        Pt Name: Hasmukh Grove  MRN: [id number]  [place] 1950  Date of evaluation: 8/18/2022  Provider: Tristen Chappell PA-C  PCP: Tonio Flores MD  Note Started: 7:17 AM EDT       I have seen and evaluated this patient with my supervising physician Syd Drake DO. Triage CHIEF COMPLAINT     No chief complaint on file. HISTORY OF PRESENT ILLNESS   (Location/Symptom, Timing/Onset, Context/Setting, Quality, Duration, Modifying Factors, Severity)  Note limiting factors. Chief Complaint: seth Grove is a 71 y.o. female who presents complaint shortness of breath. She mentions some ongoing shortness of breath, and some leg swelling, earlier this week she was seen at her primary care provider's office, and had also seen her cardiologist Dr. Walter Aponte.  She mentions that she had a chest x-ray performed last week, and was told that it could be heart failure or pneumonia, she states she was given medication for this for her treatment for pneumonia, she is unsure the name of the medicine. Today, she describes some worsening shortness of breath.  mention she has had some pain over her right upper shoulder. She mentions some ongoing swelling to her legs. She denies any URI symptoms, fever, abdominal pain, urinary symptoms nausea, vomiting, diarrhea    Nursing Notes were all reviewed and agreed with or any disagreements were addressed in the HPI. REVIEW OF SYSTEMS    (2-9 systems for level 4, 10 or more for level 5)     Review of Systems   Constitutional:  Negative for chills and fever. HENT:  Negative for congestion and rhinorrhea. Eyes:  Negative for pain and visual disturbance. Respiratory:  Positive for shortness of breath. Negative for cough. Cardiovascular:  Positive for leg swelling. Negative for chest pain.    Gastrointestinal:  Negative for abdominal pain, diarrhea, nausea and vomiting. Genitourinary:  Negative for dysuria and hematuria. Musculoskeletal:  Negative for back pain and myalgias. Skin:  Negative for rash and wound. Neurological:  Negative for dizziness and light-headedness. PAST MEDICAL HISTORY     Past Medical History:   Diagnosis Date    Anxiety     Breast carcinoma ([facility] [address] 1997    R breast - MAMMOGRAM ANNUALLY, NEXT DUE 5/2020. Cervical radiculitis     Right     Chest pain     Depression     Diabetes type 2, controlled ([facility] [address] 2016    w two random sugars >126    Family history of diabetes mellitus (DM)     Mother and Father    GERD (gastroesophageal reflux disease)     UGI- sm HH , done at [address] [place] [address],[address] 3/26/14    Hiatal hernia     Hx of Doppler ultrasound 05/17/2022    Normal bilateral lower extremity ankle brachial indices. Hx of Doppler ultrasound 05/17/2022    Significant reflux noted of the Right GSV SFJ (0.7s), GSV Knee (2.0s). Significant reflux noted in the Left GSV SFJ (1.8s). Hx of echocardiogram 05/12/2022    EF is estimated at 55-60%. Mitral annular calcification is present. Mild mitral, tricuspid and moderate aortic regurgitation is present. Mild Pulmonary hypertension noted with RVSP of 43mmHg.     Hypercalcemia     mild ~11, iPTH nl 8/2021    Hyperglycemia     Hyperlipidemia     Insomnia     Irritable bowel syndrome     LBP (low back pain)     Lumbar herniated disc     L L4-5 HNP noted on MRI    Menopause     female exam every 2 yrs, due for recheck w me 2015    Migraine headache     ON TOPAMAX    Osteoporosis     Prediabetes     a1c 6.4 in Dec 2014    S/P colonoscopic polypectomy 06/07/2019    Dr Gaby Matt, recheck 5 yrs    TIA (transient ischemic attack)     INACTIVE PROBLEM       SURGICAL HISTORY     Past Surgical History:   Procedure Laterality Date    BREAST RECONSTRUCTION Right 4/2013    Dr Gwyndolyn Mortimer (CERVIX STATUS UNKNOWN)  5/1995    LIANG/BSO    MASTECTOMY  7/1997    Right breast for breast cancer    TUBAL LIGATION         CURRENTMEDICATIONS       Previous Medications    ALLOPURINOL (ZYLOPRIM) 100 MG TABLET    Take 2 tablets by mouth in the morning. AMLODIPINE-BENAZEPRIL (LOTREL) 5-10 MG PER CAPSULE    Take 1 capsule by mouth in the morning. Aliza Adamson BLOOD GLUCOSE MONITOR STRIPS    Test twice a day & as needed for symptoms of irregular blood glucose. DOXYCYCLINE HYCLATE (VIBRA-TABS) 100 MG TABLET    Take 1 tablet by mouth in the morning and 1 tablet before bedtime. Do all this for 10 days.     FUROSEMIDE (LASIX) 40 MG TABLET    Take 1 tablet by mouth daily    GLIPIZIDE (GLUCOTROL) 5 MG TABLET    Take 1 tablet by mouth daily    HYDROXYZINE HCL (ATARAX) 25 MG TABLET    Take 1 tablet by mouth nightly    LANCETS MISC    1 each by Does not apply route 2 times daily    METFORMIN (GLUCOPHAGE) 500 MG TABLET    Take 1 tablet by mouth every morning (before breakfast)    PAROXETINE (PAXIL) 20 MG TABLET    Take 1 tablet by mouth every morning    ROSUVASTATIN (CRESTOR) 10 MG TABLET    Take 1 tablet by mouth nightly    TRAZODONE (DESYREL) 100 MG TABLET    Take 1 tablet by mouth nightly       ALLERGIES     Codeine    FAMILYHISTORY       Family History   Problem Relation Age of Onset    High Blood Pressure Mother     Diabetes Mother     Diabetes Father     Heart Disease Father         open heart surgery    High Blood Pressure Father     Diabetes Sister     High Cholesterol Sister     Stroke Paternal Grandmother         SOCIAL HISTORY       Social History     Socioeconomic History    Marital status:    Occupational History    Occupation: - [employer]     Comment: retired Sept 2015   Tobacco Use    Smoking status: Never    Smokeless tobacco: Never   Substance and Sexual Activity    Alcohol use: No    Drug use: No     Social Determinants of Health     Financial Resource Strain: Low Risk     Difficulty of Paying Living Expenses: Not hard at all   Food Insecurity: No Food Insecurity    Worried About Running Out of Food in the Last Year: Never true    [address] in the Last Year: Never true   Transportation Needs: No Transportation Needs    Lack of Transportation (Medical): No    Lack of Transportation (Non-Medical): No   Physical Activity: Insufficiently Active    Days of Exercise per Week: 3 days    Minutes of Exercise per Session: 30 min   Stress: No Stress Concern Present    Feeling of Stress : Not at all   Intimate Partner Violence: Not At Risk    Fear of Current or Ex-Partner: No    Emotionally Abused: No    Physically Abused: No    Sexually Abused: No   Housing Stability: Low Risk     Unable to Pay for Housing in the Last Year: No    Number of Places Lived in the Last Year: 1    Unstable Housing in the Last Year: No       SCREENINGS    Hans Coma Scale  Eye Opening: Spontaneous  Best Verbal Response: Oriented  Best Motor Response: Obeys commands  Hans Coma Scale Score: 15        PHYSICAL EXAM    (up to 7 for level 4, 8 or more for level 5)     ED Triage Vitals   BP Temp Temp src Heart Rate Resp SpO2 Height Weight   08/18/22 0655 -- -- 08/18/22 0704 08/18/22 0704 08/18/22 0704 -- --   (!) 126/106   (!) 170 24 (!) 86 %         Physical Exam  Vitals and nursing note reviewed. Constitutional:       Appearance: Normal appearance. She is well-developed. She is not ill-appearing or diaphoretic. HENT:      Head: Normocephalic and atraumatic. Eyes:      General: No scleral icterus. Right eye: No discharge. Left eye: No discharge. Cardiovascular:      Rate and Rhythm: Tachycardia present. Rhythm irregular. Heart sounds: Normal heart sounds. No murmur heard. No friction rub. No gallop. Pulmonary:      Effort: Pulmonary effort is normal. No respiratory distress. Breath sounds: Normal breath sounds. No stridor. No wheezing or rales. Chest:      Chest wall: No tenderness.    Abdominal:      General: Bowel sounds are normal. There is no distension. Palpations: Abdomen is soft. There is no mass. Tenderness: no abdominal tenderness There is no guarding or rebound. Musculoskeletal:         General: No tenderness. Normal range of motion. Cervical back: Normal range of motion and neck supple. Skin:     General: Skin is warm and dry. Coloration: Skin is not pale. Neurological:      Mental Status: She is alert and oriented to person, place, and time. Coordination: Coordination normal.   Psychiatric:         Mood and Affect: Mood normal.         Behavior: Behavior normal.     DIAGNOSTIC RESULTS   LABS:    Labs Reviewed   CBC WITH AUTO DIFFERENTIAL - Abnormal; Notable for the following components:       Result Value    RDW 15.1 (*)     Segs Relative 77.9 (*)     Lymphocytes % 11.0 (*)     Monocytes % 9.4 (*)     Immature Neutrophil % 0.5 (*)     All other components within normal limits   COMPREHENSIVE METABOLIC PANEL W/ REFLEX TO MG FOR LOW K - Abnormal; Notable for the following components:    CO2 19 (*)     Glucose 198 (*)     Total Protein 6.3 (*)     ALT 8 (*)     Anion Gap 18 (*)     All other components within normal limits   BRAIN NATRIURETIC PEPTIDE - Abnormal; Notable for the following components:    Pro-BNP 2,096 (*)     All other components within normal limits   TSH - Abnormal; Notable for the following components:    TSH, High Sensitivity 4.410 (*)     All other components within normal limits   MAGNESIUM - Abnormal; Notable for the following components:    Magnesium 1.7 (*)     All other components within normal limits   CULTURE, BLOOD 1   CULTURE, BLOOD 2   COVID-19, RAPID   TROPONIN   LACTIC ACID   URINALYSIS WITH REFLEX TO CULTURE       When ordered, only abnormal lab results are displayed. All other labs were within normal range or not returned as of this dictation. EKG:  When ordered, EKG's are interpreted by the Emergency Department Physician in the absence of a cardiologist.  Please see their note for interpretation of EKG. RADIOLOGY:   Non-plain film images such as CT, Ultrasound and MRI are read by the radiologist. Plain radiographic images are visualized andpreliminarily interpreted by the  ED Provider with the below findings:        Interpretation perthe Radiologist below, if available at the time of this note:    CTA PULMONARY W CONTRAST   Final Result   Limited evaluation of the subsegmental branches of the pulmonary arteries   secondary to motion artifact. No evidence of pulmonary embolism or acute pulmonary abnormality. Moderate bilateral pleural effusions with adjacent partial collapse of the   lower lobes. Mild cardiomegaly and a small pericardial effusion measuring up to 3 cm. Small hemangioma in the left hepatic lobe. XR CHEST PORTABLE   Preliminary Result   Mild pulmonary vascular congestion without overt pulmonary edema. New bilateral pleural effusions, right larger than left along with bibasilar   airspace opacities, right worse than left, which may relate to atelectasis,   pneumonia or aspiration pneumonitis. No results found.       PROCEDURES   Unless otherwise noted below, none     Procedures    CRITICAL CARE TIME   N/A    CONSULTS:  IP CONSULT TO CARDIOLOGY  IP CONSULT TO HOSPITALIST      EMERGENCY DEPARTMENT COURSE and DIFFERENTIAL DIAGNOSIS/MDM:   Vitals:    Vitals:    08/18/22 0715 08/18/22 0721 08/18/22 0744 08/18/22 1035   BP:    (!) 139/106   Pulse: (!) 157 (!) 160  (!) 131   Resp: 26 29  25   Temp:   97.8 °F (36.6 °C)    TempSrc:   Oral    SpO2:    100%       Patient was given thefollowing medications:  Medications   dilTIAZem injection 20 mg (20 mg IntraVENous Given 8/18/22 0738)   aspirin chewable tablet 324 mg (324 mg Oral Given 8/18/22 0738)   cefTRIAXone (ROCEPHIN) 1,000 mg in dextrose 5 % 50 mL IVPB mini-bag (0 mg IntraVENous Stopped 8/18/22 0913)   magnesium sulfate 1000 mg in dextrose 5% 100 mL IVPB (0 mg IntraVENous Stopped 8/18/22 103)   iopamidol (ISOVUE-370) 76 % injection 75 mL (75 mLs IntraVENous Given 8/18/22 9598)         Is this patient to be included in the SEP-1 Core Measure due to severe sepsis or septic shock? No   Exclusion criteria - the patient is NOT to be included for SEP-1 Core Measure due to: Infection is not suspected    Patient is a 45-year-old female arrives via private vehicle from home with above HPI. I saw patient shortly after arrival to exam room. Per nursing, 02 sat initially was <90% and placed on nasal canula . No acute distress. Noted to be tachycardic in the 140-170s with EKG showing A. fib and RVR. Lung sounds are clear. No abdominal pain. Mild swelling to bilateral legs, no unilateral leg swelling or calf tenderness. No focal neurodeficits.  at bedside. Patient was discussed with ED attending Dr. Felicia Eastman who also had FaceTime patient. Plan: Cardizem bolus, dyspnea work-up, PE study. No obvious source of infection, but due to tachycardia and tachypnea, will plan cultures, lactic. Anticipate admission. @8128 patient did receive initial bolus of Cardizem, heart rate improved, 103. She states her symptoms have improved somewhat. Patient had been on 4 L nasal cannula since her arrival, did do a trial of room air, and decreased to 90%. Chest x-ray shows mild pulmonary vascular congestion without pulmonary edema. New bilateral pleural effusions right larger than left with bibasilar airspace opacities. Given potential pneumonia, tachypnea and tachycardia, will plan for IV antibiotics. @8967 CTA shows no pulmonary embolism, noted moderate bilateral pleural effusion, cardiomegaly and a small pericardial effusion. Reviewing EMR, patient seen cardiologist Dr. Jus Saba 2 days ago office, with plans for left and right cardiac catheterization due to worsening dyspnea on exertion. @4303 patient discussed with on-call cardiologist Dr. Abelino Fraire who agreed to see patient.   Patient discussed with hospitalist Lito Wolfe NP, who accept the patient and requested IR consult for pleural effusions. FINAL IMPRESSION      1. Dyspnea and respiratory abnormalities    2. Atrial fibrillation with rapid ventricular response (HCC)          DISPOSITION/PLAN   DISPOSITION        PATIENT REFERREDTO:  No follow-up provider specified.     DISCHARGE MEDICATIONS:  New Prescriptions    No medications on file       DISCONTINUED MEDICATIONS:  Discontinued Medications    No medications on file              (Please note that portions ofthis note were completed with a voice recognition program.  Efforts were made to edit the dictations but occasionally words are mis-transcribed.)    Charlene Ferreira PA-C (electronically signed)             Charlene Ferreira PA-C  08/18/22 0600

## 2022-08-18 NOTE — CONSULTS
CARDIOLOGY CONSULT NOTE   Reason for consultation:  SOb/Afib    Referring physician:  Karina Yeboah MD     Primary care physician: Tyler Chatterjee MD      Dear  Dr. Karina Yeboah MD   Thanks for the consult. Chief Complaints :No chief complaint on file. History of present illness:Joide is a 71 y. o.year old who presents with complains of shortness of breath lower extremity swelling ongoing for several weeks was seen by Dr. Marvin Souza due to ongoing shortness of breath and as part of unexplained shortness of breath is planning cardiac cath nevertheless she comes in today and was noted to be in A. fib she has 2+ lower extremity swelling she says her legs and hands have been swelling for the last several months. X-ray is concerning for pleural effusion also possible pericardial effusion. She is unable to lie flat she is winded she is in A. fib with heart rate up to 170s she was given Cardizem in the emergency department CT chest did not show PE but showed possible collapse of the lower lobes with effusion    Past medical history:    has a past medical history of Anxiety, Breast carcinoma ([address] Cervical radiculitis, Chest pain, Depression, Diabetes type 2, controlled ([address] Family history of diabetes mellitus (DM), GERD (gastroesophageal reflux disease), Hiatal hernia, Hx of Doppler ultrasound, Hx of Doppler ultrasound, Hx of echocardiogram, Hypercalcemia, Hyperglycemia, Hyperlipidemia, Insomnia, Irritable bowel syndrome, LBP (low back pain), Lumbar herniated disc, Menopause, Migraine headache, Osteoporosis, Prediabetes, S/P colonoscopic polypectomy, and TIA (transient ischemic attack). Past surgical history:   has a past surgical history that includes Tubal ligation; Mastectomy (7/1997); Hysterectomy (5/1995); and Breast reconstruction (Right, 4/2013). Social History:   reports that she has never smoked.  She has never used smokeless tobacco. She reports that she does not drink alcohol and does not use drugs.   Family history:   no family history of CAD, STROKE of DM at early age    Allergies   Allergen Reactions    Codeine      \"jittery\"  Feels anxoius       allopurinol (ZYLOPRIM) tablet 200 mg, Daily  hydrOXYzine HCl (ATARAX) tablet 25 mg, Nightly  [START ON 8/19/2022] PARoxetine (PAXIL) tablet 20 mg, QAM  traZODone (DESYREL) tablet 100 mg, Nightly  rosuvastatin (CRESTOR) tablet 10 mg, Nightly  sodium chloride flush 0.9 % injection 5-40 mL, 2 times per day  sodium chloride flush 0.9 % injection 10 mL, PRN  0.9 % sodium chloride infusion, PRN  enoxaparin (LOVENOX) injection 40 mg, Q24H  ondansetron (ZOFRAN-ODT) disintegrating tablet 4 mg, Q8H PRN   Or  ondansetron (ZOFRAN) injection 4 mg, Q6H PRN  polyethylene glycol (GLYCOLAX) packet 17 g, Daily PRN  nicotine polacrilex (COMMIT) lozenge 2 mg, Q1H PRN  acetaminophen (TYLENOL) tablet 650 mg, Q6H PRN   Or  acetaminophen (TYLENOL) suppository 650 mg, Q6H PRN  insulin lispro (HUMALOG) injection vial 0-4 Units, TID WC  insulin lispro (HUMALOG) injection vial 0-4 Units, Nightly  lisinopril (PRINIVIL;ZESTRIL) tablet 10 mg, Daily  dilTIAZem 100 mg in dextrose 5 % 100 mL infusion ([facility]), Continuous  furosemide (LASIX) injection 40 mg, BID      Current Facility-Administered Medications   Medication Dose Route Frequency Provider Last Rate Last Admin    allopurinol (ZYLOPRIM) tablet 200 mg  200 mg Oral Daily Gurdeep Ing, APRN - CNP        hydrOXYzine HCl (ATARAX) tablet 25 mg  25 mg Oral Nightly Gurdeep Ing, APRN - CNP        [START ON 8/19/2022] PARoxetine (PAXIL) tablet 20 mg  20 mg Oral QAM Gurdeep Ing, APRN - CNP        traZODone (DESYREL) tablet 100 mg  100 mg Oral Nightly Gurdeep Ing, APRN - CNP        rosuvastatin (CRESTOR) tablet 10 mg  10 mg Oral Nightly Gurdeep Ing, APRN - CNP        sodium chloride flush 0.9 % injection 5-40 mL  5-40 mL IntraVENous 2 times per day Gurdeep Ing, APRN - CNP        sodium chloride flush 0.9 % injection 10 mL  10 mL IntraVENous PRN Gurdeep Ing, APRN - CNP        0.9 % sodium chloride infusion   IntraVENous PRN Irving Krill, APRN - CNP        enoxaparin (LOVENOX) injection 40 mg  40 mg SubCUTAneous Q24H Irving Andresill, APRN - CNP        ondansetron (ZOFRAN-ODT) disintegrating tablet 4 mg  4 mg Oral Q8H PRN Irving Krill, APRN - CNP        Or    ondansetron [facility]) injection 4 mg  4 mg IntraVENous Q6H PRN Irving Krill, APRN - CNP        polyethylene glycol (GLYCOLAX) packet 17 g  17 g Oral Daily PRN Irving Krill, APRN - CNP        nicotine polacrilex (COMMIT) lozenge 2 mg  2 mg Oral Q1H PRN Irving Krill, APRN - CNP        acetaminophen (TYLENOL) tablet 650 mg  650 mg Oral Q6H PRN Irving Krill, APRN - CNP        Or    acetaminophen (TYLENOL) suppository 650 mg  650 mg Rectal Q6H PRN Irving Krill, APRN - CNP        insulin lispro (HUMALOG) injection vial 0-4 Units  0-4 Units SubCUTAneous TID WC Irving Krill, APRN - CNP        insulin lispro (HUMALOG) injection vial 0-4 Units  0-4 Units SubCUTAneous Nightly Irving Krill, APRN - CNP        lisinopril (PRINIVIL;ZESTRIL) tablet 10 mg  10 mg Oral Daily Irving Krill, APRN - CNP        dilTIAZem 100 mg in dextrose 5 % 100 mL infusion ([facility])  2.5-15 mg/hr IntraVENous Continuous Tania Members, APRN - CNP 5 mL/hr at 08/18/22 1245 5 mg/hr at 08/18/22 1245    furosemide (LASIX) injection 40 mg  40 mg IntraVENous BID Freda Briseno MD         Review of Systems:   Constitutional: No Fever or Weight Loss   Eyes: No Decreased Vision  ENT: No Headaches, Hearing Loss or Vertigo  Cardiovascular: As per HPI  Respiratory: As per HPI  Gastrointestinal: No abdominal pain, appetite loss, blood in stools, constipation, diarrhea or heartburn  Genitourinary: No dysuria, trouble voiding, or hematuria  Musculoskeletal:  No gait disturbance, weakness or joint complaints  Integumentary: No rash or pruritis  Neurological: No TIA or stroke symptoms  Psychiatric: No anxiety or depression  Endocrine: No malaise, fatigue or temperature intolerance  Hematologic/Lymphatic: No bleeding problems, blood clots or swollen lymph nodes  Allergic/Immunologic: No nasal congestion or hives  All systems negative except as marked. Physical Examination:    Vitals:    08/18/22 0744 08/18/22 1035 08/18/22 1131 08/18/22 1219   BP:  (!) 139/106 (!) 143/84 (!) 148/95   Pulse:  (!) 131 (!) 135 (!) 136   Resp:  25 28 19   Temp: 97.8 °F (36.6 °C)   97.9 °F (36.6 °C)   TempSrc: Oral   Oral   SpO2:  100% 96%    Height:    5' 6\" (1.676 m)       General Appearance:  No distress, conversant    Constitutional:  Well developed, Well nourished, No acute distress, Non-toxic appearance. HENT:  Normocephalic, Atraumatic, Bilateral external ears normal, Oropharynx moist, No oral exudates, Nose normal. Neck- Normal range of motion, No tenderness, Supple, No stridor,no apical-carotid delay  Lymphatics : no palpable lymph nodes  Eyes:  PERRL, EOMI, Conjunctiva normal, No discharge. Respiratory:  Normal breath sounds, No respiratory distress, No wheezing, No chest tenderness. ,no use of accessory muscles, crackles Present  Cardiovascular: (PMI) apex non displaced,no lifts no thrills, ankle swelling Present , 1+, s1 and s2 audible,Murmur. Present, JVD not noted    Abdomen /GI:  Bowel sounds normal, Soft, No tenderness, No masses, No gross visceromegaly   :  No costovertebral angle tenderness   Musculoskeletal:  No edema, no tenderness, no deformities.  Back- no tenderness  Integument:  Well hydrated, no rash   Lymphatic:  No lymphadenopathy noted   Neurologic:  Alert & oriented x 3, CN 2-12 normal, normal motor function, normal sensory function, no focal deficits noted           Medical decision making and Data review:    Lab Review   Recent Labs     08/18/22  0710   WBC 9.8   HGB 12.9   HCT 39.2         Recent Labs     08/18/22  0710      K 3.8      CO2 19*   BUN 22   CREATININE 0.7     Recent Labs     08/18/22  0710   AST 18   ALT 8*   BILITOT 0.5   ALKPHOS 72     Recent Labs     08/18/22  0710   TROPONINT <0.010 Recent Labs     08/18/22  0710   PROBNP 2,096*     Lab Results   Component Value Date    INR 1.07 08/18/2022    PROTIME 13.8 08/18/2022       EKG: (reviewed by myself)    ECHO:(reviewed by myself)    Chest Xray:(reviewed by myself)  XR CHEST (2 VW)    Result Date: 7/22/2022  EXAMINATION: TWO XRAY VIEWS OF THE CHEST 7/22/2022 10:42 am COMPARISON: 01/04/2010 HISTORY: ORDERING SYSTEM PROVIDED HISTORY: Dyspnea, unspecified type TECHNOLOGIST PROVIDED HISTORY: Reason for exam:->sob FINDINGS: Borderline cardiomegaly, bronchovascular marking prominence throughout both lung fields worst in the right basilar region. Findings are consistent with right basilar pneumonia however, underlying early/mild congestive heart failure may contribute to this appearance. No large areas of pulmonary consolidation. No pleural effusion, pneumothorax. Borderline cardiomegaly, bronchovascular marking prominence worst in the right basilar region. Appearance is most consistent with right basilar pneumonia however, early/mild congestive heart failure may contribute to this appearance. Otherwise, radiographically nonacute chest.     XR CHEST PORTABLE    Result Date: 8/18/2022  EXAMINATION: ONE XRAY VIEW OF THE CHEST 8/18/2022 7:02 am COMPARISON: 07/22/2022 HISTORY: ORDERING SYSTEM PROVIDED HISTORY: sob TECHNOLOGIST PROVIDED HISTORY: Reason for exam:->sob Reason for Exam: sob Additional signs and symptoms: sob Relevant Medical/Surgical History: sob FINDINGS: Low lung volumes likely on the basis of patient inspiratory effort. New bilateral pleural effusions, right larger than left along with associated bibasilar airspace opacities, right worse than left. There is also mild degree of pulmonary vascular congestion without overt pulmonary edema identified. No pneumothoraces are seen. Cardiac and mediastinal silhouettes are within normal limits. No acute bony abnormality.      Mild pulmonary vascular congestion without overt pulmonary edema. New bilateral pleural effusions, right larger than left along with bibasilar airspace opacities, right worse than left, which may relate to atelectasis, pneumonia or aspiration pneumonitis. CTA PULMONARY W CONTRAST    Result Date: 8/18/2022  EXAMINATION: CTA OF THE CHEST 8/18/2022 9:03 am TECHNIQUE: CTA of the chest was performed after the administration of intravenous contrast.  Multiplanar reformatted images are provided for review. MIP images are provided for review. Automated exposure control, iterative reconstruction, and/or weight based adjustment of the mA/kV was utilized to reduce the radiation dose to as low as reasonably achievable. COMPARISON: None. HISTORY: ORDERING SYSTEM PROVIDED HISTORY: LUE pain, SOB, tachycardia, eval for PE TECHNOLOGIST PROVIDED HISTORY: Reason for exam:->LUE pain, SOB, tachycardia, eval for PE Decision Support Exception - unselect if not a suspected or confirmed emergency medical condition->Emergency Medical Condition (MA) Reason for Exam: LUE pain, SOB, tachycardia, eval for PE FINDINGS: Limited evaluation of the subsegmental branches of the pulmonary arteries secondary to motion artifact. Pulmonary Arteries: Pulmonary arteries are adequately opacified for evaluation. No evidence of intraluminal filling defect to suggest pulmonary embolism. Main pulmonary artery is normal in caliber. Mediastinum: No evidence of mediastinal lymphadenopathy. Mild cardiomegaly. Simple pericardial effusion measuring up to 3 cm inferiorly. There is no acute abnormality of the thoracic aorta. Lungs/pleura: Moderate bilateral pleural effusions with adjacent partial collapse of the lower lobes bilaterally. No focal consolidation or pulmonary edema. No pneumothorax. Upper Abdomen: A 3 cm lesion in the left hepatic lobe demonstrates peripheral, discontinuous nodular enhancement suggestive of a hemangioma. Soft Tissues/Bones: No acute bone or soft tissue abnormality.   Healing lateral right 8th rib fracture. Limited evaluation of the subsegmental branches of the pulmonary arteries secondary to motion artifact. No evidence of pulmonary embolism or acute pulmonary abnormality. Moderate bilateral pleural effusions with adjacent partial collapse of the lower lobes. Mild cardiomegaly and a small pericardial effusion measuring up to 3 cm. Small hemangioma in the left hepatic lobe. VL DUP LOWER EXTREMITY VENOUS LEFT    Result Date: 8/8/2022  Vascular Lower Extremities DVT Study Procedure Demographics   Patient Name       Ani Hinds  Date of study       08/08/2022                     A   Date of Birth      1950         Gender              Female   Age                71                 Race                   Patient Number     [id number]           Room Number   Visit Number       [id number]          Height   Corporate ID       [id number]           Weight   Accession Number   [id number]   Ordering Physician Phil Pryor T                                         Interpreting        Phil Abreu MD                                        Physician  Procedure Type of Study:   Veins:Lower Extremities DVT Study, VL LOWER EXTREMITY VENOUS LEFT. Indications for Study:S/p venous ablation. Conclusions   Summary   Left CFV is patent with good compressibility and respirophasic signal with  good augmentation. The Left GSV is non-compressible with no evidence of flow just past the  saphenofemoral junction to the knee. Signature   ------------------------------------------------------------------  Electronically signed by Phil Abreu MD (Interpreting  physician) on 08/08/2022 at 01:44 PM  ------------------------------------------------------------------  Impressions Left Impression Left CFV is patent with good compressibility and respirophasic signal with good augmentation.  The Left GSV is non-compressible with no evidence of flow just past the saphenofemoral junction to the knee. Study Location:[facility]. Technical Quality:Adequate visualization. Velocities are measured in cm/s ; Diameters are measured in cm Left Lower Extremities DVT Study Measurements Left 2D Measurements +--------------+---------------+------------+----------+ ! Location      ! Compressibility! Augmentation! Thrombosis! +--------------+---------------+------------+----------+ ! Common Femoral!Yes            ! Yes         ! None      ! +--------------+---------------+------------+----------+ Left Doppler Measurements +---------+------+------+------------+ ! Location ! Signal!Reflux! Reflux (sec)! +---------+------+------+------------+ ! GSV Thigh! Absent!      !            ! +---------+------+------+------------+ ! Femoral  !Phasic!      !            ! +---------+------+------+------------+      All labs, medications and tests reviewed by myself including data  from outside source , patient and available family . Continue all other medications of all above medical condition listed as is. Impression:  Principal Problem:    Atrial fibrillation with RVR (HCC)  Active Problems:    Chest pain    Shortness of breath    Varicose veins of both legs with edema  Resolved Problems:    * No resolved hospital problems. *      Assessment: 71 y. o.year old with PMH of  has a past medical history of Anxiety, Breast carcinoma ([facility] [address] Cervical radiculitis, Chest pain, Depression, Diabetes type 2, controlled ([facility] [address] Family history of diabetes mellitus (DM), GERD (gastroesophageal reflux disease), Hiatal hernia, Hx of Doppler ultrasound, Hx of Doppler ultrasound, Hx of echocardiogram, Hypercalcemia, Hyperglycemia, Hyperlipidemia, Insomnia, Irritable bowel syndrome, LBP (low back pain), Lumbar herniated disc, Menopause, Migraine headache, Osteoporosis, Prediabetes, S/P colonoscopic polypectomy, and TIA (transient ischemic attack).       Plan and Recommendations:    Start Cardizem drip for A. tala we will plan VICKY cardioversion due to rapid heart rate and quite symptomatic she may need antiarrhythmic to keep her in sinus rhythm  Possible congestive heart failure in setting of diastolic dysfunction atrial fibrillation start Lasix 40 mg IV twice daily  CHF: Acute  Diastolic decompensated heart failure. Appears to be volume overloaded . Agree with diuresis. We can try IV Lasix 40 mg BID. Depending on response we can titrate diuretics accordingly. Strict Is and Os and Daily weights. chf nurse consult  HTN: Uncontrolled hypertension probably contributing to diastolic dysfunction and A. tala was started on Lotrel recently but would increase lisinopril to 40 mg daily hold off on amlodipine due to ankle swelling  Start metoprolol 50 twice daily  Agree with pleural tap and cytology  Pericardial effusion we will get repeat echo  DVT prophylaxis if no contraindication  6. Dyslipidemia: Check lipid panel          Thank you  much for consult and giving us the opportunity in contributing in the care of this patient. Please feel free to call me for any questions.        Moises Martinez MD, 8/18/2022 12:53 PM

## 2022-08-18 NOTE — OR NURSING
PROCEDURE PERFORMED:  Bilatoral Thoracentesis    PRIMARY INDICATION FOR PROCEDURE: Pleural Fluid    INFORMED CONSENT:  Obtained prior to procedure. Consent placed in chart. PT TRANSPORTED FROM:   3119                   TO THE IR ROOM:  Small    PT ARRIVED TO IR ROOM AT:    1330                  ASSESSMENT: Pt alert and oriented x4. Pt verbalizes understanding of procedure. BARRIER PRECAUTIONS & STERILE TECHNIQUE:               Pt remains in bed and positioned side of bed with feet dangling. Warm blankets given. Pt placed on Vital Signs Monitor along with Cardiac Monitor from floor due to screen broken on cardiac monitor. Rei Paiz Pt prepped and draped in a sterile fashion with chlorhexadine. TIME OUT AT: 0917          Name, , allergies, labs, code status and procedure reviewed during time out. PAIN/LOCAL ANESTHESIA/SEDATION MANAGEMENT:           Local: Lidocaine 1% given by Dr Doris García          Sedation:              Fentanyl:             Versed:     INTRAOPERATIVE:           ACCESS TIME:           US/FLUORO: 5 US images          WIRE USED:           SHEATH USED:           CATHETER USED: one step          FINAL IMAGE TAKEN TO CONFIRM PLACEMENT OF:           CONTRAST/CC:     A total of 1225 cc clear yellow pleural fluid removed from right side and 700 cc sent to lab. Access to right side removed at 1358. A total of 775 cc clear yellow pleural fluid removed from left side and 700 cc sent to lab. Access to left side removed at 1405.     STERILE DRESSINGS: bandaid    SPECIMENS: R side 700 cc                           L side 700 cc    EBL:    Less than 1 cc      FOLLOW- UP X-RAY:  ordered    COMPLICATIONS: none    STAFF IN ROOM: Dr Ev Crocker RN, Cynthia Rocha RN    REPORT CALLED TO: Dennis Vences RN on Unit    PT LEFT IR ROOM AT:  0910

## 2022-08-18 NOTE — PLAN OF CARE
Problem: Discharge Planning  Goal: Discharge to home or other facility with appropriate resources  Outcome: Progressing  Flowsheets (Taken 8/18/2022 1442 by Claribel Vazquez RN)  Discharge to home or other facility with appropriate resources:   Identify barriers to discharge with patient and caregiver   Identify discharge learning needs (meds, wound care, etc)   Refer to discharge planning if patient needs post-hospital services based on physician order or complex needs related to functional status, cognitive ability or social support system   Arrange for needed discharge resources and transportation as appropriate   Arrange for interpreters to assist at discharge as needed     Problem: Safety - Adult  Goal: Free from fall injury  Outcome: Progressing     Problem: ABCDS Injury Assessment  Goal: Absence of physical injury  Outcome: Progressing

## 2022-08-18 NOTE — ED PROVIDER NOTES
I independently examined and evaluated Juan A Mesa. I personally saw the patient and performed a substantive portion of the visit including all aspects of the medical decision making. In brief their history revealed patient presented to the ED with a history of shortness of breath. Physical examination significant for irregularly irregular heart rate and reduced breath sounds bilaterally. No JVD. Patient's EKG significant for atrial fibrillation with rapid ventricular response and low voltage QRS. Patient was given a dose of Cardizem in the ER with subsequent conversion of the A. fib to sinus rhythm. Patient laboratory evaluation was unremarkable. Bedside ultrasound showed mild pericardial effusion  Chest x-ray significant for bilateral opacities worse on the right side. Patient CT angiography is unremarkable for any pulmonary embolism    Next patient given antibiotics and admitted for new onset A. fib and pneumonia. CRITICAL CARE NOTE:  There was a high probability of clinically significant life-threatening deterioration of the patient's condition requiring my urgent intervention due to arrhythmias. Bedside ultrasound was performed to address this. Total critical care time is AT LEAST 30  minutes. This includes vital sign monitoring, pulse oximetry monitoring, telemetry monitoring, clinical response to the IV medications, reviewing the nursing notes, consultation time, dictation/documentation time, and interpretation of the lab work. This time excludes time spent performing procedures and separately billable procedures and family discussion time.         All decisions regarding differential diagnosis, lab/radiology/EKG interpretation, risk of significant illness, specific reasons for performing tests, management/treatment, response to management/treatment, disposition, reasons for consults, results of consults, etc. were made by myself in conjunction with the Advanced Practice Provider. For all further details of the patient's emergency department visit, please see the Advanced Practice Provider's documentation.      EKG:  Atrial fibrillation with rapid ventricular response, low voltage QRS    - Ventricular rate 160  - OH interval  *  - QRS duration 64  - QT/Qtc 250/407  - P-R-T axes * 22 -8    - ST changes: No STEMI  -    Repeat EK:17 AM  Sinus tachycardia  Ventricular rate 102  OH interval 152  QRS duration 70  QT/QTc 334/435  PRT axes * 24 36    Labs Reviewed   CBC WITH AUTO DIFFERENTIAL - Abnormal; Notable for the following components:       Result Value    RDW 15.1 (*)     Segs Relative 77.9 (*)     Lymphocytes % 11.0 (*)     Monocytes % 9.4 (*)     Immature Neutrophil % 0.5 (*)     All other components within normal limits   COMPREHENSIVE METABOLIC PANEL W/ REFLEX TO MG FOR LOW K - Abnormal; Notable for the following components:    CO2 19 (*)     Glucose 198 (*)     Total Protein 6.3 (*)     ALT 8 (*)     Anion Gap 18 (*)     All other components within normal limits   BRAIN NATRIURETIC PEPTIDE - Abnormal; Notable for the following components:    Pro-BNP 2,096 (*)     All other components within normal limits   TSH - Abnormal; Notable for the following components:    TSH, High Sensitivity 4.410 (*)     All other components within normal limits   URINALYSIS WITH REFLEX TO CULTURE - Abnormal; Notable for the following components:    Ketones, Urine TRACE (*)     Mucus, UA RARE (*)     All other components within normal limits   MAGNESIUM - Abnormal; Notable for the following components:    Magnesium 1.7 (*)     All other components within normal limits   POCT GLUCOSE - Abnormal; Notable for the following components:    POC Glucose 131 (*)     All other components within normal limits   POCT GLUCOSE - Abnormal; Notable for the following components:    POC Glucose 149 (*)     All other components within normal limits   COVID-19, RAPID   CULTURE, BLOOD 1   CULTURE, BLOOD 2 CULTURE, BODY FLUID   CULTURE, BODY FLUID   TROPONIN   LACTIC ACID   PROTIME/INR & PTT   PLEURAL/PERITONEAL FLUID PANEL   PLEURAL/PERITONEAL FLUID PANEL   HEMOGLOBIN A1C   T4, FREE   RODOLFO   ANTI-SCLERODERMA ANTIBODY   RHEUMATOID FACTOR   BASIC METABOLIC PANEL W/ REFLEX TO MG FOR LOW K     XR CHEST PORTABLE   Final Result   Status post thoracentesis with improved effusions and improved right lung   base aeration. No pneumothorax. CTA PULMONARY W CONTRAST   Final Result   Limited evaluation of the subsegmental branches of the pulmonary arteries   secondary to motion artifact. No evidence of pulmonary embolism or acute pulmonary abnormality. Moderate bilateral pleural effusions with adjacent partial collapse of the   lower lobes. Mild cardiomegaly and a small pericardial effusion measuring up to 3 cm. Small hemangioma in the left hepatic lobe. XR CHEST PORTABLE   Final Result   Mild pulmonary vascular congestion without overt pulmonary edema. New bilateral pleural effusions, right larger than left along with bibasilar   airspace opacities, right worse than left, which may relate to atelectasis,   pneumonia or aspiration pneumonitis. IR GUIDED THORACENTESIS PLEURAL    (Results Pending)     Bedside ultrasound:   Indication: Tachycardia/A. fib, low voltage QRS, mildy reduced heart sounds  Findings: Mild pericardial effusion without tamponade     Jessica Dubose DO  08/18/22 [id number]

## 2022-08-18 NOTE — ED NOTES
Medication History  [facility]    Patient Name: Ryan Shea 1950     Medication history has been completed by: Dmitry Norris CPhT    Source(s) of information: patient and retail pharmacy     Primary Care Physician: Cheyenne Holguin MD     Pharmacy: [facility]    Allergies as of 08/18/2022 - Fully Reviewed 08/16/2022   Allergen Reaction Noted    Codeine          Prior to Admission medications    Medication Sig Start Date End Date Taking? Authorizing Provider   amLODIPine-benazepril (LOTREL) 5-10 MG per capsule Take 1 capsule by mouth in the morning. . 8/16/22   Elaine Omer MD   doxycycline hyclate (VIBRA-TABS) 100 MG tablet Take 1 tablet by mouth in the morning and 1 tablet before bedtime. Do all this for 10 days. 8/12/22 8/22/22  Cheyenne Holguin MD   allopurinol (ZYLOPRIM) 100 MG tablet Take 2 tablets by mouth in the morning. 7/21/22   Cheyenne Holguin MD   hydrOXYzine HCl (ATARAX) 25 MG tablet Take 1 tablet by mouth nightly 7/21/22   Cheyenne Holguin MD   metFORMIN (GLUCOPHAGE) 500 MG tablet Take 1 tablet by mouth every morning (before breakfast) 5/6/22   Cheyenne Holguin MD   glipiZIDE (GLUCOTROL) 5 MG tablet Take 1 tablet by mouth daily 5/6/22   Cheyenne Holguin MD   furosemide (LASIX) 40 MG tablet Take 1 tablet by mouth daily  Patient taking differently: Take 20 mg by mouth daily 4/26/22   Cheyenne Holguin MD   traZODone (DESYREL) 100 MG tablet Take 1 tablet by mouth nightly 2/16/22   Cheyenne Holguin MD   rosuvastatin (CRESTOR) 10 MG tablet Take 1 tablet by mouth nightly 2/16/22   Cheyenne Holguin MD   PARoxetine (PAXIL) 20 MG tablet Take 1 tablet by mouth every morning 2/16/22   Cheyenne Holguin MD   Lancets MISC 1 each by Does not apply route 2 times daily 2/16/22   Cheyenne Holguin MD   blood glucose monitor strips Test twice a day & as needed for symptoms of irregular blood glucose.  2/16/22   Cheyenne Holguin MD Comments:  Medication list reviewed with patient, verified active prescriptions with retail pharmacy as recent claims did not populate. Patient reports she has taken first dose of medications today.     To my knowledge the above medication history is accurate as of 8/18/2022 9:41 AM.   Marya Lisa CPhT   8/18/2022 9:41 AM

## 2022-08-18 NOTE — PROGRESS NOTES
4 Eyes Skin Assessment     NAME:  Gigi Diaz  YOB: 1950  MEDICAL RECORD NUMBER:  [id number]    The patient is being assess for  Admission    I agree that 2 RN's have performed a thorough Head to Toe Skin Assessment on the patient. ALL assessment sites listed below have been assessed. Areas assessed by both nurses:    Head, Face, Ears, Shoulders, Back, Chest, Arms, Elbows, Hands, Sacrum. Buttock, Coccyx, Ischium, and Legs. Feet and Heels        Does the Patient have a Wound?  No noted wound(s)       Yohan Prevention initiated:  NA   Wound Care Orders initiated:  NA    Pressure Injury (Stage 3,4, Unstageable, DTI, NWPT, and Complex wounds) if present place referral/consult order under [de-identified] No    New and Established Ostomies if present place consult order under : No      Nurse 1 eSignature: Electronically signed by Justin Delcid RN on 8/18/22 at 2:59 PM EDT    **SHARE this note so that the co-signing nurse is able to place an eSignature**    Nurse 2 eSignature: Electronically signed by Ct Curry RN on 8/18/22 at 3:01 PM EDT

## 2022-08-19 LAB
ANION GAP SERPL CALCULATED.3IONS-SCNC: 14 MMOL/L (ref 4–16)
BUN BLDV-MCNC: 27 MG/DL (ref 6–23)
CALCIUM SERPL-MCNC: 9.2 MG/DL (ref 8.3–10.6)
CHLORIDE BLD-SCNC: 104 MMOL/L (ref 99–110)
CO2: 20 MMOL/L (ref 21–32)
CREAT SERPL-MCNC: 1.1 MG/DL (ref 0.6–1.1)
ESTIMATED AVERAGE GLUCOSE: 140 MG/DL
GFR AFRICAN AMERICAN: 59 ML/MIN/1.73M2
GFR NON-AFRICAN AMERICAN: 49 ML/MIN/1.73M2
GLUCOSE BLD-MCNC: 141 MG/DL (ref 70–99)
GLUCOSE BLD-MCNC: 156 MG/DL (ref 70–99)
GLUCOSE BLD-MCNC: 164 MG/DL (ref 70–99)
GLUCOSE BLD-MCNC: 177 MG/DL (ref 70–99)
GLUCOSE BLD-MCNC: 193 MG/DL (ref 70–99)
HBA1C MFR BLD: 6.5 % (ref 4.2–6.3)
MAGNESIUM: 2 MG/DL (ref 1.8–2.4)
POTASSIUM SERPL-SCNC: 3.2 MMOL/L (ref 3.5–5.1)
SODIUM BLD-SCNC: 138 MMOL/L (ref 135–145)

## 2022-08-19 PROCEDURE — 6370000000 HC RX 637 (ALT 250 FOR IP): Performed by: NURSE PRACTITIONER

## 2022-08-19 PROCEDURE — 2580000003 HC RX 258: Performed by: NURSE PRACTITIONER

## 2022-08-19 PROCEDURE — 80048 BASIC METABOLIC PNL TOTAL CA: CPT

## 2022-08-19 PROCEDURE — 2140000000 HC CCU INTERMEDIATE R&B

## 2022-08-19 PROCEDURE — 94761 N-INVAS EAR/PLS OXIMETRY MLT: CPT

## 2022-08-19 PROCEDURE — 6360000002 HC RX W HCPCS: Performed by: INTERNAL MEDICINE

## 2022-08-19 PROCEDURE — 6370000000 HC RX 637 (ALT 250 FOR IP): Performed by: INTERNAL MEDICINE

## 2022-08-19 PROCEDURE — 36415 COLL VENOUS BLD VENIPUNCTURE: CPT

## 2022-08-19 PROCEDURE — 82962 GLUCOSE BLOOD TEST: CPT

## 2022-08-19 PROCEDURE — 99233 SBSQ HOSP IP/OBS HIGH 50: CPT | Performed by: INTERNAL MEDICINE

## 2022-08-19 PROCEDURE — 94150 VITAL CAPACITY TEST: CPT

## 2022-08-19 PROCEDURE — 83735 ASSAY OF MAGNESIUM: CPT

## 2022-08-19 RX ORDER — POTASSIUM CHLORIDE 20 MEQ/1
40 TABLET, EXTENDED RELEASE ORAL 2 TIMES DAILY WITH MEALS
Status: DISCONTINUED | OUTPATIENT
Start: 2022-08-19 | End: 2022-08-20

## 2022-08-19 RX ADMIN — FUROSEMIDE 40 MG: 10 INJECTION, SOLUTION INTRAMUSCULAR; INTRAVENOUS at 10:28

## 2022-08-19 RX ADMIN — APIXABAN 5 MG: 5 TABLET, FILM COATED ORAL at 21:01

## 2022-08-19 RX ADMIN — DILTIAZEM HYDROCHLORIDE 120 MG: 120 CAPSULE, COATED, EXTENDED RELEASE ORAL at 09:57

## 2022-08-19 RX ADMIN — APIXABAN 5 MG: 5 TABLET, FILM COATED ORAL at 09:58

## 2022-08-19 RX ADMIN — SODIUM CHLORIDE, PRESERVATIVE FREE 10 ML: 5 INJECTION INTRAVENOUS at 10:00

## 2022-08-19 RX ADMIN — POTASSIUM CHLORIDE 40 MEQ: 1500 TABLET, EXTENDED RELEASE ORAL at 17:32

## 2022-08-19 RX ADMIN — POTASSIUM CHLORIDE 40 MEQ: 1500 TABLET, EXTENDED RELEASE ORAL at 10:03

## 2022-08-19 RX ADMIN — PAROXETINE HYDROCHLORIDE 20 MG: 20 TABLET, FILM COATED ORAL at 09:58

## 2022-08-19 RX ADMIN — METOPROLOL TARTRATE 50 MG: 50 TABLET, FILM COATED ORAL at 09:57

## 2022-08-19 RX ADMIN — ALLOPURINOL 200 MG: 100 TABLET ORAL at 09:58

## 2022-08-19 RX ADMIN — SODIUM CHLORIDE, PRESERVATIVE FREE 10 ML: 5 INJECTION INTRAVENOUS at 21:02

## 2022-08-19 RX ADMIN — METOPROLOL TARTRATE 50 MG: 50 TABLET, FILM COATED ORAL at 21:01

## 2022-08-19 RX ADMIN — LISINOPRIL 20 MG: 20 TABLET ORAL at 09:57

## 2022-08-19 RX ADMIN — HYDROXYZINE HYDROCHLORIDE 25 MG: 25 TABLET, FILM COATED ORAL at 21:01

## 2022-08-19 RX ADMIN — ROSUVASTATIN CALCIUM 10 MG: 5 TABLET, FILM COATED ORAL at 21:01

## 2022-08-19 RX ADMIN — DILTIAZEM HYDROCHLORIDE 30 MG: 30 TABLET, FILM COATED ORAL at 02:43

## 2022-08-19 RX ADMIN — FUROSEMIDE 40 MG: 10 INJECTION, SOLUTION INTRAMUSCULAR; INTRAVENOUS at 17:51

## 2022-08-19 RX ADMIN — TRAZODONE HYDROCHLORIDE 100 MG: 50 TABLET ORAL at 21:01

## 2022-08-19 ASSESSMENT — PAIN - FUNCTIONAL ASSESSMENT: PAIN_FUNCTIONAL_ASSESSMENT: ACTIVITIES ARE NOT PREVENTED

## 2022-08-19 ASSESSMENT — PAIN SCALES - GENERAL
PAINLEVEL_OUTOF10: 0
PAINLEVEL_OUTOF10: 4

## 2022-08-19 ASSESSMENT — ENCOUNTER SYMPTOMS: VOICE CHANGE: 0

## 2022-08-19 ASSESSMENT — PAIN DESCRIPTION - FREQUENCY: FREQUENCY: INTERMITTENT

## 2022-08-19 ASSESSMENT — PAIN DESCRIPTION - DESCRIPTORS: DESCRIPTORS: ACHING

## 2022-08-19 ASSESSMENT — PAIN DESCRIPTION - LOCATION: LOCATION: CHEST

## 2022-08-19 ASSESSMENT — PAIN DESCRIPTION - PAIN TYPE: TYPE: ACUTE PAIN

## 2022-08-19 ASSESSMENT — PAIN DESCRIPTION - ORIENTATION: ORIENTATION: LOWER

## 2022-08-19 NOTE — PROGRESS NOTES
V2.0  [facility] [place] [place]      Name:  Roger Mendoza /Age/Sex: 1950  (71 y.o. female)   MRN & CSN:  [id number] & [id number] Encounter Date/Time: 2022 3:39 PM EDT    Location:  [address] [place] [address] PCP: Paulina Rousseau MD       Hospital Day: 2    Assessment and Plan:   Roger Mendoza is a 71 y.o. female with diffuse swelling       Plan:    Afib RVR - resolved    CHF exacerbation  - probnp dramatically elevated  - IV diuresis    Diffuse swelling  - patient's inability to make a fist due to swelling vs tight skin  - f/u anti-scleroderma, RODOLFO, RF to evaluate rheumatologic etiology  - patient is 71 so it would be odd for a a rheumatological disease to present this late  - she states she has no family history of autoimmune disease, nor has she ever been diagnosed with one    Bilateral pleural effusions  - unlikely to be recurrence of breast cancer as effusions are bilateral in setting of elevated bnp  - fluid studies indicating transudative effusion  - unremarkable white count of around 1000/cubic mm, although predominantly lymphocytic which in the context of an exudative effusion could indicate chylothorax  - f/u pleural fluid cytology     Ppx: Eliquis  Dispo:     Subjective:     Chief Complaint: No chief complaint on file. Patient reports improved swelling, still can't make a fist bilaterally however. No further complaints. No SOB, chest pain. Review of Systems:    Review of Systems   HENT:  Negative for voice change. 10 point ROS negative except as stated above in \"subjective\" section    Objective:      Intake/Output Summary (Last 24 hours) at 2022 1539  Last data filed at 2022 1847  Gross per 24 hour   Intake --   Output 100 ml   Net -100 ml        Vitals:   Vitals:    22 1500   BP: (!) 131/55   Pulse: 77   Resp: 29   Temp: 97.4 °F (36.3 °C)   SpO2:        Physical Exam:     General: NAD  Eyes: EOMI  ENT: neck supple  Cardiovascular: Regular rate.  Respiratory: Clear to auscultation  Gastrointestinal: Soft, non tender  Genitourinary: no suprapubic tenderness  Musculoskeletal: 1+ LE edema bilaterally  Skin: warm, dry  Neuro: Alert. Psych: Mood appropriate.      Medications:   Medications:    potassium chloride  40 mEq Oral BID WC    allopurinol  200 mg Oral Daily    hydrOXYzine HCl  25 mg Oral Nightly    PARoxetine  20 mg Oral QAM    traZODone  100 mg Oral Nightly    rosuvastatin  10 mg Oral Nightly    sodium chloride flush  5-40 mL IntraVENous 2 times per day    insulin lispro  0-4 Units SubCUTAneous TID WC    insulin lispro  0-4 Units SubCUTAneous Nightly    furosemide  40 mg IntraVENous BID    lisinopril  20 mg Oral Daily    metoprolol tartrate  50 mg Oral BID    apixaban  5 mg Oral BID    dilTIAZem  120 mg Oral Daily      Infusions:    sodium chloride       PRN Meds: sodium chloride flush, 10 mL, PRN  sodium chloride, , PRN  ondansetron, 4 mg, Q8H PRN   Or  ondansetron, 4 mg, Q6H PRN  polyethylene glycol, 17 g, Daily PRN  nicotine polacrilex, 2 mg, Q1H PRN  acetaminophen, 650 mg, Q6H PRN   Or  acetaminophen, 650 mg, Q6H PRN        Labs      Recent Results (from the past 24 hour(s))   POCT Glucose    Collection Time: 08/18/22  4:48 PM   Result Value Ref Range    POC Glucose 149 (H) 70 - 99 MG/DL   POCT Glucose    Collection Time: 08/18/22  8:23 PM   Result Value Ref Range    POC Glucose 179 (H) 70 - 99 MG/DL   Basic Metabolic Panel w/ Reflex to MG    Collection Time: 08/19/22  5:15 AM   Result Value Ref Range    Sodium 138 135 - 145 MMOL/L    Potassium 3.2 (L) 3.5 - 5.1 MMOL/L    Chloride 104 99 - 110 mMol/L    CO2 20 (L) 21 - 32 MMOL/L    Anion Gap 14 4 - 16    BUN 27 (H) 6 - 23 MG/DL    Creatinine 1.1 0.6 - 1.1 MG/DL    Glucose 164 (H) 70 - 99 MG/DL    Calcium 9.2 8.3 - 10.6 MG/DL    GFR Non- 49 (L) >60 mL/min/1.73m2    GFR  59 (L) >60 mL/min/1.73m2   Magnesium    Collection Time: 08/19/22  5:15 AM   Result Value Ref Range    Magnesium 2.0 1.8 - 2.4 mg/dl   POCT Glucose    Collection Time: 08/19/22  7:53 AM   Result Value Ref Range    POC Glucose 156 (H) 70 - 99 MG/DL   POCT Glucose    Collection Time: 08/19/22 12:10 PM   Result Value Ref Range    POC Glucose 177 (H) 70 - 99 MG/DL        Imaging/Diagnostics Last 24 Hours   Echocardiogram limited    Result Date: 8/18/2022  Transthoracic Echocardiography Report (TTE)  Demographics   Patient Name       Ravi Elizabeth  Date of Study       08/18/2022                     A   Date of Birth      1950         Gender              Female   Age                71 year(s)         Race                   Patient Number     [id number]         Room Number         3119   Visit Number       [id number]   Corporate ID       [id number]   Accession Number   [id number]         [facility]   Ordering Physician Vincent Lloyd CNP        Interpreting        Vannessa Sahu MD  Procedure Type of Study   TTE procedure:ECHOCARDIOGRAM LIMITED. Procedure Date Date: 08/18/2022 Start: 02:54 PM Study Location: Portable Technical Quality: Fair visualization Indications:Congestive heart failure. Patient Status: Routine HR: 99 bpm BP: 104/60 mmHg  Conclusions   Summary  This is a limited echocardiogram.  Left ventricular systolic function is normal.  Ejection fraction is visually estimated at 50-55%. Mild left ventricular hypertrophy. Mild aortic regurgitation. Moderate circumferential pericardial effusion without evidence of cardiac  tamponade.    Signature   ------------------------------------------------------------------  Electronically signed by Vannessa Beltran MD (Interpreting  physician) on 08/18/2022 at 04:44 PM  ------------------------------------------------------------------   Findings   Left Ventricle  Left ventricular systolic function is normal.  Ejection fraction is visually estimated at 50-55%. Mild left ventricular hypertrophy. Aortic Valve  Mild aortic regurgitation. Pericardial Effusion  Moderate circumferential pericardial effusion without evidence of cardiac  tamponade.   M-Mode/2D Measurements & Calculations   LV Diastolic Dimension:  LV Systolic Dimension:  LA Dimension: 3.5 cmAO Root  3.34 cm                  2.44 cm                 Dimension: 2.8 cmLA Area:  LV FS:27 %               LV Volume Diastolic: 70 13.8 cm2  LV PW Diastolic: 9.68 cm ml  Septum Diastolic: 0.33   LV Volume Systolic: 34  cm                       ml  CO: 7.09 l/min           LV EDV/LV EDV Index: 70 RV Diastolic Dimension:                           mlLV ESV/LV ESV Index:  1.94 cm                           34 ml  LV Area Diastolic: 27    EF Calculated (A4C):    LA/Aorta: 1.25  cm2                      51.4 %  LV Area Systolic: 69.7   EF Calculated (2D):     LA volume/Index: 38 ml  cm2                      53.7 %                            LV Length: 8.46 cm                            LVOT: 2 cm  Doppler Measurements & Calculations   MV Peak E-Wave: 102  AV Peak Velocity: 222 cm/s    LVOT Peak Velocity: 127  cm/s                 AV Peak Gradient: 19.71 mmHg  cm/s  MV Peak A-Wave: 74   AV Mean Velocity: 144 cm/s    LVOT Mean Velocity: 82.1  cm/s                 AV Mean Gradient: 10 mmHg     cm/s  MV E/A Ratio: 1.38   AV VTI: 36.5 cm               LVOT Peak Gradient: 8  MV Peak Gradient:    AV Area (Continuity):1.96 cm2 mmHgLVOT Mean Gradient: 3  4.16 mmHg                                          mmHg                       LVOT VTI: 22.8 cm             Estimated RVSP: 50 mmHg  MV P1/2t: 52 msec    AV P1/2t: 282 msec            Estimated RAP:3 mmHg  MVA by PHT:4.23 cm2  Estimated PASP: 31.94 mmHg   MV E' Septal                                       TR Velocity:269 cm/s  Velocity: 6.89 cm/s                                TR Gradient:28.94 mmHg  MV E' Lateral PV Peak Velocity: 125  Velocity: 6.27 cm/s                                cm/s  MV E/E' septal: 14.8                               PV Peak Gradient: 6.25  MV E/E' lateral:                                   mmHg  16.27  MR Velocity: 430  cm/s                                               NC ED Velocity: 125 cm/s      XR CHEST PORTABLE    Result Date: 8/18/2022  EXAMINATION: ONE XRAY VIEW OF THE CHEST 8/18/2022 2:45 pm COMPARISON: Chest radiograph and CT PA 08/18/2022. HISTORY: ORDERING SYSTEM PROVIDED HISTORY: post bilat thoracentesis TECHNOLOGIST PROVIDED HISTORY: Reason for exam:->post bilat thoracentesis Reason for Exam: post bilat thoracentesis Additional signs and symptoms: na Relevant Medical/Surgical History: diabetes, breast cancer FINDINGS: Single view provided. Stable mediastinal and enlarged cardiac silhouette. Normal lung volumes. Significant interval improvement of right pleural effusion with trace residual effusion and improved right lung base atelectasis. Improved left pleural effusion with persistent mild effusion and lung base atelectasis. No pneumothorax or free subdiaphragmatic air. Status post thoracentesis with improved effusions and improved right lung base aeration. No pneumothorax. XR CHEST PORTABLE    Result Date: 8/18/2022  EXAMINATION: ONE XRAY VIEW OF THE CHEST 8/18/2022 7:02 am COMPARISON: 07/22/2022 HISTORY: ORDERING SYSTEM PROVIDED HISTORY: sob TECHNOLOGIST PROVIDED HISTORY: Reason for exam:->sob Reason for Exam: sob Additional signs and symptoms: sob Relevant Medical/Surgical History: sob FINDINGS: Low lung volumes likely on the basis of patient inspiratory effort. New bilateral pleural effusions, right larger than left along with associated bibasilar airspace opacities, right worse than left. There is also mild degree of pulmonary vascular congestion without overt pulmonary edema identified. No pneumothoraces are seen.   Cardiac and mediastinal silhouettes are within normal limits. No acute bony abnormality. Mild pulmonary vascular congestion without overt pulmonary edema. New bilateral pleural effusions, right larger than left along with bibasilar airspace opacities, right worse than left, which may relate to atelectasis, pneumonia or aspiration pneumonitis. CTA PULMONARY W CONTRAST    Result Date: 8/18/2022  EXAMINATION: CTA OF THE CHEST 8/18/2022 9:03 am TECHNIQUE: CTA of the chest was performed after the administration of intravenous contrast.  Multiplanar reformatted images are provided for review. MIP images are provided for review. Automated exposure control, iterative reconstruction, and/or weight based adjustment of the mA/kV was utilized to reduce the radiation dose to as low as reasonably achievable. COMPARISON: None. HISTORY: ORDERING SYSTEM PROVIDED HISTORY: LUE pain, SOB, tachycardia, eval for PE TECHNOLOGIST PROVIDED HISTORY: Reason for exam:->LUE pain, SOB, tachycardia, eval for PE Decision Support Exception - unselect if not a suspected or confirmed emergency medical condition->Emergency Medical Condition (MA) Reason for Exam: LUE pain, SOB, tachycardia, eval for PE FINDINGS: Limited evaluation of the subsegmental branches of the pulmonary arteries secondary to motion artifact. Pulmonary Arteries: Pulmonary arteries are adequately opacified for evaluation. No evidence of intraluminal filling defect to suggest pulmonary embolism. Main pulmonary artery is normal in caliber. Mediastinum: No evidence of mediastinal lymphadenopathy. Mild cardiomegaly. Simple pericardial effusion measuring up to 3 cm inferiorly. There is no acute abnormality of the thoracic aorta. Lungs/pleura: Moderate bilateral pleural effusions with adjacent partial collapse of the lower lobes bilaterally. No focal consolidation or pulmonary edema. No pneumothorax.  Upper Abdomen: A 3 cm lesion in the left hepatic lobe demonstrates peripheral, discontinuous effusions with thoracentesis catheter is within the. A total of 1225 cc clear serous appearing fluid removed from the right pleural space with 700 mL sample sent for laboratory evaluation. 775 mL clear serous appearing fluid removed from the left pleural space with 700 mL sample sent for laboratory evaluation. No complication suggested. Postprocedure chest radiograph ordered. Successful ultrasound guided bilateral thoracentesis with specimen sent for laboratory evaluation. No complication suggested. .       Electronically signed by Emilee Flores MD on 8/19/2022 at 3:39 PM

## 2022-08-19 NOTE — PROGRESS NOTES
Cardiology Progress Note     Admit Date:  8/18/2022    Consult reason/ Seen today for :   A. Fib  Atrial flutter  Congestive heart failure    Subjective and  Overnight Events : converted to sinus on Cardizem drip underwent pleural tap breathing is somewhat better. Feels much improved      Chief complain on admission : 71 y. o.year old who is admitted forNo chief complaint on file. Assessment / Plan:  Atrial fibrillation converted to sinus rhythm on Cardizem. Start Eliquis 5 mg twice daily we will add 10-day antiarrhythmics if he continues to have more episodes of A. Fib  Continue Cardizem 120 mg daily, metoprolol 50 mg twice daily  Possible congestive heart failure in setting of diastolic dysfunction atrial fibrillation start Lasix 40 mg IV twice daily  Status post pleural tap for pleural effusion  CHF: Heart failure with preserved EF of 50 to 45% with moderate pericardial effusion   acute  Diastolic decompensated heart failure. Appears to be volume overloaded . Agree with diuresis. We can try IV Lasix 40 mg BID. Depending on response we can titrate diuretics accordingly. Strict Is and Os and Daily weights. chf nurse consult  HTN: Uncontrolled hypertension probably contributing to diastolic dysfunction and A. fib was started on Lotrel recently but would increase lisinopril to 40 mg daily hold off on amlodipine due to ankle swelling  Follow-up pleural tap and cytology  Pericardial effusion, slightly increased compared to previous echo repeat echo in about 2 months no signs of tamponade  DVT prophylaxis if no contraindication  6.    Dyslipidemia: Check lipid panel  Will see in office discharge plan    Past medical history:    has a past medical history of Anxiety, Breast carcinoma ([facility] [address] Cervical radiculitis, Chest pain, Depression, Diabetes type 2, controlled ([facility] [address] Family history of diabetes mellitus (DM), GERD (gastroesophageal reflux disease), Hiatal hernia, Hx of Doppler ultrasound, Hx of Doppler ultrasound, Hx of echocardiogram, Hypercalcemia, Hyperglycemia, Hyperlipidemia, Insomnia, Irritable bowel syndrome, LBP (low back pain), Lumbar herniated disc, Menopause, Migraine headache, Osteoporosis, Prediabetes, S/P colonoscopic polypectomy, and TIA (transient ischemic attack). Past surgical history:   has a past surgical history that includes Tubal ligation; Mastectomy (7/1997); Hysterectomy (5/1995); and Breast reconstruction (Right, 4/2013). Social History:   reports that she has never smoked. She has never used smokeless tobacco. She reports that she does not drink alcohol and does not use drugs. Family history:  family history includes Diabetes in her father, mother, and sister; Heart Disease in her father; High Blood Pressure in her father and mother; High Cholesterol in her sister; Stroke in her paternal grandmother. Allergies   Allergen Reactions    Codeine      \"jittery\"  Feels anxoius       Review of Systems:    All 14 systems were reviewed and are negative  Except for the positive findings  which as documented     BP (!) 104/51   Pulse 78   Temp 97.2 °F (36.2 °C) (Oral)   Resp 22   Ht 5' 6\" (1.676 m)   Wt 152 lb 8 oz (69.2 kg)   SpO2 92%   BMI 24.61 kg/m²     Intake/Output Summary (Last 24 hours) at 8/19/2022 1149  Last data filed at 8/18/2022 1847  Gross per 24 hour   Intake --   Output 750 ml   Net -750 ml     Physical Exam:  Constitutional:  Well developed, Well nourished, No acute distress, Non-toxic appearance. HENT:  Normocephalic, Atraumatic, Bilateral external ears normal, Oropharynx moist, No oral exudates, Nose normal. Neck- Normal range of motion, No tenderness, Supple, No stridor. Eyes:  PERRL, EOMI, Conjunctiva normal, No discharge. Respiratory:  Normal breath sounds, No respiratory distress, No wheezing, No chest tenderness.    Cardiovascular:  Normal heart rate, Normal rhythm, No murmurs, No rubs, No gallops, JVP not elevated  Abdomen/GI:  Bowel sounds normal, Soft, No tenderness, No masses, No pulsatile masses. Musculoskeletal:  Intact distal pulses, No edema, No tenderness, No cyanosis, No clubbing. Good range of motion in all major joints. No tenderness to palpation or major deformities noted. Back- No tenderness. Integument:  Warm, Dry, No erythema, No rash. Lymphatic:  No lymphadenopathy noted. Neurologic:  Alert & oriented x 3, Normal motor function, Normal sensory function, No focal deficits noted. Psychiatric:  Affect  and  Mood :no change    Medications:    potassium chloride  40 mEq Oral BID WC    allopurinol  200 mg Oral Daily    hydrOXYzine HCl  25 mg Oral Nightly    PARoxetine  20 mg Oral QAM    traZODone  100 mg Oral Nightly    rosuvastatin  10 mg Oral Nightly    sodium chloride flush  5-40 mL IntraVENous 2 times per day    insulin lispro  0-4 Units SubCUTAneous TID WC    insulin lispro  0-4 Units SubCUTAneous Nightly    furosemide  40 mg IntraVENous BID    lisinopril  20 mg Oral Daily    metoprolol tartrate  50 mg Oral BID    apixaban  5 mg Oral BID    dilTIAZem  120 mg Oral Daily      sodium chloride       sodium chloride flush, sodium chloride, ondansetron **OR** ondansetron, polyethylene glycol, nicotine polacrilex, acetaminophen **OR** acetaminophen    Lab Data:  CBC:   Recent Labs     08/18/22  0710   WBC 9.8   HGB 12.9   HCT 39.2   MCV 88.5        BMP:   Recent Labs     08/18/22  0710 08/19/22  0515    138   K 3.8 3.2*    104   CO2 19* 20*   BUN 22 27*   CREATININE 0.7 1.1     PT/INR:   Recent Labs     08/18/22  0715   PROTIME 13.8   INR 1.07     BNP:    Recent Labs     08/18/22  0710   PROBNP 2,096*     TROPONIN:   Recent Labs     08/18/22  0710   TROPONINT <0.010        ECHO :   echocardiogram    Assessment:  71 y. o.year old who is admitted forNo chief complaint on file.    , active issues as noted below:  Impression:  Principal Problem:    Atrial fibrillation with RVR (HCC)  Active Problems:    Chest pain    Shortness of breath    Varicose veins of both legs with edema  Resolved Problems:    * No resolved hospital problems. *            All labs, medications and tests reviewed by myself , continue all other medications of all above medical condition listed as is except for changes mentioned above. Thank you very much for consult , please call with questions.     Freda Briseno MD, MD 8/19/2022 11:49 AM

## 2022-08-20 DIAGNOSIS — E11.9 CONTROLLED TYPE 2 DIABETES MELLITUS WITHOUT COMPLICATION, WITHOUT LONG-TERM CURRENT USE OF INSULIN (HCC): ICD-10-CM

## 2022-08-20 LAB
ANION GAP SERPL CALCULATED.3IONS-SCNC: 11 MMOL/L (ref 4–16)
BUN BLDV-MCNC: 30 MG/DL (ref 6–23)
CALCIUM SERPL-MCNC: 9.3 MG/DL (ref 8.3–10.6)
CHLORIDE BLD-SCNC: 108 MMOL/L (ref 99–110)
CO2: 23 MMOL/L (ref 21–32)
CREAT SERPL-MCNC: 1 MG/DL (ref 0.6–1.1)
GFR AFRICAN AMERICAN: >60 ML/MIN/1.73M2
GFR NON-AFRICAN AMERICAN: 55 ML/MIN/1.73M2
GLUCOSE BLD-MCNC: 129 MG/DL (ref 70–99)
GLUCOSE BLD-MCNC: 161 MG/DL (ref 70–99)
GLUCOSE BLD-MCNC: 177 MG/DL (ref 70–99)
GLUCOSE BLD-MCNC: 177 MG/DL (ref 70–99)
GLUCOSE BLD-MCNC: 253 MG/DL (ref 70–99)
POTASSIUM SERPL-SCNC: 4.1 MMOL/L (ref 3.5–5.1)
SODIUM BLD-SCNC: 142 MMOL/L (ref 135–145)
T4 FREE: 1.37 NG/DL (ref 0.9–1.8)
TSH HIGH SENSITIVITY: 1.46 UIU/ML (ref 0.27–4.2)

## 2022-08-20 PROCEDURE — 2140000000 HC CCU INTERMEDIATE R&B

## 2022-08-20 PROCEDURE — 84439 ASSAY OF FREE THYROXINE: CPT

## 2022-08-20 PROCEDURE — 80048 BASIC METABOLIC PNL TOTAL CA: CPT

## 2022-08-20 PROCEDURE — 36415 COLL VENOUS BLD VENIPUNCTURE: CPT

## 2022-08-20 PROCEDURE — 86430 RHEUMATOID FACTOR TEST QUAL: CPT

## 2022-08-20 PROCEDURE — 6360000002 HC RX W HCPCS: Performed by: INTERNAL MEDICINE

## 2022-08-20 PROCEDURE — 84443 ASSAY THYROID STIM HORMONE: CPT

## 2022-08-20 PROCEDURE — 86038 ANTINUCLEAR ANTIBODIES: CPT

## 2022-08-20 PROCEDURE — 94761 N-INVAS EAR/PLS OXIMETRY MLT: CPT

## 2022-08-20 PROCEDURE — 86235 NUCLEAR ANTIGEN ANTIBODY: CPT

## 2022-08-20 PROCEDURE — 6370000000 HC RX 637 (ALT 250 FOR IP): Performed by: NURSE PRACTITIONER

## 2022-08-20 PROCEDURE — 2580000003 HC RX 258: Performed by: NURSE PRACTITIONER

## 2022-08-20 PROCEDURE — 82962 GLUCOSE BLOOD TEST: CPT

## 2022-08-20 PROCEDURE — 6370000000 HC RX 637 (ALT 250 FOR IP): Performed by: INTERNAL MEDICINE

## 2022-08-20 RX ORDER — POTASSIUM CHLORIDE 20 MEQ/1
40 TABLET, EXTENDED RELEASE ORAL
Status: DISCONTINUED | OUTPATIENT
Start: 2022-08-21 | End: 2022-08-23 | Stop reason: HOSPADM

## 2022-08-20 RX ADMIN — TRAZODONE HYDROCHLORIDE 100 MG: 50 TABLET ORAL at 22:49

## 2022-08-20 RX ADMIN — DILTIAZEM HYDROCHLORIDE 120 MG: 120 CAPSULE, COATED, EXTENDED RELEASE ORAL at 09:48

## 2022-08-20 RX ADMIN — APIXABAN 5 MG: 5 TABLET, FILM COATED ORAL at 09:44

## 2022-08-20 RX ADMIN — SODIUM CHLORIDE, PRESERVATIVE FREE 10 ML: 5 INJECTION INTRAVENOUS at 22:50

## 2022-08-20 RX ADMIN — FUROSEMIDE 40 MG: 10 INJECTION, SOLUTION INTRAMUSCULAR; INTRAVENOUS at 10:08

## 2022-08-20 RX ADMIN — POTASSIUM CHLORIDE 40 MEQ: 1500 TABLET, EXTENDED RELEASE ORAL at 09:44

## 2022-08-20 RX ADMIN — APIXABAN 5 MG: 5 TABLET, FILM COATED ORAL at 22:48

## 2022-08-20 RX ADMIN — ROSUVASTATIN CALCIUM 10 MG: 5 TABLET, FILM COATED ORAL at 22:48

## 2022-08-20 RX ADMIN — SODIUM CHLORIDE, PRESERVATIVE FREE 10 ML: 5 INJECTION INTRAVENOUS at 09:50

## 2022-08-20 RX ADMIN — METOPROLOL TARTRATE 50 MG: 50 TABLET, FILM COATED ORAL at 22:49

## 2022-08-20 RX ADMIN — PAROXETINE HYDROCHLORIDE 20 MG: 20 TABLET, FILM COATED ORAL at 09:48

## 2022-08-20 RX ADMIN — SODIUM CHLORIDE, PRESERVATIVE FREE 10 ML: 5 INJECTION INTRAVENOUS at 18:22

## 2022-08-20 RX ADMIN — METOPROLOL TARTRATE 50 MG: 50 TABLET, FILM COATED ORAL at 09:44

## 2022-08-20 RX ADMIN — FUROSEMIDE 40 MG: 10 INJECTION, SOLUTION INTRAMUSCULAR; INTRAVENOUS at 18:22

## 2022-08-20 RX ADMIN — HYDROXYZINE HYDROCHLORIDE 25 MG: 25 TABLET, FILM COATED ORAL at 22:48

## 2022-08-20 ASSESSMENT — ENCOUNTER SYMPTOMS: VOICE CHANGE: 0

## 2022-08-20 NOTE — PROGRESS NOTES
Comprehensive Nutrition Assessment    Type and Reason for Visit:  Initial, Positive Nutrition Screen    Nutrition Recommendations/Plan:   Continue current diet   High protein oral nutrition supplement available as needed  Please document all po intake  Will monitor po intake, weight trends, nutrition status, poc     Malnutrition Assessment:  Malnutrition Status:  Insufficient data (08/20/22 1416)    Context:  Acute Illness       Nutrition Assessment:    Pt admitted for respiratory abnormalities, pleural effusion, Afib with RVR, PMH: osteoporosis, HTN, HLD, GERD, Breast Ca, pt currently on 4 carb/low sodium diet, 1 meal of 51-75% documented since admission, no clinically significant wt loss noted per weight hx review, no wounds, pt on 1 L NC, will follow at moderate nutrition risk    Nutrition Related Findings:    Glucose 177, Hemoglobin A1c 6.5 Wound Type: None       Current Nutrition Intake & Therapies:    Average Meal Intake: 51-75%  Average Supplements Intake: None Ordered  ADULT DIET; Regular; 4 carb choices (60 gm/meal); Low Sodium (2 gm)    Anthropometric Measures:  Height: 5' 5.98\" (167.6 cm)  Ideal Body Weight (IBW): 130 lbs (59 kg)    Current Body Weight: 152 lb 8.9 oz (69.2 kg), 117.4 % IBW. Weight Source: Bed Scale  Current BMI (kg/m2): 24.6  Weight Adjustment For: No Adjustment  BMI Categories: Normal Weight (BMI 18.5-24. 9)    Estimated Daily Nutrient Needs:  Energy Requirements Based On: Kcal/kg  Weight Used for Energy Requirements: Current  Energy (kcal/day): 5232-9218 (25-28 kcal/kg)  Weight Used for Protein Requirements: Current  Protein (g/day): 69-83 (1.0-1.2 g/kg)  Method Used for Fluid Requirements: 1 ml/kcal    Nutrition Diagnosis:   Inadequate oral intake related to acute injury/trauma as evidenced by intake 51-75%    Nutrition Interventions:   Food and/or Nutrient Delivery: Continue Current Diet, Start Oral Nutrition Supplement  Nutrition Education/Counseling: No recommendation at this time  Coordination of Nutrition Care: Continue to monitor while inpatient  Goals:     Goals: PO intake 75% or greater, by next RD assessment  Nutrition Monitoring and Evaluation:   Behavioral-Environmental Outcomes: None Identified  Food/Nutrient Intake Outcomes: Food and Nutrient Intake, Supplement Intake  Physical Signs/Symptoms Outcomes: Biochemical Data, GI Status, Hemodynamic Status, Fluid Status or Edema, Weight, Meal Time Behavior    Discharge Planning:     Too soon to determine     Phil Morrison [address], [address], [place]  Contact: [phone]

## 2022-08-20 NOTE — PROGRESS NOTES
V2.0  [facility] [place] Hospitalist Progress Note      Name:  Harjeet Vaughn /Age/Sex: 1950  (71 y.o. female)   MRN & CSN:  [id number] & [id number] Encounter Date/Time: 2022 3:39 PM EDT    Location:  4827/020-Y PCP: Julia Mcclellan MD       Hospital Day: 3    Assessment and Plan:   Harjeet Vaughn is a 71 y.o. 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 Patient reports improved swelling, still can't make a fist bilaterally however. No further complaints. No SOB, chest pain. Review of Systems:    Review of Systems   HENT:  Negative for voice change. 10 point ROS negative except as stated above in \"subjective\" section    Objective:      Intake/Output Summary (Last 24 hours) at 2022 1612  Last data filed at 2022 1536  Gross per 24 hour   Intake 150 ml   Output 1700 ml   Net -1550 ml          Vitals:   Vitals:    22 1533   BP: (!) 131/59 Pulse:    Resp:    Temp: 97.7 °F (36.5 °C)   SpO2: 93%       Physical Exam:     General: NAD  Eyes: EOMI  ENT: neck supple  Cardiovascular: Regular rate. Respiratory: Clear to auscultation  Gastrointestinal: Soft, non tender  Genitourinary: no suprapubic tenderness  Musculoskeletal: 1+ LE edema bilaterally, cannot make closed fist bilaterally 2/2 tight skin  Skin: warm, dry  Neuro: Alert. Psych: Mood appropriate.      Medications:   Medications:    [START ON 8/21/2022] potassium chloride  40 mEq Oral Daily with breakfast    allopurinol  200 mg Oral Daily    hydrOXYzine HCl  25 mg Oral Nightly    PARoxetine  20 mg Oral QAM    traZODone  100 mg Oral Nightly    rosuvastatin  10 mg Oral Nightly    sodium chloride flush  5-40 mL IntraVENous 2 times per day    insulin lispro  0-4 Units SubCUTAneous TID WC    insulin lispro  0-4 Units SubCUTAneous Nightly    furosemide  40 mg IntraVENous BID    lisinopril  20 mg Oral Daily    metoprolol tartrate  50 mg Oral BID    apixaban  5 mg Oral BID    dilTIAZem  120 mg Oral Daily      Infusions:    sodium chloride       PRN Meds: sodium chloride flush, 10 mL, PRN  sodium chloride, , PRN  ondansetron, 4 mg, Q8H PRN   Or  ondansetron, 4 mg, Q6H PRN  polyethylene glycol, 17 g, Daily PRN  nicotine polacrilex, 2 mg, Q1H PRN  acetaminophen, 650 mg, Q6H PRN   Or  acetaminophen, 650 mg, Q6H PRN      Labs      Recent Results (from the past 24 hour(s))   POCT Glucose    Collection Time: 08/19/22  5:33 PM   Result Value Ref Range    POC Glucose 141 (H) 70 - 99 MG/DL   POCT Glucose    Collection Time: 08/19/22  9:14 PM   Result Value Ref Range    POC Glucose 193 (H) 70 - 99 MG/DL   Basic Metabolic Panel w/ Reflex to MG    Collection Time: 08/20/22  5:53 AM   Result Value Ref Range    Sodium 142 135 - 145 MMOL/L    Potassium 4.1 3.5 - 5.1 MMOL/L    Chloride 108 99 - 110 mMol/L    CO2 23 21 - 32 MMOL/L    Anion Gap 11 4 - 16    BUN 30 (H) 6 - 23 MG/DL    Creatinine 1.0 0.6 - 1.1 MG/DL Glucose 177 (H) 70 - 99 MG/DL    Calcium 9.3 8.3 - 10.6 MG/DL    GFR Non- 55 (L) >60 mL/min/1.73m2    GFR African American >60 >60 mL/min/1.73m2   POCT Glucose    Collection Time: 08/20/22 10:02 AM   Result Value Ref Range    POC Glucose 161 (H) 70 - 99 MG/DL   POCT Glucose    Collection Time: 08/20/22 11:53 AM   Result Value Ref Range    POC Glucose 129 (H) 70 - 99 MG/DL        Imaging/Diagnostics Last 24 Hours   Echocardiogram limited    Result Date: 8/18/2022  Transthoracic Echocardiography Report (TTE)  Demographics   Patient Name       Nila Almazan  Date of Study       08/18/2022                     A   Date of Birth      1950         Gender              Female   Age                71 year(s)         Race                   Patient Number     [id number]         Room Number         3119   Visit Number       [id number]   Corporate ID       [id number]   Accession Number   [id number]         Damian Brannon [address] [place] [address]   Ordering Physician Rosemarie Herrera CNP        Interpreting        Vito Sahu MD  Procedure Type of Study   TTE procedure:ECHOCARDIOGRAM LIMITED. Procedure Date Date: 08/18/2022 Start: 02:54 PM Study Location: Portable Technical Quality: Fair visualization Indications:Congestive heart failure. Patient Status: Routine HR: 99 bpm BP: 104/60 mmHg  Conclusions   Summary  This is a limited echocardiogram.  Left ventricular systolic function is normal.  Ejection fraction is visually estimated at 50-55%. Mild left ventricular hypertrophy. Mild aortic regurgitation. Moderate circumferential pericardial effusion without evidence of cardiac  tamponade.    Signature   ------------------------------------------------------------------  Electronically signed by Vito Lynch MD (Interpreting  physician) on 08/18/2022 at 04:44 PM ------------------------------------------------------------------   Findings   Left Ventricle  Left ventricular systolic function is normal.  Ejection fraction is visually estimated at 50-55%. Mild left ventricular hypertrophy. Aortic Valve  Mild aortic regurgitation. Pericardial Effusion  Moderate circumferential pericardial effusion without evidence of cardiac  tamponade.   M-Mode/2D Measurements & Calculations   LV Diastolic Dimension:  LV Systolic Dimension:  LA Dimension: 3.5 cmAO Root  3.34 cm                  2.44 cm                 Dimension: 2.8 cmLA Area:  LV FS:27 %               LV Volume Diastolic: 70 74.1 cm2  LV PW Diastolic: 7.61 cm ml  Septum Diastolic: 2.57   LV Volume Systolic: 34  cm                       ml  CO: 7.09 l/min           LV EDV/LV EDV Index: 70 RV Diastolic Dimension:                           mlLV ESV/LV ESV Index:  1.94 cm                           34 ml  LV Area Diastolic: 27    EF Calculated (A4C):    LA/Aorta: 1.25  cm2                      51.4 %  LV Area Systolic: 92.5   EF Calculated (2D):     LA volume/Index: 38 ml  cm2                      53.7 %                            LV Length: 8.46 cm                            LVOT: 2 cm  Doppler Measurements & Calculations   MV Peak E-Wave: 102  AV Peak Velocity: 222 cm/s    LVOT Peak Velocity: 127  cm/s                 AV Peak Gradient: 19.71 mmHg  cm/s  MV Peak A-Wave: 74   AV Mean Velocity: 144 cm/s    LVOT Mean Velocity: 82.1  cm/s                 AV Mean Gradient: 10 mmHg     cm/s  MV E/A Ratio: 1.38   AV VTI: 36.5 cm               LVOT Peak Gradient: 8  MV Peak Gradient:    AV Area (Continuity):1.96 cm2 mmHgLVOT Mean Gradient: 3  4.16 mmHg                                          mmHg                       LVOT VTI: 22.8 cm             Estimated RVSP: 50 mmHg  MV P1/2t: 52 msec    AV P1/2t: 282 msec            Estimated RAP:3 mmHg  MVA by PHT:4.23 cm2  Estimated PASP: 31.94 mmHg   MV E' Septal TR Velocity:269 cm/s  Velocity: 6.89 cm/s                                TR Gradient:28.94 mmHg  MV E' Lateral                                      PV Peak Velocity: 125  Velocity: 6.27 cm/s                                cm/s  MV E/E' septal: 14.8                               PV Peak Gradient: 6.25  MV E/E' lateral:                                   mmHg  16.27  MR Velocity: 430  cm/s                                               RI ED Velocity: 125 cm/s      XR CHEST PORTABLE    Result Date: 8/18/2022  EXAMINATION: ONE XRAY VIEW OF THE CHEST 8/18/2022 2:45 pm COMPARISON: Chest radiograph and CT PA 08/18/2022. HISTORY: ORDERING SYSTEM PROVIDED HISTORY: post bilat thoracentesis TECHNOLOGIST PROVIDED HISTORY: Reason for exam:->post bilat thoracentesis Reason for Exam: post bilat thoracentesis Additional signs and symptoms: na Relevant Medical/Surgical History: diabetes, breast cancer FINDINGS: Single view provided. Stable mediastinal and enlarged cardiac silhouette. Normal lung volumes. Significant interval improvement of right pleural effusion with trace residual effusion and improved right lung base atelectasis. Improved left pleural effusion with persistent mild effusion and lung base atelectasis. No pneumothorax or free subdiaphragmatic air. Status post thoracentesis with improved effusions and improved right lung base aeration. No pneumothorax. XR CHEST PORTABLE    Result Date: 8/18/2022  EXAMINATION: ONE XRAY VIEW OF THE CHEST 8/18/2022 7:02 am COMPARISON: 07/22/2022 HISTORY: ORDERING SYSTEM PROVIDED HISTORY: sob TECHNOLOGIST PROVIDED HISTORY: Reason for exam:->sob Reason for Exam: sob Additional signs and symptoms: sob Relevant Medical/Surgical History: sob FINDINGS: Low lung volumes likely on the basis of patient inspiratory effort. New bilateral pleural effusions, right larger than left along with associated bibasilar airspace opacities, right worse than left.   There is also mild degree of pulmonary vascular congestion without overt pulmonary edema identified. No pneumothoraces are seen. Cardiac and mediastinal silhouettes are within normal limits. No acute bony abnormality. Mild pulmonary vascular congestion without overt pulmonary edema. New bilateral pleural effusions, right larger than left along with bibasilar airspace opacities, right worse than left, which may relate to atelectasis, pneumonia or aspiration pneumonitis. CTA PULMONARY W CONTRAST    Result Date: 8/18/2022  EXAMINATION: CTA OF THE CHEST 8/18/2022 9:03 am TECHNIQUE: CTA of the chest was performed after the administration of intravenous contrast.  Multiplanar reformatted images are provided for review. MIP images are provided for review. Automated exposure control, iterative reconstruction, and/or weight based adjustment of the mA/kV was utilized to reduce the radiation dose to as low as reasonably achievable. COMPARISON: None. HISTORY: ORDERING SYSTEM PROVIDED HISTORY: LUE pain, SOB, tachycardia, eval for PE TECHNOLOGIST PROVIDED HISTORY: Reason for exam:->LUE pain, SOB, tachycardia, eval for PE Decision Support Exception - unselect if not a suspected or confirmed emergency medical condition->Emergency Medical Condition (MA) Reason for Exam: LUE pain, SOB, tachycardia, eval for PE FINDINGS: Limited evaluation of the subsegmental branches of the pulmonary arteries secondary to motion artifact. Pulmonary Arteries: Pulmonary arteries are adequately opacified for evaluation. No evidence of intraluminal filling defect to suggest pulmonary embolism. Main pulmonary artery is normal in caliber. Mediastinum: No evidence of mediastinal lymphadenopathy. Mild cardiomegaly. Simple pericardial effusion measuring up to 3 cm inferiorly. There is no acute abnormality of the thoracic aorta. Lungs/pleura: Moderate bilateral pleural effusions with adjacent partial collapse of the lower lobes bilaterally.   No focal consolidation or pulmonary edema. No pneumothorax. Upper Abdomen: A 3 cm lesion in the left hepatic lobe demonstrates peripheral, discontinuous nodular enhancement suggestive of a hemangioma. Soft Tissues/Bones: No acute bone or soft tissue abnormality. Healing lateral right 8th rib fracture. Limited evaluation of the subsegmental branches of the pulmonary arteries secondary to motion artifact. No evidence of pulmonary embolism or acute pulmonary abnormality. Moderate bilateral pleural effusions with adjacent partial collapse of the lower lobes. Mild cardiomegaly and a small pericardial effusion measuring up to 3 cm. Small hemangioma in the left hepatic lobe. IR GUIDED THORACENTESIS PLEURAL    Result Date: 8/19/2022  PROCEDURE: ULTRASOUNDGUIDED bilateral THORACENTESIS 8/18/2022 HISTORY: ORDERING SYSTEM PROVIDED HISTORY: pleural effusions TECHNOLOGIST PROVIDED HISTORY: Pleural effusions See ip consult Reason for exam:->pleural effusions Which side should the procedure be performed?->Radiologist Recommendation TECHNIQUE: Maximum sterile barrier technique including hand hygiene, skin prep and sterile ultrasound technique utilized for procedure. Sterile ultrasound technique also utilized for procedure Ultrasound guidance required to confirm presence of pleural fluid, puncture site selection, real-time intra procedural guidance. Images made for patient's medical file. Following informed consent, pause a confirm/time-out and sequential ultrasound-guided puncture site selection, skin and ultrasound probe were prepped and draped in sterile fashion. 10 mL 1% lidocaine without epinephrine for local anesthesia the puncture sites. Sequential placement of 5 Italian trocar mounted sheath were placed in the right and left pleural spaces. Catheters were advanced off trocar introducer attached evacuated containers. Bilateral thoracentesis performed. Access catheters removed. Dressing applied. Postprocedure chest radiograph ordered. Patient tolerated procedure well. FINDINGS: Pre and intraprocedural images demonstrate moderate-large bilateral pleural effusions with thoracentesis catheter is within the. A total of 1225 cc clear serous appearing fluid removed from the right pleural space with 700 mL sample sent for laboratory evaluation. 775 mL clear serous appearing fluid removed from the left pleural space with 700 mL sample sent for laboratory evaluation. No complication suggested. Postprocedure chest radiograph ordered. Successful ultrasound guided bilateral thoracentesis with specimen sent for laboratory evaluation. No complication suggested. .       Electronically signed by Troy Light MD on 8/20/2022 at 4:12 PM

## 2022-08-20 NOTE — PLAN OF CARE
Problem: Discharge Planning  Goal: Discharge to home or other facility with appropriate resources  Outcome: Progressing  Flowsheets (Taken 8/19/2022 2043 by Dwain Parker RN)  Discharge to home or other facility with appropriate resources:   Identify barriers to discharge with patient and caregiver   Arrange for needed discharge resources and transportation as appropriate   Identify discharge learning needs (meds, wound care, etc)   Arrange for interpreters to assist at discharge as needed   Refer to discharge planning if patient needs post-hospital services based on physician order or complex needs related to functional status, cognitive ability or social support system     Problem: Safety - Adult  Goal: Free from fall injury  Outcome: Progressing     Problem: ABCDS Injury Assessment  Goal: Absence of physical injury  Outcome: Progressing     Problem: Pain  Goal: Verbalizes/displays adequate comfort level or baseline comfort level  Outcome: Progressing

## 2022-08-21 LAB
ANION GAP SERPL CALCULATED.3IONS-SCNC: 9 MMOL/L (ref 4–16)
BUN BLDV-MCNC: 33 MG/DL (ref 6–23)
CALCIUM SERPL-MCNC: 9.4 MG/DL (ref 8.3–10.6)
CHLORIDE BLD-SCNC: 106 MMOL/L (ref 99–110)
CO2: 24 MMOL/L (ref 21–32)
CREAT SERPL-MCNC: 1.1 MG/DL (ref 0.6–1.1)
CULTURE: NORMAL
CULTURE: NORMAL
GFR AFRICAN AMERICAN: 59 ML/MIN/1.73M2
GFR NON-AFRICAN AMERICAN: 49 ML/MIN/1.73M2
GLUCOSE BLD-MCNC: 182 MG/DL (ref 70–99)
GLUCOSE BLD-MCNC: 189 MG/DL (ref 70–99)
GLUCOSE BLD-MCNC: 193 MG/DL (ref 70–99)
GLUCOSE BLD-MCNC: 206 MG/DL (ref 70–99)
GLUCOSE BLD-MCNC: 224 MG/DL (ref 70–99)
Lab: NORMAL
Lab: NORMAL
POTASSIUM SERPL-SCNC: 4.4 MMOL/L (ref 3.5–5.1)
SODIUM BLD-SCNC: 139 MMOL/L (ref 135–145)
SPECIMEN: NORMAL
SPECIMEN: NORMAL

## 2022-08-21 PROCEDURE — 82962 GLUCOSE BLOOD TEST: CPT

## 2022-08-21 PROCEDURE — 6360000002 HC RX W HCPCS: Performed by: INTERNAL MEDICINE

## 2022-08-21 PROCEDURE — 80048 BASIC METABOLIC PNL TOTAL CA: CPT

## 2022-08-21 PROCEDURE — 94761 N-INVAS EAR/PLS OXIMETRY MLT: CPT

## 2022-08-21 PROCEDURE — 2140000000 HC CCU INTERMEDIATE R&B

## 2022-08-21 PROCEDURE — 36415 COLL VENOUS BLD VENIPUNCTURE: CPT

## 2022-08-21 PROCEDURE — 6370000000 HC RX 637 (ALT 250 FOR IP): Performed by: NURSE PRACTITIONER

## 2022-08-21 PROCEDURE — 6370000000 HC RX 637 (ALT 250 FOR IP): Performed by: SURGERY

## 2022-08-21 PROCEDURE — 2580000003 HC RX 258: Performed by: NURSE PRACTITIONER

## 2022-08-21 PROCEDURE — 6370000000 HC RX 637 (ALT 250 FOR IP): Performed by: INTERNAL MEDICINE

## 2022-08-21 RX ADMIN — PAROXETINE HYDROCHLORIDE 20 MG: 20 TABLET, FILM COATED ORAL at 08:51

## 2022-08-21 RX ADMIN — METOPROLOL TARTRATE 50 MG: 50 TABLET, FILM COATED ORAL at 21:12

## 2022-08-21 RX ADMIN — TRAZODONE HYDROCHLORIDE 100 MG: 50 TABLET ORAL at 21:12

## 2022-08-21 RX ADMIN — ALLOPURINOL 200 MG: 100 TABLET ORAL at 08:51

## 2022-08-21 RX ADMIN — FUROSEMIDE 40 MG: 10 INJECTION, SOLUTION INTRAMUSCULAR; INTRAVENOUS at 11:12

## 2022-08-21 RX ADMIN — SODIUM CHLORIDE, PRESERVATIVE FREE 10 ML: 5 INJECTION INTRAVENOUS at 21:13

## 2022-08-21 RX ADMIN — POTASSIUM CHLORIDE 40 MEQ: 1500 TABLET, EXTENDED RELEASE ORAL at 08:51

## 2022-08-21 RX ADMIN — LISINOPRIL 20 MG: 20 TABLET ORAL at 08:51

## 2022-08-21 RX ADMIN — INSULIN LISPRO 1 UNITS: 100 INJECTION, SOLUTION INTRAVENOUS; SUBCUTANEOUS at 12:23

## 2022-08-21 RX ADMIN — APIXABAN 5 MG: 5 TABLET, FILM COATED ORAL at 21:12

## 2022-08-21 RX ADMIN — APIXABAN 5 MG: 5 TABLET, FILM COATED ORAL at 08:51

## 2022-08-21 RX ADMIN — DILTIAZEM HYDROCHLORIDE 120 MG: 120 CAPSULE, COATED, EXTENDED RELEASE ORAL at 08:51

## 2022-08-21 RX ADMIN — ROSUVASTATIN CALCIUM 10 MG: 5 TABLET, FILM COATED ORAL at 21:12

## 2022-08-21 RX ADMIN — SODIUM CHLORIDE, PRESERVATIVE FREE 10 ML: 5 INJECTION INTRAVENOUS at 08:51

## 2022-08-21 RX ADMIN — FUROSEMIDE 40 MG: 10 INJECTION, SOLUTION INTRAMUSCULAR; INTRAVENOUS at 17:59

## 2022-08-21 RX ADMIN — INSULIN LISPRO 1 UNITS: 100 INJECTION, SOLUTION INTRAVENOUS; SUBCUTANEOUS at 18:01

## 2022-08-21 RX ADMIN — METOPROLOL TARTRATE 50 MG: 50 TABLET, FILM COATED ORAL at 08:51

## 2022-08-21 RX ADMIN — HYDROXYZINE HYDROCHLORIDE 25 MG: 25 TABLET, FILM COATED ORAL at 21:12

## 2022-08-22 LAB
ANION GAP SERPL CALCULATED.3IONS-SCNC: 9 MMOL/L (ref 4–16)
BUN BLDV-MCNC: 31 MG/DL (ref 6–23)
CALCIUM SERPL-MCNC: 9.5 MG/DL (ref 8.3–10.6)
CHLORIDE BLD-SCNC: 102 MMOL/L (ref 99–110)
CO2: 26 MMOL/L (ref 21–32)
CREAT SERPL-MCNC: 0.9 MG/DL (ref 0.6–1.1)
CULTURE: ABNORMAL
CULTURE: ABNORMAL
GFR AFRICAN AMERICAN: >60 ML/MIN/1.73M2
GFR NON-AFRICAN AMERICAN: >60 ML/MIN/1.73M2
GLUCOSE BLD-MCNC: 190 MG/DL (ref 70–99)
GLUCOSE BLD-MCNC: 191 MG/DL (ref 70–99)
GLUCOSE BLD-MCNC: 201 MG/DL (ref 70–99)
GLUCOSE BLD-MCNC: 209 MG/DL (ref 70–99)
GLUCOSE BLD-MCNC: 253 MG/DL (ref 70–99)
Lab: ABNORMAL
MAGNESIUM: 1.8 MG/DL (ref 1.8–2.4)
POTASSIUM SERPL-SCNC: 4.6 MMOL/L (ref 3.5–5.1)
REASON FOR REJECTION: NORMAL
REJECTED TEST: NORMAL
SODIUM BLD-SCNC: 137 MMOL/L (ref 135–145)
SPECIMEN: ABNORMAL

## 2022-08-22 PROCEDURE — 6370000000 HC RX 637 (ALT 250 FOR IP): Performed by: NURSE PRACTITIONER

## 2022-08-22 PROCEDURE — 6370000000 HC RX 637 (ALT 250 FOR IP): Performed by: INTERNAL MEDICINE

## 2022-08-22 PROCEDURE — 80048 BASIC METABOLIC PNL TOTAL CA: CPT

## 2022-08-22 PROCEDURE — 82962 GLUCOSE BLOOD TEST: CPT

## 2022-08-22 PROCEDURE — 36415 COLL VENOUS BLD VENIPUNCTURE: CPT

## 2022-08-22 PROCEDURE — 6370000000 HC RX 637 (ALT 250 FOR IP): Performed by: SURGERY

## 2022-08-22 PROCEDURE — 6360000002 HC RX W HCPCS: Performed by: SURGERY

## 2022-08-22 PROCEDURE — 83735 ASSAY OF MAGNESIUM: CPT

## 2022-08-22 PROCEDURE — 2700000000 HC OXYGEN THERAPY PER DAY

## 2022-08-22 PROCEDURE — 94761 N-INVAS EAR/PLS OXIMETRY MLT: CPT

## 2022-08-22 PROCEDURE — 1200000000 HC SEMI PRIVATE

## 2022-08-22 PROCEDURE — 94618 PULMONARY STRESS TESTING: CPT

## 2022-08-22 PROCEDURE — 2580000003 HC RX 258: Performed by: NURSE PRACTITIONER

## 2022-08-22 RX ORDER — FUROSEMIDE 10 MG/ML
40 INJECTION INTRAMUSCULAR; INTRAVENOUS 3 TIMES DAILY
Status: DISPENSED | OUTPATIENT
Start: 2022-08-22 | End: 2022-08-23

## 2022-08-22 RX ORDER — GLIPIZIDE 5 MG/1
TABLET ORAL
Qty: 90 TABLET | Refills: 1 | OUTPATIENT
Start: 2022-08-22

## 2022-08-22 RX ORDER — FUROSEMIDE 10 MG/ML
40 INJECTION INTRAMUSCULAR; INTRAVENOUS 3 TIMES DAILY
Status: DISCONTINUED | OUTPATIENT
Start: 2022-08-22 | End: 2022-08-22

## 2022-08-22 RX ADMIN — APIXABAN 5 MG: 5 TABLET, FILM COATED ORAL at 20:40

## 2022-08-22 RX ADMIN — APIXABAN 5 MG: 5 TABLET, FILM COATED ORAL at 09:12

## 2022-08-22 RX ADMIN — METOPROLOL TARTRATE 50 MG: 50 TABLET, FILM COATED ORAL at 09:12

## 2022-08-22 RX ADMIN — FUROSEMIDE 40 MG: 10 INJECTION, SOLUTION INTRAMUSCULAR; INTRAVENOUS at 20:40

## 2022-08-22 RX ADMIN — INSULIN LISPRO 1 UNITS: 100 INJECTION, SOLUTION INTRAVENOUS; SUBCUTANEOUS at 17:29

## 2022-08-22 RX ADMIN — FUROSEMIDE 40 MG: 10 INJECTION, SOLUTION INTRAMUSCULAR; INTRAVENOUS at 14:28

## 2022-08-22 RX ADMIN — SODIUM CHLORIDE, PRESERVATIVE FREE 10 ML: 5 INJECTION INTRAVENOUS at 20:41

## 2022-08-22 RX ADMIN — SODIUM CHLORIDE, PRESERVATIVE FREE 10 ML: 5 INJECTION INTRAVENOUS at 09:13

## 2022-08-22 RX ADMIN — DILTIAZEM HYDROCHLORIDE 120 MG: 120 CAPSULE, COATED, EXTENDED RELEASE ORAL at 09:12

## 2022-08-22 RX ADMIN — INSULIN LISPRO 1 UNITS: 100 INJECTION, SOLUTION INTRAVENOUS; SUBCUTANEOUS at 12:44

## 2022-08-22 RX ADMIN — LISINOPRIL 20 MG: 20 TABLET ORAL at 09:12

## 2022-08-22 RX ADMIN — ALLOPURINOL 200 MG: 100 TABLET ORAL at 09:12

## 2022-08-22 RX ADMIN — ROSUVASTATIN CALCIUM 10 MG: 5 TABLET, FILM COATED ORAL at 20:40

## 2022-08-22 RX ADMIN — POTASSIUM CHLORIDE 40 MEQ: 1500 TABLET, EXTENDED RELEASE ORAL at 09:12

## 2022-08-22 RX ADMIN — PAROXETINE HYDROCHLORIDE 20 MG: 20 TABLET, FILM COATED ORAL at 09:12

## 2022-08-22 RX ADMIN — TRAZODONE HYDROCHLORIDE 100 MG: 50 TABLET ORAL at 20:40

## 2022-08-22 RX ADMIN — METOPROLOL TARTRATE 50 MG: 50 TABLET, FILM COATED ORAL at 20:40

## 2022-08-22 RX ADMIN — HYDROXYZINE HYDROCHLORIDE 25 MG: 25 TABLET, FILM COATED ORAL at 20:40

## 2022-08-22 ASSESSMENT — PAIN DESCRIPTION - LOCATION
LOCATION: FOOT;HAND
LOCATION: HAND;FOOT

## 2022-08-22 ASSESSMENT — PAIN DESCRIPTION - DESCRIPTORS: DESCRIPTORS: STABBING

## 2022-08-22 ASSESSMENT — PAIN DESCRIPTION - PAIN TYPE: TYPE: CHRONIC PAIN

## 2022-08-22 ASSESSMENT — PAIN DESCRIPTION - ORIENTATION: ORIENTATION: RIGHT;LEFT

## 2022-08-22 ASSESSMENT — PAIN SCALES - GENERAL
PAINLEVEL_OUTOF10: 3
PAINLEVEL_OUTOF10: 4

## 2022-08-22 NOTE — PLAN OF CARE
Problem: Discharge Planning  Goal: Discharge to home or other facility with appropriate resources  8/22/2022 0911 by Vivienne Mondragon LPN  Outcome: Progressing  8/22/2022 0346 by Kurtis Goltz, RN  Outcome: Progressing     Problem: Safety - Adult  Goal: Free from fall injury  8/22/2022 0911 by Vivienne Mondragon LPN  Outcome: Progressing  8/22/2022 0346 by Kurtis Goltz, RN  Outcome: Progressing     Problem: ABCDS Injury Assessment  Goal: Absence of physical injury  8/22/2022 0911 by Vivienne Mondragon LPN  Outcome: Progressing  8/22/2022 0346 by Kurtis Goltz, RN  Outcome: Progressing     Problem: Pain  Goal: Verbalizes/displays adequate comfort level or baseline comfort level  8/22/2022 0911 by Vivienne Mondragon LPN  Outcome: Progressing  8/22/2022 0346 by Kurtis Goltz, RN  Outcome: Progressing     Problem: Nutrition Deficit:  Goal: Optimize nutritional status  8/22/2022 0911 by Vivienne Mondragon LPN  Outcome: Progressing  8/22/2022 0346 by Kurtis Goltz, RN  Outcome: Progressing     Problem: Chronic Conditions and Co-morbidities  Goal: Patient's chronic conditions and co-morbidity symptoms are monitored and maintained or improved  8/22/2022 0911 by Vivienne Mondragon LPN  Outcome: Progressing  8/22/2022 0346 by Kurtis Goltz, RN  Outcome: Progressing

## 2022-08-22 NOTE — PROGRESS NOTES
Blood collected and sent to lab.    Pt did not qualify for home oxygen while awake. IF the pt stays the night, and overnight Oximetry will be completed.

## 2022-08-22 NOTE — PROGRESS NOTES
8/22/2022 10:38 AM  Patient Room #: 9182/3061-Q  Patient Name: Shelly Gutierrez    (Step 1 Done by RN if possible otherwise call Pulmonary Diagnostics)  Place patient on room air at rest for at least 30 minutes. If patient falls below 88% before 30 minutes then you can record the level and stop. Record room air saturation level _93_ %. If patient is at 88% or below, they will qualify for home oxygen and you can stop. If level does not fall below 88%, fill in level above. If indicated continue to Step 2. Signature:__Wallace Tapia RRT___ Date: _08/22/2022__  (Step 2&3 Done by P)  Ambulate patient on room air until saturation falls below 89%. Record level of room air saturation with ambulation_93__ %. Next, place patient back on ___lpm oxygen and ambulate, record level __%. (Note:  this level must show improvement from room air level done with ambulation.)  If patients saturation on room air with ambulation is 88% or below AND patient shows improvement with oxygen during ambulation, they will qualify for home oxygen and you can stop. If patient does not drop below 89%, then patient should have an overnight oximetry trending on room air to see if level falls below 88%. Complete level in Step 3 below. Room air overnight oximetry level 88 % for_8__  cumulative minutes. If patients room air oxygen level is < 89% for at least 5 cumulative minutes, patient will qualify for home oxygen and you can stop. (Attach Night Trending Report)    Complete order below: Diagnosis:__CHF__  Home oxygen at:  Length of Need: ? Lifetime ?  3 Months     _2_lpm or __%   via  [x] nasal cannula  []mask  [] other: Conserving Device         []continuous []  with activity  [x]  Nocturnal   [] Portable Tanks [x]  Concentrator  [] Conserving Device        Therapist Signature:_Kristie Hill, RRT__     Date:  _8/23/2022__  Physician Signature:  __Electronically Signed in EMR_    Date:___  Ordering User: Yamilex Earl MD       NPI:  [id number]  [x] Patient Qualifies      [] Patient Does NOT qualify

## 2022-08-22 NOTE — PROGRESS NOTES
V2.0  [facility]      Name:  Pito Thompson /Age/Sex: 1950  (71 y.o. female)   MRN & CSN:  [id number] & [id number] Encounter Date/Time: 2022 3:39 PM EDT    Location:  7364/2971-J PCP: Yasir De La Cruz MD       Hospital Day: 5    Assessment and Plan:   Pito Thompson is a 71 y.o. female with diffuse swelling       Plan: IV diuresis one more day. She didn't end up putting out as much fluid as I thought she would. Perhaps overestimated degree of fluid overload. F/u CXR and pro-BNP in the AM to help decide volume status. Dr. Clarissa Bustillo said she would revisit most recent echo and add comments about RV function and PAP. Patient clinically appears to have scleroderma which puts her at risk for PAH. Patient is very weak and needs PT evaluation to determine dispo. Afib RVR - resolved    CHF exacerbation  - LV function normal  - f/u Emily interpretation of RV function and PAP (she said she would re-evaluate most recent echo and add comments about RV and PA)  - probnp dramatically elevated  - IV diuresis one more day. She didn't end up putting out as much fluid as I thought she would. Perhaps overestimated degree of fluid overload. 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 Patient reports improved swelling, still can't make a fist bilaterally however. No further complaints. No SOB, chest pain. She is very weak and requires assistance to get in and out of bed. Review of Systems:        10 point ROS negative except as stated above in \"subjective\" section    Objective: Intake/Output Summary (Last 24 hours) at 8/22/2022 1216  Last data filed at 8/21/2022 2115  Gross per 24 hour   Intake 150 ml   Output 1300 ml   Net -1150 ml          Vitals:   Vitals:    08/22/22 0900   BP: (!) 115/56   Pulse: 85   Resp: 21   Temp: 98.4 °F (36.9 °C)   SpO2: 96%       Physical Exam:     General: NAD  Eyes: EOMI  ENT: neck supple  Cardiovascular: Regular rate. Respiratory: Clear to auscultation  Gastrointestinal: Soft, non tender  Genitourinary: no suprapubic tenderness  Musculoskeletal: 1+ LE edema bilaterally, cannot make closed fist bilaterally 2/2 tight skin  Skin: warm, dry  Neuro: Alert. Psych: Mood appropriate.      Medications:   Medications:    furosemide  40 mg IntraVENous TID    potassium chloride  40 mEq Oral Daily with breakfast    allopurinol  200 mg Oral Daily    hydrOXYzine HCl  25 mg Oral Nightly    PARoxetine  20 mg Oral QAM    traZODone  100 mg Oral Nightly    rosuvastatin  10 mg Oral Nightly    sodium chloride flush  5-40 mL IntraVENous 2 times per day    insulin lispro  0-4 Units SubCUTAneous TID     insulin lispro  0-4 Units SubCUTAneous Nightly    lisinopril  20 mg Oral Daily    metoprolol tartrate  50 mg Oral BID    apixaban  5 mg Oral BID    dilTIAZem  120 mg Oral Daily      Infusions:    sodium chloride       PRN Meds: sodium chloride flush, 10 mL, PRN  sodium chloride, , PRN  ondansetron, 4 mg, Q8H PRN Or  ondansetron, 4 mg, Q6H PRN  polyethylene glycol, 17 g, Daily PRN  nicotine polacrilex, 2 mg, Q1H PRN  acetaminophen, 650 mg, Q6H PRN   Or  acetaminophen, 650 mg, Q6H PRN      Labs      Recent Results (from the past 24 hour(s))   POCT Glucose    Collection Time: 08/21/22  4:14 PM   Result Value Ref Range    POC Glucose 224 (H) 70 - 99 MG/DL   POCT Glucose    Collection Time: 08/21/22  8:06 PM   Result Value Ref Range    POC Glucose 193 (H) 70 - 99 MG/DL   POCT Glucose    Collection Time: 08/22/22  7:49 AM   Result Value Ref Range    POC Glucose 190 (H) 70 - 99 MG/DL        Imaging/Diagnostics Last 24 Hours   Echocardiogram limited    Result Date: 8/18/2022  Transthoracic Echocardiography Report (TTE)  Demographics   Patient Name       Beverley Holbrook  Date of Study       08/18/2022                     A   Date of Birth      1950         Gender              Female   Age                71 year(s)         Race                   Patient Number     [id number]         Room Number         4298   Visit Number       [id number]   Corporate ID       [id number]   Accession Number   [id number]         Damian Mitchell,                                                            [facility]   Ordering Physician Anamaria Gtz CNP        Interpreting        Linda Zapata                                        Physician           MD  Procedure Type of Study   TTE procedure:ECHOCARDIOGRAM LIMITED. Procedure Date Date: 08/18/2022 Start: 02:54 PM Study Location: Portable Technical Quality: Fair visualization Indications:Congestive heart failure. Patient Status: Routine HR: 99 bpm BP: 104/60 mmHg  Conclusions   Summary  This is a limited echocardiogram.  Left ventricular systolic function is normal.  Ejection fraction is visually estimated at 50-55%. Mild left ventricular hypertrophy. Mild aortic regurgitation. Moderate circumferential pericardial effusion without evidence of cardiac  tamponade.    Signature ------------------------------------------------------------------  Electronically signed by Oral Bhandari MD (Interpreting  physician) on 08/18/2022 at 04:44 PM  ------------------------------------------------------------------   Findings   Left Ventricle  Left ventricular systolic function is normal.  Ejection fraction is visually estimated at 50-55%. Mild left ventricular hypertrophy. Aortic Valve  Mild aortic regurgitation. Pericardial Effusion  Moderate circumferential pericardial effusion without evidence of cardiac  tamponade.   M-Mode/2D Measurements & Calculations   LV Diastolic Dimension:  LV Systolic Dimension:  LA Dimension: 3.5 cmAO Root  3.34 cm                  2.44 cm                 Dimension: 2.8 cmLA Area:  LV FS:27 %               LV Volume Diastolic: 70 89.8 cm2  LV PW Diastolic: 3.46 cm ml  Septum Diastolic: 3.92   LV Volume Systolic: 34  cm                       ml  CO: 7.09 l/min           LV EDV/LV EDV Index: 70 RV Diastolic Dimension:                           mlLV ESV/LV ESV Index:  1.94 cm                           34 ml  LV Area Diastolic: 27    EF Calculated (A4C):    LA/Aorta: 1.25  cm2                      51.4 %  LV Area Systolic: 51.3   EF Calculated (2D):     LA volume/Index: 38 ml  cm2                      53.7 %                            LV Length: 8.46 cm                            LVOT: 2 cm  Doppler Measurements & Calculations   MV Peak E-Wave: 102  AV Peak Velocity: 222 cm/s    LVOT Peak Velocity: 127  cm/s                 AV Peak Gradient: 19.71 mmHg  cm/s  MV Peak A-Wave: 74   AV Mean Velocity: 144 cm/s    LVOT Mean Velocity: 82.1  cm/s                 AV Mean Gradient: 10 mmHg     cm/s  MV E/A Ratio: 1.38   AV VTI: 36.5 cm               LVOT Peak Gradient: 8  MV Peak Gradient:    AV Area (Continuity):1.96 cm2 mmHgLVOT Mean Gradient: 3  4.16 mmHg                                          mmHg                       LVOT VTI: 22.8 cm             Estimated RVSP: 50 mmHg  MV P1/2t: 52 msec    AV P1/2t: 282 msec            Estimated RAP:3 mmHg  MVA by PHT:4.23 cm2  Estimated PASP: 31.94 mmHg   MV E' Septal                                       TR Velocity:269 cm/s  Velocity: 6.89 cm/s                                TR Gradient:28.94 mmHg  MV E' Lateral                                      PV Peak Velocity: 125  Velocity: 6.27 cm/s                                cm/s  MV E/E' septal: 14.8                               PV Peak Gradient: 6.25  MV E/E' lateral:                                   mmHg  16.27  MR Velocity: 430  cm/s                                               UT ED Velocity: 125 cm/s      XR CHEST PORTABLE    Result Date: 8/18/2022  EXAMINATION: ONE XRAY VIEW OF THE CHEST 8/18/2022 2:45 pm COMPARISON: Chest radiograph and CT PA 08/18/2022. HISTORY: ORDERING SYSTEM PROVIDED HISTORY: post bilat thoracentesis TECHNOLOGIST PROVIDED HISTORY: Reason for exam:->post bilat thoracentesis Reason for Exam: post bilat thoracentesis Additional signs and symptoms: na Relevant Medical/Surgical History: diabetes, breast cancer FINDINGS: Single view provided. Stable mediastinal and enlarged cardiac silhouette. Normal lung volumes. Significant interval improvement of right pleural effusion with trace residual effusion and improved right lung base atelectasis. Improved left pleural effusion with persistent mild effusion and lung base atelectasis. No pneumothorax or free subdiaphragmatic air. Status post thoracentesis with improved effusions and improved right lung base aeration. No pneumothorax.      XR CHEST PORTABLE    Result Date: 8/18/2022  EXAMINATION: ONE XRAY VIEW OF THE CHEST 8/18/2022 7:02 am COMPARISON: 07/22/2022 HISTORY: ORDERING SYSTEM PROVIDED HISTORY: sob TECHNOLOGIST PROVIDED HISTORY: Reason for exam:->sob Reason for Exam: sob Additional signs and symptoms: sob Relevant Medical/Surgical History: sob FINDINGS: Low lung volumes likely on the basis of patient inspiratory effort. New bilateral pleural effusions, right larger than left along with associated bibasilar airspace opacities, right worse than left. There is also mild degree of pulmonary vascular congestion without overt pulmonary edema identified. No pneumothoraces are seen. Cardiac and mediastinal silhouettes are within normal limits. No acute bony abnormality. Mild pulmonary vascular congestion without overt pulmonary edema. New bilateral pleural effusions, right larger than left along with bibasilar airspace opacities, right worse than left, which may relate to atelectasis, pneumonia or aspiration pneumonitis. CTA PULMONARY W CONTRAST    Result Date: 8/18/2022  EXAMINATION: CTA OF THE CHEST 8/18/2022 9:03 am TECHNIQUE: CTA of the chest was performed after the administration of intravenous contrast.  Multiplanar reformatted images are provided for review. MIP images are provided for review. Automated exposure control, iterative reconstruction, and/or weight based adjustment of the mA/kV was utilized to reduce the radiation dose to as low as reasonably achievable. COMPARISON: None. HISTORY: ORDERING SYSTEM PROVIDED HISTORY: LUE pain, SOB, tachycardia, eval for PE TECHNOLOGIST PROVIDED HISTORY: Reason for exam:->LUE pain, SOB, tachycardia, eval for PE Decision Support Exception - unselect if not a suspected or confirmed emergency medical condition->Emergency Medical Condition (MA) Reason for Exam: LUE pain, SOB, tachycardia, eval for PE FINDINGS: Limited evaluation of the subsegmental branches of the pulmonary arteries secondary to motion artifact. Pulmonary Arteries: Pulmonary arteries are adequately opacified for evaluation. No evidence of intraluminal filling defect to suggest pulmonary embolism. Main pulmonary artery is normal in caliber. Mediastinum: No evidence of mediastinal lymphadenopathy. Mild cardiomegaly. Simple pericardial effusion measuring up to 3 cm inferiorly.   There is no acute abnormality of the thoracic aorta. Lungs/pleura: Moderate bilateral pleural effusions with adjacent partial collapse of the lower lobes bilaterally. No focal consolidation or pulmonary edema. No pneumothorax. Upper Abdomen: A 3 cm lesion in the left hepatic lobe demonstrates peripheral, discontinuous nodular enhancement suggestive of a hemangioma. Soft Tissues/Bones: No acute bone or soft tissue abnormality. Healing lateral right 8th rib fracture. Limited evaluation of the subsegmental branches of the pulmonary arteries secondary to motion artifact. No evidence of pulmonary embolism or acute pulmonary abnormality. Moderate bilateral pleural effusions with adjacent partial collapse of the lower lobes. Mild cardiomegaly and a small pericardial effusion measuring up to 3 cm. Small hemangioma in the left hepatic lobe. IR GUIDED THORACENTESIS PLEURAL    Result Date: 8/19/2022  PROCEDURE: ULTRASOUNDGUIDED bilateral THORACENTESIS 8/18/2022 HISTORY: ORDERING SYSTEM PROVIDED HISTORY: pleural effusions TECHNOLOGIST PROVIDED HISTORY: Pleural effusions See ip consult Reason for exam:->pleural effusions Which side should the procedure be performed?->Radiologist Recommendation TECHNIQUE: Maximum sterile barrier technique including hand hygiene, skin prep and sterile ultrasound technique utilized for procedure. Sterile ultrasound technique also utilized for procedure Ultrasound guidance required to confirm presence of pleural fluid, puncture site selection, real-time intra procedural guidance. Images made for patient's medical file. Following informed consent, pause a confirm/time-out and sequential ultrasound-guided puncture site selection, skin and ultrasound probe were prepped and draped in sterile fashion. 10 mL 1% lidocaine without epinephrine for local anesthesia the puncture sites. Sequential placement of 5 [place] trocar mounted sheath were placed in the right and left pleural spaces.   Catheters were advanced

## 2022-08-22 NOTE — PROGRESS NOTES
V2.0  [facility] Hospitalist Progress Note      Name:  Kyler Hughes /Age/Sex: 1950  (71 y.o. female)   MRN & CSN:  [id number] & [id number] Encounter Date/Time: 2022 3:39 PM EDT    Location:  [facility] PCP: Gian Gomes MD       Hospital Day: 5    Assessment and Plan:   Kyler Hughes is a 71 y.o. 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 Patient reports improved swelling, still can't make a fist bilaterally however. No further complaints. No SOB, chest pain. She is very weak and requires assistance to get in and out of bed. Review of Systems:        10 point ROS negative except as stated above in \"subjective\" section    Objective:      Intake/Output Summary (Last 24 hours) at 2022 1215  Last data filed at 2022 2115  Gross per 24 hour   Intake 150 ml   Output 1300 ml   Net -1150 ml          Vitals:   Vitals:    22 0900 BP: (!) 115/56   Pulse: 85   Resp: 21   Temp: 98.4 °F (36.9 °C)   SpO2: 96%       Physical Exam:     General: NAD  Eyes: EOMI  ENT: neck supple  Cardiovascular: Regular rate. Respiratory: Clear to auscultation  Gastrointestinal: Soft, non tender  Genitourinary: no suprapubic tenderness  Musculoskeletal: 1+ LE edema bilaterally, cannot make closed fist bilaterally 2/2 tight skin  Skin: warm, dry  Neuro: Alert. Psych: Mood appropriate.      Medications:   Medications:    furosemide  40 mg IntraVENous TID    potassium chloride  40 mEq Oral Daily with breakfast    allopurinol  200 mg Oral Daily    hydrOXYzine HCl  25 mg Oral Nightly    PARoxetine  20 mg Oral QAM    traZODone  100 mg Oral Nightly    rosuvastatin  10 mg Oral Nightly    sodium chloride flush  5-40 mL IntraVENous 2 times per day    insulin lispro  0-4 Units SubCUTAneous TID WC    insulin lispro  0-4 Units SubCUTAneous Nightly    lisinopril  20 mg Oral Daily    metoprolol tartrate  50 mg Oral BID    apixaban  5 mg Oral BID    dilTIAZem  120 mg Oral Daily      Infusions:    sodium chloride       PRN Meds: sodium chloride flush, 10 mL, PRN  sodium chloride, , PRN  ondansetron, 4 mg, Q8H PRN   Or  ondansetron, 4 mg, Q6H PRN  polyethylene glycol, 17 g, Daily PRN  nicotine polacrilex, 2 mg, Q1H PRN  acetaminophen, 650 mg, Q6H PRN   Or  acetaminophen, 650 mg, Q6H PRN      Labs      Recent Results (from the past 24 hour(s))   POCT Glucose    Collection Time: 08/21/22  4:14 PM   Result Value Ref Range    POC Glucose 224 (H) 70 - 99 MG/DL   POCT Glucose    Collection Time: 08/21/22  8:06 PM   Result Value Ref Range    POC Glucose 193 (H) 70 - 99 MG/DL   POCT Glucose    Collection Time: 08/22/22  7:49 AM   Result Value Ref Range    POC Glucose 190 (H) 70 - 99 MG/DL        Imaging/Diagnostics Last 24 Hours   Echocardiogram limited    Result Date: 8/18/2022  Transthoracic Echocardiography Report (TTE)  Demographics   Patient Name       [facility]  Date of Study       08/18/2022                     A   Date of Birth      1950         Gender              Female   Age                71 year(s)         Race                   Patient Number     [id number]         Room Number         3119   Visit Number       [id number]   Corporate ID       [id number]   Accession Number   [id number]         Damian Brannon 26,                                                            [facility]   Ordering Physician Chana Birmingham CNP        Interpreting        Tricia Fernandez                                        Physician           MD  Procedure Type of Study   TTE procedure:ECHOCARDIOGRAM LIMITED. Procedure Date Date: 08/18/2022 Start: 02:54 PM Study Location: Portable Technical Quality: Fair visualization Indications:Congestive heart failure. Patient Status: Routine HR: 99 bpm BP: 104/60 mmHg  Conclusions   Summary  This is a limited echocardiogram.  Left ventricular systolic function is normal.  Ejection fraction is visually estimated at 50-55%. Mild left ventricular hypertrophy. Mild aortic regurgitation. Moderate circumferential pericardial effusion without evidence of cardiac  tamponade. Signature   ------------------------------------------------------------------  Electronically signed by Tricia Fernandez MD (Interpreting  physician) on 08/18/2022 at 04:44 PM  ------------------------------------------------------------------   Findings   Left Ventricle  Left ventricular systolic function is normal.  Ejection fraction is visually estimated at 50-55%. Mild left ventricular hypertrophy. Aortic Valve  Mild aortic regurgitation. Pericardial Effusion  Moderate circumferential pericardial effusion without evidence of cardiac  tamponade.   M-Mode/2D Measurements & Calculations   LV Diastolic Dimension:  LV Systolic Dimension:  LA Dimension: 3.5 cmAO Root  3.34 cm                  2.44 cm                 Dimension: 2.8 cmLA Area:  LV FS:27 % LV Volume Diastolic: 70 71.9 cm2  LV PW Diastolic: 9.38 cm ml  Septum Diastolic: 3.66   LV Volume Systolic: 34  cm                       ml  CO: 7.09 l/min           LV EDV/LV EDV Index: 70 RV Diastolic Dimension:                           mlLV ESV/LV ESV Index:  1.94 cm                           34 ml  LV Area Diastolic: 27    EF Calculated (A4C):    LA/Aorta: 1.25  cm2                      51.4 %  LV Area Systolic: 97.0   EF Calculated (2D):     LA volume/Index: 38 ml  cm2                      53.7 %                            LV Length: 8.46 cm                            LVOT: 2 cm  Doppler Measurements & Calculations   MV Peak E-Wave: 102  AV Peak Velocity: 222 cm/s    LVOT Peak Velocity: 127  cm/s                 AV Peak Gradient: 19.71 mmHg  cm/s  MV Peak A-Wave: 74   AV Mean Velocity: 144 cm/s    LVOT Mean Velocity: 82.1  cm/s                 AV Mean Gradient: 10 mmHg     cm/s  MV E/A Ratio: 1.38   AV VTI: 36.5 cm               LVOT Peak Gradient: 8  MV Peak Gradient:    AV Area (Continuity):1.96 cm2 mmHgLVOT Mean Gradient: 3  4.16 mmHg                                          mmHg                       LVOT VTI: 22.8 cm             Estimated RVSP: 50 mmHg  MV P1/2t: 52 msec    AV P1/2t: 282 msec            Estimated RAP:3 mmHg  MVA by PHT:4.23 cm2  Estimated PASP: 31.94 mmHg   MV E' Septal                                       TR Velocity:269 cm/s  Velocity: 6.89 cm/s                                TR Gradient:28.94 mmHg  MV E' Lateral                                      PV Peak Velocity: 125  Velocity: 6.27 cm/s                                cm/s  MV E/E' septal: 14.8                               PV Peak Gradient: 6.25  MV E/E' lateral:                                   mmHg  16.27  MR Velocity: 430  cm/s                                               NE ED Velocity: 125 cm/s      XR CHEST PORTABLE    Result Date: 8/18/2022  EXAMINATION: ONE XRAY VIEW OF THE CHEST 8/18/2022 2:45 pm COMPARISON: Chest radiograph and CT PA 08/18/2022. HISTORY: ORDERING SYSTEM PROVIDED HISTORY: post bilat thoracentesis TECHNOLOGIST PROVIDED HISTORY: Reason for exam:->post bilat thoracentesis Reason for Exam: post bilat thoracentesis Additional signs and symptoms: na Relevant Medical/Surgical History: diabetes, breast cancer FINDINGS: Single view provided. Stable mediastinal and enlarged cardiac silhouette. Normal lung volumes. Significant interval improvement of right pleural effusion with trace residual effusion and improved right lung base atelectasis. Improved left pleural effusion with persistent mild effusion and lung base atelectasis. No pneumothorax or free subdiaphragmatic air. Status post thoracentesis with improved effusions and improved right lung base aeration. No pneumothorax. XR CHEST PORTABLE    Result Date: 8/18/2022  EXAMINATION: ONE XRAY VIEW OF THE CHEST 8/18/2022 7:02 am COMPARISON: 07/22/2022 HISTORY: ORDERING SYSTEM PROVIDED HISTORY: sob TECHNOLOGIST PROVIDED HISTORY: Reason for exam:->sob Reason for Exam: sob Additional signs and symptoms: sob Relevant Medical/Surgical History: sob FINDINGS: Low lung volumes likely on the basis of patient inspiratory effort. New bilateral pleural effusions, right larger than left along with associated bibasilar airspace opacities, right worse than left. There is also mild degree of pulmonary vascular congestion without overt pulmonary edema identified. No pneumothoraces are seen. Cardiac and mediastinal silhouettes are within normal limits. No acute bony abnormality. Mild pulmonary vascular congestion without overt pulmonary edema. New bilateral pleural effusions, right larger than left along with bibasilar airspace opacities, right worse than left, which may relate to atelectasis, pneumonia or aspiration pneumonitis.      CTA PULMONARY W CONTRAST    Result Date: 8/18/2022  EXAMINATION: CTA OF THE CHEST 8/18/2022 9:03 am TECHNIQUE: CTA of the chest was performed after the administration of intravenous contrast.  Multiplanar reformatted images are provided for review. MIP images are provided for review. Automated exposure control, iterative reconstruction, and/or weight based adjustment of the mA/kV was utilized to reduce the radiation dose to as low as reasonably achievable. COMPARISON: None. HISTORY: ORDERING SYSTEM PROVIDED HISTORY: LUE pain, SOB, tachycardia, eval for PE TECHNOLOGIST PROVIDED HISTORY: Reason for exam:->LUE pain, SOB, tachycardia, eval for PE Decision Support Exception - unselect if not a suspected or confirmed emergency medical condition->Emergency Medical Condition (MA) Reason for Exam: LUE pain, SOB, tachycardia, eval for PE FINDINGS: Limited evaluation of the subsegmental branches of the pulmonary arteries secondary to motion artifact. Pulmonary Arteries: Pulmonary arteries are adequately opacified for evaluation. No evidence of intraluminal filling defect to suggest pulmonary embolism. Main pulmonary artery is normal in caliber. Mediastinum: No evidence of mediastinal lymphadenopathy. Mild cardiomegaly. Simple pericardial effusion measuring up to 3 cm inferiorly. There is no acute abnormality of the thoracic aorta. Lungs/pleura: Moderate bilateral pleural effusions with adjacent partial collapse of the lower lobes bilaterally. No focal consolidation or pulmonary edema. No pneumothorax. Upper Abdomen: A 3 cm lesion in the left hepatic lobe demonstrates peripheral, discontinuous nodular enhancement suggestive of a hemangioma. Soft Tissues/Bones: No acute bone or soft tissue abnormality. Healing lateral right 8th rib fracture. Limited evaluation of the subsegmental branches of the pulmonary arteries secondary to motion artifact. No evidence of pulmonary embolism or acute pulmonary abnormality. Moderate bilateral pleural effusions with adjacent partial collapse of the lower lobes.  Mild cardiomegaly and a small pericardial effusion measuring up to 3 cm. Small hemangioma in the left hepatic lobe. IR GUIDED THORACENTESIS PLEURAL    Result Date: 8/19/2022  PROCEDURE: ULTRASOUNDGUIDED bilateral THORACENTESIS 8/18/2022 HISTORY: ORDERING SYSTEM PROVIDED HISTORY: pleural effusions TECHNOLOGIST PROVIDED HISTORY: Pleural effusions See ip consult Reason for exam:->pleural effusions Which side should the procedure be performed?->Radiologist Recommendation TECHNIQUE: Maximum sterile barrier technique including hand hygiene, skin prep and sterile ultrasound technique utilized for procedure. Sterile ultrasound technique also utilized for procedure Ultrasound guidance required to confirm presence of pleural fluid, puncture site selection, real-time intra procedural guidance. Images made for patient's medical file. Following informed consent, pause a confirm/time-out and sequential ultrasound-guided puncture site selection, skin and ultrasound probe were prepped and draped in sterile fashion. 10 mL 1% lidocaine without epinephrine for local anesthesia the puncture sites. Sequential placement of 5 [place] trocar mounted sheath were placed in the right and left pleural spaces. Catheters were advanced off trocar introducer attached evacuated containers. Bilateral thoracentesis performed. Access catheters removed. Dressing applied. Postprocedure chest radiograph ordered. Patient tolerated procedure well. FINDINGS: Pre and intraprocedural images demonstrate moderate-large bilateral pleural effusions with thoracentesis catheter is within the. A total of 1225 cc clear serous appearing fluid removed from the right pleural space with 700 mL sample sent for laboratory evaluation. 775 mL clear serous appearing fluid removed from the left pleural space with 700 mL sample sent for laboratory evaluation. No complication suggested. Postprocedure chest radiograph ordered.      Successful ultrasound guided bilateral thoracentesis with specimen sent for laboratory evaluation. No complication suggested. .       Electronically signed by Troy Light MD on 8/22/2022 at 12:15 PM

## 2022-08-22 NOTE — PROGRESS NOTES
V2.0  [facility] [place] Hospital[place] Progress Note      Name:  Lauren Bowden /Age/Sex: 1950  (71 y.o. female)   MRN & CSN:  [id number] & [id number] Encounter Date/Time: 2022 3:39 PM EDT    Location:  [facility]/7121-I PCP: Michaela Goodwin MD       Hospital Day: 5    Assessment and Plan:   Lauren Bowden is a 71 y.o. 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 Patient reports improved swelling, still can't make a fist bilaterally however. No further complaints. No SOB, chest pain. Review of Systems:        10 point ROS negative except as stated above in \"subjective\" section    Objective:      Intake/Output Summary (Last 24 hours) at 2022 1214  Last data filed at 2022 2115  Gross per 24 hour   Intake 150 ml   Output 1300 ml   Net -1150 ml          Vitals:   Vitals:    22 0900   BP: (!) 115/56   Pulse: 85   Resp: 21   Temp: 98.4 °F (36.9 °C)   SpO2: 96%       Physical Exam:     General: NAD  Eyes: EOMI  ENT: neck supple  Cardiovascular: Regular rate. Respiratory: Clear to auscultation  Gastrointestinal: Soft, non tender  Genitourinary: no suprapubic tenderness  Musculoskeletal: 1+ LE edema bilaterally, cannot make closed fist bilaterally 2/2 tight skin  Skin: warm, dry  Neuro: Alert. Psych: Mood appropriate.      Medications:   Medications:    furosemide  40 mg IntraVENous TID    potassium chloride  40 mEq Oral Daily with breakfast    allopurinol  200 mg Oral Daily    hydrOXYzine HCl  25 mg Oral Nightly    PARoxetine  20 mg Oral QAM    traZODone  100 mg Oral Nightly    rosuvastatin  10 mg Oral Nightly    sodium chloride flush  5-40 mL IntraVENous 2 times per day    insulin lispro  0-4 Units SubCUTAneous TID WC    insulin lispro  0-4 Units SubCUTAneous Nightly    lisinopril  20 mg Oral Daily    metoprolol tartrate  50 mg Oral BID    apixaban  5 mg Oral BID    dilTIAZem  120 mg Oral Daily      Infusions:    sodium chloride       PRN Meds: sodium chloride flush, 10 mL, PRN  sodium chloride, , PRN  ondansetron, 4 mg, Q8H PRN   Or  ondansetron, 4 mg, Q6H PRN  polyethylene glycol, 17 g, Daily PRN  nicotine polacrilex, 2 mg, Q1H PRN  acetaminophen, 650 mg, Q6H PRN   Or  acetaminophen, 650 mg, Q6H PRN      Labs      Recent Results (from the past 24 hour(s))   POCT Glucose    Collection Time: 08/21/22  4:14 PM   Result Value Ref Range    POC Glucose 224 (H) 70 - 99 MG/DL   POCT Glucose    Collection Time: 08/21/22  8:06 PM   Result Value Ref Range    POC Glucose 193 (H) 70 - 99 MG/DL   POCT Glucose    Collection Time: 08/22/22  7:49 AM   Result Value Ref Range    POC Glucose 190 (H) 70 - 99 MG/DL        Imaging/Diagnostics Last 24 Hours   Echocardiogram limited    Result Date: 8/18/2022  Transthoracic Echocardiography Report (TTE)  Demographics   Patient Name       Chloe Mckeon  Date of Study       08/18/2022                     A   Date of Birth 1950         Gender              Female   Age                71 year(s)         Race                   Patient Number     [id number]         Room Number         8942   Visit Number       [id number]   Corporate ID       [id number]   Accession Number   [id number]         Damian Mitchell                                                            [facility]   Ordering Physician Otis Riddle CNP        Interpreting        Jzoef Sahu MD  Procedure Type of Study   TTE procedure:ECHOCARDIOGRAM LIMITED. Procedure Date Date: 08/18/2022 Start: 02:54 PM Study Location: Portable Technical Quality: Fair visualization Indications:Congestive heart failure. Patient Status: Routine HR: 99 bpm BP: 104/60 mmHg  Conclusions   Summary  This is a limited echocardiogram.  Left ventricular systolic function is normal.  Ejection fraction is visually estimated at 50-55%. Mild left ventricular hypertrophy. Mild aortic regurgitation. Moderate circumferential pericardial effusion without evidence of cardiac  tamponade. Signature   ------------------------------------------------------------------  Electronically signed by Jozef Rivera MD (Interpreting  physician) on 08/18/2022 at 04:44 PM  ------------------------------------------------------------------   Findings   Left Ventricle  Left ventricular systolic function is normal.  Ejection fraction is visually estimated at 50-55%. Mild left ventricular hypertrophy. Aortic Valve  Mild aortic regurgitation. Pericardial Effusion  Moderate circumferential pericardial effusion without evidence of cardiac  tamponade.   M-Mode/2D Measurements & Calculations   LV Diastolic Dimension:  LV Systolic Dimension:  LA Dimension: 3.5 cmAO Root  3.34 cm                  2.44 cm                 Dimension: 2.8 cmLA Area:  LV FS:27 %               LV Volume Diastolic: 70 35.1 cm2  LV PW Diastolic: 3.05 cm ml Septum Diastolic: 1.73   LV Volume Systolic: 34  cm                       ml  CO: 7.09 l/min           LV EDV/LV EDV Index: 70 RV Diastolic Dimension:                           mlLV ESV/LV ESV Index:  1.94 cm                           34 ml  LV Area Diastolic: 27    EF Calculated (A4C):    LA/Aorta: 1.25  cm2                      51.4 %  LV Area Systolic: 68.9   EF Calculated (2D):     LA volume/Index: 38 ml  cm2                      53.7 %                            LV Length: 8.46 cm                            LVOT: 2 cm  Doppler Measurements & Calculations   MV Peak E-Wave: 102  AV Peak Velocity: 222 cm/s    LVOT Peak Velocity: 127  cm/s                 AV Peak Gradient: 19.71 mmHg  cm/s  MV Peak A-Wave: 74   AV Mean Velocity: 144 cm/s    LVOT Mean Velocity: 82.1  cm/s                 AV Mean Gradient: 10 mmHg     cm/s  MV E/A Ratio: 1.38   AV VTI: 36.5 cm               LVOT Peak Gradient: 8  MV Peak Gradient:    AV Area (Continuity):1.96 cm2 mmHgLVOT Mean Gradient: 3  4.16 mmHg                                          mmHg                       LVOT VTI: 22.8 cm             Estimated RVSP: 50 mmHg  MV P1/2t: 52 msec    AV P1/2t: 282 msec            Estimated RAP:3 mmHg  MVA by PHT:4.23 cm2  Estimated PASP: 31.94 mmHg   MV E' Septal                                       TR Velocity:269 cm/s  Velocity: 6.89 cm/s                                TR Gradient:28.94 mmHg  MV E' Lateral                                      PV Peak Velocity: 125  Velocity: 6.27 cm/s                                cm/s  MV E/E' septal: 14.8                               PV Peak Gradient: 6.25  MV E/E' lateral:                                   mmHg  16.27  MR Velocity: 430  cm/s                                               MT ED Velocity: 125 cm/s      XR CHEST PORTABLE    Result Date: 8/18/2022  EXAMINATION: ONE XRAY VIEW OF THE CHEST 8/18/2022 2:45 pm COMPARISON: Chest radiograph and CT PA 08/18/2022.  HISTORY: ORDERING SYSTEM PROVIDED HISTORY: post bilat thoracentesis TECHNOLOGIST PROVIDED HISTORY: Reason for exam:->post bilat thoracentesis Reason for Exam: post bilat thoracentesis Additional signs and symptoms: na Relevant Medical/Surgical History: diabetes, breast cancer FINDINGS: Single view provided. Stable mediastinal and enlarged cardiac silhouette. Normal lung volumes. Significant interval improvement of right pleural effusion with trace residual effusion and improved right lung base atelectasis. Improved left pleural effusion with persistent mild effusion and lung base atelectasis. No pneumothorax or free subdiaphragmatic air. Status post thoracentesis with improved effusions and improved right lung base aeration. No pneumothorax. XR CHEST PORTABLE    Result Date: 8/18/2022  EXAMINATION: ONE XRAY VIEW OF THE CHEST 8/18/2022 7:02 am COMPARISON: 07/22/2022 HISTORY: ORDERING SYSTEM PROVIDED HISTORY: sob TECHNOLOGIST PROVIDED HISTORY: Reason for exam:->sob Reason for Exam: sob Additional signs and symptoms: sob Relevant Medical/Surgical History: sob FINDINGS: Low lung volumes likely on the basis of patient inspiratory effort. New bilateral pleural effusions, right larger than left along with associated bibasilar airspace opacities, right worse than left. There is also mild degree of pulmonary vascular congestion without overt pulmonary edema identified. No pneumothoraces are seen. Cardiac and mediastinal silhouettes are within normal limits. No acute bony abnormality. Mild pulmonary vascular congestion without overt pulmonary edema. New bilateral pleural effusions, right larger than left along with bibasilar airspace opacities, right worse than left, which may relate to atelectasis, pneumonia or aspiration pneumonitis.      CTA PULMONARY W CONTRAST    Result Date: 8/18/2022  EXAMINATION: CTA OF THE CHEST 8/18/2022 9:03 am TECHNIQUE: CTA of the chest was performed after the administration of intravenous contrast.  Multiplanar reformatted images are provided for review. MIP images are provided for review. Automated exposure control, iterative reconstruction, and/or weight based adjustment of the mA/kV was utilized to reduce the radiation dose to as low as reasonably achievable. COMPARISON: None. HISTORY: ORDERING SYSTEM PROVIDED HISTORY: LUE pain, SOB, tachycardia, eval for PE TECHNOLOGIST PROVIDED HISTORY: Reason for exam:->LUE pain, SOB, tachycardia, eval for PE Decision Support Exception - unselect if not a suspected or confirmed emergency medical condition->Emergency Medical Condition (MA) Reason for Exam: LUE pain, SOB, tachycardia, eval for PE FINDINGS: Limited evaluation of the subsegmental branches of the pulmonary arteries secondary to motion artifact. Pulmonary Arteries: Pulmonary arteries are adequately opacified for evaluation. No evidence of intraluminal filling defect to suggest pulmonary embolism. Main pulmonary artery is normal in caliber. Mediastinum: No evidence of mediastinal lymphadenopathy. Mild cardiomegaly. Simple pericardial effusion measuring up to 3 cm inferiorly. There is no acute abnormality of the thoracic aorta. Lungs/pleura: Moderate bilateral pleural effusions with adjacent partial collapse of the lower lobes bilaterally. No focal consolidation or pulmonary edema. No pneumothorax. Upper Abdomen: A 3 cm lesion in the left hepatic lobe demonstrates peripheral, discontinuous nodular enhancement suggestive of a hemangioma. Soft Tissues/Bones: No acute bone or soft tissue abnormality. Healing lateral right 8th rib fracture. Limited evaluation of the subsegmental branches of the pulmonary arteries secondary to motion artifact. No evidence of pulmonary embolism or acute pulmonary abnormality. Moderate bilateral pleural effusions with adjacent partial collapse of the lower lobes. Mild cardiomegaly and a small pericardial effusion measuring up to 3 cm. Small hemangioma in the left hepatic lobe.      IR GUIDED THORACENTESIS PLEURAL    Result Date: 8/19/2022  PROCEDURE: ULTRASOUNDGUIDED bilateral THORACENTESIS 8/18/2022 HISTORY: ORDERING SYSTEM PROVIDED HISTORY: pleural effusions TECHNOLOGIST PROVIDED HISTORY: Pleural effusions See ip consult Reason for exam:->pleural effusions Which side should the procedure be performed?->Radiologist Recommendation TECHNIQUE: Maximum sterile barrier technique including hand hygiene, skin prep and sterile ultrasound technique utilized for procedure. Sterile ultrasound technique also utilized for procedure Ultrasound guidance required to confirm presence of pleural fluid, puncture site selection, real-time intra procedural guidance. Images made for patient's medical file. Following informed consent, pause a confirm/time-out and sequential ultrasound-guided puncture site selection, skin and ultrasound probe were prepped and draped in sterile fashion. 10 mL 1% lidocaine without epinephrine for local anesthesia the puncture sites. Sequential placement of 5 Croatian trocar mounted sheath were placed in the right and left pleural spaces. Catheters were advanced off trocar introducer attached evacuated containers. Bilateral thoracentesis performed. Access catheters removed. Dressing applied. Postprocedure chest radiograph ordered. Patient tolerated procedure well. FINDINGS: Pre and intraprocedural images demonstrate moderate-large bilateral pleural effusions with thoracentesis catheter is within the. A total of 1225 cc clear serous appearing fluid removed from the right pleural space with 700 mL sample sent for laboratory evaluation. 775 mL clear serous appearing fluid removed from the left pleural space with 700 mL sample sent for laboratory evaluation. No complication suggested. Postprocedure chest radiograph ordered. Successful ultrasound guided bilateral thoracentesis with specimen sent for laboratory evaluation. No complication suggested. .       Electronically signed by Tushar Sanchez MD on 8/22/2022 at 12:14 PM

## 2022-08-22 NOTE — PLAN OF CARE

## 2022-08-23 ENCOUNTER — APPOINTMENT (OUTPATIENT)
Dept: GENERAL RADIOLOGY | Age: 72
DRG: 291 | End: 2022-08-23
Payer: MEDICARE

## 2022-08-23 VITALS
DIASTOLIC BLOOD PRESSURE: 56 MMHG | WEIGHT: 143.3 LBS | RESPIRATION RATE: 32 BRPM | BODY MASS INDEX: 23.03 KG/M2 | HEART RATE: 77 BPM | HEIGHT: 66 IN | OXYGEN SATURATION: 97 % | SYSTOLIC BLOOD PRESSURE: 128 MMHG | TEMPERATURE: 97.6 F

## 2022-08-23 LAB
CULTURE: NORMAL
GLUCOSE BLD-MCNC: 180 MG/DL (ref 70–99)
GLUCOSE BLD-MCNC: 193 MG/DL (ref 70–99)
Lab: NORMAL
PRO-BNP: 1399 PG/ML
SPECIMEN: NORMAL

## 2022-08-23 PROCEDURE — 2580000003 HC RX 258: Performed by: NURSE PRACTITIONER

## 2022-08-23 PROCEDURE — 6370000000 HC RX 637 (ALT 250 FOR IP): Performed by: NURSE PRACTITIONER

## 2022-08-23 PROCEDURE — 82962 GLUCOSE BLOOD TEST: CPT

## 2022-08-23 PROCEDURE — 6370000000 HC RX 637 (ALT 250 FOR IP): Performed by: INTERNAL MEDICINE

## 2022-08-23 PROCEDURE — 83880 ASSAY OF NATRIURETIC PEPTIDE: CPT

## 2022-08-23 PROCEDURE — 36415 COLL VENOUS BLD VENIPUNCTURE: CPT

## 2022-08-23 PROCEDURE — 6370000000 HC RX 637 (ALT 250 FOR IP): Performed by: SURGERY

## 2022-08-23 PROCEDURE — 71045 X-RAY EXAM CHEST 1 VIEW: CPT

## 2022-08-23 RX ORDER — METOPROLOL TARTRATE 50 MG/1
50 TABLET, FILM COATED ORAL 2 TIMES DAILY
Qty: 60 TABLET | Refills: 1 | Status: ON HOLD | OUTPATIENT
Start: 2022-08-23 | End: 2022-10-03 | Stop reason: HOSPADM

## 2022-08-23 RX ORDER — LISINOPRIL 20 MG/1
20 TABLET ORAL DAILY
Qty: 30 TABLET | Refills: 1 | Status: ON HOLD | OUTPATIENT
Start: 2022-08-24 | End: 2022-10-03 | Stop reason: HOSPADM

## 2022-08-23 RX ORDER — POLYETHYLENE GLYCOL 3350 17 G
2 POWDER IN PACKET (EA) ORAL
Qty: 100 EACH | Refills: 3 | OUTPATIENT
Start: 2022-08-23

## 2022-08-23 RX ORDER — POLYETHYLENE GLYCOL 3350 17 G
2 POWDER IN PACKET (EA) ORAL
Qty: 100 EACH | Refills: 3 | Status: SHIPPED | OUTPATIENT
Start: 2022-08-23 | End: 2022-09-23

## 2022-08-23 RX ORDER — DILTIAZEM HYDROCHLORIDE 120 MG/1
120 CAPSULE, COATED, EXTENDED RELEASE ORAL DAILY
Qty: 30 CAPSULE | Refills: 1 | Status: ON HOLD | OUTPATIENT
Start: 2022-08-24 | End: 2022-10-03 | Stop reason: HOSPADM

## 2022-08-23 RX ORDER — DILTIAZEM HYDROCHLORIDE 120 MG/1
120 CAPSULE, COATED, EXTENDED RELEASE ORAL DAILY
Qty: 30 CAPSULE | Refills: 1 | OUTPATIENT
Start: 2022-08-24

## 2022-08-23 RX ORDER — LISINOPRIL 20 MG/1
20 TABLET ORAL DAILY
Qty: 30 TABLET | Refills: 1 | OUTPATIENT
Start: 2022-08-24

## 2022-08-23 RX ORDER — METOPROLOL TARTRATE 50 MG/1
50 TABLET, FILM COATED ORAL 2 TIMES DAILY
Qty: 60 TABLET | Refills: 1 | OUTPATIENT
Start: 2022-08-23

## 2022-08-23 RX ADMIN — METOPROLOL TARTRATE 50 MG: 50 TABLET, FILM COATED ORAL at 09:24

## 2022-08-23 RX ADMIN — POTASSIUM CHLORIDE 40 MEQ: 1500 TABLET, EXTENDED RELEASE ORAL at 09:13

## 2022-08-23 RX ADMIN — PAROXETINE HYDROCHLORIDE 20 MG: 20 TABLET, FILM COATED ORAL at 09:12

## 2022-08-23 RX ADMIN — ALLOPURINOL 200 MG: 100 TABLET ORAL at 09:12

## 2022-08-23 RX ADMIN — APIXABAN 5 MG: 5 TABLET, FILM COATED ORAL at 09:12

## 2022-08-23 RX ADMIN — DILTIAZEM HYDROCHLORIDE 120 MG: 120 CAPSULE, COATED, EXTENDED RELEASE ORAL at 09:24

## 2022-08-23 RX ADMIN — SODIUM CHLORIDE, PRESERVATIVE FREE 10 ML: 5 INJECTION INTRAVENOUS at 09:13

## 2022-08-23 NOTE — DISCHARGE SUMMARY
Discharge Summary    Name:  Gigi Diaz /Age/Sex: 1950  (71 y.o. female)   MRN & CSN:  [id number] & [id number] Admission Date/Time: 2022  6:50 AM   Attending:  Roopa Salinas MD Discharging Physician: Roopa Salinas MD     Hospital Course:   Gigi Diaz is a 71 y.o.  female  who presents with Atrial fibrillation with RVR ([address]   Pt with history of hypertension, prediabetes who presents with shortness of breath. Pt reported bilateral legs and hand swelling for 4 months. History the heart rate was noted to be 170s. Patient did receive Cardizem and heart rate did improve. Afib RVR - resolved  Patient was started on Eliquis and antiarrhythmics were adjusted by cardiology. Continue Cardizem 120 mg daily and metoprolol 50 mg twice daily. Per patient they have an outpatient cardiac cath scheduled in September. Patient understands importance of following up with appointment. CHF exacerbation acute on chronic diastolic  -The pt was started on IV Diuresis. ProBNP improved. - LV function normal LVEF of 50 to 45%. -Cardiology saw the pt and adjusted medications as well.   -Renal functions remained stable. Possible scleroderma-myositis vs overlap syndrome  - f/u anti-scleroderma, RODOLFO, RF, anti-SSA, anti-SSB to evaluate rheumatologic etiology as out patient. - patient states she has a rheumatology appointment set up  -Patient also has weakness, did work with PT OT, the patient and the family refuse rehab. Continue PT OT as an outpatient.  -Further has been the patient has rheumatology appointment in September as well. Bilateral pleural effusions  - unlikely to be recurrence of breast cancer as effusions are bilateral in setting of elevated bnp  -The patient underwent thoracocentesis, fluid studies indicating transudative effusion  - unremarkable white count of around 1000/cubic mm,  -Patient to follow-up with PCP for cytology results.     Patient was seen in hospital for CHF.   I am prescribing oxygen because the diagnosis and testing requires the patient to have oxygen in the home. Conditions will improve or be benefited by oxygen use. The patient is able to perform good mobility and therefore requires the use of a portable oxygen system for ambulation. The patient expressed appropriate understanding of and agreement with the discharge recommendations, medications, and plan. Consults this admission:  IP CONSULT TO CARDIOLOGY  IP CONSULT TO HOSPITALIST  IP CONSULT TO [address] [place] [address]    Discharge Instruction:   Follow up appointments:   Primary care physician:  within 2 weeks  Cardiology in 2 weeks, follow-up with your cardiac cath appointment in September  Follow-up with rheumatology in 2 weeks. Follow-up with the scheduled appointment. Diet:  cardiac diet   Activity: activity as tolerated  Disposition: Discharged to:   []Home, [x]HHC, []SNF, []Acute Rehab, []Hospice   Condition on discharge: Stable    Discharge Medications:        Medication List        START taking these medications      apixaban 5 MG Tabs tablet  Commonly known as: ELIQUIS  Take 1 tablet by mouth 2 times daily     dilTIAZem 120 MG extended release capsule  Commonly known as: CARDIZEM CD  Take 1 capsule by mouth daily  Start taking on: August 24, 2022     lisinopril 20 MG tablet  Commonly known as: PRINIVIL;ZESTRIL  Take 1 tablet by mouth daily  Start taking on: August 24, 2022     metoprolol tartrate 50 MG tablet  Commonly known as: LOPRESSOR  Take 1 tablet by mouth 2 times daily     nicotine polacrilex 2 MG lozenge  Commonly known as: COMMIT  Take 1 lozenge by mouth every hour as needed for Smoking cessation            CONTINUE taking these medications      blood glucose test strips  Test twice a day & as needed for symptoms of irregular blood glucose.      furosemide 40 MG tablet  Commonly known as: Lasix  Take 1 tablet by mouth daily     glipiZIDE 5 MG tablet  Commonly known as: GLUCOTROL  Take 1 tablet by mouth daily     Lancets Misc  1 each by Does not apply route 2 times daily     metFORMIN 500 MG tablet  Commonly known as: GLUCOPHAGE  Take 1 tablet by mouth every morning (before breakfast)     PARoxetine 20 MG tablet  Commonly known as: PAXIL  Take 1 tablet by mouth every morning     rosuvastatin 10 MG tablet  Commonly known as: CRESTOR  Take 1 tablet by mouth nightly     traZODone 100 MG tablet  Commonly known as: DESYREL  Take 1 tablet by mouth nightly            STOP taking these medications      allopurinol 100 MG tablet  Commonly known as: ZYLOPRIM     amLODIPine-benazepril 5-10 MG per capsule  Commonly known as: Lotrel     doxycycline hyclate 100 MG tablet  Commonly known as: VIBRA-TABS     hydrOXYzine HCl 25 MG tablet  Commonly known as: ATARAX               Where to Get Your Medications        These medications were sent to [address] [place] [address] 25, [address] [place] [id number]      Phone: [phone]   apixaban 5 MG Tabs tablet  dilTIAZem 120 MG extended release capsule  lisinopril 20 MG tablet  metoprolol tartrate 50 MG tablet  nicotine polacrilex 2 MG lozenge         Objective Findings at Discharge:   /61   Pulse 82   Temp 98.6 °F (37 °C) (Oral)   Resp 28   Ht 5' 5.98\" (1.676 m)   Wt 143 lb 4.8 oz (65 kg)   SpO2 94%   BMI 23.14 kg/m²            PHYSICAL EXAM   General: NAD  Eyes: EOMI  ENT: neck supple  Cardiovascular: Regular rate. Respiratory: Clear to auscultation  Gastrointestinal: Soft, non tender  Genitourinary: no suprapubic tenderness  Musculoskeletal: trace LE edema bilaterally, cannot make closed fist bilaterally 2/2 tight skin  Skin: warm, dry  Neuro: Alert. Psych: Mood appropriate.      BMP/CBC  Recent Labs     08/21/22  0340 08/22/22  1429    137   K 4.4 4.6    102   CO2 24 26   BUN 33* 31*   CREATININE 1.1 0.9 IMAGING:      Discharge Time of 35 minutes    Electronically signed by Ady Patel MD on 8/23/2022 at 12:21 PM

## 2022-08-23 NOTE — PROGRESS NOTES
Doctor Please copy and paste below in your progress note per DME requirements. Patient was seen in hospital for CHF. I am prescribing oxygen because the diagnosis and testing requires the patient to have oxygen in the home. Conditions will improve or be benefited by oxygen use. The patient is able to perform good mobility and therefore requires the use of a portable oxygen system for ambulation.

## 2022-08-23 NOTE — CARE COORDINATION
Received call from [address] [facility] for help with copay on new discharge medication, diltizem. 1x voucher approved and sent to pharmacy.   Patient placed on flagged list.

## 2022-08-23 NOTE — PROGRESS NOTES
Outpatient Pharmacy Progress Note for Meds-to-Beds    Total number of Prescriptions Filled: 4  The following medications were dispensed to the patient during the discharge process:  Metoprolol  Diltiazem  Lisinopril  Eliquis    Additional Documentation:  Medication(s) were delivered to the patient's room prior to discharge  Med Assist was able to help cover the cost of the medications to provide patient with a $0.00 co-pay. A [employer] coupon card was applied to provide patient with a $0 co-pay for Eliquis      Thank you for letting us serve your patients.   [facility]    [address], [address] [facility]    Phone: [phone]    Fax: [phone]

## 2022-08-23 NOTE — PROGRESS NOTES
Pt has complete the over night trending. The results are as follows:    Time with SpO2<88:   0:08:36,   2.3%    The pt qualifies. The results are in the pt's soft chart.

## 2022-08-24 ENCOUNTER — CARE COORDINATION (OUTPATIENT)
Dept: CASE MANAGEMENT | Age: 72
End: 2022-08-24

## 2022-08-24 ENCOUNTER — TELEPHONE (OUTPATIENT)
Dept: INTERNAL MEDICINE CLINIC | Age: 72
End: 2022-08-24

## 2022-08-24 LAB
ANTI-NUCLEAR ANTIBODY (ANA): NORMAL
ENA TO SSA (RO) ANTIBODY: 4 AU/ML (ref 0–40)
ENA TO SSB (LA) ANTIBODY: 0 AU/ML (ref 0–40)
RHEUMATOID FACTOR: <10 IU/ML (ref 0–14)
SCLERODERMA (SCL-70) AB: 0 AU/ML (ref 0–40)
SSA 60 RO IGG ANTIBODY: 0 AU/ML (ref 0–40)

## 2022-08-24 NOTE — CARE COORDINATION
Care Transitions Outreach Attempt    Call within 2 business days of discharge: Yes   Attempted to reach patient for transitions of care follow up. Unable to reach patient. Patient: Blanca Fischer Patient : 1950 MRN: [id number]    Last Discharge [facility]       Date Complaint Diagnosis Description Type Department Provider    22 Shortness of Breath; Swelling  Pleural effusion, bilateral ... ED to Hosp-Admission (Discharged) (ADMITTED) [facility] [address] Mac Valdes MD; Jessica Villareal          24 Hour Transition of Care Call:    1st Attempt to contact patient for Initial 24 Hour Transition from hospital to home Call:   Patient not reached. Left HIPAA Compliant message on Voice Mail to call CTN (number given) with any questions and an update on patient's condition since discharge. Patient has PCP & nurse visit with cardiology appointment 2022. Will continue to follow. Thao Soler LPN    [phone]  [facility] / [place] [address] Coordinator        Was this an external facility discharge?  No Discharge Facility: [facility]    Noted following upcoming appointments from discharge chart review:   [facility] follow up appointment(s):   Future Appointments   Date Time Provider Drake Simmons   2022  1:30 PM Anca Ybarra MD [address] [facility]   2022  2:00 PM SCHEDULE, [facility] João Marvin [facility] [address] [facility]   2022  7:00 AM CATH LAB 01 [address] [facility]   2022  8:00 AM [facility]EDULE, [address] [facility] [address] [facility]   2022  9:45 AM Anca Ybarra MD [address] [facility]   2022  9:45 AM Rich Hull  [address] [facility]   2023  2:00 PM SCHEDULE, [facility] VASCULAR [facility]   2023 10:30 AM Steve Hull  [address] [facility] follow up appointment(s):

## 2022-08-24 NOTE — TELEPHONE ENCOUNTER
Geov[facility] Transitions Initial Follow Up Call    Outreach made within 2 business days of discharge: Yes    Patient: Harjeet Vaughn Patient : 1950   MRN: [id number]  Reason for Admission: There are no discharge diagnoses documented for the most recent discharge. Discharge Date: 22       Spoke with: patient    Discharge department/facility: [facility]    TCM Interactive Patient Contact:  Was patient able to fill all prescriptions: Yes  Was patient instructed to bring all medications to the follow-up visit: Yes  Is patient taking all medications as directed in the discharge summary?  Yes  Does patient understand their discharge instructions: Yes  Does patient have questions or concerns that need addressed prior to 7-14 day follow up office visit: no    Scheduled appointment with PCP within 7-14 days    Follow Up  Future Appointments   Date Time Provider Drake Simmons   2022  1:30 PM Julia Mcclellan MD [address] IM [facility]   2022  2:00 PM SCHEDULE, [facility]   2022  7:00 AM CATH LAB 01 [address] [facility]   2022  8:00 AM SCHEDULE, [address] AWV LPN [address] IM [facility]   2022  9:45 AM Julia Mcclellan MD [address] IM [facility]   2022  9:45 AM Bishop Kayser Hank Borne, MD [facility] Heart [facility]   2023  2:00 PM SCHEDULE, [facility] VASCULAR [facility]   2023 10:30 AM Steve Holden MD [facility] Heart [facility]       [facility]

## 2022-08-25 ENCOUNTER — CARE COORDINATION (OUTPATIENT)
Dept: CASE MANAGEMENT | Age: 72
End: 2022-08-25

## 2022-08-25 NOTE — CARE COORDINATION
Care Transitions Outreach Attempt    Call within 2 business days of discharge: Yes   Attempted to reach patient for transitions of care follow up. Unable to reach patient. Patient: Kelly Palma Patient : 1950 MRN: [id number]    Last Discharge [facility]       Date Complaint Diagnosis Description Type Department Provider    22 Shortness of Breath; Swelling  Pleural effusion, bilateral ... ED to Hosp-Admission (Discharged) (ADMITTED) [facility] 3N Mark Segura MD; Jessica Villareal          24 Hour Transition of Care Call:    2nd Attempt to contact patient for 24 Hour Transition Call - (Discharged from Hospital to Home)  Patient was not reached. Left HIPAA Compliant message on Voice Mail to call [facility] (number given) with any questions and an update on patient's condition since discharge. Has PCP appointment 2022. FINAL CALL    Rosailnda Patiño LPN    [phone]  [facility] / [place] [address] Coordinator            Was this an external facility discharge?  No Discharge Facility: [facility]    Noted following upcoming appointments from discharge chart review:   [facility] follow up appointment(s):   Future Appointments   Date Time Provider Drake Simmons   2022  1:30 PM Beck Faust MD [facility] E S IM [facility]   2022  2:00 PM SCHEDULE, Cecilia Marvin [facility]   2022  7:00 AM CATH LAB 01 [address] [facility]   2022  8:00 AM SCHEDULE, [facility] AWV LPN [facility] E S IM [facility]   2022  9:45 AM Beck Faust MD [facility] E S IM [facility]   2022  9:45 AM Aly Bishop MD [facility]   2023  2:00 PM SCHEDULE, [facility] VASCULAR [facility] MMA   2023 10:30 AM Steve Bishop MD [facility]     Non-[facility] follow up appointment(s):

## 2022-08-30 ENCOUNTER — OFFICE VISIT (OUTPATIENT)
Dept: INTERNAL MEDICINE CLINIC | Age: 72
End: 2022-08-30
Payer: MEDICARE

## 2022-08-30 ENCOUNTER — HOSPITAL ENCOUNTER (OUTPATIENT)
Age: 72
Discharge: HOME | End: 2022-08-30
Payer: MEDICARE

## 2022-08-30 ENCOUNTER — NURSE ONLY (OUTPATIENT)
Dept: CARDIOLOGY CLINIC | Age: 72
End: 2022-08-30
Payer: MEDICARE

## 2022-08-30 VITALS
SYSTOLIC BLOOD PRESSURE: 126 MMHG | RESPIRATION RATE: 16 BRPM | HEART RATE: 80 BPM | OXYGEN SATURATION: 97 % | DIASTOLIC BLOOD PRESSURE: 66 MMHG

## 2022-08-30 DIAGNOSIS — I50.30 DIASTOLIC HEART FAILURE, UNSPECIFIED HF CHRONICITY (HCC): ICD-10-CM

## 2022-08-30 DIAGNOSIS — Z09 HOSPITAL DISCHARGE FOLLOW-UP: ICD-10-CM

## 2022-08-30 DIAGNOSIS — I50.30 DIASTOLIC HEART FAILURE, UNSPECIFIED HF CHRONICITY (HCC): Primary | ICD-10-CM

## 2022-08-30 DIAGNOSIS — I31.39 PERICARDIAL EFFUSION: ICD-10-CM

## 2022-08-30 DIAGNOSIS — E11.9 CONTROLLED TYPE 2 DIABETES MELLITUS WITHOUT COMPLICATION, WITHOUT LONG-TERM CURRENT USE OF INSULIN (HCC): ICD-10-CM

## 2022-08-30 DIAGNOSIS — I48.91 ATRIAL FIBRILLATION WITH RVR (HCC): ICD-10-CM

## 2022-08-30 LAB
ABO/RH: NORMAL
ALBUMIN SERPL-MCNC: 3.5 GM/DL (ref 3.4–5)
ALP BLD-CCNC: 81 IU/L (ref 40–128)
ALT SERPL-CCNC: 11 U/L (ref 10–40)
ANION GAP SERPL CALCULATED.3IONS-SCNC: 12 MMOL/L (ref 4–16)
ANION GAP SERPL CALCULATED.3IONS-SCNC: 13 MMOL/L (ref 4–16)
ANTIBODY SCREEN: NEGATIVE
AST SERPL-CCNC: 14 IU/L (ref 15–37)
BASOPHILS ABSOLUTE: 0.1 K/CU MM
BASOPHILS RELATIVE PERCENT: 0.7 % (ref 0–1)
BILIRUB SERPL-MCNC: 0.4 MG/DL (ref 0–1)
BUN BLDV-MCNC: 28 MG/DL (ref 6–23)
BUN BLDV-MCNC: 28 MG/DL (ref 6–23)
CALCIUM SERPL-MCNC: 10.1 MG/DL (ref 8.3–10.6)
CALCIUM SERPL-MCNC: 10.3 MG/DL (ref 8.3–10.6)
CHLORIDE BLD-SCNC: 102 MMOL/L (ref 99–110)
CHLORIDE BLD-SCNC: 102 MMOL/L (ref 99–110)
CO2: 23 MMOL/L (ref 21–32)
CO2: 23 MMOL/L (ref 21–32)
COMMENT: NORMAL
CREAT SERPL-MCNC: 0.8 MG/DL (ref 0.6–1.1)
CREAT SERPL-MCNC: 0.8 MG/DL (ref 0.6–1.1)
DIFFERENTIAL TYPE: ABNORMAL
EOSINOPHILS ABSOLUTE: 0.1 K/CU MM
EOSINOPHILS RELATIVE PERCENT: 1.4 % (ref 0–3)
GFR AFRICAN AMERICAN: >60 ML/MIN/1.73M2
GFR AFRICAN AMERICAN: >60 ML/MIN/1.73M2
GFR NON-AFRICAN AMERICAN: >60 ML/MIN/1.73M2
GFR NON-AFRICAN AMERICAN: >60 ML/MIN/1.73M2
GLUCOSE BLD-MCNC: 162 MG/DL (ref 70–99)
GLUCOSE BLD-MCNC: 163 MG/DL (ref 70–99)
HCT VFR BLD CALC: 39.3 % (ref 37–47)
HEMOGLOBIN: 12.2 GM/DL (ref 12.5–16)
HIGH SENSITIVE C-REACTIVE PROTEIN: 11.2 MG/L
IMMATURE NEUTROPHIL %: 0.5 % (ref 0–0.43)
LYMPHOCYTES ABSOLUTE: 1.3 K/CU MM
LYMPHOCYTES RELATIVE PERCENT: 15.7 % (ref 24–44)
MAGNESIUM: 2.1 MG/DL (ref 1.8–2.4)
MCH RBC QN AUTO: 28.7 PG (ref 27–31)
MCHC RBC AUTO-ENTMCNC: 31 % (ref 32–36)
MCV RBC AUTO: 92.5 FL (ref 78–100)
MONOCYTES ABSOLUTE: 1 K/CU MM
MONOCYTES RELATIVE PERCENT: 11.9 % (ref 0–4)
NUCLEATED RBC %: 0 %
PDW BLD-RTO: 15.1 % (ref 11.7–14.9)
PLATELET # BLD: 389 K/CU MM (ref 140–440)
PMV BLD AUTO: 11.1 FL (ref 7.5–11.1)
POTASSIUM SERPL-SCNC: 4.5 MMOL/L (ref 3.5–5.1)
POTASSIUM SERPL-SCNC: 4.6 MMOL/L (ref 3.5–5.1)
PRO-BNP: 1383 PG/ML
RBC # BLD: 4.25 M/CU MM (ref 4.2–5.4)
SEGMENTED NEUTROPHILS ABSOLUTE COUNT: 5.8 K/CU MM
SEGMENTED NEUTROPHILS RELATIVE PERCENT: 69.8 % (ref 36–66)
SODIUM BLD-SCNC: 137 MMOL/L (ref 135–145)
SODIUM BLD-SCNC: 138 MMOL/L (ref 135–145)
TOTAL IMMATURE NEUTOROPHIL: 0.04 K/CU MM
TOTAL NUCLEATED RBC: 0 K/CU MM
TOTAL PROTEIN: 5.9 GM/DL (ref 6.4–8.2)
WBC # BLD: 8.3 K/CU MM (ref 4–10.5)

## 2022-08-30 PROCEDURE — 83735 ASSAY OF MAGNESIUM: CPT

## 2022-08-30 PROCEDURE — 86901 BLOOD TYPING SEROLOGIC RH(D): CPT

## 2022-08-30 PROCEDURE — 80053 COMPREHEN METABOLIC PANEL: CPT

## 2022-08-30 PROCEDURE — 83883 ASSAY NEPHELOMETRY NOT SPEC: CPT

## 2022-08-30 PROCEDURE — 86850 RBC ANTIBODY SCREEN: CPT

## 2022-08-30 PROCEDURE — 80048 BASIC METABOLIC PNL TOTAL CA: CPT

## 2022-08-30 PROCEDURE — 36415 COLL VENOUS BLD VENIPUNCTURE: CPT

## 2022-08-30 PROCEDURE — 83880 ASSAY OF NATRIURETIC PEPTIDE: CPT

## 2022-08-30 PROCEDURE — 99496 TRANSJ CARE MGMT HIGH F2F 7D: CPT | Performed by: INTERNAL MEDICINE

## 2022-08-30 PROCEDURE — 99211 OFF/OP EST MAY X REQ PHY/QHP: CPT | Performed by: INTERNAL MEDICINE

## 2022-08-30 PROCEDURE — 85025 COMPLETE CBC W/AUTO DIFF WBC: CPT

## 2022-08-30 PROCEDURE — 86320 SERUM IMMUNOELECTROPHORESIS: CPT

## 2022-08-30 PROCEDURE — 1111F DSCHRG MED/CURRENT MED MERGE: CPT | Performed by: INTERNAL MEDICINE

## 2022-08-30 PROCEDURE — 86141 C-REACTIVE PROTEIN HS: CPT

## 2022-08-30 PROCEDURE — 86900 BLOOD TYPING SEROLOGIC ABO: CPT

## 2022-08-30 NOTE — PROGRESS NOTES
Post-Discharge Transitional Care  Follow Up      Yadi Whitfield   YOB: 1950    Date of Office Visit:  8/30/2022  Date of Hospital Admission: 8/18/22  Date of Hospital Discharge: 8/23/22  Risk of hospital readmission (high >=14%. Medium >=10%) :Readmission Risk Score: 8.5      Care management risk score Rising risk (score 2-5) and Complex Care (Scores >=6): No Risk Score On File     Non face to face  following discharge, date last encounter closed (first attempt may have been earlier): 08/25/2022    Call initiated 2 business days of discharge: Yes    ASSESSMENT/PLAN:   Diastolic heart failure, unspecified HF chronicity ([address]- CLINICALLY IMPROVED BUT STILL SYMPTOMATIC W SOB/REECE, SOME LEG EDEMA. RECHECK BNP AND CONT F/U CARDIO  -     Brain Natriuretic Peptide; Future  Atrial fibrillation with RVR ([address]- CLINICALLY NOW BACK IN NSR AND FOR CONT F/U CARDIO, CHECK LAB  -     Magnesium; Future  Pericardial effusion- ? ETIOLOGY? ALSO SCR LAB FOR POSSIBLE AMYLOIDOSIS. LHC ALSO TO BE DONE END OF THE WEEK. -     C-Reactive Protein; Future  -     Comprehensive Metabolic Panel; Future  -     IMMUNOFIXATION SERUM PROFILE; Future  -     Kappa/Lambda Quantitative Free Light Chains, Serum; Future  -     IMMUNOFIXATION, URINE; Future  Controlled type 2 diabetes mellitus without complication, without long-term current use of insulin ([address]- CONT DM [address] discharge follow-up  -     MD DISCHARGE MEDS RECONCILED W/ CURRENT OUTPATIENT MED LIST    Medical Decision Making: high complexity  Return in about 3 weeks (around 9/20/2022) for routine. On this date 8/30/2022 I have spent 25 minutes reviewing previous notes, test results and face to face with the patient discussing the diagnosis and importance of compliance with the treatment plan as well as documenting on the day of the visit.        Subjective:   HPI:  Follow up of Hospital problems/diagnosis(es): SEE [address] [facility]    Inpatient course: Discharge summary reviewed- see chart. Hospital Course:   Fabiola Quiñones is a 71 y.o.  female  who presents with Atrial fibrillation with RVR ([address]   Pt with history of hypertension, prediabetes who presents with shortness of breath. Pt reported bilateral legs and hand swelling for 4 months. History the heart rate was noted to be 170s. Patient did receive Cardizem and heart rate did improve. Afib RVR - resolved  Patient was started on Eliquis and antiarrhythmics were adjusted by cardiology. Continue Cardizem 120 mg daily and metoprolol 50 mg twice daily. Per patient they have an outpatient cardiac cath scheduled in September. Patient understands importance of following up with appointment. CHF exacerbation acute on chronic diastolic  -The pt was started on IV Diuresis. ProBNP improved. - LV function normal LVEF of 50 to 45%. -Cardiology saw the pt and adjusted medications as well.   -Renal functions remained stable. Possible scleroderma-myositis vs overlap syndrome  - f/u anti-scleroderma, RODOLFO, RF, anti-SSA, anti-SSB to evaluate rheumatologic etiology as out patient. - patient states she has a rheumatology appointment set up  -Patient also has weakness, did work with PT OT, the patient and the family refuse rehab. Continue PT OT as an outpatient.  -Further has been the patient has rheumatology appointment in September as well. Bilateral pleural effusions  - unlikely to be recurrence of breast cancer as effusions are bilateral in setting of elevated bnp  -The patient underwent thoracocentesis, fluid studies indicating transudative effusion  - unremarkable white count of around 1000/cubic mm,  -Patient to follow-up with PCP for cytology results. Patient was seen in hospital for CHF. I am prescribing oxygen because the diagnosis and testing requires the patient to have oxygen in the home. Conditions will improve or be benefited by oxygen use.   The patient is able to perform good mobility and therefore requires the use of a portable oxygen system for ambulation. The patient expressed appropriate understanding of and agreement with the discharge recommendations, medications, and plan. Consults this admission:  IP CONSULT TO CARDIOLOGY  IP CONSULT TO HOSPITALIST  IP CONSULT TO INTERVENTIONAL RADIOLOGY      Interval history/Current status:   HAD ONSET OF AFIB W RVR AND CHF, SIGNIFICANT BILAT PL EFFUSIONS. HOSPITALIST WAS CONCERNED ABOUT A SCLERODERMA/MYOSITIS OVERLAP SYNDROME BUT SCR TESTS HAVE COME BACK NEG, SHE HAS F/U W RHEUMATLOGY NEXT WEEK IN COL. CHF, FOR [facility] END OF THIS WEEK W DR CASTRO.  BNP DOWN TO 1400 FR PRIOR 2000. SOB IMPROVED BUT STILL FEELS WEAK. SHE HAS A PERICARDIAL EFFUSION NOTED ON ECHO 4/11 OF UNCERTAIN ETIOLOGY, ASKED HER TO CHECK SPECIFICALLY W DR CASTRO FOR CAUSE AND IF NEEDS FURTHER EVALUATION. DM- RECENT SUGARS STABLE  Lab Results   Component Value Date    LABA1C 6.5 (H) 08/18/2022    LABA1C 6.8 06/10/2022    LABA1C 5.9 02/16/2022     Lab Results   Component Value Date    LABMICR <1.20 03/17/2022    LDLCALC see below 06/10/2022    CREATININE 0.9 08/22/2022         Patient Active Problem List   Diagnosis    Hyperlipidemia    History of breast cancer    Lumbar herniated disc    Low back pain    GERD (gastroesophageal reflux disease)    Irritable bowel syndrome    Migraine headache    Diabetes type 2, controlled ([address]    Hypercalcemia    Carcinoma of right female breast, unspecified estrogen receptor status, unspecified site of breast ([address]    Peripheral edema    Chest pain    Venous insufficiency    Shortness of breath    Edema of lower extremity    Varicose veins of both legs with edema    Atrial fibrillation with RVR ([address]       Medications listed as ordered at the time of discharge from hospital     Medication List            Accurate as of August 30, 2022  9:38 AM. If you have any questions, ask your nurse or doctor.                 CONTINUE taking these medications      apixaban 5 MG Tabs tablet  Commonly known as: ELIQUIS  Take 1 tablet by mouth 2 times daily     blood glucose test strips  Test twice a day & as needed for symptoms of irregular blood glucose.      dilTIAZem 120 MG extended release capsule  Commonly known as: CARDIZEM CD  Take 1 capsule by mouth daily     furosemide 40 MG tablet  Commonly known as: Lasix  Take 1 tablet by mouth daily     glipiZIDE 5 MG tablet  Commonly known as: GLUCOTROL  Take 1 tablet by mouth daily     Lancets Misc  1 each by Does not apply route 2 times daily     lisinopril 20 MG tablet  Commonly known as: PRINIVIL;ZESTRIL  Take 1 tablet by mouth daily     metFORMIN 500 MG tablet  Commonly known as: GLUCOPHAGE  Take 1 tablet by mouth every morning (before breakfast)     metoprolol tartrate 50 MG tablet  Commonly known as: LOPRESSOR  Take 1 tablet by mouth 2 times daily     nicotine polacrilex 2 MG lozenge  Commonly known as: COMMIT  Take 1 lozenge by mouth every hour as needed for Smoking cessation     PARoxetine 20 MG tablet  Commonly known as: PAXIL  Take 1 tablet by mouth every morning     rosuvastatin 10 MG tablet  Commonly known as: CRESTOR  Take 1 tablet by mouth nightly     traZODone 100 MG tablet  Commonly known as: DESYREL  Take 1 tablet by mouth nightly                Medications marked \"taking\" at this time  Outpatient Medications Marked as Taking for the 8/30/22 encounter (Office Visit) with Maria Victoria Tripp MD   Medication Sig Dispense Refill    apixaban (ELIQUIS) 5 MG TABS tablet Take 1 tablet by mouth 2 times daily 60 tablet 1    lisinopril (PRINIVIL;ZESTRIL) 20 MG tablet Take 1 tablet by mouth daily 30 tablet 1    metoprolol tartrate (LOPRESSOR) 50 MG tablet Take 1 tablet by mouth 2 times daily 60 tablet 1    dilTIAZem (CARDIZEM CD) 120 MG extended release capsule Take 1 capsule by mouth daily 30 capsule 1    nicotine polacrilex (COMMIT) 2 MG lozenge Take 1 lozenge by mouth every hour as needed for Smoking cessation 100 each 3    metFORMIN (GLUCOPHAGE) 500 MG tablet Take 1 tablet by mouth every morning (before breakfast) 90 tablet 1    glipiZIDE (GLUCOTROL) 5 MG tablet Take 1 tablet by mouth daily 90 tablet 1    furosemide (LASIX) 40 MG tablet Take 1 tablet by mouth daily 90 tablet 1    traZODone (DESYREL) 100 MG tablet Take 1 tablet by mouth nightly 90 tablet 1    rosuvastatin (CRESTOR) 10 MG tablet Take 1 tablet by mouth nightly 90 tablet 1    PARoxetine (PAXIL) 20 MG tablet Take 1 tablet by mouth every morning 90 tablet 1    Lancets MISC 1 each by Does not apply route 2 times daily 100 each 0    blood glucose monitor strips Test twice a day & as needed for symptoms of irregular blood glucose. 100 strip 3        Medications patient taking as of now reconciled against medications ordered at time of hospital discharge: Yes    A comprehensive review of systems was negative except for what was noted in the HPI.     Objective:    /66   Pulse 80   Resp 16   SpO2 97%   General Appearance: alert and oriented to person, place and time, well developed and well- nourished, in no acute distress  Skin: warm and dry, no rash or erythema  Head: normocephalic and atraumatic  Eyes: pupils equal, round, and reactive to light, extraocular eye movements intact, conjunctivae normal  Neck: supple and non-tender without mass, no thyromegaly or thyroid nodules, no cervical lymphadenopathy  Pulmonary/Chest: clear to auscultation bilaterally- no wheezes, rales or rhonchi, normal air movement, no respiratory distress  Cardiovascular: normal rate, regular rhythm, normal S1 and S2, no murmurs, rubs, clicks, or gallops, distal pulses intact, no carotid bruits  Abdomen: soft, non-tender, non-distended, normal bowel sounds, no masses or organomegaly  Extremities: no cyanosis, clubbing but w 1+ BILAT LOWER LEG EDEMA  Musculoskeletal: normal range of motion, no joint swelling, deformity or tenderness  Neurologic: no cranial nerve deficit, gait, coordination and speech normal      An electronic signature was used to authenticate this note.   --Farhat Martinez MD

## 2022-08-30 NOTE — PROGRESS NOTES
Patient was here in office & educated on 24 [facility] for Dx: SOB Pul HTN. Procedure is scheduled for 9/2/22 @ 7:00, w/arrival @ 5:45, @ [facility]. Pre-admission orders were given to patient for labs which are due 8/30/22 @ [facility]. Procedure and risks were explained to patient. Consent forms were signed. Instructions were given to patient to remain NPO after midnight the night before procedure. Patient may take morning meds the morning of procedure with small amount of water. Patient is asked to call hospital @ [phone], 1 to 2 days before procedure to pre-register. Patient was notified that procedure could be delayed due to an emergency. Patient voiced understanding.  Copies of consent, pre-testing orders, & instructions scanned into media

## 2022-08-31 NOTE — RESULT ENCOUNTER NOTE
Please call pt, her heart failure tracking # is about the same so steady since the hospital, magnesium level is normal, she does have sl high inflammation level which is likely from stress w recent hospital stay as well. We are pending lab also to screen for abnormal proteins as a potential cause for fluid around her heart and will call when results are back.

## 2022-09-01 LAB
KAPPA QUANT FREE LIGHT CHAINS: 46.46 MG/L (ref 3.3–19.4)
KAPPA/LAMBDA FREE LIGHT CHAIN RATIO: 1.29 (ref 0.26–1.65)
LAMBDA FREE LIGHT CHAINS QNT: 35.94 MG/L (ref 5.71–26.3)

## 2022-09-02 ENCOUNTER — TELEPHONE (OUTPATIENT)
Dept: CARDIOLOGY CLINIC | Age: 72
End: 2022-09-02

## 2022-09-02 DIAGNOSIS — E85.9 AMYLOIDOSIS, UNSPECIFIED TYPE (HCC): Primary | ICD-10-CM

## 2022-09-02 NOTE — TELEPHONE ENCOUNTER
Patient's  Masha Watson called stating patient's LHC had to be cancelled because patient took Eliquis. Also  stated CXR showed pleural effusion and Dr. Hernán Castro wanted Dr. Madeline Dawn to be aware of this.  advised that I will speak to Dr. Madeline Dawn to determine when to reschedule cath and call him back to advise. [facility] agreed.

## 2022-09-06 ENCOUNTER — TELEMEDICINE (OUTPATIENT)
Dept: INTERNAL MEDICINE CLINIC | Age: 72
End: 2022-09-06
Payer: MEDICARE

## 2022-09-06 ENCOUNTER — TELEPHONE (OUTPATIENT)
Dept: INTERNAL MEDICINE CLINIC | Age: 72
End: 2022-09-06

## 2022-09-06 DIAGNOSIS — Z00.00 MEDICARE ANNUAL WELLNESS VISIT, SUBSEQUENT: Primary | ICD-10-CM

## 2022-09-06 DIAGNOSIS — R32 URINARY INCONTINENCE IN FEMALE: Primary | ICD-10-CM

## 2022-09-06 PROCEDURE — G0439 PPPS, SUBSEQ VISIT: HCPCS | Performed by: INTERNAL MEDICINE

## 2022-09-06 PROCEDURE — 1123F ACP DISCUSS/DSCN MKR DOCD: CPT | Performed by: INTERNAL MEDICINE

## 2022-09-06 RX ORDER — OXYBUTYNIN CHLORIDE 5 MG/1
5 TABLET ORAL 2 TIMES DAILY
Qty: 60 TABLET | Refills: 3 | Status: SHIPPED | OUTPATIENT
Start: 2022-09-06

## 2022-09-06 ASSESSMENT — PATIENT HEALTH QUESTIONNAIRE - PHQ9
SUM OF ALL RESPONSES TO PHQ QUESTIONS 1-9: 4
2. FEELING DOWN, DEPRESSED OR HOPELESS: 0
SUM OF ALL RESPONSES TO PHQ QUESTIONS 1-9: 4
7. TROUBLE CONCENTRATING ON THINGS, SUCH AS READING THE NEWSPAPER OR WATCHING TELEVISION: 0
SUM OF ALL RESPONSES TO PHQ QUESTIONS 1-9: 4
5. POOR APPETITE OR OVEREATING: 1
1. LITTLE INTEREST OR PLEASURE IN DOING THINGS: 0
SUM OF ALL RESPONSES TO PHQ QUESTIONS 1-9: 4
9. THOUGHTS THAT YOU WOULD BE BETTER OFF DEAD, OR OF HURTING YOURSELF: 0
3. TROUBLE FALLING OR STAYING ASLEEP: 0
SUM OF ALL RESPONSES TO PHQ9 QUESTIONS 1 & 2: 0
4. FEELING TIRED OR HAVING LITTLE ENERGY: 3
8. MOVING OR SPEAKING SO SLOWLY THAT OTHER PEOPLE COULD HAVE NOTICED. OR THE OPPOSITE, BEING SO FIGETY OR RESTLESS THAT YOU HAVE BEEN MOVING AROUND A LOT MORE THAN USUAL: 0
6. FEELING BAD ABOUT YOURSELF - OR THAT YOU ARE A FAILURE OR HAVE LET YOURSELF OR YOUR FAMILY DOWN: 0
10. IF YOU CHECKED OFF ANY PROBLEMS, HOW DIFFICULT HAVE THESE PROBLEMS MADE IT FOR YOU TO DO YOUR WORK, TAKE CARE OF THINGS AT HOME, OR GET ALONG WITH OTHER PEOPLE: 0

## 2022-09-06 ASSESSMENT — LIFESTYLE VARIABLES
HOW OFTEN DO YOU HAVE A DRINK CONTAINING ALCOHOL: NEVER
HOW MANY STANDARD DRINKS CONTAINING ALCOHOL DO YOU HAVE ON A TYPICAL DAY: PATIENT DOES NOT DRINK

## 2022-09-06 NOTE — TELEPHONE ENCOUNTER
Patient's  called again to see when patient is scheduled for [facility]. Per Dr. Liana Zamora patient to be added on 9/12 as an Add on. Patient notified. Instructions reviewed with patient. Patient acknowledged understanding. Patient scheduled 9/12/22 @ 12:00 with arrival time 10:00.

## 2022-09-06 NOTE — PROGRESS NOTES
Medicare Annual Wellness Visit    Amber Alvarenga is here for Medicare AWV    Assessment & Plan   Medicare annual wellness visit, subsequent    Recommendations for Preventive Services Due: see orders and patient instructions/AVS.  Recommended screening schedule for the next 5-10 years is provided to the patient in written form: see Patient Instructions/AVS.     No follow-ups on file. Subjective       Patient's complete Health Risk Assessment and screening values have been reviewed and are found in Flowsheets. The following problems were reviewed today and where indicated follow up appointments were made and/or referrals ordered. Positive Risk Factor Screenings with Interventions:    Fall Risk:  Do you feel unsteady or are you worried about falling? : no  2 or more falls in past year?: (!) yes  Fall with injury in past year?: no   Fall Risk Interventions:    Patient declines any further evaluation/treatment for this issue    Cognitive:   Words recalled: 2 Words Recalled  Total Score Interpretation: Abnormal Mini-Cog  Did the patient refuse to take the cognition test?: No  Cognitive Impairment Interventions:  Patient declines any further evaluation/treatment for cognitive impairment  Patient did not sound as if she was cognitively impaired           General Health and ACP:  General  In general, how would you say your health is?: (!) Poor (weakness, waiting to get a heart cath)  In the past 7 days, have you experienced any of the following: New or Increased Pain, New or Increased Fatigue, Loneliness, Social Isolation, Stress or Anger?: (!) Yes  Select all that apply: (!) New or Increased Fatigue  Do you get the social and emotional support that you need?: Yes  Do you have a Living Will?: (!) No    Advance Directives       Power of  Living Will ACP-Advance Directive ACP-Power of     Not on File Not on File Not on File Not on File          General Health Risk Interventions:  Poor self-assessment of health status: Patient is currently waiting to be scheduled for a heart catheterization and is not feeling well. She has seen her PCP, and cardiology  Fatigue: patient declines any further evaluation/treatment for this issue, patient recently hospitalized for CHF and does have fatigue issues, and is awaiting a heart cath. No Living Will: Advance Care Planning addressed with patient today    Health Habits/Nutrition:  Physical Activity: Inactive    Days of Exercise per Week: 0 days    Minutes of Exercise per Session: 0 min     Have you lost any weight without trying in the past 3 months?: (!) Yes (13 lbs)     Have you seen the dentist within the past year?: (!) No  Health Habits/Nutrition Interventions:  Inadequate physical activity:  patient is not ready to increase his/her physical activity level at this time  Nutritional issues:  patient is not ready to address his/her nutritional/weight issues at this time  Dental exam overdue:  patient encouraged to make appointment with his/her dentist      ADLs:  In the past 7 days, did you need help from others to perform any of the following everyday activities: Eating, dressing, grooming, bathing, toileting, or walking/balance?: No  In the past 7 days, did you need help from others to take care of any of the following: Laundry, housekeeping, banking/finances, shopping, telephone use, food preparation, transportation, or taking medications?: (!) Yes  Select all that apply: [employer], Transportation ()  ADL Interventions:  Patient declines any further evaluation/treatment for this issue  Patient states her  has been helping her with ADLs          Objective      Patient-Reported Vitals  No data recorded          Allergies   Allergen Reactions    Codeine      \"jittery\"  Feels anxoius     Prior to Visit Medications    Medication Sig Taking?  Authorizing Provider   apixaban (ELIQUIS) 5 MG TABS tablet Take 1 tablet by mouth 2 times daily Yes Keyana Duncan MD lisinopril (PRINIVIL;ZESTRIL) 20 MG tablet Take 1 tablet by mouth daily Yes Max Barker MD   metoprolol tartrate (LOPRESSOR) 50 MG tablet Take 1 tablet by mouth 2 times daily Yes Max Barker MD   dilTIAZem (CARDIZEM CD) 120 MG extended release capsule Take 1 capsule by mouth daily Yes Max Barker MD   metFORMIN (GLUCOPHAGE) 500 MG tablet Take 1 tablet by mouth every morning (before breakfast) Yes Alexia Omalley MD   glipiZIDE (GLUCOTROL) 5 MG tablet Take 1 tablet by mouth daily Yes Alexia Omalley MD   traZODone (DESYREL) 100 MG tablet Take 1 tablet by mouth nightly Yes Alexia Omalley MD   rosuvastatin (CRESTOR) 10 MG tablet Take 1 tablet by mouth nightly Yes Alexia Omalley MD   PARoxetine (PAXIL) 20 MG tablet Take 1 tablet by mouth every morning Yes Alexia Omalley MD   Lancets MISC 1 each by Does not apply route 2 times daily Yes Alexia Omalley MD   blood glucose monitor strips Test twice a day & as needed for symptoms of irregular blood glucose. Yes Alexia Omalley MD   nicotine polacrilex (COMMIT) 2 MG lozenge Take 1 lozenge by mouth every hour as needed for Smoking cessation  Max Barker MD   amLODIPine-benazepril (LOTREL) 5-10 MG per capsule Take 1 capsule by mouth in the morning. Peg Penn MD   allopurinol (ZYLOPRIM) 100 MG tablet Take 2 tablets by mouth in the morning. Patient not taking: Reported on 8/18/2022  Aleixa Omalley MD   furosemide (LASIX) 40 MG tablet Take 1 tablet by mouth daily  Alexia Omalley MD       [facility] (Including outside providers/suppliers regularly involved in providing care):   Patient Care Team:  Alexia Omalley MD as PCP - General  Alexia Omalley MD as PCP - [facility] Provider     Reviewed and updated this visit:  Allergies  Meds             IMargarita LPN, 9/4/9572, performed the documented evaluation under the direct supervision of the attending physician.     Juan A Mes[place],

## 2022-09-06 NOTE — PATIENT INSTRUCTIONS
Personalized Preventive Plan for Fer [place] - 9/6/2022  Medicare offers a range of preventive health benefits. Some of the tests and screenings are paid in full while other may be subject to a deductible, co-insurance, and/or copay. Some of these benefits include a comprehensive review of your medical history including lifestyle, illnesses that may run in your family, and various assessments and screenings as appropriate. After reviewing your medical record and screening and assessments performed today your provider may have ordered immunizations, labs, imaging, and/or referrals for you. A list of these orders (if applicable) as well as your Preventive Care list are included within your After Visit Summary for your review. Other Preventive Recommendations:    A preventive eye exam performed by an eye specialist is recommended every 1-2 years to screen for glaucoma; cataracts, macular degeneration, and other eye disorders. A preventive dental visit is recommended every 6 months. Try to get at least 150 minutes of exercise per week or 10,000 steps per day on a pedometer . Order or download the FREE \"Exercise & Physical Activity: Your Everyday Guide\" from The "[employer]. Call 8-435.380.3434 or search The [employer] online. You need 1035-2399 mg of calcium and 9638-8614 IU of vitamin D per day. It is possible to meet your calcium requirement with diet alone, but a vitamin D supplement is usually necessary to meet this goal.  When exposed to the sun, use a sunscreen that protects against both UVA and UVB radiation with an SPF of 30 or greater. Reapply every 2 to 3 hours or after sweating, drying off with a towel, or swimming. Always wear a seat belt when traveling in a car. Always wear a helmet when riding a bicycle or motorcycle. Personalized Preventive Plan for [place] - 9/6/2022  Medicare offers a range of preventive health benefits.  Some of the tests and screenings are paid in full while other may be subject to a deductible, co-insurance, and/or copay. Some of these benefits include a comprehensive review of your medical history including lifestyle, illnesses that may run in your family, and various assessments and screenings as appropriate. After reviewing your medical record and screening and assessments performed today your provider may have ordered immunizations, labs, imaging, and/or referrals for you. A list of these orders (if applicable) as well as your Preventive Care list are included within your After Visit Summary for your review. Other Preventive Recommendations:    A preventive eye exam performed by an eye specialist is recommended every 1-2 years to screen for glaucoma; cataracts, macular degeneration, and other eye disorders. A preventive dental visit is recommended every 6 months. Try to get at least 150 minutes of exercise per week or 10,000 steps per day on a pedometer . Order or download the FREE \"Exercise & Physical Activity: Your Everyday Guide\" from The [employer]. Call 9-526.476.9659 or search The [employer] online. You need 9608-8959 mg of calcium and 1130-8725 IU of vitamin D per day. It is possible to meet your calcium requirement with diet alone, but a vitamin D supplement is usually necessary to meet this goal.  When exposed to the sun, use a sunscreen that protects against both UVA and UVB radiation with an SPF of 30 or greater. Reapply every 2 to 3 hours or after sweating, drying off with a towel, or swimming. Always wear a seat belt when traveling in a car. Always wear a helmet when riding a bicycle or motorcycle.

## 2022-09-06 NOTE — TELEPHONE ENCOUNTER
Patient states she has been having urinary incontinence prior to her last hospital encounter. She states she forgot to tell you. She has been using depends and is asking if there is something you could prescribe for her. [facility]acy is [facility]. She also reports her BP last night was 146/62, and states her hands and LE are swollen, with pitting edema in LE. She is still waiting for her heart cath to be rescheduled and advised them to call cardiology this afternoon if they don't contact her this morning. Thank you.

## 2022-09-08 DIAGNOSIS — E11.9 CONTROLLED TYPE 2 DIABETES MELLITUS WITHOUT COMPLICATION, WITHOUT LONG-TERM CURRENT USE OF INSULIN (HCC): ICD-10-CM

## 2022-09-09 ENCOUNTER — TELEPHONE (OUTPATIENT)
Dept: CARDIOLOGY CLINIC | Age: 72
End: 2022-09-09

## 2022-09-09 NOTE — TELEPHONE ENCOUNTER
Reminder call. Left message on voicemail. Instructions reviewed. Reminded to Hold Eliquis 2 days before procedure. Patient acknowledged understanding.  Reminded of F/U appointment 9/21/22 @ 9:45

## 2022-09-12 ENCOUNTER — TELEPHONE (OUTPATIENT)
Dept: CARDIOLOGY CLINIC | Age: 72
End: 2022-09-12

## 2022-09-12 ENCOUNTER — HOSPITAL ENCOUNTER (OUTPATIENT)
Dept: CARDIAC CATH/INVASIVE PROCEDURES | Age: 72
Discharge: HOME | End: 2022-09-12

## 2022-09-12 NOTE — TELEPHONE ENCOUNTER
Procedure rescheduled due to physician illness. [facility] - 9/30/22 @ 7:00  Arrival time 5:45. Instructions reviewed with patient. Patient advised to resume meds at this time. Pt acknowledged and agreed.

## 2022-09-19 ENCOUNTER — TELEPHONE (OUTPATIENT)
Dept: INTERNAL MEDICINE CLINIC | Age: 72
End: 2022-09-19

## 2022-09-21 DIAGNOSIS — F51.01 PRIMARY INSOMNIA: ICD-10-CM

## 2022-09-21 RX ORDER — TRAZODONE HYDROCHLORIDE 100 MG/1
TABLET ORAL
Qty: 90 TABLET | Refills: 1 | Status: SHIPPED | OUTPATIENT
Start: 2022-09-21 | End: 2022-09-23

## 2022-09-22 LAB
A/G RATIO: 1.3 (ref 1.2–2.2)
ALBUMIN SERPL-MCNC: 3.5 G/DL (ref 3.7–4.7)
ALP BLD-CCNC: 72 IU/L (ref 44–121)
ALT SERPL-CCNC: 8 IU/L (ref 0–32)
AST SERPL-CCNC: 13 IU/L (ref 0–40)
BILIRUB SERPL-MCNC: 0.3 MG/DL (ref 0–1.2)
BUN / CREAT RATIO: 32 (ref 12–28)
BUN BLDV-MCNC: 35 MG/DL (ref 8–27)
CALCIUM SERPL-MCNC: 10 MG/DL (ref 8.7–10.3)
CHLORIDE BLD-SCNC: 101 MMOL/L (ref 96–106)
CO2: 23 MMOL/L (ref 20–29)
CREAT SERPL-MCNC: 1.1 MG/DL (ref 0.57–1)
EGFR (CKD-EPI): 54 ML/MIN/1.73
GLOBULIN: 2.8 G/DL (ref 1.5–4.5)
GLUCOSE BLD-MCNC: 143 MG/DL (ref 65–99)
HCT VFR BLD CALC: 34.3 % (ref 34–46.6)
HEMOGLOBIN: 11.5 G/DL (ref 11.1–15.9)
MAGNESIUM: 2.1 MG/DL (ref 1.6–2.3)
MCH RBC QN AUTO: 29 PG (ref 26.6–33)
MCHC RBC AUTO-ENTMCNC: 33.5 G/DL (ref 31.5–35.7)
MCV RBC AUTO: 87 FL (ref 79–97)
PDW BLD-RTO: 14.3 % (ref 11.7–15.4)
PLATELET # BLD: 327 X10E3/UL (ref 150–450)
POTASSIUM SERPL-SCNC: 4.4 MMOL/L (ref 3.5–5.2)
RBC # BLD: 3.96 X10E6/UL (ref 3.77–5.28)
SODIUM BLD-SCNC: 139 MMOL/L (ref 134–144)
TOTAL PROTEIN: 6.3 G/DL (ref 6–8.5)
WBC # BLD: 6.4 X10E3/UL (ref 3.4–10.8)

## 2022-09-23 ENCOUNTER — APPOINTMENT (OUTPATIENT)
Dept: GENERAL RADIOLOGY | Age: 72
DRG: 280 | End: 2022-09-23
Payer: MEDICARE

## 2022-09-23 ENCOUNTER — HOSPITAL ENCOUNTER (INPATIENT)
Age: 72
LOS: 9 days | Discharge: HOME HEALTH | DRG: 280 | End: 2022-10-03
Attending: EMERGENCY MEDICINE | Admitting: STUDENT IN AN ORGANIZED HEALTH CARE EDUCATION/TRAINING PROGRAM
Payer: MEDICARE

## 2022-09-23 ENCOUNTER — TELEMEDICINE (OUTPATIENT)
Dept: INTERNAL MEDICINE CLINIC | Age: 72
End: 2022-09-23

## 2022-09-23 DIAGNOSIS — C50.911 CARCINOMA OF RIGHT FEMALE BREAST, UNSPECIFIED ESTROGEN RECEPTOR STATUS, UNSPECIFIED SITE OF BREAST (HCC): ICD-10-CM

## 2022-09-23 DIAGNOSIS — J96.01 ACUTE RESPIRATORY FAILURE WITH HYPOXIA (HCC): Primary | ICD-10-CM

## 2022-09-23 DIAGNOSIS — Z09 HOSPITAL DISCHARGE FOLLOW-UP: ICD-10-CM

## 2022-09-23 DIAGNOSIS — J90 RECURRENT LEFT PLEURAL EFFUSION: ICD-10-CM

## 2022-09-23 DIAGNOSIS — E11.9 CONTROLLED TYPE 2 DIABETES MELLITUS WITHOUT COMPLICATION, WITHOUT LONG-TERM CURRENT USE OF INSULIN (HCC): ICD-10-CM

## 2022-09-23 DIAGNOSIS — J90 PLEURAL EFFUSION: ICD-10-CM

## 2022-09-23 DIAGNOSIS — F41.9 ANXIETY AND DEPRESSION: ICD-10-CM

## 2022-09-23 DIAGNOSIS — J90 RECURRENT RIGHT PLEURAL EFFUSION: ICD-10-CM

## 2022-09-23 DIAGNOSIS — I50.30 DIASTOLIC HEART FAILURE, UNSPECIFIED HF CHRONICITY (HCC): ICD-10-CM

## 2022-09-23 DIAGNOSIS — F32.A ANXIETY AND DEPRESSION: ICD-10-CM

## 2022-09-23 DIAGNOSIS — C79.51 MALIGNANT NEOPLASM METASTATIC TO BONE (HCC): Primary | ICD-10-CM

## 2022-09-23 LAB
ALBUMIN SERPL-MCNC: 3.3 GM/DL (ref 3.4–5)
ALP BLD-CCNC: 75 IU/L (ref 40–129)
ALT SERPL-CCNC: 12 U/L (ref 10–40)
ANION GAP SERPL CALCULATED.3IONS-SCNC: 14 MMOL/L (ref 4–16)
AST SERPL-CCNC: 15 IU/L (ref 15–37)
BASOPHILS ABSOLUTE: 0.1 K/CU MM
BASOPHILS RELATIVE PERCENT: 0.6 % (ref 0–1)
BILIRUB SERPL-MCNC: 0.4 MG/DL (ref 0–1)
BUN BLDV-MCNC: 30 MG/DL (ref 6–23)
CALCIUM SERPL-MCNC: 9.7 MG/DL (ref 8.3–10.6)
CHLORIDE BLD-SCNC: 100 MMOL/L (ref 99–110)
CO2: 21 MMOL/L (ref 21–32)
CREAT SERPL-MCNC: 1.1 MG/DL (ref 0.6–1.1)
DIFFERENTIAL TYPE: ABNORMAL
EOSINOPHILS ABSOLUTE: 0.1 K/CU MM
EOSINOPHILS RELATIVE PERCENT: 0.9 % (ref 0–3)
GFR AFRICAN AMERICAN: 59 ML/MIN/1.73M2
GFR NON-AFRICAN AMERICAN: 49 ML/MIN/1.73M2
GLUCOSE BLD-MCNC: 174 MG/DL (ref 70–99)
HCT VFR BLD CALC: 35.9 % (ref 37–47)
HEMOGLOBIN: 11.4 GM/DL (ref 12.5–16)
IMMATURE NEUTROPHIL %: 0.6 % (ref 0–0.43)
LYMPHOCYTES ABSOLUTE: 1.6 K/CU MM
LYMPHOCYTES RELATIVE PERCENT: 11.9 % (ref 24–44)
MCH RBC QN AUTO: 28.6 PG (ref 27–31)
MCHC RBC AUTO-ENTMCNC: 31.8 % (ref 32–36)
MCV RBC AUTO: 90.2 FL (ref 78–100)
MONOCYTES ABSOLUTE: 1.2 K/CU MM
MONOCYTES RELATIVE PERCENT: 9 % (ref 0–4)
NUCLEATED RBC %: 0 %
PDW BLD-RTO: 14.9 % (ref 11.7–14.9)
PLATELET # BLD: 371 K/CU MM (ref 140–440)
PMV BLD AUTO: 11 FL (ref 7.5–11.1)
POTASSIUM SERPL-SCNC: 3.8 MMOL/L (ref 3.5–5.1)
PRO-BNP: 2712 PG/ML
RBC # BLD: 3.98 M/CU MM (ref 4.2–5.4)
SEGMENTED NEUTROPHILS ABSOLUTE COUNT: 10.4 K/CU MM
SEGMENTED NEUTROPHILS RELATIVE PERCENT: 77 % (ref 36–66)
SODIUM BLD-SCNC: 135 MMOL/L (ref 135–145)
TOTAL IMMATURE NEUTOROPHIL: 0.08 K/CU MM
TOTAL NUCLEATED RBC: 0 K/CU MM
TOTAL PROTEIN: 6.4 GM/DL (ref 6.4–8.2)
TROPONIN T: 0.01 NG/ML
WBC # BLD: 13.5 K/CU MM (ref 4–10.5)

## 2022-09-23 PROCEDURE — 85025 COMPLETE CBC W/AUTO DIFF WBC: CPT

## 2022-09-23 PROCEDURE — 80053 COMPREHEN METABOLIC PANEL: CPT

## 2022-09-23 PROCEDURE — 96374 THER/PROPH/DIAG INJ IV PUSH: CPT

## 2022-09-23 PROCEDURE — 93005 ELECTROCARDIOGRAM TRACING: CPT | Performed by: EMERGENCY MEDICINE

## 2022-09-23 PROCEDURE — 6370000000 HC RX 637 (ALT 250 FOR IP): Performed by: EMERGENCY MEDICINE

## 2022-09-23 PROCEDURE — 6360000002 HC RX W HCPCS: Performed by: EMERGENCY MEDICINE

## 2022-09-23 PROCEDURE — 99285 EMERGENCY DEPT VISIT HI MDM: CPT

## 2022-09-23 PROCEDURE — 83880 ASSAY OF NATRIURETIC PEPTIDE: CPT

## 2022-09-23 PROCEDURE — 71045 X-RAY EXAM CHEST 1 VIEW: CPT

## 2022-09-23 PROCEDURE — 84484 ASSAY OF TROPONIN QUANT: CPT

## 2022-09-23 RX ORDER — LORAZEPAM 0.5 MG/1
0.5 TABLET ORAL ONCE
Status: COMPLETED | OUTPATIENT
Start: 2022-09-23 | End: 2022-09-23

## 2022-09-23 RX ORDER — MIRTAZAPINE 15 MG/1
15 TABLET, FILM COATED ORAL NIGHTLY
Qty: 30 TABLET | Refills: 3 | Status: SHIPPED | OUTPATIENT
Start: 2022-09-23

## 2022-09-23 RX ORDER — FUROSEMIDE 10 MG/ML
60 INJECTION INTRAMUSCULAR; INTRAVENOUS ONCE
Status: COMPLETED | OUTPATIENT
Start: 2022-09-23 | End: 2022-09-23

## 2022-09-23 RX ORDER — ALPRAZOLAM 0.25 MG/1
0.25 TABLET ORAL 2 TIMES DAILY PRN
Qty: 40 TABLET | Refills: 1 | Status: SHIPPED | OUTPATIENT
Start: 2022-09-23 | End: 2022-10-05

## 2022-09-23 RX ADMIN — LORAZEPAM 0.5 MG: 0.5 TABLET ORAL at 23:03

## 2022-09-23 RX ADMIN — FUROSEMIDE 60 MG: 10 INJECTION, SOLUTION INTRAVENOUS at 22:36

## 2022-09-23 ASSESSMENT — PAIN - FUNCTIONAL ASSESSMENT: PAIN_FUNCTIONAL_ASSESSMENT: NONE - DENIES PAIN

## 2022-09-23 NOTE — PROGRESS NOTES
ADDENDUM 11/15/22- PLEASE NOTE THIS VISIT WAS A VIRTUAL HEALTH VISIT AS PT WAS VERY DECONDITIONED AFTER HOSPITALIZATION SO ACCESS WAS THUS PERFORMED THROUGH TELE- HEALTH CAPABILITIES.  (MMN)       Post-Discharge Transitional Care  Follow Up      Hasmukh Grove   YOB: 1950    Date of Office Visit:  9/23/2022  Date of Hospital Admission: 9/2/22  Date of Hospital Discharge: 9/2/22  Risk of hospital readmission (high >=14%. Medium >=10%) :Readmission Risk Score: 8.5      Care management risk score Rising risk (score 2-5) and Complex Care (Scores >=6): No Risk Score On File     Non face to face  following discharge, date last encounter closed (first attempt may have been earlier): 09/19/2022    Call initiated 2 business days of discharge: Yes    ASSESSMENT/PLAN:   Malignant neoplasm metastatic to bone ([facility] [address]- prob mets breast CA, pending oncology consultation for next week  Diastolic heart failure, unspecified HF chronicity ([address]- stabilized after thoracentesis and meds reviewed, is on lasix and seeing cardio w [facility]  Carcinoma of right female breast, unspecified estrogen receptor status, unspecified site of breast ([facility] [address]  Anxiety and depression- we'll switch paxil to remeron as may help w sleep, given stressful situation w dx, adding prn xanax   -     ALPRAZolam (XANAX) 0.25 MG tablet; Take 1 tablet by mouth 2 times daily as needed for Anxiety for up to 30 days. , Disp-40 tablet, R-1Normal  -     mirtazapine (REMERON) 15 MG tablet; Take 1 tablet by mouth nightly, Disp-30 tablet, R-3Normal    Medical Decision Making: high complexity  Return in about 4 weeks (around 10/21/2022) for routine. On this date 9/23/2022 I have spent 25 minutes reviewing previous notes, test results and face to face with the patient discussing the diagnosis and importance of compliance with the treatment plan as well as documenting on the day of the visit.        Subjective:   HPI:  Follow up of Hospital problems/diagnosis(es): see below    Inpatient course: Discharge summary reviewed- see chart. Emily Shepard MD - 09/16/2022 2:41 PM EDT  Formatting of this note is different from the original.  Images from the original note were not included. Discharge Summary   Name: Ld Michael   MRN: [id number] CSN: [id number]   Admitted: 9/13/2022 Discharged: 9/16/2022   Attending Physician: No att. providers found Consultants:     Dear Charles Tovar MD,   It was my pleasure to provide care for your patient, Ld Michael who was admitted to the hospital and is now being discharged. The summary of the patient's hospital course can be found below. Please do not hesitate to call me with any concerns or if you require additional information. Thank you for allowing me to participate in the care of your patient. Sincerely,   Dr. Dillon Collier    Discharge Diagnoses:  Problem   Atrial Fibrillation (Hcc)   Lytic Bone Lesions On Xray   Bilateral pleural effusions   Hypoxia   Acute On Chronic Diastolic Congestive Heart Failure (Hcc)   Type 2 Diabetes Mellitus, Without Long-Term Current Use of Insulin (Hcc)   Depression   Respiratory Failure With Hypoxia (Hcc)   Shortness of Breath       Body mass index is 22.11 kg/m². , recommend lifestyle modification, weight loss and follow-up with PCP. Past Medical History:   Diagnosis Date   Breast cancer ([facility] [address]   CHF (congestive heart failure) ([facility] [address]     Procedures: None    Disposition: To home    Hospital course:  Lawrence Tidwell presented with increasing shortness of breath, lower extremity swelling, and was admitted for acute hypoxic respiratory failure. She was started on supplemental oxygen and was found to have return of bilateral pleural effusions. She underwent drainage of the left side pleural effusion, studies were unremarkable. She was also found to have exacerbation of underlying heart failure and underwent diuresis with furosemide.  Both of these interventions helped improve her shortness of breath and she was able to be weaned off oxygen, home O2 eval prior to discharge showed no oxygen requirements. I do recommend continuing low-dose furosemide 20 mg daily as I do believe the fluid accumulations exacerbate her lower leg symptoms. During this hospitalization, there was an incidental finding of several bony lytic lesions in the ribs. Further evaluation with nuc med scan showed multiple osseous lesions concerning for metastatic disease. Oncology was consulted and evaluated further with CT chest,/abdomen/pelvis. There was initial concern for possible lesions in the liver, however MRI showed evidence that these lesions were all hemangiomas. Oncology recommends bone biopsy outpatient for further investigation of possible underlying recurrent breast cancer versus new malignancy. I also recommended that she keep her appointment with [place] cardiology for the heart cath to rule out any underlying coronary artery disease which could have been contributing to the development of heart failure. She may be interested though in getting second opinion from getting cardiology which is understandable. I also recommended that she keep her follow-up with rheumatology as there were concerns for abnormal proteins during the work-up for her pericardial effusion. She should also follow-up with vascular provider for further evaluation of lower extremity skin tightening/hardening which I suspect is due to venous disease. If this is ruled out, she could have further evaluation with dermatology. The remainder of the patient's chronic medical issues remained stable and appropriately treated with home regimens throughout this admission. On the date of discharge, the patient was found to not be in any acute distress, with vital signs within normal limits, and no new abnormalities on physical examination.  Further, the patient expressed appropriate understanding of, and agreement with, the discharge recommendations, medications, and plan. Vital Signs:  BP: 103/44 ( 1400)  Temp: 97.3 °F (36.3 °C) (756)  Pulse: 77 ( 1600)  Resp: 16 (756)  SpO2: 95 % ( 1600)  FiO2 (%): --  O2 Flow Rate (L/min): 2 L/min (756)  Cardiac (WDL): --  Cardiac Rhythm: Normal sinus rhythm (900) Temp (48hrs), Av °F (36.7 °C), Min:97.3 °F (36.3 °C), Max:98.8 °F (37.1 °C)    I/O:  Intake/Output Summary (Last 24 hours) at 2022  Last data filed at 9/15/2022 2357  Gross per 24 hour   Intake 240 ml   Output 300 ml   Net -60 ml     Physical Exam:  Constitutional: Patient is examined in hospital bed, lying comfortably. HEENT: Nasal canula in place. Moist oral mucosa. Respiratory: Lungs with continued crackles bilaterally, trace. Diminished aeration in right lower lung fields. Fair, symmetrical air movement. No increased work of breathing. Cardiovascular: Normal rate and rhythm without murmurs, rubs, or gallops. Radial pulses 2+ bilaterally. No lower extremity edema. Gastrointestinal: Abdomen is soft, non-distended, and non-tender. Bowel sounds present. Skin: Skin is warm, pink, and dry. No noted abnormal bruising, rashes, or wounds. Neurological: No appreciable weakness of the upper and lower extremities or sensory deficits. Imaging:  NM-BONE SCAN, WHOLE BODY    Result Date: 2022  IMPRESSION: Findings consistent with osseous metastatic disease within multiple ribs and vertebral bodies and possibly left pelvis. Findings consistent with degenerative changes within multiple joints. Dictated by: Ayana Cantu (Resident), DO CAMPOS, Tex Joshi MD, have reviewed the images and agree with the above interpretation. Electronically Signed by: Tex Joshi MD, 2022 1:33 PM     MRI-LIVER W/WO CONTRAST    Result Date: 9/15/2022  IMPRESSION: 1. Abnormalities noted on the computed tomography scan in the liver proved to represent hemangiomas, a benign process requiring no further follow-up.  2. Bony abnormalities at T11 and L1 suggestive of metastatic disease. 3. Bilateral pleural effusions. Electronically Signed by: Lilly Barrios MD, 9/15/2022 11:03 PM     CT-ABD/PELVIS W IV CONTRAST ONLY    Result Date: 9/15/2022  IMPRESSION: Hypodense lesions within the liver particularly left lobe concerning for evidence of metastatic disease 2. Sclerosis within the L1 vertebral body may indicate osseous metastasis. This was demonstrated on the recent bone scan. 3. Ancillary findings as above Please see above for additional details Electronically Signed by: Alexis De La O MD, 9/15/2022 9:21 AM     Discharge Medications:  Discharge Medication List as of 9/16/2022 6:05 PM       CONTINUE these medications which have CHANGED   Details   furosemide (LASIX) 40 mg tablet Take 0.5 tablets (20 mg total) by mouth daily. Do not take on days when you are ill, having vomiting/diarrhea, or are not taking good oral hydrationDisp-15 tablet, R-0, Normal         CONTINUE these medications which have NOT CHANGED   Details   apixaban (ELIQUIS) 5 mg tablet Take 5 mg by mouth 2 times a day. Historical Med     diltiazem (CARDIZEM CD) 120 mg 24 hr capsule Take 120 mg by mouth daily. Historical Med     glipiZIDE (GLUCOTROL) 5 mg tablet Take 5 mg by mouth daily. Historical Med     lisinopriL (PRINIVIL) 20 mg tablet Take 20 mg by mouth daily. Historical Med     metFORMIN (GLUCOPHAGE) 500 mg tablet Take 500 mg by mouth every morning (before breakfast). Historical Med     metoprolol tartrate (LOPRESSOR) 50 mg tablet Take 50 mg by mouth 2 times a day. Historical Med     oxybutynin (DITROPAN) 5 mg tablet Take 5 mg by mouth 2 times a day. Historical Med     paroxetine (PAXIL) 20 mg tablet Take 20 mg by mouth every morning. Historical Med     rosuvastatin (CRESTOR) 10 mg tablet Take 10 mg by mouth at bedtime. Historical Med     traZODone (DESYREL) 100 mg tablet Take 100 mg by mouth at bedtime. Historical Med         More than 30 minutes was spent in direct discharge planning    Signed: Carlene Villegas MD, 9/16/2022 7:54 PM      Electronically signed by Carlene Villegas MD at 09/18/2022 5:08 PM EDT    Interval history/Current status:   [facility], bone scan pos for bony mets, is now sched for f/u oncology 9/29 next month. Now also on home O2. Is also on lasix 40mg daily. Sleeping poorly and also w anxiety. Patient Active Problem List   Diagnosis    Hyperlipidemia    History of breast cancer    Lumbar herniated disc    Low back pain    GERD (gastroesophageal reflux disease)    Irritable bowel syndrome    Migraine headache    Diabetes type 2, controlled (HCC)    Hypercalcemia    Carcinoma of right female breast, unspecified estrogen receptor status, unspecified site of breast ([address]    Peripheral edema    Chest pain    Venous insufficiency    Shortness of breath    Edema of lower extremity    Varicose veins of both legs with edema    Atrial fibrillation with RVR ([address]    Diastolic heart failure, unspecified HF chronicity ([address]       Medications listed as ordered at the time of discharge from hospital     Medication List            Accurate as of September 23, 2022 11:39 AM. If you have any questions, ask your nurse or doctor. CONTINUE taking these medications      apixaban 5 MG Tabs tablet  Commonly known as: ELIQUIS  Take 1 tablet by mouth 2 times daily     blood glucose test strips  Test twice a day & as needed for symptoms of irregular blood glucose.      dilTIAZem 120 MG extended release capsule  Commonly known as: CARDIZEM CD  Take 1 capsule by mouth daily     glipiZIDE 5 MG tablet  Commonly known as: GLUCOTROL  Take 1 tablet by mouth daily     Lancets Misc  1 each by Does not apply route 2 times daily     lisinopril 20 MG tablet  Commonly known as: PRINIVIL;ZESTRIL  Take 1 tablet by mouth daily     metFORMIN 500 MG tablet  Commonly known as: GLUCOPHAGE  Take 1 tablet by mouth every morning (before breakfast) metoprolol tartrate 50 MG tablet  Commonly known as: LOPRESSOR  Take 1 tablet by mouth 2 times daily     oxybutynin 5 MG tablet  Commonly known as: DITROPAN  Take 1 tablet by mouth 2 times daily     PARoxetine 20 MG tablet  Commonly known as: PAXIL  Take 1 tablet by mouth every morning     rosuvastatin 10 MG tablet  Commonly known as: CRESTOR  Take 1 tablet by mouth nightly     traZODone 100 MG tablet  Commonly known as: DESYREL  TAKE ONE TABLET BY MOUTH ONCE NIGHTLY                Medications marked \"taking\" at this time  Outpatient Medications Marked as Taking for the 9/23/22 encounter (Telemedicine) with Melanie Alvarez MD   Medication Sig Dispense Refill    traZODone (DESYREL) 100 MG tablet TAKE ONE TABLET BY MOUTH ONCE NIGHTLY 90 tablet 1    oxybutynin (DITROPAN) 5 MG tablet Take 1 tablet by mouth 2 times daily 60 tablet 3    apixaban (ELIQUIS) 5 MG TABS tablet Take 1 tablet by mouth 2 times daily 60 tablet 1    lisinopril (PRINIVIL;ZESTRIL) 20 MG tablet Take 1 tablet by mouth daily 30 tablet 1    metoprolol tartrate (LOPRESSOR) 50 MG tablet Take 1 tablet by mouth 2 times daily 60 tablet 1    dilTIAZem (CARDIZEM CD) 120 MG extended release capsule Take 1 capsule by mouth daily 30 capsule 1    metFORMIN (GLUCOPHAGE) 500 MG tablet Take 1 tablet by mouth every morning (before breakfast) 90 tablet 1    glipiZIDE (GLUCOTROL) 5 MG tablet Take 1 tablet by mouth daily 90 tablet 1    rosuvastatin (CRESTOR) 10 MG tablet Take 1 tablet by mouth nightly 90 tablet 1    PARoxetine (PAXIL) 20 MG tablet Take 1 tablet by mouth every morning 90 tablet 1    Lancets MISC 1 each by Does not apply route 2 times daily 100 each 0    blood glucose monitor strips Test twice a day & as needed for symptoms of irregular blood glucose. 100 strip 3        Medications patient taking as of now reconciled against medications ordered at time of hospital discharge:  Yes    A comprehensive review of systems was negative except for what was noted in the HPI. Objective: An electronic signature was used to authenticate this note.   --Farhat Martinez MD

## 2022-09-24 PROBLEM — J96.01 ACUTE RESPIRATORY FAILURE WITH HYPOXIA (HCC): Status: ACTIVE | Noted: 2022-09-24

## 2022-09-24 PROBLEM — I50.21 ACUTE SYSTOLIC CHF (CONGESTIVE HEART FAILURE) (HCC): Status: ACTIVE | Noted: 2022-09-24

## 2022-09-24 LAB
ANION GAP SERPL CALCULATED.3IONS-SCNC: 13 MMOL/L (ref 4–16)
BUN BLDV-MCNC: 34 MG/DL (ref 6–23)
CALCIUM SERPL-MCNC: 9.4 MG/DL (ref 8.3–10.6)
CHLORIDE BLD-SCNC: 102 MMOL/L (ref 99–110)
CO2: 21 MMOL/L (ref 21–32)
CREAT SERPL-MCNC: 1.4 MG/DL (ref 0.6–1.1)
EKG ATRIAL RATE: 89 BPM
EKG DIAGNOSIS: NORMAL
EKG P AXIS: 23 DEGREES
EKG P-R INTERVAL: 124 MS
EKG Q-T INTERVAL: 350 MS
EKG QRS DURATION: 70 MS
EKG QTC CALCULATION (BAZETT): 425 MS
EKG R AXIS: 5 DEGREES
EKG T AXIS: 18 DEGREES
EKG VENTRICULAR RATE: 89 BPM
GFR AFRICAN AMERICAN: 45 ML/MIN/1.73M2
GFR NON-AFRICAN AMERICAN: 37 ML/MIN/1.73M2
GLUCOSE BLD-MCNC: 160 MG/DL (ref 70–99)
GLUCOSE BLD-MCNC: 161 MG/DL (ref 70–99)
GLUCOSE BLD-MCNC: 161 MG/DL (ref 70–99)
GLUCOSE BLD-MCNC: 214 MG/DL (ref 70–99)
MAGNESIUM: 1.8 MG/DL (ref 1.8–2.4)
POTASSIUM SERPL-SCNC: 4.1 MMOL/L (ref 3.5–5.1)
SODIUM BLD-SCNC: 136 MMOL/L (ref 135–145)
TROPONIN T: 0.04 NG/ML
TROPONIN T: 0.07 NG/ML

## 2022-09-24 PROCEDURE — 36415 COLL VENOUS BLD VENIPUNCTURE: CPT

## 2022-09-24 PROCEDURE — 82962 GLUCOSE BLOOD TEST: CPT

## 2022-09-24 PROCEDURE — 6370000000 HC RX 637 (ALT 250 FOR IP): Performed by: STUDENT IN AN ORGANIZED HEALTH CARE EDUCATION/TRAINING PROGRAM

## 2022-09-24 PROCEDURE — 2580000003 HC RX 258: Performed by: STUDENT IN AN ORGANIZED HEALTH CARE EDUCATION/TRAINING PROGRAM

## 2022-09-24 PROCEDURE — 93010 ELECTROCARDIOGRAM REPORT: CPT | Performed by: INTERNAL MEDICINE

## 2022-09-24 PROCEDURE — 83735 ASSAY OF MAGNESIUM: CPT

## 2022-09-24 PROCEDURE — 84484 ASSAY OF TROPONIN QUANT: CPT

## 2022-09-24 PROCEDURE — 99222 1ST HOSP IP/OBS MODERATE 55: CPT | Performed by: INTERNAL MEDICINE

## 2022-09-24 PROCEDURE — 94660 CPAP INITIATION&MGMT: CPT

## 2022-09-24 PROCEDURE — 99223 1ST HOSP IP/OBS HIGH 75: CPT | Performed by: INTERNAL MEDICINE

## 2022-09-24 PROCEDURE — 2140000000 HC CCU INTERMEDIATE R&B

## 2022-09-24 PROCEDURE — 2700000000 HC OXYGEN THERAPY PER DAY

## 2022-09-24 PROCEDURE — 6360000002 HC RX W HCPCS: Performed by: STUDENT IN AN ORGANIZED HEALTH CARE EDUCATION/TRAINING PROGRAM

## 2022-09-24 PROCEDURE — 80048 BASIC METABOLIC PNL TOTAL CA: CPT

## 2022-09-24 PROCEDURE — 94761 N-INVAS EAR/PLS OXIMETRY MLT: CPT

## 2022-09-24 RX ORDER — FUROSEMIDE 10 MG/ML
40 INJECTION INTRAMUSCULAR; INTRAVENOUS 3 TIMES DAILY
Status: DISCONTINUED | OUTPATIENT
Start: 2022-09-24 | End: 2022-09-24

## 2022-09-24 RX ORDER — ACETAMINOPHEN 650 MG/1
650 SUPPOSITORY RECTAL EVERY 6 HOURS PRN
Status: DISCONTINUED | OUTPATIENT
Start: 2022-09-24 | End: 2022-10-03 | Stop reason: HOSPADM

## 2022-09-24 RX ORDER — OXYBUTYNIN CHLORIDE 5 MG/1
5 TABLET ORAL 2 TIMES DAILY
Status: DISCONTINUED | OUTPATIENT
Start: 2022-09-24 | End: 2022-10-03 | Stop reason: HOSPADM

## 2022-09-24 RX ORDER — ROSUVASTATIN CALCIUM 5 MG/1
10 TABLET, COATED ORAL NIGHTLY
Status: DISCONTINUED | OUTPATIENT
Start: 2022-09-24 | End: 2022-10-03 | Stop reason: HOSPADM

## 2022-09-24 RX ORDER — INSULIN LISPRO 100 [IU]/ML
0-4 INJECTION, SOLUTION INTRAVENOUS; SUBCUTANEOUS NIGHTLY
Status: DISCONTINUED | OUTPATIENT
Start: 2022-09-24 | End: 2022-10-03 | Stop reason: SDUPTHER

## 2022-09-24 RX ORDER — ALPRAZOLAM 0.25 MG/1
0.25 TABLET ORAL 2 TIMES DAILY PRN
Status: DISCONTINUED | OUTPATIENT
Start: 2022-09-24 | End: 2022-10-03 | Stop reason: HOSPADM

## 2022-09-24 RX ORDER — FUROSEMIDE 10 MG/ML
40 INJECTION INTRAMUSCULAR; INTRAVENOUS 2 TIMES DAILY
Status: DISCONTINUED | OUTPATIENT
Start: 2022-09-25 | End: 2022-09-26

## 2022-09-24 RX ORDER — METOPROLOL TARTRATE 50 MG/1
50 TABLET, FILM COATED ORAL 2 TIMES DAILY
Status: DISCONTINUED | OUTPATIENT
Start: 2022-09-24 | End: 2022-09-28

## 2022-09-24 RX ORDER — MIRTAZAPINE 15 MG/1
15 TABLET, FILM COATED ORAL NIGHTLY
Status: DISCONTINUED | OUTPATIENT
Start: 2022-09-24 | End: 2022-10-03 | Stop reason: HOSPADM

## 2022-09-24 RX ORDER — SODIUM CHLORIDE 0.9 % (FLUSH) 0.9 %
5-40 SYRINGE (ML) INJECTION EVERY 12 HOURS SCHEDULED
Status: DISCONTINUED | OUTPATIENT
Start: 2022-09-24 | End: 2022-10-03 | Stop reason: HOSPADM

## 2022-09-24 RX ORDER — INSULIN LISPRO 100 [IU]/ML
0-4 INJECTION, SOLUTION INTRAVENOUS; SUBCUTANEOUS
Status: DISCONTINUED | OUTPATIENT
Start: 2022-09-24 | End: 2022-10-03 | Stop reason: SDUPTHER

## 2022-09-24 RX ORDER — ONDANSETRON 2 MG/ML
4 INJECTION INTRAMUSCULAR; INTRAVENOUS EVERY 6 HOURS PRN
Status: DISCONTINUED | OUTPATIENT
Start: 2022-09-24 | End: 2022-09-26 | Stop reason: SDUPTHER

## 2022-09-24 RX ORDER — ENOXAPARIN SODIUM 100 MG/ML
40 INJECTION SUBCUTANEOUS DAILY
Status: DISCONTINUED | OUTPATIENT
Start: 2022-09-24 | End: 2022-09-25

## 2022-09-24 RX ORDER — SODIUM CHLORIDE 0.9 % (FLUSH) 0.9 %
5-40 SYRINGE (ML) INJECTION PRN
Status: DISCONTINUED | OUTPATIENT
Start: 2022-09-24 | End: 2022-10-03 | Stop reason: HOSPADM

## 2022-09-24 RX ORDER — SODIUM CHLORIDE 9 MG/ML
INJECTION, SOLUTION INTRAVENOUS PRN
Status: DISCONTINUED | OUTPATIENT
Start: 2022-09-24 | End: 2022-10-03 | Stop reason: HOSPADM

## 2022-09-24 RX ORDER — ONDANSETRON 4 MG/1
4 TABLET, ORALLY DISINTEGRATING ORAL EVERY 8 HOURS PRN
Status: DISCONTINUED | OUTPATIENT
Start: 2022-09-24 | End: 2022-10-03 | Stop reason: HOSPADM

## 2022-09-24 RX ORDER — LISINOPRIL 20 MG/1
20 TABLET ORAL DAILY
Status: DISCONTINUED | OUTPATIENT
Start: 2022-09-24 | End: 2022-10-03 | Stop reason: HOSPADM

## 2022-09-24 RX ORDER — POLYETHYLENE GLYCOL 3350 17 G/17G
17 POWDER, FOR SOLUTION ORAL DAILY PRN
Status: DISCONTINUED | OUTPATIENT
Start: 2022-09-24 | End: 2022-10-03 | Stop reason: HOSPADM

## 2022-09-24 RX ORDER — ACETAMINOPHEN 325 MG/1
650 TABLET ORAL EVERY 6 HOURS PRN
Status: DISCONTINUED | OUTPATIENT
Start: 2022-09-24 | End: 2022-09-26 | Stop reason: SDUPTHER

## 2022-09-24 RX ORDER — DILTIAZEM HYDROCHLORIDE 120 MG/1
120 CAPSULE, COATED, EXTENDED RELEASE ORAL DAILY
Status: DISCONTINUED | OUTPATIENT
Start: 2022-09-24 | End: 2022-09-26

## 2022-09-24 RX ADMIN — FUROSEMIDE 40 MG: 10 INJECTION, SOLUTION INTRAMUSCULAR; INTRAVENOUS at 09:27

## 2022-09-24 RX ADMIN — METOPROLOL TARTRATE 50 MG: 50 TABLET, FILM COATED ORAL at 09:27

## 2022-09-24 RX ADMIN — SODIUM CHLORIDE, PRESERVATIVE FREE 10 ML: 5 INJECTION INTRAVENOUS at 20:09

## 2022-09-24 RX ADMIN — DILTIAZEM HYDROCHLORIDE 120 MG: 120 CAPSULE, COATED, EXTENDED RELEASE ORAL at 09:27

## 2022-09-24 RX ADMIN — SODIUM CHLORIDE, PRESERVATIVE FREE 5 ML: 5 INJECTION INTRAVENOUS at 09:27

## 2022-09-24 RX ADMIN — LISINOPRIL 20 MG: 20 TABLET ORAL at 09:27

## 2022-09-24 RX ADMIN — ENOXAPARIN SODIUM 40 MG: 40 INJECTION SUBCUTANEOUS at 09:27

## 2022-09-24 RX ADMIN — METOPROLOL TARTRATE 50 MG: 50 TABLET, FILM COATED ORAL at 20:10

## 2022-09-24 RX ADMIN — ROSUVASTATIN CALCIUM 10 MG: 5 TABLET, FILM COATED ORAL at 20:09

## 2022-09-24 RX ADMIN — OXYBUTYNIN CHLORIDE 5 MG: 5 TABLET ORAL at 09:27

## 2022-09-24 RX ADMIN — OXYBUTYNIN CHLORIDE 5 MG: 5 TABLET ORAL at 20:09

## 2022-09-24 RX ADMIN — ACETAMINOPHEN 650 MG: 325 TABLET ORAL at 20:09

## 2022-09-24 RX ADMIN — ALPRAZOLAM 0.25 MG: 0.25 TABLET ORAL at 20:09

## 2022-09-24 RX ADMIN — MIRTAZAPINE 15 MG: 15 TABLET, FILM COATED ORAL at 20:10

## 2022-09-24 NOTE — ED PROVIDER NOTES
Triage Chief Complaint:   Shortness of Breath (Started today; has recently been seen and had 2 paracentesis done; normally wears O2 at 3L/min and is currently requiring more to sub stain oxygen levels)    [place]:  Anna Brown is a 71 y.o. female that presents with difficulty breathing. The patient was recently discharged from the hospital after she was admitted for acute on chronic CHF with pleural effusion status postthoracentesis. States that shortness of breath started again today, worse with lying flat and exertion. She usually uses 2 to 3 L of oxygen at all times at home. Denies any cough, fever, chest pain, abdominal pain, nausea, vomiting. States that she has chronic swelling in her extremities. No other acute complaints. She is on 40 mg of Lasix daily. ROS:  At least 10 systems reviewed and otherwise acutely negative except as in the [address] Past Medical History:   Diagnosis Date    Anxiety     Breast carcinoma ([facility] [address] 1997    R breast - MAMMOGRAM ANNUALLY, NEXT DUE 5/2020. Cervical radiculitis     Right     Chest pain     Depression     Diabetes type 2, controlled ([address] 2016    w two random sugars >126    Family history of diabetes mellitus (DM)     Mother and Father    GERD (gastroesophageal reflux disease)     UGI- sm HH , done at [address] [place] [address],[address] 3/26/14    Hiatal hernia     Hx of Doppler ultrasound 05/17/2022    Normal bilateral lower extremity ankle brachial indices. Hx of Doppler ultrasound 05/17/2022    Significant reflux noted of the Right GSV SFJ (0.7s), GSV Knee (2.0s). Significant reflux noted in the Left GSV SFJ (1.8s). Hx of echocardiogram 05/12/2022    EF is estimated at 55-60%. Mitral annular calcification is present. Mild mitral, tricuspid and moderate aortic regurgitation is present. Mild Pulmonary hypertension noted with RVSP of 43mmHg.     Hypercalcemia     mild ~11, iPTH nl 8/2021    Hyperglycemia     Hyperlipidemia     Insomnia     Irritable bowel syndrome     LBP (low back pain) Lumbar herniated disc     L L4-5 HNP noted on MRI    Menopause     female exam every 2 yrs, due for recheck w me 2015    Migraine headache     ON TOPAMAX    Osteoporosis     Prediabetes     a1c 6.4 in Dec 2014    S/P colonoscopic polypectomy 06/07/2019    Dr Verónica Escoto, recheck 5 yrs    TIA (transient ischemic attack)     INACTIVE PROBLEM     Past Surgical History:   Procedure Laterality Date    BREAST RECONSTRUCTION Right 4/2013    Dr Joseline Ragland ([facility])  5/1995    LIANG/BSO    MASTECTOMY  7/1997    Right breast for breast cancer    TUBAL LIGATION       Family History   Problem Relation Age of Onset    High Blood Pressure Mother     Diabetes Mother     Diabetes Father     Heart Disease Father         open heart surgery    High Blood Pressure Father     Diabetes Sister     High Cholesterol Sister     Other Sister         hypochondriac    Stroke Paternal Grandmother      Social History     Socioeconomic History    Marital status:      Spouse name: Not on file    Number of children: Not on file    Years of education: Not on file    Highest education level: Not on file   Occupational History    Occupation: - [employer]     Comment: retired Sept 2015   Tobacco Use    Smoking status: Never    Smokeless tobacco: Never   Substance and Sexual Activity    Alcohol use: No    Drug use: No    Sexual activity: Not on file   Other Topics Concern    Not on file   Social History Narrative    Not on file     Social Determinants of Health     Financial Resource Strain: Low Risk     Difficulty of Paying Living Expenses: Not hard at all   Food Insecurity: No Food Insecurity    Worried About Running Out of Food in the Last Year: Never true    [address] in the Last Year: Never true   Transportation Needs: No Transportation Needs    Lack of Transportation (Medical): No    Lack of Transportation (Non-Medical):  No   Physical Activity: Inactive    Days of Exercise per Week: 0 days    Minutes of Exercise per Session: 0 min   Stress: No Stress Concern Present    Feeling of Stress : Not at all   Social Connections: Not on file   Intimate Partner Violence: Not At Risk    Fear of Current or Ex-Partner: No    Emotionally Abused: No    Physically Abused: No    Sexually Abused: No   Housing Stability: Low Risk     Unable to Pay for Housing in the Last Year: No    Number of Places Lived in the Last Year: 1    Unstable Housing in the Last Year: No     No current facility-administered medications for this encounter. Current Outpatient Medications   Medication Sig Dispense Refill    ALPRAZolam (XANAX) 0.25 MG tablet Take 1 tablet by mouth 2 times daily as needed for Anxiety for up to 30 days. 40 tablet 1    mirtazapine (REMERON) 15 MG tablet Take 1 tablet by mouth nightly 30 tablet 3    oxybutynin (DITROPAN) 5 MG tablet Take 1 tablet by mouth 2 times daily 60 tablet 3    apixaban (ELIQUIS) 5 MG TABS tablet Take 1 tablet by mouth 2 times daily 60 tablet 1    lisinopril (PRINIVIL;ZESTRIL) 20 MG tablet Take 1 tablet by mouth daily 30 tablet 1    metoprolol tartrate (LOPRESSOR) 50 MG tablet Take 1 tablet by mouth 2 times daily 60 tablet 1    dilTIAZem (CARDIZEM CD) 120 MG extended release capsule Take 1 capsule by mouth daily 30 capsule 1    metFORMIN (GLUCOPHAGE) 500 MG tablet Take 1 tablet by mouth every morning (before breakfast) 90 tablet 1    glipiZIDE (GLUCOTROL) 5 MG tablet Take 1 tablet by mouth daily 90 tablet 1    rosuvastatin (CRESTOR) 10 MG tablet Take 1 tablet by mouth nightly 90 tablet 1    Lancets MISC 1 each by Does not apply route 2 times daily 100 each 0    blood glucose monitor strips Test twice a day & as needed for symptoms of irregular blood glucose.  100 strip 3     Allergies   Allergen Reactions    Codeine      \"jittery\"  Feels anxoius       Nursing Notes Reviewed    Physical Exam:  ED Triage Vitals [09/23/22 2154]   Enc Vitals Group      BP (!) 150/101 Heart Rate 83      Resp 27      Temp 98.1 °F (36.7 °C)      Temp Source Oral      SpO2 (!) 8 %      Weight 143 lb (64.9 kg)      Height 5' 5\" (1.651 m)      Head Circumference       Peak Flow       Pain Score       Pain Loc       Pain Edu? Excl. in [address]? GENERAL APPEARANCE: Awake and alert. Cooperative. No acute distress. HEAD: Normocephalic. Atraumatic. EYES: EOM's grossly intact. Sclera anicteric. ENT: Mucous membranes are moist. Tolerates saliva. No trismus. NECK: Supple. Trachea midline. HEART: RRR. Radial pulses 2+. LUNGS: Respirations mildly tachypneic with conversational dyspnea. Diminished breath sounds bilaterally left greater than right with basilar crackles  ABDOMEN: Soft. Non-tender. No guarding or rebound. EXTREMITIES: No acute deformities. Pitting edema lower extremities  SKIN: Warm and dry. NEUROLOGICAL: No gross facial drooping. Moves all 4 extremities spontaneously. PSYCHIATRIC: Normal mood.     I have reviewed and interpreted all of the currently available lab results from this visit (if applicable):  Results for orders placed or performed during the hospital encounter of 09/23/22   CBC with Auto Differential   Result Value Ref Range    WBC 13.5 (H) 4.0 - 10.5 K/CU MM    RBC 3.98 (L) 4.2 - 5.4 M/CU MM    Hemoglobin 11.4 (L) 12.5 - 16.0 GM/DL    Hematocrit 35.9 (L) 37 - 47 %    MCV 90.2 78 - 100 FL    MCH 28.6 27 - 31 PG    MCHC 31.8 (L) 32.0 - 36.0 %    RDW 14.9 11.7 - 14.9 %    Platelets 395 491 - 439 K/CU MM    MPV 11.0 7.5 - 11.1 FL    Differential Type AUTOMATED DIFFERENTIAL     Segs Relative 77.0 (H) 36 - 66 %    Lymphocytes % 11.9 (L) 24 - 44 %    Monocytes % 9.0 (H) 0 - 4 %    Eosinophils % 0.9 0 - 3 %    Basophils % 0.6 0 - 1 %    Segs Absolute 10.4 K/CU MM    Lymphocytes Absolute 1.6 K/CU MM    Monocytes Absolute 1.2 K/CU MM    Eosinophils Absolute 0.1 K/CU MM    Basophils Absolute 0.1 K/CU MM    Nucleated RBC % 0.0 %    Total Nucleated RBC 0.0 K/CU MM    Total Immature Neutrophil 0.08 K/CU MM    Immature Neutrophil % 0.6 (H) 0 - 0.43 %   Comprehensive Metabolic Panel w/ Reflex to MG   Result Value Ref Range    Sodium 135 135 - 145 MMOL/L    Potassium 3.8 3.5 - 5.1 MMOL/L    Chloride 100 99 - 110 mMol/L    CO2 21 21 - 32 MMOL/L    BUN 30 (H) 6 - 23 MG/DL    Creatinine 1.1 0.6 - 1.1 MG/DL    Glucose 174 (H) 70 - 99 MG/DL    Calcium 9.7 8.3 - 10.6 MG/DL    Albumin 3.3 (L) 3.4 - 5.0 GM/DL    Total Protein 6.4 6.4 - 8.2 GM/DL    Total Bilirubin 0.4 0.0 - 1.0 MG/DL    ALT 12 10 - 40 U/L    AST 15 15 - 37 IU/L    Alkaline Phosphatase 75 40 - 129 IU/L    GFR Non- 49 (L) >60 mL/min/1.73m2    GFR  59 (L) >60 mL/min/1.73m2    Anion Gap 14 4 - 16   Troponin   Result Value Ref Range    Troponin T 0.012 (H) <0.01 NG/ML   Brain Natriuretic Peptide   Result Value Ref Range    Pro-BNP 2,712 (H) <300 PG/ML   EKG 12 Lead   Result Value Ref Range    Ventricular Rate 89 BPM    Atrial Rate 89 BPM    P-R Interval 124 ms    QRS Duration 70 ms    Q-T Interval 350 ms    QTc Calculation (Bazett) 425 ms    P Axis 23 degrees    R Axis 5 degrees    T Axis 18 degrees    Diagnosis       Sinus rhythm with premature atrial complexes  Otherwise normal ECG  When compared with ECG of 18-AUG-2022 12:53,  premature atrial complexes are now present  Criteria for Septal infarct are no longer present        Radiographs (if obtained):  [] The following radiograph was interpreted by myself in the absence of a radiologist:  [x] Radiologist's Report Reviewed:    EKG (if obtained): (All EKG's are interpreted by myself in the absence of a cardiologist)  12 lead EKG as interpreted by me reveals normal sinus rhythm. PACs. Axis is normal. There are no ischemic ST elevations or other suspicious ST changes;  QRS interval is narrow, QT interval is not prolonged. Final interpretation: Normal sinus rhythm. PACs.       MDM:  Plan of care is discussed thoroughly with the patient and family if present. If performed, all imaging and lab work also discussed with patient. All relevant prior results and chart reviewed if available. Patient presents as above. She is in no acute distress but does have some conversational dyspnea. She is requiring 6 L nasal cannula above her 2 to 3 L baseline. Lung sounds are diminished bilaterally and I suspect that she has acute on chronic CHF with pleural effusions similar to prior admission. Denies any chest pain at this time and EKG does not show any ischemic changes. Patient given 60 mg of IV Lasix here. Patient is afebrile. Low suspicion for underlying infectious etiology. BNP is elevated. Metabolic work-up largely unremarkable. No evidence of acute anemia. Patient does not have significant improvement after Lasix in the emergency department has some increase in respiratory rate and tachycardia. BiPAP will be applied and patient admitted to the hospitalist for further management. She has required multiple thoracenteses before in the past and will likely require again during inpatient stay. I directly delivered medical care to this critically ill patient. Timely evaluation and treatment was necessary to address the significant organ system dysfunction present in this patient. Due to high probability of clinically significant, life-threatening deterioration, the patient required my highest level of preparedness to intervene emergently and I personally spent this critical care time directly and personally managing the patient. I was involved in the stabilization of this critical patient for 40 minutes.   During this time I was physically present at the bedside during my initial exam and for re-examinations at intervals coordinating this patient's care with other physicians, examining radiographs, interpreting electrocardiograms and rhythm strips, reviewing laboratory results, discussing the patient's condition and management with the patient/family. Time billed does not include time for procedures. Clinical Impression:  1. Acute respiratory failure with hypoxia ([facility] [address]    2.  Pleural effusion      (Please note that portions of this note may have been completed with a voice recognition program. Efforts were made to edit the dictations but occasionally words are mis-transcribed.)    MD Michaela Thomas MD  09/23/22 [id number]

## 2022-09-24 NOTE — PROGRESS NOTES
Physician Progress Note      Dirk WELLS #:                  [id number]  :                       1950  ADMIT DATE:       2022 9:51 PM  [address] [place] DATE:  RESPONDING  PROVIDER #:        Alisia Kendall MD          QUERY TEXT:    Pt admitted with acute systolic CHF. Noted documentation of Acute respiratory   failure with hypoxia on 22 by Dr. Anne-Marie Hernandez, ED physician. If possible,   please document in progress notes and discharge summary:    The medical record reflects the following:  Risk Factors: recent hospitalization for SOB, recent thoracentesis, recurrent   bilat pleural effusions  Clinical Indicators:  Per ED assessment: Respirations mildly tachypneic with   conversational dyspnea. Diminished breath sounds bilaterally left greater   than right with basilar crackles. Pt requiring 6L of O2 and typically   requires 2-3L home O2. CXR: Worsening basilar opacities, bilateral effusions   and pulmonary edema  suggestive of congestive heart failure. Treatment: Lasix IV, imaging, labs, oxygen    Thank you,  Rubi Mancilla RN  [phone]  Options provided:  -- Acute respiratory failure with hypoxia confirmed present on admission  -- Acute respiratory failure with hypoxia ruled out  -- Other - I will add my own diagnosis  -- Disagree - Not applicable / Not valid  -- Disagree - Clinically unable to determine / Unknown  -- Refer to Clinical Documentation Reviewer    PROVIDER RESPONSE TEXT:    The diagnosis of Acute respiratory failure with hypoxia was confirmed as   present on admission.     Query created by: Citlaly Wisdom on 2022 9:44 AM      Electronically signed by:  Alisia Kendall MD 2022 11:12 AM

## 2022-09-24 NOTE — PROGRESS NOTES
Nutrition Education    Admit with acute CHF. Consult for heart failure diet ed. Currently on low sodium diet with 1500 ml fluid restriction. Eating meals today. Receptive to discussion on current diet order. Has been paying more attention to food choices for heart health recently. Agreeable to monitor sodium intake. Encouraged to consume adequate meals, 3 per day. Will continue to follow up during stay. Educated on low sodium diet with fluid restriction  Learners: Patient and Family  Readiness: Acceptance  Method: Explanation and Handout - heart failure nutrition handout from Nutrition Care Manual   Response: Verbalizes Understanding  Contact name and number provided.     Kiera Gastelum RD, LD  Contact Number: [phone]

## 2022-09-24 NOTE — ED NOTES
Report called to [address]. All questions answered at this time.       Deepthi Dutton RN  09/24/22 0045

## 2022-09-24 NOTE — CONSULTS
Chart reviewed full note to follow                        Name:  Diya Kramer /Age/Sex: 1950  (71 y.o. female)   MRN & CSN:  [id number] & [id number] Admission Date/Time: 2022  9:51 PM   Location:  [id number]-D PCP: Sdai Corrales, [address] [facility] Day: 2          Referring physician:  Merrill Duque MD         Reason for consultation: Congestive heart failure        Thanks for referral.    Information source: Patient    CC; short of breath      HPI:   Thank you for involving me in taking  care of Diya Kramer who  is a 71 y. o.year  Old female  Presents with history of atrial fibrillation, diabetes, CA breast with metastatic disease, now admitted complains of increasing dyspnea has been having recurrent pleural effusions , last echo showed that the patient has normal EF however had a pericardial effusion  , CT recently showed that she has large bilateral pleural effusions probably secondary to malignancy                  Past medical history:    has a past medical history of Anxiety, Breast carcinoma ([address] Cervical radiculitis, Chest pain, Depression, Diabetes type 2, controlled ([address] Family history of diabetes mellitus (DM), GERD (gastroesophageal reflux disease), Hiatal hernia, Hx of Doppler ultrasound, Hx of Doppler ultrasound, Hx of echocardiogram, Hypercalcemia, Hyperglycemia, Hyperlipidemia, Insomnia, Irritable bowel syndrome, LBP (low back pain), Lumbar herniated disc, Menopause, Migraine headache, Osteoporosis, Prediabetes, S/P colonoscopic polypectomy, and TIA (transient ischemic attack). Past surgical history:   has a past surgical history that includes Tubal ligation; Mastectomy (1997); Hysterectomy (1995); and Breast reconstruction (Right, 2013). Social History:   reports that she has never smoked. She has never used smokeless tobacco. She reports that she does not drink alcohol and does not use drugs.   Family history:  family history includes Diabetes in her father, mother, and sister; Heart Disease in her father; High Blood Pressure in her father and mother; High Cholesterol in her sister; Other in her sister; Stroke in her paternal grandmother.     Allergies   Allergen Reactions    Codeine      \"jittery\"  Feels anxoius       sodium chloride flush 0.9 % injection 5-40 mL, 2 times per day  sodium chloride flush 0.9 % injection 5-40 mL, PRN  0.9 % sodium chloride infusion, PRN  ondansetron (ZOFRAN-ODT) disintegrating tablet 4 mg, Q8H PRN   Or  ondansetron (ZOFRAN) injection 4 mg, Q6H PRN  polyethylene glycol (GLYCOLAX) packet 17 g, Daily PRN  acetaminophen (TYLENOL) tablet 650 mg, Q6H PRN   Or  acetaminophen (TYLENOL) suppository 650 mg, Q6H PRN  enoxaparin (LOVENOX) injection 40 mg, Daily  furosemide (LASIX) injection 40 mg, TID  ALPRAZolam (XANAX) tablet 0.25 mg, BID PRN  [Held by provider] apixaban (ELIQUIS) tablet 5 mg, BID  dilTIAZem (CARDIZEM CD) extended release capsule 120 mg, Daily  lisinopril (PRINIVIL;ZESTRIL) tablet 20 mg, Daily  metoprolol tartrate (LOPRESSOR) tablet 50 mg, BID  mirtazapine (REMERON) tablet 15 mg, Nightly  oxybutynin (DITROPAN) tablet 5 mg, BID  rosuvastatin (CRESTOR) tablet 10 mg, Nightly  insulin lispro (HUMALOG) injection vial 0-4 Units, TID WC  insulin lispro (HUMALOG) injection vial 0-4 Units, Nightly      Current Facility-Administered Medications   Medication Dose Route Frequency Provider Last Rate Last Admin    sodium chloride flush 0.9 % injection 5-40 mL  5-40 mL IntraVENous 2 times per day Kayla Henderson MD        sodium chloride flush 0.9 % injection 5-40 mL  5-40 mL IntraVENous PRN Kayla Henderson MD        0.9 % sodium chloride infusion   IntraVENous PRN Kayla Henderson MD        ondansetron (ZOFRAN-ODT) disintegrating tablet 4 mg  4 mg Oral Q8H PRN Kayla Henderson MD        Or    ondansetron (ZOFRAN) injection 4 mg  4 mg IntraVENous Q6H PRN Kayla Henderson MD        polyethylene glycol (GLYCOLAX) packet 17 g  17 g Oral Daily PRN Amy Lee MD        acetaminophen (TYLENOL) tablet 650 mg  650 mg Oral Q6H PRN Amy Lee MD        Or    acetaminophen (TYLENOL) suppository 650 mg  650 mg Rectal Q6H PRN Amy Lee MD        enoxaparin (LOVENOX) injection 40 mg  40 mg SubCUTAneous Daily Amy Lee MD        furosemide (LASIX) injection 40 mg  40 mg IntraVENous TID Amy Lee MD        ALPRAZolam Levar Tinajero) tablet 0.25 mg  0.25 mg Oral BID PRN Amy Lee MD        [Held by provider] apixaban (ELIQUIS) tablet 5 mg  5 mg Oral BID [facility] MARLO Martin MD        dilTIAZem (CARDIZEM CD) extended release capsule 120 mg  120 mg Oral Daily Amy Lee MD        lisinopril (PRINIVIL;ZESTRIL) tablet 20 mg  20 mg Oral Daily Triyug B Julio Cesar Martin MD        metoprolol tartrate (LOPRESSOR) tablet 50 mg  50 mg Oral BID Amy Lee MD        mirtazapine (REMERON) tablet 15 mg  15 mg Oral Nightly Amy Lee MD        oxybutynin (DITROPAN) tablet 5 mg  5 mg Oral BID Amy Lee MD        rosuvastatin (CRESTOR) tablet 10 mg  10 mg Oral Nightly Amy Lee MD        insulin lispro (HUMALOG) injection vial 0-4 Units  0-4 Units SubCUTAneous TID WC Amy Lee MD        insulin lispro (HUMALOG) injection vial 0-4 Units  0-4 Units SubCUTAneous Nightly Amy Lee MD         Review of Systems:  All 14 systems reviewed, all negative except for shortness of breath    Physical Examination:    BP (!) 167/77   Pulse 81   Temp 97 °F (36.1 °C) (Oral)   Resp 20   Ht 5' 5\" (1.651 m)   Wt 143 lb (64.9 kg)   SpO2 99%   BMI 23.80 kg/m²      Wt Readings from Last 3 Encounters:   09/23/22 143 lb (64.9 kg)   08/23/22 143 lb 4.8 oz (65 kg)   08/16/22 153 lb 6.4 oz (69.6 kg)     Body mass index is 23.8 kg/m².       General Appearance: Fair  Head: normocephalic     Eyes: normal, noninjected conjunctiva    ENT: normal mucosa, noninjected throat, normal     NECK: No JVP  No thyromegaly        Cardiovascular: No thrills palpated   Auscultation: Normal S1 and S2, no murmur   carotid bruit no   Abdominal Aorta no bruit    Respiratory:    Breath sounds diminished bilaterally    Extremities: Trace edema clubbing ,   no cyanosis    SKIN: Warm and well perfused, no pallor or cyanosis    Vascular exam:  Pedal Pulses: Palp bilaterally        Abdomen:  No masses or tenderness. No organomegaly noted. Neurological:  Oriented to time, place, and person   No focal neurological deficit noted.   Psychiatric:normal mood, no anxiety    Lab Review   Recent Results (from the past 24 hour(s))   EKG 12 Lead    Collection Time: 09/23/22  9:56 PM   Result Value Ref Range    Ventricular Rate 89 BPM    Atrial Rate 89 BPM    P-R Interval 124 ms    QRS Duration 70 ms    Q-T Interval 350 ms    QTc Calculation (Bazett) 425 ms    P Axis 23 degrees    R Axis 5 degrees    T Axis 18 degrees    Diagnosis       Sinus rhythm with premature atrial complexes  Otherwise normal ECG  When compared with ECG of 18-AUG-2022 12:53,  premature atrial complexes are now present  Criteria for Septal infarct are no longer present     CBC with Auto Differential    Collection Time: 09/23/22 10:02 PM   Result Value Ref Range    WBC 13.5 (H) 4.0 - 10.5 K/CU MM    RBC 3.98 (L) 4.2 - 5.4 M/CU MM    Hemoglobin 11.4 (L) 12.5 - 16.0 GM/DL    Hematocrit 35.9 (L) 37 - 47 %    MCV 90.2 78 - 100 FL    MCH 28.6 27 - 31 PG    MCHC 31.8 (L) 32.0 - 36.0 %    RDW 14.9 11.7 - 14.9 %    Platelets 167 901 - 167 K/CU MM    MPV 11.0 7.5 - 11.1 FL    Differential Type AUTOMATED DIFFERENTIAL     Segs Relative 77.0 (H) 36 - 66 %    Lymphocytes % 11.9 (L) 24 - 44 %    Monocytes % 9.0 (H) 0 - 4 %    Eosinophils % 0.9 0 - 3 %    Basophils % 0.6 0 - 1 %    Segs Absolute 10.4 K/CU MM    Lymphocytes Absolute 1.6 K/CU MM    Monocytes Absolute 1.2 K/CU MM Eosinophils Absolute 0.1 K/CU MM    Basophils Absolute 0.1 K/CU MM    Nucleated RBC % 0.0 %    Total Nucleated RBC 0.0 K/CU MM    Total Immature Neutrophil 0.08 K/CU MM    Immature Neutrophil % 0.6 (H) 0 - 0.43 %   Comprehensive Metabolic Panel w/ Reflex to MG    Collection Time: 09/23/22 10:02 PM   Result Value Ref Range    Sodium 135 135 - 145 MMOL/L    Potassium 3.8 3.5 - 5.1 MMOL/L    Chloride 100 99 - 110 mMol/L    CO2 21 21 - 32 MMOL/L    BUN 30 (H) 6 - 23 MG/DL    Creatinine 1.1 0.6 - 1.1 MG/DL    Glucose 174 (H) 70 - 99 MG/DL    Calcium 9.7 8.3 - 10.6 MG/DL    Albumin 3.3 (L) 3.4 - 5.0 GM/DL    Total Protein 6.4 6.4 - 8.2 GM/DL    Total Bilirubin 0.4 0.0 - 1.0 MG/DL    ALT 12 10 - 40 U/L    AST 15 15 - 37 IU/L    Alkaline Phosphatase 75 40 - 129 IU/L    GFR Non- 49 (L) >60 mL/min/1.73m2    GFR  59 (L) >60 mL/min/1.73m2    Anion Gap 14 4 - 16   Troponin    Collection Time: 09/23/22 10:02 PM   Result Value Ref Range    Troponin T 0.012 (H) <0.01 NG/ML   Brain Natriuretic Peptide    Collection Time: 09/23/22 10:02 PM   Result Value Ref Range    Pro-BNP 2,712 (H) <300 PG/ML   Basic Metabolic Panel    Collection Time: 09/24/22  4:57 AM   Result Value Ref Range    Sodium 136 135 - 145 MMOL/L    Potassium 4.1 3.5 - 5.1 MMOL/L    Chloride 102 99 - 110 mMol/L    CO2 21 21 - 32 MMOL/L    Anion Gap 13 4 - 16    BUN 34 (H) 6 - 23 MG/DL    Creatinine 1.4 (H) 0.6 - 1.1 MG/DL    Glucose 214 (H) 70 - 99 MG/DL    Calcium 9.4 8.3 - 10.6 MG/DL    GFR Non- 37 (L) >60 mL/min/1.73m2    GFR  45 (L) >60 mL/min/1.73m2   Magnesium    Collection Time: 09/24/22  4:57 AM   Result Value Ref Range    Magnesium 1.8 1.8 - 2.4 mg/dl   Troponin    Collection Time: 09/24/22  4:57 AM   Result Value Ref Range    Troponin T 0.068 (H) <0.01 NG/ML           Assessment/Recommendations:     -Dyspnea probably secondary to pulmonary etiology given that she has large pleural effusion which is probably secondary to her malignancy her ejection fraction has been normal  -Was seen for A. fib last month is in sinus rhythm now is on negative chronotropic agents patient is on Eliquis which will probably need to be held prior to thoracentesis  -Possibility of a HFpEF however looks like the pleural effusions are coming from her malignancy await oncology input  -Hyperlipidemia on Crestor 10 mg p.o. daily       Neyda Hylton MD, 9/24/2022 8:32 AM       Please note this report has been partially produced using speech recognition software and may contain errors related to that system including errors in grammar, punctuation, and spelling, as well as words and phrases that may be inappropriate. If there are any questions or concerns please feel free to contact the dictating provider for clarification.

## 2022-09-24 NOTE — CONSULTS
Pulmonary Consult Note      Reason for Consult: recurrent bilateral pleural effusion; evaluate for need of pleurex vs pelurodesis vs thoracentesis only   Requesting Physician: Timur Long MD    Subjective:   CHIEF COMPLAINT :SOB    Patient Active Problem List    Diagnosis Date Noted    Acute systolic CHF (congestive heart failure) ([address] 09/24/2022     Priority: Medium    Acute respiratory failure with hypoxia ([address] 09/24/2022     Priority: Medium    Diastolic heart failure, unspecified HF chronicity ([address] 08/30/2022     Priority: Medium    Atrial fibrillation with RVR ([address] 08/18/2022     Priority: Medium    Varicose veins of both legs with edema 06/28/2022     Priority: Medium    Venous insufficiency 06/07/2022     Priority: Medium    Shortness of breath 06/07/2022     Priority: Medium    Edema of lower extremity 06/07/2022     Priority: Medium    Chest pain 05/12/2022     Priority: Medium    Peripheral edema 03/21/2022    Carcinoma of right female breast, unspecified estrogen receptor status, unspecified site of breast ([address] 02/16/2022    Hypercalcemia      Overview Note:     mild ~11, iPTH nl 8/2021      Diabetes type 2, controlled ([address]      Overview Note:     likely, w two random sugars >126      Migraine headache     GERD (gastroesophageal reflux disease)     Irritable bowel syndrome     Lumbar herniated disc     Low back pain      Overview Note:     replace inactive diagnosis      Hyperlipidemia     History of breast cancer      Overview Note:     R breast           HPI:                The patient is a 71 y.o. female with significant past medical history of Afib, CHF, Possible scleroderma, H/O breast cancer, B/L pleural effusion   presents with complaints of SOB x 3 days getting worse. Her CXR showed Bilateral Pleural effusion. Her PBNPT is 2,712 and her [facility] showed suspected diastolic dysfunction.      Past Medical History:      Diagnosis Date    Anxiety     Breast carcinoma ([facility] 1997    R breast - MAMMOGRAM ANNUALLY, NEXT DUE 5/2020. Cervical radiculitis     Right     Chest pain     Depression     Diabetes type 2, controlled ([facility] [address] 2016    w two random sugars >126    Family history of diabetes mellitus (DM)     Mother and Father    GERD (gastroesophageal reflux disease)     UGI- sm HH , done at [address] [place] [address],[address] 3/26/14    Hiatal hernia     Hx of Doppler ultrasound 05/17/2022    Normal bilateral lower extremity ankle brachial indices. Hx of Doppler ultrasound 05/17/2022    Significant reflux noted of the Right GSV SFJ (0.7s), GSV Knee (2.0s). Significant reflux noted in the Left GSV SFJ (1.8s). Hx of echocardiogram 05/12/2022    EF is estimated at 55-60%. Mitral annular calcification is present. Mild mitral, tricuspid and moderate aortic regurgitation is present. Mild Pulmonary hypertension noted with RVSP of 43mmHg.     Hypercalcemia     mild ~11, iPTH nl 8/2021    Hyperglycemia     Hyperlipidemia     Insomnia     Irritable bowel syndrome     LBP (low back pain)     Lumbar herniated disc     L L4-5 HNP noted on MRI    Menopause     female exam every 2 yrs, due for recheck w me 2015    Migraine headache     ON TOPAMAX    Osteoporosis     Prediabetes     a1c 6.4 in Dec 2014    S/P colonoscopic polypectomy 06/07/2019    Dr Frank Dia, recheck 5 yrs    TIA (transient ischemic attack)     INACTIVE PROBLEM      Past Surgical History:        Procedure Laterality Date    BREAST RECONSTRUCTION Right 4/2013    Dr Selvin Anna (CERVIX STATUS UNKNOWN)  5/1995    LIANG/BSO    MASTECTOMY  7/1997    Right breast for breast cancer    TUBAL LIGATION       Current Medications:     sodium chloride flush  5-40 mL IntraVENous 2 times per day    enoxaparin  40 mg SubCUTAneous Daily    [Held by provider] apixaban  5 mg Oral BID    dilTIAZem  120 mg Oral Daily    lisinopril  20 mg Oral Daily    metoprolol tartrate  50 mg Oral BID    mirtazapine  15 mg Oral Nightly    oxybutynin  5 mg Oral BID    rosuvastatin  10 mg Oral Nightly    insulin lispro  0-4 Units SubCUTAneous TID WC    insulin lispro  0-4 Units SubCUTAneous Nightly    [START ON 9/25/2022] furosemide  40 mg IntraVENous BID     Allergies:    Social History:    TOBACCO:   reports that she has never smoked. She has never used smokeless tobacco.  ETOH:   reports no history of alcohol use. Patient currently lives independently    Family History:       Problem Relation Age of Onset    High Blood Pressure Mother     Diabetes Mother     Diabetes Father     Heart Disease Father         open heart surgery    High Blood Pressure Father     Diabetes Sister     High Cholesterol Sister     Other Sister         hypochondriac    Stroke Paternal Grandmother        REVIEW OF SYSTEMS:    CONSTITUTIONAL:  negative for fevers, chills, diaphoresis, activity change, appetite change, fatigue, night sweats and unexpected weight change.    EYES:  negative for blurred vision, eye discharge, visual disturbance and icterus  HEENT:  negative for hearing loss, tinnitus, ear drainage, sinus pressure, nasal congestion, epistaxis and snoring  RESPIRATORY:  See HPI  CARDIOVASCULAR:  negative for chest pain, palpitations, exertional chest pressure/discomfort, edema, syncope  GASTROINTESTINAL:  negative for nausea, vomiting, diarrhea, constipation, blood in stool and abdominal pain  GENITOURINARY:  negative for frequency, dysuria, urinary incontinence, decreased urine volume, and hematuria  HEMATOLOGIC/LYMPHATIC:  negative for easy bruising, bleeding and lymphadenopathy  ALLERGIC/IMMUNOLOGIC:  negative for recurrent infections, angioedema, anaphylaxis and drug reactions  ENDOCRINE:  negative for weight changes and diabetic symptoms including polyuria, polydipsia and polyphagia  MUSCULOSKELETAL:  negative for  pain, joint swelling, decreased range of motion and muscle weakness  NEUROLOGICAL:  negative for headaches, slurred speech, unilateral weakness  PSYCHIATRIC/BEHAVIORAL: negative for hallucinations, behavioral problems, confusion and agitation. Objective:   PHYSICAL EXAM:      VITALS:  /62   Pulse 83   Temp 97.7 °F (36.5 °C) (Oral)   Resp 27   Ht 5' 5\" (1.651 m)   Wt 143 lb (64.9 kg)   SpO2 97%   BMI 23.80 kg/m²   24HR INTAKE/OUTPUT:    Intake/Output Summary (Last 24 hours) at 2022 1600  Last data filed at 2022 1413  Gross per 24 hour   Intake 1050 ml   Output 450 ml   Net 600 ml     CURRENT PULSE OXIMETRY:  SpO2: 97 %  24HR PULSE OXIMETRY RANGE:  SpO2  Av.4 %  Min: 8 %  Max: 100 %    CONSTITUTIONAL:  awake, alert, cooperative, no apparent distress, and appears stated age  NECK:  Supple, symmetrical, trachea midline, no adenopathy, thyroid symmetric, not enlarged and no tenderness, skin normal  LUNGS:  Decrease air entry bilateral bases  CARDIOVASCULAR:  normal S1 and S2, no edema and no JVD  ABDOMEN:  normal bowel sounds, non-distended and no masses palpated, and no tenderness to palpation. No hepatospleenomegaly  LYMPHADENOPATHY:  no axillary or supraclavicular adenopathy. No cervical adnenopathy  PSYCHIATRIC: Oriented to person place and time. No obvious depression or anxiety. MUSCULOSKELETAL: No obvious misalignment or effusion of the joints. No clubbing, cyanosis of the digits. SKIN:  normal skin color, texture, turgor and no redness, warmth, or swelling.  No palpable nodules    DATA:    Old records have been reviewed  CBC with Differential:    Lab Results   Component Value Date/Time    WBC 13.5 2022 10:02 PM    RBC 3.98 2022 10:02 PM    HGB 11.4 2022 10:02 PM    HCT 35.9 2022 10:02 PM     2022 10:02 PM    MCV 90.2 2022 10:02 PM    MCH 28.6 2022 10:02 PM    MCHC 31.8 2022 10:02 PM    RDW 14.9 2022 10:02 PM    SEGSPCT 77.0 2022 10:02 PM    LYMPHOPCT 11.9 2022 10:02 PM    MONOPCT 9.0 2022 10:02 PM    BASOPCT 0.6 2022 10:02 PM    MONOSABS 1.2 2022 10:02 PM    LYMPHSABS 1.6 2022 10:02 PM    EOSABS 0.1 09/23/2022 10:02 PM    BASOSABS 0.1 09/23/2022 10:02 PM    DIFFTYPE AUTOMATED DIFFERENTIAL 09/23/2022 10:02 PM     BMP:    Lab Results   Component Value Date/Time     09/24/2022 04:57 AM    K 4.1 09/24/2022 04:57 AM     09/24/2022 04:57 AM    CO2 21 09/24/2022 04:57 AM    BUN 34 09/24/2022 04:57 AM    CREATININE 1.4 09/24/2022 04:57 AM    CALCIUM 9.4 09/24/2022 04:57 AM    GFRAA 45 09/24/2022 04:57 AM    GFRAA >60 09/14/2012 08:31 AM    LABGLOM 37 09/24/2022 04:57 AM    LABGLOM 79 05/07/2014 07:41 AM    GLUCOSE 214 09/24/2022 04:57 AM     Hepatic Function Panel:    Lab Results   Component Value Date/Time    ALKPHOS 75 09/23/2022 10:02 PM    ALT 12 09/23/2022 10:02 PM    AST 15 09/23/2022 10:02 PM    PROT 6.4 09/23/2022 10:02 PM    PROT 7.4 09/14/2012 08:31 AM    BILITOT 0.4 09/23/2022 10:02 PM     ABG:  No results found for: AQA9SHA, BEART, S1AICCRS, PHART, THGBART, UPI1NCA, PO2ART, LYC1EQW    Cultures:   Pending      Radiology Review: Worsening basilar opacities, bilateral effusions and pulmonary edema   suggestive of congestive heart failure. Superimposed pneumonia would be   difficult to exclude     1. Abnormalities noted on the computed tomography scan in the liver    proved to represent hemangiomas, a benign process requiring no further    follow-up. 2.  Bony abnormalities at T11 and L1 suggestive of metastatic disease. 3.  Bilateral pleural effusions.              Assessment/Plan       Patient Active Problem List    Diagnosis Date Noted    Acute systolic CHF (congestive heart failure) ([facility] [address] 09/24/2022     Priority: Medium    Acute respiratory failure with hypoxia ([address] 09/24/2022     Priority: Medium    Diastolic heart failure, unspecified HF chronicity ([address] 08/30/2022     Priority: Medium    Atrial fibrillation with RVR ([address] 08/18/2022     Priority: Medium    Varicose veins of both legs with edema 06/28/2022     Priority: Medium    Venous insufficiency 06/07/2022     Priority: Medium    Shortness of breath 06/07/2022     Priority: Medium    Edema of lower extremity 06/07/2022     Priority: Medium    Chest pain 05/12/2022     Priority: Medium    Peripheral edema 03/21/2022    Carcinoma of right female breast, unspecified estrogen receptor status, unspecified site of breast ([facility] 02/16/2022    Hypercalcemia      Overview Note:     mild ~11, iPTH nl 8/2021      Diabetes type 2, controlled ([facility] [address]      Overview Note:     likely, w two random sugars >126      Migraine headache     GERD (gastroesophageal reflux disease)     Irritable bowel syndrome     Lumbar herniated disc     Low back pain      Overview Note:     replace inactive diagnosis      Hyperlipidemia     History of breast cancer      Overview Note:     R breast        Acute on chronic Hypoxic resp failure  Bilateral Pleural effusions  Diastolic dysfunction  Afib on Eliquis  H/o Breast ca with mets to T12, L1    PLAN  1. Diuresis  2. CHF optimization  3. Inhalers  4. Keep sats > 92%  5. I/o chart  6. Hold Eliquis  7. US Guided Bilateral thoracentesis in am  8. DVT and GI Prophylaxis  9. Await Oncology eval for the mets with h/o breast ca  10.  C/w present management          Electronically signed by Jonathan Contreras MD on 9/24/2022 at 4:00 PM

## 2022-09-24 NOTE — PLAN OF CARE
Problem: Discharge Planning  Goal: Discharge to home or other facility with appropriate resources  9/24/2022 1056 by Raj Day RN  Outcome: Progressing  Flowsheets (Taken 9/24/2022 0900)  Discharge to home or other facility with appropriate resources: Identify barriers to discharge with patient and caregiver  9/24/2022 0446 by Emma Richardson RN  Outcome: Progressing     Problem: Safety - Adult  Goal: Free from fall injury  9/24/2022 1056 by Raj Day RN  Outcome: Progressing  9/24/2022 0446 by Emma Richardson RN  Outcome: Progressing

## 2022-09-24 NOTE — CONSULTS
ONCOLOGY HEMATOLOGY CARE ([facility])  CONSULTATION REPORT    2022  3:47 PM    Patient:    Patti Soto  : 1950   71 y.o. MRN: [id number]  Admitted: 2022  9:51 PM ATT: Francy March MD   [id number]  AdmitDx: Pleural effusion [J90]  Acute respiratory failure with hypoxia ([address] [F46.30]  Acute systolic CHF (congestive heart failure) ([facility] [address] [I50.21]  PCP: Lizett Dempsey MD    Reason for Consult: ?met cancer    Requesting Physician:  Francy March MD      History Obtained From:  Patient and review of all records      CHIEF COMPLAINT    Chief Complaint   Patient presents with    Shortness of Breath     Started today; has recently been seen and had 2 paracentesis done; normally wears O2 at 3L/min and is currently requiring more to sub stain oxygen levels       HISTORY OF PRESENT ILLNESS   Patti Soto is a 71 y.o. female was diagnosed with invasive ductal carcinoma the right breast.  Underwent right breast mastectomy . Tumor size was 5 cm. Lymph nodes positive. Estrogen and progesterone positive. Received adjuvant chemotherapy. She was on adjuvant tamoxifen for 5 years followed by AI for 5 years. All the other details not available at this point. She underwent left breast mastopexy and right nipple areolar reconstruction with modified CV flap in 2013. She was initially seen by Dr. Hammad Duran here at [facility] and then at [facility], last follow-up was in 2019 May  She was admitted to [facility] in 2022 with symptoms of shortness of breath. Echocardiogram with moderate pericardial effusion. Was also found to have bilateral pleural effusion. Underwent thoracentesis. Cytology negative for any malignancy. No lung nodules or lymphadenopathy. CAT scan of the chest at that time. She was admitted to [facility] in 2022.   She had a bone scan performed on 2022 which revealed osseous metastatic disease in multiple ribs and vertebral bodies and also possibly left hemipelvis. Findings suspicious for metastatic disease. CT scan of the abdomen pelvis was also done on September 15, 2022 which revealed hypodense lesions within the liver. Sclerosis within the L1 vertebrae. Subsequently an MRI of the abdomen revealed hemangiomas. She had another thoracentesis on September 13, 2022, cytology negative for any malignancy. CA 27-29 was 19  was 83 CA 19-9 was less than 0.8 ferritin of 135 iron saturations of 9% I01 694 folic acid 65.2 CEA 4.5    Currently admitted with worsening shortness of breath. Some chest discomfort. He is on home oxygen. Poor appetite. No weight loss. Denied any abdominal pain. No joint pains or bone pains. 9/23/2022 CBC WBC of 13.5 hemoglobin of 11.4 hematocrit 35. 4MCV of 90 and platelets of 246  CMP with sodium 135 potassium 3.8 creatinine 1.1 albumin 3.3 rest of the LFTs within normal limits. Chest x-ray with bilateral worsening opacities and pleural effusions and pulmonary edema. PAST MEDICAL HISTORY    Past Medical History:   Diagnosis Date    Anxiety     Breast carcinoma ([facility] [address] 1997    R breast - MAMMOGRAM ANNUALLY, NEXT DUE 5/2020. Cervical radiculitis     Right     Chest pain     Depression     Diabetes type 2, controlled ([address] 2016    w two random sugars >126    Family history of diabetes mellitus (DM)     Mother and Father    GERD (gastroesophageal reflux disease)     UGI- sm HH , done at [address] [place] [address],[facility] [place] [facility] 3/26/14    Hiatal hernia     Hx of Doppler ultrasound 05/17/2022    Normal bilateral lower extremity ankle brachial indices. Hx of Doppler ultrasound 05/17/2022    Significant reflux noted of the Right GSV SFJ (0.7s), GSV Knee (2.0s). Significant reflux noted in the Left GSV SFJ (1.8s). Hx of echocardiogram 05/12/2022    EF is estimated at 55-60%. Mitral annular calcification is present. Mild mitral, tricuspid and moderate aortic regurgitation is present. Mild Pulmonary hypertension noted with RVSP of 43mmHg.     Hypercalcemia     mild ~11, iPTH nl 8/2021    Hyperglycemia     Hyperlipidemia     Insomnia     Irritable bowel syndrome     LBP (low back pain)     Lumbar herniated disc     L L4-5 HNP noted on MRI    Menopause     female exam every 2 yrs, due for recheck w me 2015    Migraine headache     ON TOPAMAX    Osteoporosis     Prediabetes     a1c 6.4 in Dec 2014    S/P colonoscopic polypectomy 06/07/2019    Dr Michael Llanos, recheck 5 yrs    TIA (transient ischemic attack)     INACTIVE PROBLEM       SURGICAL HISTORY    Past Surgical History:   Procedure Laterality Date    BREAST RECONSTRUCTION Right 4/2013    Dr Elicia Weiss (CERVIX STATUS UNKNOWN)  5/1995    LIANG/BSO    MASTECTOMY  7/1997    Right breast for breast cancer    TUBAL LIGATION         Current Medications:    Medications    Scheduled Medications:    sodium chloride flush  5-40 mL IntraVENous 2 times per day    enoxaparin  40 mg SubCUTAneous Daily    [Held by provider] apixaban  5 mg Oral BID    dilTIAZem  120 mg Oral Daily    lisinopril  20 mg Oral Daily    metoprolol tartrate  50 mg Oral BID    mirtazapine  15 mg Oral Nightly    oxybutynin  5 mg Oral BID    rosuvastatin  10 mg Oral Nightly    insulin lispro  0-4 Units SubCUTAneous TID WC    insulin lispro  0-4 Units SubCUTAneous Nightly    [START ON 9/25/2022] furosemide  40 mg IntraVENous BID     PRN Medications: sodium chloride flush, sodium chloride, ondansetron **OR** ondansetron, polyethylene glycol, acetaminophen **OR** acetaminophen, ALPRAZolam    Allergies:  Codeine    FAMILY HISTORY    Family History   Problem Relation Age of Onset    High Blood Pressure Mother     Diabetes Mother     Diabetes Father     Heart Disease Father         open heart surgery    High Blood Pressure Father     Diabetes Sister     High Cholesterol Sister     Other Sister         hypochondriac    Stroke Paternal Grandmother        SOCIAL HISTORY    Social History Socioeconomic History    Marital status:    Occupational History    Occupation: - [employer]     Comment: retired Sept 2015   Tobacco Use    Smoking status: Never    Smokeless tobacco: Never   Substance and Sexual Activity    Alcohol use: No    Drug use: No     Social Determinants of Health     Financial Resource Strain: Low Risk     Difficulty of Paying Living Expenses: Not hard at all   Food Insecurity: No Food Insecurity    Worried About [address] in the Last Year: Never true    [address] [employer] in the Last Year: Never true   Transportation Needs: No Transportation Needs    Lack of Transportation (Medical): No    Lack of Transportation (Non-Medical): No   Physical Activity: Inactive    Days of Exercise per Week: 0 days    Minutes of Exercise per Session: 0 min   Stress: No Stress Concern Present    Feeling of Stress : Not at all   Intimate Partner Violence: Not At Risk    Fear of Current or Ex-Partner: No    Emotionally Abused: No    Physically Abused: No    Sexually Abused: No   Housing Stability: Low Risk     Unable to Pay for Housing in the Last Year: No    Number of Places Lived in the Last Year: 1    Unstable Housing in the Last Year: No       Review of systems:  Constitutional:  No weight loss, No fever, No chills  Eyes:  No diplopia, No transient or permanent loss of vision, No scotomata. ENT / Mouth:  No epistaxis, No dysphagia, No hoarseness, No oral ulcers, No gingival bleeding. No sore throat, No postnasal drip, No nasal drip, No mouth pain, No sinus pain, No tinnitus, Normal hearing. Cardiovascular:  No palpitations, No syncope, No upper extremity edema, No lower extremity edema, No calf discomfort.   Respiratory:  No hemoptysis, No pleurisy, No wheezing  Breast:  No left breast mas  Gastrointestinal:  No abdominal pain, No abdominal cramping, No nausea, No vomiting, No constipation, No diarrhea, No hematochezia, No melena, No jaundice, No dyspepsia, No dysphagia. Urinary:  No dysuria, No hematuria, No urinary incontinence. Musculoskeletal:  No muscle pain, No swollen joints, No joint redness, No bone pain, No spine tenderness. Skin:  No rash, No nodules, No pruritus, No lesions. PHYSICAL EXAM      Vitals: /62   Pulse 83   Temp 97.7 °F (36.5 °C) (Oral)   Resp 27   Ht 5' 5\" (1.651 m)   Wt 143 lb (64.9 kg)   SpO2 97%   BMI 23.80 kg/m²     CONSTITUTIONAL: awake, alert, tired appearing  EYES: MAIRA,No palor, no icetrus  ENT: ATNC  NECK: No JVD  HEMATOLOGIC/LYMPHATIC: no cervical, supraclavicular or axillary lymphadenopathy   LUNGS: dec bs bases  CARDIOVASCULAR: s1s2 rrr no murmurs  ABDOMEN: soft ntnd bs pos  NEUROLOGIC: GI  SKIN: No rash  EXTREMITIES: no LE edema bilaterally  Right breast with reconstruction changes  Left breast with no mass, no lad      LABORATORY RESULTS  CBC:   Recent Labs     09/23/22 2202   WBC 13.5*   HGB 11.4*        BMP:    Recent Labs     09/23/22 2202 09/24/22  0457    136   K 3.8 4.1    102   CO2 21 21   BUN 30* 34*   CREATININE 1.1 1.4*   GLUCOSE 174* 214*     Hepatic:   Recent Labs     09/23/22 2202   AST 15   ALT 12   BILITOT 0.4   ALKPHOS 75     INR: No results for input(s): INR in the last 72 hours. RADIOLOGY REPORTS  Reviewed    IMPRESSION & RECOMMENDATIONS:  History of invasive ductal carcinoma the right breast in 1999 status postmastectomy, ER/CT positive looks like, received adjuvant chemotherapy followed by endocrine therapy for 10 years. Now with a recurrent bilateral pleural effusions, possible pericardial effusion. Thoracentesis twice negative for any malignancy. Bone scan with bone lesions. Chest x-ray with again looks like pleural effusion. Noted plans for ultrasound-guided thoracentesis by pulmonology. This with cardiology, repeat echo recommended. Also discussed with IR, will place a consult for bone biopsy as inpatient if not will obtain as outpatient. Recommend follow-up with oncology at [facility], she has an appointment already. Anemia, most probably multifactorial.  Note B12 levels low as well. Will replace parenterally. CHF, A. fib. AC on hold for thoracentesis. Is followed by cardiology. Continue other medical care    ECOG Performance Status (ECOG Scale 0-4): 1-2    This plan was discussed with the patient and her  and they seem to have verbalized understanding. Discussed with Dr. Johnnie Burkitt Dr. Gaynell Gola    We will continue to follow the patient. Thank you for allowing us to participate in the care of this patient.      [address]

## 2022-09-24 NOTE — H&P
V2.0  History and Physical      Name:  Libertad Fuller /Age/Sex: 1950  (71 y.o. female)   MRN & CSN:  [id number] & [id number] Encounter Date/Time: 2022 12:16 AM EDT   Location:   PCP: Alie Zamora MD       Hospital Day: 2    Assessment and Plan:   Libertad Fuller is a 71 y.o. female with a pmh of Afib, CHF, Possible scleroderma, H/O breast cancer, B/L pleural effusion who presents with Acute systolic CHF (congestive heart failure) [facility]    Hospital Problems             Last Modified POA    * (Principal) Acute systolic CHF (congestive heart failure) ([facility] [address] 2022 Yes          Acute CHF - SOB. On 2L home oxygen. Elevated BNP. Elevated troponin likely due to CHF. Last echo in  showed normal EF. Chest xray - Compatible with CHF and B/L Pleural effusion. Cytology was -ve on last tap. Plan IV diuresis, trend troponin, IR guided thoracocentesis - diagnostic and therapeutic, strict I/O, daily weight, fluid restriction, BiPAP. Cardiology consulted. Repeat Echo. Afib rate controlled - continue home meds. Eliquis held. IR consulted for possible thoracocentesis     H/O breast cancer - Recent MRI s/o metastatic lesion in bones. Oncology consulted    DM - placed on Insulin scale coverage    Recent work up was done for scleroderma-myositis overlap syndrome -  RODOLFO, RF, anti-SSA, anti-SSB came back negative      Disposition:   Current Living situation: Home  Expected Disposition: Home  Estimated D/C: 3    Diet No diet orders on file   DVT Prophylaxis [] Lovenox, []  Heparin, [] SCDs, [] Ambulation,  [] Eliquis, [x] Xarelto, [] Coumadin   Code Status Prior   Surrogate Decision Maker/ POA spouse     History from:     patient    History of Present Illness:     Chief Complaint: Acute systolic CHF (congestive heart failure) ([facility] [address]  Libertad Fuller is a 71 y.o. female with pmh as mentioned above who presents with SOB and intermittent chest pain.  Patient reports she since couple of months she has been in and out of hospital for similar reason. Uses home o2 at 2L. Got multiple thoracocentesis for recurrent B/L pleural effusion in last few months. For now patient and family wants all intervention including CPR and intubation. Review of Systems: Need 10 Elements   Review of Systems    Denies headache, dizziness, abdominal pain, dysuria. Bowel and bladder habits normal    Objective:   No intake or output data in the 24 hours ending 09/24/22 0016   Vitals:   Vitals:    09/23/22 2345 09/24/22 0000 09/24/22 0006 09/24/22 0014   BP: (!) 176/73 (!) 164/82     Pulse: (!) 102 (!) 102 98 84   Resp: (!) 39 (!) 32 (!) 35 22   Temp: 98.3 °F (36.8 °C)      TempSrc: Oral      SpO2: 92% 92% 93% 97%   Weight:       Height:           Medications Prior to Admission     Prior to Admission medications    Medication Sig Start Date End Date Taking? Authorizing Provider   ALPRAZolam (XANAX) 0.25 MG tablet Take 1 tablet by mouth 2 times daily as needed for Anxiety for up to 30 days. 9/23/22 10/23/22  Jake Bailey MD   mirtazapine (REMERON) 15 MG tablet Take 1 tablet by mouth nightly 9/23/22   Jake Bailey MD   oxybutynin (DITROPAN) 5 MG tablet Take 1 tablet by mouth 2 times daily 9/6/22   Jake Bailey MD   apixaban (ELIQUIS) 5 MG TABS tablet Take 1 tablet by mouth 2 times daily 8/23/22   David Che MD   lisinopril (PRINIVIL;ZESTRIL) 20 MG tablet Take 1 tablet by mouth daily 8/24/22   David Che MD   metoprolol tartrate (LOPRESSOR) 50 MG tablet Take 1 tablet by mouth 2 times daily 8/23/22   David Che MD   dilTIAZem (CARDIZEM CD) 120 MG extended release capsule Take 1 capsule by mouth daily 8/24/22   David Che MD   amLODIPine-benazepril (LOTREL) 5-10 MG per capsule Take 1 capsule by mouth in the morning. . 8/16/22 8/23/22  Alondra Subramanian MD   allopurinol (ZYLOPRIM) 100 MG tablet Take 2 tablets by mouth in the morning.   Patient not taking: No sig reported 7/21/22 8/23/22  Alie Zamora MD   metFORMIN (GLUCOPHAGE) 500 MG tablet Take 1 tablet by mouth every morning (before breakfast) 5/6/22   Alie Zamora MD   glipiZIDE (GLUCOTROL) 5 MG tablet Take 1 tablet by mouth daily 5/6/22   Alie Zamora MD   rosuvastatin (CRESTOR) 10 MG tablet Take 1 tablet by mouth nightly 2/16/22   Alie Zamora MD   Lancets MISC 1 each by Does not apply route 2 times daily 2/16/22   Alie Zamora MD   blood glucose monitor strips Test twice a day & as needed for symptoms of irregular blood glucose. 2/16/22   Alie Zamora MD       Physical Exam: Need 8 Elements   Physical Exam     General: Elderly looks frail and in respiratory distress  Eyes: EOMI  ENT: neck supple  Cardiovascular: Regular rate. Respiratory:B/L reduced lung sound  Gastrointestinal: Soft, non tender  Genitourinary: no suprapubic tenderness  Musculoskeletal: No edema  Skin: warm, dry  Neuro: Alert. Psych: Mood appropriate. Past Medical History:   PMHx   Past Medical History:   Diagnosis Date    Anxiety     Breast carcinoma ([facility] [address] 1997    R breast - MAMMOGRAM ANNUALLY, NEXT DUE 5/2020. Cervical radiculitis     Right     Chest pain     Depression     Diabetes type 2, controlled ([address] 2016    w two random sugars >126    Family history of diabetes mellitus (DM)     Mother and Father    GERD (gastroesophageal reflux disease)     UGI- sm HH , done at [address] [place] [address],[address] 3/26/14    Hiatal hernia     Hx of Doppler ultrasound 05/17/2022    Normal bilateral lower extremity ankle brachial indices. Hx of Doppler ultrasound 05/17/2022    Significant reflux noted of the Right GSV SFJ (0.7s), GSV Knee (2.0s). Significant reflux noted in the Left GSV SFJ (1.8s). Hx of echocardiogram 05/12/2022    EF is estimated at 55-60%. Mitral annular calcification is present. Mild mitral, tricuspid and moderate aortic regurgitation is present. Mild Pulmonary hypertension noted with RVSP of 43mmHg.     Hypercalcemia     mild ~11, [facility] 8/2021    Hyperglycemia     Hyperlipidemia     Insomnia     Irritable bowel syndrome     LBP (low back pain)     Lumbar herniated disc     L L4-5 HNP noted on MRI    Menopause     female exam every 2 yrs, due for recheck w me 2015    Migraine headache     ON TOPAMAX    Osteoporosis     Prediabetes     a1c 6.4 in Dec 2014    S/P colonoscopic polypectomy 06/07/2019    Dr Isis Gabriel, recheck 5 yrs    TIA (transient ischemic attack)     INACTIVE PROBLEM     PSHX:  has a past surgical history that includes Tubal ligation; Mastectomy (7/1997); Hysterectomy (5/1995); and Breast reconstruction (Right, 4/2013). Allergies: Allergies   Allergen Reactions    Codeine      \"jittery\"  Feels anxoius     Fam HX:  family history includes Diabetes in her father, mother, and sister; Heart Disease in her father; High Blood Pressure in her father and mother; High Cholesterol in her sister; Other in her sister; Stroke in her paternal grandmother. Soc HX:   Social History     Socioeconomic History    Marital status:    Occupational History    Occupation: - [employer]     Comment: retired Sept 2015   Tobacco Use    Smoking status: Never    Smokeless tobacco: Never   Substance and Sexual Activity    Alcohol use: No    Drug use: No     Social Determinants of Health     Financial Resource Strain: Low Risk     Difficulty of Paying Living Expenses: Not hard at all   Food Insecurity: No Food Insecurity    Worried About [address] in the Last Year: Never true    [facility] in the Last Year: Never true   Transportation Needs: No Transportation Needs    Lack of Transportation (Medical): No    Lack of Transportation (Non-Medical):  No   Physical Activity: Inactive    Days of Exercise per Week: 0 days    Minutes of Exercise per Session: 0 min   Stress: No Stress Concern Present    Feeling of Stress : Not at all   Intimate Partner Violence: Not At Risk    Fear of Current or Ex-Partner: No Emotionally Abused: No    Physically Abused: No    Sexually Abused: No   Housing Stability: Low Risk     Unable to Pay for Housing in the Last Year: No    Number of Places Lived in the Last Year: 1    Unstable Housing in the Last Year: No       Medications:   Medications:    Infusions:   PRN Meds:     Labs      CBC:   Recent Labs     09/23/22 2202   WBC 13.5*   HGB 11.4*        BMP:    Recent Labs     09/23/22 2202      K 3.8      CO2 21   BUN 30*   CREATININE 1.1   GLUCOSE 174*     Hepatic:   Recent Labs     09/23/22 2202   AST 15   ALT 12   BILITOT 0.4   ALKPHOS 75     Lipids:   Lab Results   Component Value Date/Time    CHOL 190 06/10/2022 08:40 AM    HDL 33 06/10/2022 08:40 AM    TRIG 402 06/10/2022 08:40 AM     Hemoglobin A1C:   Lab Results   Component Value Date/Time    LABA1C 6.5 08/18/2022 07:10 AM     TSH:   Lab Results   Component Value Date/Time    TSH 2.90 07/21/2022 09:55 AM     Troponin:   Lab Results   Component Value Date/Time    TROPONINT 0.012 09/23/2022 10:02 PM    TROPONINT <0.010 08/18/2022 07:10 AM     Lactic Acid: No results for input(s): LACTA in the last 72 hours.   BNP:   Recent Labs     09/23/22 2202   PROBNP 2,712*     UA:  Lab Results   Component Value Date/Time    NITRU NEGATIVE 08/18/2022 12:15 PM    COLORU YELLOW 08/18/2022 12:15 PM    PHUR 5.0 06/07/2012 12:00 AM    WBCUA 1 08/18/2022 12:15 PM    RBCUA NONE SEEN 08/18/2022 12:15 PM    MUCUS RARE 08/18/2022 12:15 PM    TRICHOMONAS NONE SEEN 08/18/2022 12:15 PM    BACTERIA NEGATIVE 08/18/2022 12:15 PM    CLARITYU CLEAR 08/18/2022 12:15 PM    SPECGRAV 1.010 08/18/2022 12:15 PM    LEUKOCYTESUR NEGATIVE 08/18/2022 12:15 PM    UROBILINOGEN 0.2 08/18/2022 12:15 PM    BILIRUBINUR NEGATIVE 08/18/2022 12:15 PM    BILIRUBINUR Negative 06/07/2012 12:00 AM    BLOODU NEGATIVE 08/18/2022 12:15 PM    GLUCOSEU Negative 06/07/2012 12:00 AM    KETUA TRACE 08/18/2022 12:15 PM     Urine Cultures: No results found for: LABURIN  Blood Cultures: No results found for: BC  No results found for: BLOODCULT2  Organism: No results found for: ORG    Imaging/Diagnostics Last 24 Hours   XR CHEST PORTABLE    Result Date: 9/23/2022  EXAMINATION: ONE XRAY VIEW OF THE CHEST 9/23/2022 9:58 pm COMPARISON: August 23, 2022 HISTORY: ORDERING SYSTEM PROVIDED HISTORY: dyspnea TECHNOLOGIST PROVIDED HISTORY: Reason for exam:->dyspnea Reason for Exam: shortness of breath Additional signs and symptoms: dyspnea FINDINGS: No lines or tubes. Stable cardiomediastinal silhouette. The lungs show interval decrease in lung volume with worsening basilar opacities, bilateral effusions and pulmonary edema. No pneumothorax. No acute or aggressive osseous lesion. 1. Worsening basilar opacities, bilateral effusions and pulmonary edema suggestive of congestive heart failure. Superimposed pneumonia would be difficult to exclude.        Personally reviewed Lab Studies, Imaging, and discussed case with Patient and spouse    Electronically signed by Vu Chen MD on 9/24/2022 at 12:16 AM

## 2022-09-25 ENCOUNTER — APPOINTMENT (OUTPATIENT)
Dept: ULTRASOUND IMAGING | Age: 72
DRG: 280 | End: 2022-09-25
Payer: MEDICARE

## 2022-09-25 ENCOUNTER — APPOINTMENT (OUTPATIENT)
Dept: GENERAL RADIOLOGY | Age: 72
DRG: 280 | End: 2022-09-25
Payer: MEDICARE

## 2022-09-25 LAB
ANION GAP SERPL CALCULATED.3IONS-SCNC: 10 MMOL/L (ref 4–16)
APTT: 34.5 SECONDS (ref 25.1–37.1)
BUN BLDV-MCNC: 36 MG/DL (ref 6–23)
CALCIUM SERPL-MCNC: 9.9 MG/DL (ref 8.3–10.6)
CHLORIDE BLD-SCNC: 103 MMOL/L (ref 99–110)
CHOLESTEROL: 148 MG/DL
CO2: 25 MMOL/L (ref 21–32)
CREAT SERPL-MCNC: 1.1 MG/DL (ref 0.6–1.1)
EKG ATRIAL RATE: 288 BPM
EKG DIAGNOSIS: NORMAL
EKG P AXIS: -86 DEGREES
EKG Q-T INTERVAL: 286 MS
EKG QRS DURATION: 72 MS
EKG QTC CALCULATION (BAZETT): 438 MS
EKG R AXIS: 37 DEGREES
EKG T AXIS: -23 DEGREES
EKG VENTRICULAR RATE: 141 BPM
FLUID TYPE: ABNORMAL INDEX
FLUID TYPE: NORMAL INDEX
GFR AFRICAN AMERICAN: 59 ML/MIN/1.73M2
GFR NON-AFRICAN AMERICAN: 49 ML/MIN/1.73M2
GLUCOSE BLD-MCNC: 133 MG/DL (ref 70–99)
GLUCOSE BLD-MCNC: 146 MG/DL (ref 70–99)
GLUCOSE BLD-MCNC: 154 MG/DL (ref 70–99)
GLUCOSE BLD-MCNC: 169 MG/DL (ref 70–99)
GLUCOSE BLD-MCNC: 171 MG/DL (ref 70–99)
GLUCOSE BLD-MCNC: 185 MG/DL (ref 70–99)
GLUCOSE, FLUID: 175 MG/DL
GLUCOSE, FLUID: 2 MG/DL
GLUCOSE, FLUID: NORMAL MG/DL
HDLC SERPL-MCNC: 32 MG/DL
INR BLD: 1.07 INDEX
LACTATE DEHYDROGENASE, FLUID: 84 IU/L
LACTATE DEHYDROGENASE, FLUID: 90 IU/L
LACTATE DEHYDROGENASE, FLUID: NORMAL IU/L
LDL CHOLESTEROL CALCULATED: 66 MG/DL
LYMPHOCYTES, BODY FLUID: 74 %
LYMPHOCYTES, BODY FLUID: 84 %
MAGNESIUM: 2 MG/DL (ref 1.8–2.4)
MESOTHELIAL FLUID: 24 /100 WBC
MESOTHELIAL FLUID: 6 /100 WBC
MONOCYTE, FLUID: 15 %
MONOCYTE, FLUID: 20 %
NEUTROPHIL, FLUID: 1 %
NEUTROPHIL, FLUID: 5 %
OTHER CELLS FLUID: 0
OTHER CELLS FLUID: 1
PH FLUID: 8
POTASSIUM SERPL-SCNC: 4.2 MMOL/L (ref 3.5–5.1)
PROTEIN FLUID: 0.2 G/DL
PROTEIN FLUID: 2.4 GM/DL
PROTEIN FLUID: NORMAL G/DL
PROTHROMBIN TIME: 13.8 SECONDS (ref 11.7–14.5)
RBC FLUID: 504 /CU MM
RBC FLUID: 7000 /CU MM
SODIUM BLD-SCNC: 138 MMOL/L (ref 135–145)
TRIGL SERPL-MCNC: 250 MG/DL
WBC FLUID: 185 /CU MM
WBC FLUID: 383 /CU MM

## 2022-09-25 PROCEDURE — 88342 IMHCHEM/IMCYTCHM 1ST ANTB: CPT

## 2022-09-25 PROCEDURE — 93308 TTE F-UP OR LMTD: CPT

## 2022-09-25 PROCEDURE — 80061 LIPID PANEL: CPT

## 2022-09-25 PROCEDURE — 84134 ASSAY OF PREALBUMIN: CPT

## 2022-09-25 PROCEDURE — 88108 CYTOPATH CONCENTRATE TECH: CPT

## 2022-09-25 PROCEDURE — 80048 BASIC METABOLIC PNL TOTAL CA: CPT

## 2022-09-25 PROCEDURE — 93005 ELECTROCARDIOGRAM TRACING: CPT | Performed by: INTERNAL MEDICINE

## 2022-09-25 PROCEDURE — 82945 GLUCOSE OTHER FLUID: CPT

## 2022-09-25 PROCEDURE — 0W993ZX DRAINAGE OF RIGHT PLEURAL CAVITY, PERCUTANEOUS APPROACH, DIAGNOSTIC: ICD-10-PCS | Performed by: INTERNAL MEDICINE

## 2022-09-25 PROCEDURE — 6370000000 HC RX 637 (ALT 250 FOR IP): Performed by: STUDENT IN AN ORGANIZED HEALTH CARE EDUCATION/TRAINING PROGRAM

## 2022-09-25 PROCEDURE — 2500000003 HC RX 250 WO HCPCS: Performed by: INTERNAL MEDICINE

## 2022-09-25 PROCEDURE — 36410 VNPNXR 3YR/> PHY/QHP DX/THER: CPT

## 2022-09-25 PROCEDURE — 76937 US GUIDE VASCULAR ACCESS: CPT

## 2022-09-25 PROCEDURE — 87070 CULTURE OTHR SPECIMN AEROBIC: CPT

## 2022-09-25 PROCEDURE — 6360000002 HC RX W HCPCS: Performed by: INTERNAL MEDICINE

## 2022-09-25 PROCEDURE — 83735 ASSAY OF MAGNESIUM: CPT

## 2022-09-25 PROCEDURE — 99232 SBSQ HOSP IP/OBS MODERATE 35: CPT | Performed by: INTERNAL MEDICINE

## 2022-09-25 PROCEDURE — 2700000000 HC OXYGEN THERAPY PER DAY

## 2022-09-25 PROCEDURE — 87205 SMEAR GRAM STAIN: CPT

## 2022-09-25 PROCEDURE — 82962 GLUCOSE BLOOD TEST: CPT

## 2022-09-25 PROCEDURE — 85610 PROTHROMBIN TIME: CPT

## 2022-09-25 PROCEDURE — 99024 POSTOP FOLLOW-UP VISIT: CPT | Performed by: INTERNAL MEDICINE

## 2022-09-25 PROCEDURE — 2580000003 HC RX 258: Performed by: STUDENT IN AN ORGANIZED HEALTH CARE EDUCATION/TRAINING PROGRAM

## 2022-09-25 PROCEDURE — 0W9B3ZX DRAINAGE OF LEFT PLEURAL CAVITY, PERCUTANEOUS APPROACH, DIAGNOSTIC: ICD-10-PCS | Performed by: INTERNAL MEDICINE

## 2022-09-25 PROCEDURE — 85730 THROMBOPLASTIN TIME PARTIAL: CPT

## 2022-09-25 PROCEDURE — 71045 X-RAY EXAM CHEST 1 VIEW: CPT

## 2022-09-25 PROCEDURE — 6370000000 HC RX 637 (ALT 250 FOR IP): Performed by: INTERNAL MEDICINE

## 2022-09-25 PROCEDURE — 36415 COLL VENOUS BLD VENIPUNCTURE: CPT

## 2022-09-25 PROCEDURE — 93010 ELECTROCARDIOGRAM REPORT: CPT | Performed by: INTERNAL MEDICINE

## 2022-09-25 PROCEDURE — 88341 IMHCHEM/IMCYTCHM EA ADD ANTB: CPT

## 2022-09-25 PROCEDURE — 94761 N-INVAS EAR/PLS OXIMETRY MLT: CPT

## 2022-09-25 PROCEDURE — 2140000000 HC CCU INTERMEDIATE R&B

## 2022-09-25 PROCEDURE — 83986 ASSAY PH BODY FLUID NOS: CPT

## 2022-09-25 PROCEDURE — 2709999900 HC NON-CHARGEABLE SUPPLY

## 2022-09-25 PROCEDURE — 99223 1ST HOSP IP/OBS HIGH 75: CPT | Performed by: INTERNAL MEDICINE

## 2022-09-25 PROCEDURE — APPSS45 APP SPLIT SHARED TIME 31-45 MINUTES: Performed by: NURSE PRACTITIONER

## 2022-09-25 PROCEDURE — 2580000003 HC RX 258: Performed by: INTERNAL MEDICINE

## 2022-09-25 PROCEDURE — 05HC33Z INSERTION OF INFUSION DEVICE INTO LEFT BASILIC VEIN, PERCUTANEOUS APPROACH: ICD-10-PCS | Performed by: STUDENT IN AN ORGANIZED HEALTH CARE EDUCATION/TRAINING PROGRAM

## 2022-09-25 PROCEDURE — 84157 ASSAY OF PROTEIN OTHER: CPT

## 2022-09-25 PROCEDURE — 76604 US EXAM CHEST: CPT

## 2022-09-25 PROCEDURE — P9047 ALBUMIN (HUMAN), 25%, 50ML: HCPCS | Performed by: INTERNAL MEDICINE

## 2022-09-25 PROCEDURE — 88305 TISSUE EXAM BY PATHOLOGIST: CPT

## 2022-09-25 PROCEDURE — 89051 BODY FLUID CELL COUNT: CPT

## 2022-09-25 PROCEDURE — 83615 LACTATE (LD) (LDH) ENZYME: CPT

## 2022-09-25 PROCEDURE — 32555 ASPIRATE PLEURA W/ IMAGING: CPT | Performed by: INTERNAL MEDICINE

## 2022-09-25 RX ORDER — DEXTROSE MONOHYDRATE 100 MG/ML
INJECTION, SOLUTION INTRAVENOUS CONTINUOUS PRN
Status: DISCONTINUED | OUTPATIENT
Start: 2022-09-25 | End: 2022-09-27 | Stop reason: SDUPTHER

## 2022-09-25 RX ORDER — ALBUMIN (HUMAN) 12.5 G/50ML
25 SOLUTION INTRAVENOUS ONCE
Status: COMPLETED | OUTPATIENT
Start: 2022-09-25 | End: 2022-09-25

## 2022-09-25 RX ORDER — 0.9 % SODIUM CHLORIDE 0.9 %
500 INTRAVENOUS SOLUTION INTRAVENOUS ONCE
Status: COMPLETED | OUTPATIENT
Start: 2022-09-25 | End: 2022-09-25

## 2022-09-25 RX ORDER — CYANOCOBALAMIN 1000 UG/ML
1000 INJECTION INTRAMUSCULAR; SUBCUTANEOUS ONCE
Status: COMPLETED | OUTPATIENT
Start: 2022-09-25 | End: 2022-09-25

## 2022-09-25 RX ORDER — METOPROLOL TARTRATE 5 MG/5ML
5 INJECTION INTRAVENOUS ONCE
Status: COMPLETED | OUTPATIENT
Start: 2022-09-25 | End: 2022-09-25

## 2022-09-25 RX ORDER — DIGOXIN 0.25 MG/ML
250 INJECTION INTRAMUSCULAR; INTRAVENOUS ONCE
Status: COMPLETED | OUTPATIENT
Start: 2022-09-25 | End: 2022-09-25

## 2022-09-25 RX ADMIN — ALBUMIN (HUMAN) 25 G: 0.25 INJECTION, SOLUTION INTRAVENOUS at 15:35

## 2022-09-25 RX ADMIN — SODIUM CHLORIDE, PRESERVATIVE FREE 10 ML: 5 INJECTION INTRAVENOUS at 21:21

## 2022-09-25 RX ADMIN — AMIODARONE HYDROCHLORIDE 1 MG/MIN: 50 INJECTION, SOLUTION INTRAVENOUS at 15:02

## 2022-09-25 RX ADMIN — SODIUM CHLORIDE 500 ML: 9 INJECTION, SOLUTION INTRAVENOUS at 14:55

## 2022-09-25 RX ADMIN — SODIUM CHLORIDE, PRESERVATIVE FREE 10 ML: 5 INJECTION INTRAVENOUS at 12:37

## 2022-09-25 RX ADMIN — CYANOCOBALAMIN 1000 MCG: 1000 INJECTION, SOLUTION INTRAMUSCULAR at 18:10

## 2022-09-25 RX ADMIN — METOPROLOL TARTRATE 50 MG: 50 TABLET, FILM COATED ORAL at 21:20

## 2022-09-25 RX ADMIN — AMIODARONE HYDROCHLORIDE 1 MG/MIN: 50 INJECTION, SOLUTION INTRAVENOUS at 22:28

## 2022-09-25 RX ADMIN — AMIODARONE HYDROCHLORIDE 150 MG: 50 INJECTION, SOLUTION INTRAVENOUS at 14:46

## 2022-09-25 RX ADMIN — ALPRAZOLAM 0.25 MG: 0.25 TABLET ORAL at 21:23

## 2022-09-25 RX ADMIN — MIRTAZAPINE 15 MG: 15 TABLET, FILM COATED ORAL at 21:20

## 2022-09-25 RX ADMIN — METOPROLOL TARTRATE 5 MG: 5 INJECTION INTRAVENOUS at 11:17

## 2022-09-25 RX ADMIN — OXYBUTYNIN CHLORIDE 5 MG: 5 TABLET ORAL at 21:20

## 2022-09-25 RX ADMIN — ROSUVASTATIN CALCIUM 10 MG: 5 TABLET, FILM COATED ORAL at 21:20

## 2022-09-25 RX ADMIN — DIGOXIN 250 MCG: 0.25 INJECTION INTRAMUSCULAR; INTRAVENOUS at 13:39

## 2022-09-25 RX ADMIN — OXYBUTYNIN CHLORIDE 5 MG: 5 TABLET ORAL at 12:37

## 2022-09-25 ASSESSMENT — ENCOUNTER SYMPTOMS
CONSTIPATION: 0
DIARRHEA: 0
EYE REDNESS: 0
BLOOD IN STOOL: 0
ABDOMINAL DISTENTION: 0
CHEST TIGHTNESS: 0
SORE THROAT: 0
EYE PAIN: 0
SHORTNESS OF BREATH: 1
COUGH: 0
SINUS PAIN: 0
VOMITING: 0
VOICE CHANGE: 0
TROUBLE SWALLOWING: 0
EYE ITCHING: 0
EYE DISCHARGE: 0
BACK PAIN: 0
ABDOMINAL PAIN: 0
PHOTOPHOBIA: 0
NAUSEA: 0
RHINORRHEA: 0

## 2022-09-25 NOTE — PROGRESS NOTES
In-Patient Progress Note    Patient:  Levar Kaur 71 y.o. female MRN: [id number]     Date of Service: 9/25/2022    Hospital Day: 3      Chief complaint: had concerns including Shortness of Breath (Started today; has recently been seen and had 2 paracentesis done; normally wears O2 at 3L/min and is currently requiring more to sub stain oxygen levels). Assessment and Plan   Levar Kaur, a 71 y.o. female, with a history of Afib, CHF, Possible scleroderma, H/O breast cancer, B/L pleural effusion, was admitted on 9/23/2022 with complaints of had concerns including Shortness of Breath (Started today; has recently been seen and had 2 paracentesis done; normally wears O2 at 3L/min and is currently requiring more to sub stain oxygen levels). Assessment  Acute on chronic respiratory failure, 2/2 decompensated heart failure versus malignant pleural effusion. Elevated pro-BNP at 2712. Bilateral pleural effusion, 2/2 decompensated heart failure vs malignant pleural effusion. NSTEMI likely demand ischemia, troponin 0.012 > 0.068 > 0.038. Stress test 05/2022 with no reversible perfusion defect. Afib RVR, hx of P.afib. Went out of rhythm and into RVR while getting thoracentesis. History of breast cancer with bone metastasis. Chronic respiratory failure on home oxygen at 2 to 3 L via nasal cannula. Plan  Bilateral thoracentesis today. Fluid to be sent for analysis. Eliquis has been held for procedure, resume in 12 hrs. For limited echo today, r/o pericardial effusion, wall motion abnormality. Patient had stress test in may, which was negative, but if shortness of breath doesn't improve with thoracentesis, might be worthwhile to re-evaluate for ACS. Digoxin 250ug bolus per cardiology. Can evaluate for repeat dozing based on response. IR for potential bone biopsy inpatient vs outpatient. Continue lasix 40 mg twice daily. Continue to monitor urinary output and renal function.   Follow hematology, pulmonology, cardiology, and IR recs      # Peptic ulcer prophylaxis: diet  # DVT Prophylaxis: resume eliquis this evening   #CODE STATUS: full      Current living situation: spouse  Expected Disposition: home  Estimated discharge date: 2-3 days      Review of System     Review of Systems   Constitutional:  Negative for activity change, appetite change, chills, fatigue and fever. HENT:  Negative for congestion, ear discharge, ear pain, hearing loss, nosebleeds, postnasal drip, rhinorrhea, sinus pain, sore throat, trouble swallowing and voice change. Eyes:  Negative for photophobia, pain, discharge, redness and itching. Respiratory:  Positive for shortness of breath (improved post thoracentesis). Negative for cough and chest tightness. Cardiovascular:  Negative for chest pain, palpitations and leg swelling. Gastrointestinal:  Negative for abdominal distention, abdominal pain, blood in stool, constipation, diarrhea, nausea and vomiting. Endocrine: Negative for cold intolerance, heat intolerance, polydipsia, polyphagia and polyuria. Genitourinary:  Negative for difficulty urinating, dysuria, flank pain, frequency, hematuria and urgency. Musculoskeletal:  Negative for arthralgias, back pain, gait problem, joint swelling and myalgias. Skin:  Negative for pallor, rash and wound. Allergic/Immunologic: Negative for environmental allergies and food allergies. Neurological:  Negative for dizziness, tremors, seizures, syncope, speech difficulty, weakness, light-headedness, numbness and headaches. Hematological:  Negative for adenopathy. Does not bruise/bleed easily. Psychiatric/Behavioral:  Negative for agitation, confusion, decreased concentration, hallucinations, self-injury, sleep disturbance and suicidal ideas. The patient is not nervous/anxious and is not hyperactive.       I have reviewed all pertinent PMHx, PSHx, FamHx, SocialHx, medications, and allergies and updated history as appropriate. Physical Exam   VITAL SIGNS:  BP (!) 145/59   Pulse 90   Temp 97.3 °F (36.3 °C) (Oral)   Resp 28   Ht 5' 5\" (1.651 m)   Wt 143 lb (64.9 kg)   SpO2 98%   BMI 23.80 kg/m²   Tmax over 24 hours:  Temp (24hrs), Av.4 °F (36.3 °C), Min:97.2 °F (36.2 °C), Max:97.7 °F (36.5 °C)      Patient Vitals for the past 6 hrs:   BP Temp Temp src Pulse Resp SpO2 Weight   22 0845 (!) 145/59 97.3 °F (36.3 °C) Oral 90 28 98 % --   22 0600 -- -- -- -- -- -- 143 lb (64.9 kg)         Intake/Output Summary (Last 24 hours) at 2022 1132  Last data filed at 2022 1413  Gross per 24 hour   Intake 475 ml   Output 450 ml   Net 25 ml     Wt Readings from Last 2 Encounters:   22 143 lb (64.9 kg)   22 143 lb 4.8 oz (65 kg)     Body mass index is 23.8 kg/m². Physical Exam  Constitutional:       General: She is not in acute distress. Appearance: She is well-developed. HENT:      Head: Normocephalic and atraumatic. Right Ear: External ear normal.      Left Ear: External ear normal.      Nose: Nose normal.   Eyes:      General: No scleral icterus. Right eye: No discharge. Left eye: No discharge. Conjunctiva/sclera: Conjunctivae normal.      Pupils: Pupils are equal, round, and reactive to light. Neck:      Thyroid: No thyromegaly. Vascular: No JVD. Trachea: No tracheal deviation. Cardiovascular:      Rate and Rhythm: Tachycardia present. Rhythm irregular. Heart sounds: Normal heart sounds. No murmur heard. No friction rub. No gallop. Pulmonary:      Effort: Pulmonary effort is normal. No respiratory distress. Breath sounds: Normal breath sounds. No stridor. No wheezing or rales. Chest:      Chest wall: No tenderness. Abdominal:      General: Bowel sounds are normal. There is no distension. Palpations: Abdomen is soft. There is no mass. Tenderness: There is no abdominal tenderness. There is no guarding or rebound. Hernia: No hernia is present. Genitourinary:     Vagina: Normal. No vaginal discharge. Rectum: Guaiac result negative. Musculoskeletal:         General: No tenderness or deformity. Normal range of motion. Cervical back: Normal range of motion and neck supple. Lymphadenopathy:      Cervical: No cervical adenopathy. Skin:     General: Skin is warm and dry. Capillary Refill: Capillary refill takes less than 2 seconds. Coloration: Skin is not pale. Findings: No erythema or rash. Neurological:      Mental Status: She is alert and oriented to person, place, and time. Cranial Nerves: No cranial nerve deficit. Motor: No abnormal muscle tone. Coordination: Coordination normal.      Deep Tendon Reflexes: Reflexes normal.   Psychiatric:         Behavior: Behavior normal.         Thought Content: Thought content normal.         Judgment: Judgment normal.         Current Medications      lidocaine  20 mL IntraDERmal Once    sodium chloride flush  5-40 mL IntraVENous 2 times per day    enoxaparin  40 mg SubCUTAneous Daily    [Held by provider] apixaban  5 mg Oral BID    dilTIAZem  120 mg Oral Daily    lisinopril  20 mg Oral Daily    metoprolol tartrate  50 mg Oral BID    mirtazapine  15 mg Oral Nightly    oxybutynin  5 mg Oral BID    rosuvastatin  10 mg Oral Nightly    insulin lispro  0-4 Units SubCUTAneous TID WC    insulin lispro  0-4 Units SubCUTAneous Nightly    furosemide  40 mg IntraVENous BID         Labs and Imaging Studies   Laboratory findings:  XR CHEST PORTABLE    Result Date: 9/24/2022  EXAMINATION: ONE XRAY VIEW OF THE CHEST 9/23/2022 9:58 pm COMPARISON: August 23, 2022 HISTORY: ORDERING SYSTEM PROVIDED HISTORY: dyspnea TECHNOLOGIST PROVIDED HISTORY: Reason for exam:->dyspnea Reason for Exam: shortness of breath Additional signs and symptoms: dyspnea FINDINGS: No lines or tubes. Stable cardiomediastinal silhouette.   The lungs show interval decrease in lung volume with worsening basilar opacities, bilateral effusions and pulmonary edema. No pneumothorax. No acute or aggressive osseous lesion. 1. Worsening basilar opacities, bilateral effusions and pulmonary edema suggestive of congestive heart failure. Superimposed pneumonia would be difficult to exclude.        Recent Results (from the past 24 hour(s))   POCT Glucose    Collection Time: 09/24/22 12:15 PM   Result Value Ref Range    POC Glucose 160 (H) 70 - 99 MG/DL   POCT Glucose    Collection Time: 09/24/22  2:53 PM   Result Value Ref Range    POC Glucose 161 (H) 70 - 99 MG/DL   Troponin    Collection Time: 09/24/22  6:00 PM   Result Value Ref Range    Troponin T 0.038 (H) <0.01 NG/ML   POCT Glucose    Collection Time: 09/24/22  7:59 PM   Result Value Ref Range    POC Glucose 161 (H) 70 - 99 MG/DL   Protime/INR & PTT    Collection Time: 09/25/22  2:49 AM   Result Value Ref Range    Protime 13.8 11.7 - 14.5 SECONDS    INR 1.07 INDEX    aPTT 34.5 25.1 - 37.1 SECONDS   Basic Metabolic Panel    Collection Time: 09/25/22  2:49 AM   Result Value Ref Range    Sodium 138 135 - 145 MMOL/L    Potassium 4.2 3.5 - 5.1 MMOL/L    Chloride 103 99 - 110 mMol/L    CO2 25 21 - 32 MMOL/L    Anion Gap 10 4 - 16    BUN 36 (H) 6 - 23 MG/DL    Creatinine 1.1 0.6 - 1.1 MG/DL    Glucose 171 (H) 70 - 99 MG/DL    Calcium 9.9 8.3 - 10.6 MG/DL    GFR Non- 49 (L) >60 mL/min/1.73m2    GFR  59 (L) >60 mL/min/1.73m2   Magnesium    Collection Time: 09/25/22  2:49 AM   Result Value Ref Range    Magnesium 2.0 1.8 - 2.4 mg/dl   Lipid panel - fasting    Collection Time: 09/25/22  2:49 AM   Result Value Ref Range    Triglycerides 250 (H) <150 MG/DL    Cholesterol 148 <200 MG/DL    HDL 32 (L) >40 MG/DL    LDL Calculated 66 <100 MG/DL   POCT Glucose    Collection Time: 09/25/22  7:54 AM   Result Value Ref Range    POC Glucose 154 (H) 70 - 99 MG/DL           Electronically signed by Mackenzie Gaitan MD on 9/25/2022 at 11:32 AM

## 2022-09-25 NOTE — PROCEDURES
Hilaria Montelongo is a 71 y.o. female patient. 1. Acute respiratory failure with hypoxia ([facility] [address]    2. Pleural effusion    3. Recurrent right pleural effusion      Past Medical History:   Diagnosis Date    Anxiety     Breast carcinoma ([facility] [address] 1997    R breast - MAMMOGRAM ANNUALLY, NEXT DUE 5/2020. Cervical radiculitis     Right     Chest pain     Depression     Diabetes type 2, controlled ([facility] [address] 2016    w two random sugars >126    Family history of diabetes mellitus (DM)     Mother and Father    GERD (gastroesophageal reflux disease)     UGI- sm HH , done at [address] [place] [address],[address] 3/26/14    Hiatal hernia     Hx of Doppler ultrasound 05/17/2022    Normal bilateral lower extremity ankle brachial indices. Hx of Doppler ultrasound 05/17/2022    Significant reflux noted of the Right GSV SFJ (0.7s), GSV Knee (2.0s). Significant reflux noted in the Left GSV SFJ (1.8s). Hx of echocardiogram 05/12/2022    EF is estimated at 55-60%. Mitral annular calcification is present. Mild mitral, tricuspid and moderate aortic regurgitation is present. Mild Pulmonary hypertension noted with RVSP of 43mmHg. Hypercalcemia     mild ~11, iPTH nl 8/2021    Hyperglycemia     Hyperlipidemia     Insomnia     Irritable bowel syndrome     LBP (low back pain)     Lumbar herniated disc     L L4-5 HNP noted on MRI    Menopause     female exam every 2 yrs, due for recheck w me 2015    Migraine headache     ON TOPAMAX    Osteoporosis     Prediabetes     a1c 6.4 in Dec 2014    S/P colonoscopic polypectomy 06/07/2019    Dr Lorena Hercules, recheck 5 yrs    TIA (transient ischemic attack)     INACTIVE PROBLEM     Blood pressure (!) 145/59, pulse 90, temperature 97.3 °F (36.3 °C), temperature source Oral, resp.  rate 28, height 5' 5\" (1.651 m), weight 143 lb (64.9 kg), SpO2 98 %, not currently breastfeeding.  : Left thoracentesis  INDICATION:Recurrent Left Pleural effusion  Estimated blood Loss: None  Thoracentesis    Date/Time: 9/25/2022 12:22 PM  Performed by: Sandro Fuentes Rose Boss MD  Authorized by: Kristie River MD   Consent: Verbal consent obtained. Written consent obtained. Risks and benefits: risks, benefits and alternatives were discussed  Consent given by: patient  Patient understanding: patient states understanding of the procedure being performed  Patient consent: the patient's understanding of the procedure matches consent given  Procedure consent: procedure consent matches procedure scheduled  Relevant documents: relevant documents present and verified  Test results: test results available and properly labeled  Site marked: the operative site was marked  Imaging studies: imaging studies available  Patient identity confirmed: verbally with patient and arm band  Time out: Immediately prior to procedure a \"time out\" was called to verify the correct patient, procedure, equipment, support staff and site/side marked as required.   Body area: trunk     CXR STAT  Left Pleural fluid analysis      Kristie River MD  9/25/2022

## 2022-09-25 NOTE — PROCEDURES
Wray Crigler is a 71 y.o. female patient. 1. Acute respiratory failure with hypoxia ([facility] [address]    2. Pleural effusion      Past Medical History:   Diagnosis Date    Anxiety     Breast carcinoma ([facility] [address] 1997    R breast - MAMMOGRAM ANNUALLY, NEXT DUE 5/2020. Cervical radiculitis     Right     Chest pain     Depression     Diabetes type 2, controlled ([address] 2016    w two random sugars >126    Family history of diabetes mellitus (DM)     Mother and Father    GERD (gastroesophageal reflux disease)     UGI- sm HH , done at [address] [place] [address],[address] 3/26/14    Hiatal hernia     Hx of Doppler ultrasound 05/17/2022    Normal bilateral lower extremity ankle brachial indices. Hx of Doppler ultrasound 05/17/2022    Significant reflux noted of the Right GSV SFJ (0.7s), GSV Knee (2.0s). Significant reflux noted in the Left GSV SFJ (1.8s). Hx of echocardiogram 05/12/2022    EF is estimated at 55-60%. Mitral annular calcification is present. Mild mitral, tricuspid and moderate aortic regurgitation is present. Mild Pulmonary hypertension noted with RVSP of 43mmHg. Hypercalcemia     mild ~11, iPTH nl 8/2021    Hyperglycemia     Hyperlipidemia     Insomnia     Irritable bowel syndrome     LBP (low back pain)     Lumbar herniated disc     L L4-5 HNP noted on MRI    Menopause     female exam every 2 yrs, due for recheck w me 2015    Migraine headache     ON TOPAMAX    Osteoporosis     Prediabetes     a1c 6.4 in Dec 2014    S/P colonoscopic polypectomy 06/07/2019    Dr Frank Dia, recheck 5 yrs    TIA (transient ischemic attack)     INACTIVE PROBLEM     Blood pressure (!) 145/59, pulse 90, temperature 97.3 °F (36.3 °C), temperature source Oral, resp. rate 28, height 5' 5\" (1.651 m), weight 143 lb (64.9 kg), SpO2 98 %, not currently breastfeeding.       Right THORACENTESIS  ESTIMATED BLOOD LOSS: NONE  INDICATION: RECURRENT RIGHT PLEURAL EFFUSION  1% Lidacaine 10 ml used  Thoracentesis    Date/Time: 9/25/2022 12:17 PM  Performed by: Kristie River, MD  Authorized by: Iza Hand MD   Consent: Written consent obtained. Risks and benefits: risks, benefits and alternatives were discussed  Consent given by: patient  Patient understanding: patient states understanding of the procedure being performed  Patient consent: the patient's understanding of the procedure matches consent given  Procedure consent: procedure consent matches procedure scheduled  Relevant documents: relevant documents present and verified  Test results: test results available and properly labeled  Site marked: the operative site was marked  Imaging studies: imaging studies available  Patient identity confirmed: verbally with patient and arm band  Time out: Immediately prior to procedure a \"time out\" was called to verify the correct patient, procedure, equipment, support staff and site/side marked as required.   Body area: trunk    CXR STAT  Right pleural fluid analysis    Iza Hand MD  9/25/2022

## 2022-09-25 NOTE — PROGRESS NOTES
Cardiology Progress Note     Today's Plan check limited echo  Admit Date:  9/23/2022    Consult reason/ Seen today for: CHF    Subjective and  Overnight Events:  continues with shortness of breath   For thoracentesis today     Telemetry SR /ST  Occasional PAC/ PJC     Assessment / Plan:     Shortness of breath multifactorial   Pleural effusion - recurrent effusions: CT chest -large bilateral pleural effusions: for thoracentesis today   Pericardial effusion-small pericardial effusion noted on CT of chest- previously seen on echo in August- recheck limited echo in am  ?possible HFpEF :echo 08/2022 normal EF / mild LVH: on lasix   Elevated troponin -peak 0.068- ETT stress test 05/2022 no ischemia  Atrial fib - currently in SR- on Cardizem and eliquis  Hypertension-stable -on lisinopril and metoprolol   Hyperlipidemia: on statin   Breast cancer- per primary : CT concerning for mets to bone       History of Presenting Illness:    Chief complain on admission : 71 y. o.year old who is admitted for  Chief Complaint   Patient presents with    Shortness of Breath     Started today; has recently been seen and had 2 paracentesis done; normally wears O2 at 3L/min and is currently requiring more to sub stain oxygen levels        Past medical history:    has a past medical history of Anxiety, Breast carcinoma ([address] Cervical radiculitis, Chest pain, Depression, Diabetes type 2, controlled ([address] Family history of diabetes mellitus (DM), GERD (gastroesophageal reflux disease), Hiatal hernia, Hx of Doppler ultrasound, Hx of Doppler ultrasound, Hx of echocardiogram, Hypercalcemia, Hyperglycemia, Hyperlipidemia, Insomnia, Irritable bowel syndrome, LBP (low back pain), Lumbar herniated disc, Menopause, Migraine headache, Osteoporosis, Prediabetes, S/P colonoscopic polypectomy, and TIA (transient ischemic attack).   Past surgical history:   has a past surgical history that includes Tubal ligation; Mastectomy (7/1997); Hysterectomy (5/1995); and Breast reconstruction (Right, 4/2013). Social History:   reports that she has never smoked. She has never used smokeless tobacco. She reports that she does not drink alcohol and does not use drugs. Family history:  family history includes Diabetes in her father, mother, and sister; Heart Disease in her father; High Blood Pressure in her father and mother; High Cholesterol in her sister; Other in her sister; Stroke in her paternal grandmother. Allergies   Allergen Reactions    Codeine      \"jittery\"  Feels anxoius       Review of Systems:   All 14 systems were reviewed and are negative  Except for the positive findings which are documented     BP (!) 145/59   Pulse 90   Temp 97.3 °F (36.3 °C) (Oral)   Resp 28   Ht 5' 5\" (1.651 m)   Wt 143 lb (64.9 kg)   SpO2 98%   BMI 23.80 kg/m²     Intake/Output Summary (Last 24 hours) at 9/25/2022 1007  Last data filed at 9/24/2022 1413  Gross per 24 hour   Intake 475 ml   Output 450 ml   Net 25 ml       Physical Exam:  Physical Exam  Eyes:      Extraocular Movements: Extraocular movements intact. Cardiovascular:      Rate and Rhythm: Normal rate and regular rhythm. Heart sounds: Normal heart sounds. Pulmonary:      Effort: Tachypnea present. No respiratory distress. Breath sounds: Examination of the right-middle field reveals decreased breath sounds. Examination of the right-lower field reveals decreased breath sounds. Examination of the left-lower field reveals decreased breath sounds. Decreased breath sounds present. Comments: Diminished   Abdominal:      Tenderness: There is no abdominal tenderness. Musculoskeletal:      Right lower leg: Edema (edema to mid thigh down) present. Left lower leg: Edema (edema to mid thigh down) present. Skin:     Capillary Refill: Capillary refill takes less than 2 seconds.    Neurological:      Mental Status: She is alert and oriented to person, place, and time. Medications:    sodium chloride flush  5-40 mL IntraVENous 2 times per day    enoxaparin  40 mg SubCUTAneous Daily    [Held by provider] apixaban  5 mg Oral BID    dilTIAZem  120 mg Oral Daily    lisinopril  20 mg Oral Daily    metoprolol tartrate  50 mg Oral BID    mirtazapine  15 mg Oral Nightly    oxybutynin  5 mg Oral BID    rosuvastatin  10 mg Oral Nightly    insulin lispro  0-4 Units SubCUTAneous TID WC    insulin lispro  0-4 Units SubCUTAneous Nightly    furosemide  40 mg IntraVENous BID      dextrose      sodium chloride       glucose, dextrose bolus **OR** dextrose bolus, glucagon (rDNA), dextrose, sodium chloride flush, sodium chloride, ondansetron **OR** ondansetron, polyethylene glycol, acetaminophen **OR** acetaminophen, ALPRAZolam    Lab Data:  CBC:   Recent Labs     09/23/22 2202   WBC 13.5*   HGB 11.4*   HCT 35.9*   MCV 90.2        BMP:   Recent Labs     09/23/22 2202 09/24/22 0457 09/25/22  0249    136 138   K 3.8 4.1 4.2    102 103   CO2 21 21 25   BUN 30* 34* 36*   CREATININE 1.1 1.4* 1.1     PT/INR:   Recent Labs     09/25/22 0249   PROTIME 13.8   INR 1.07     BNP:    Recent Labs     09/23/22 2202   PROBNP 2,712*     TROPONIN:   Recent Labs     09/23/22 2202 09/24/22 0457 09/24/22  1800   TROPONINT 0.012* 0.068* 0.038*              Impression:  Principal Problem:    Acute systolic CHF (congestive heart failure) (HCC)  Active Problems:    Acute respiratory failure with hypoxia (HCC)  Resolved Problems:    * No resolved hospital problems. *       All labs, medications and tests reviewed by myself, continue all other medications of all above medical condition listed as is except for changes mentioned above. Thank you   Please call with questions. Electronically signed by DORIS Solis CNP on 9/25/2022 at 10:07 AM  I have seen ,spoken to  and examined this patient personally, independently of the GAURANG.  I have reviewed the hospital care given to date and reviewed all pertinent labs and imaging. I have spoken with patient, nursing staff and provided written and verbal instructions . The above note has been reviewed     CARDIOLOGY ATTENDING ADDENDUM    HPI:  I have reviewed the above HPI  And agree with above     Pulse Range: Pulse  Av  Min: 90  Max: 140    Blood Presuure Range:  Systolic (81KJI), FWU:151 , Min:133 , RGC:867   ; Diastolic (05RZI), MMQ:62, Min:49, Max:59      Pulse ox Range: SpO2  Av.5 %  Min: 89 %  Max: 98 %    24hr I & O:  No intake or output data in the 24 hours ending 22 1852      BP (!) 145/59   Pulse (!) 140   Temp 97.3 °F (36.3 °C) (Oral)   Resp 28   Ht 5' 5\" (1.651 m)   Wt 143 lb (64.9 kg)   SpO2 (!) 89%   BMI 23.80 kg/m²       Physical Exam:  General:  Awake, alert, NAD  Head:normal  Eye:normal  Neck:  No JVD   Chest:  Clear to auscultation, respiration easy  Cardiovascular:  RRR S1S2  Abdomen:   nontender  Extremities: Trace edema  Pulses; palpable  Neuro: grossly normal      MEDICAL DECISION MAKING;    Pt assessed , chart reviewed, patient examined examined , all available data was reviewed, following is the plan which was discussed with GAURANG as well:    -Dyspnea probably secondary to pulmonary etiology given that she has large pleural effusion which is probably secondary to her malignancy her ejection fraction has been normal, had thoracentesis today  -Was seen for A. fib last month is in sinus rhythm now is on negative chronotropic agents patient is on Eliquis which will probably need to be held prior to thoracentesis, monitor A. fib with RVR postthoracentesis  -Possibility of a HFpEF however looks like the pleural effusions are coming from her malignancy await oncology input  -Hyperlipidemia on Crestor 10 mg p.o. daily  -Echo shows pericardial effusion and might need pericardiocentesis        Dc Phipps MD [address] [facility]

## 2022-09-25 NOTE — SIGNIFICANT EVENT
RRT called around 1400 for tachycardia, hypotension, desaturation. On arrival patient was in reverse Trendelenburg, awake alert and oriented, not complaining of any palpitation or shortness of breath. Blood pressure was 91/42, heart rate ranging around 130 to 150 bpm.  Patient had received digoxin that 1339. Patient transiently went into sinus with blood pressure improving 100/50 but went out of rhythm again. Patient in A. fib/a flutter. Discussed with cardiologist on-call (Dr. Thao Orona). Because of the labile nature of the patient's rhythm, will start patient on amiodarone drip. Patient to be given amiodarone bolus 150 mg over 10 minutes followed by amiodarone 1 mg/min.       Electronically signed by Josiah Humphrey MD on 9/25/22 at 2:57 PM EDT

## 2022-09-25 NOTE — PROGRESS NOTES
Pt heart rate 145. RR was initiated. Vs 95/46 (57), , 98%NC, 26, temp 97. 6. pt alert and oriented pt denies symptoms. Hospitalist at bedside. See MAR for new orders. Pt to remain on unit.

## 2022-09-25 NOTE — PROGRESS NOTES
Notified hospitalist that pt Heart rate is still high and BP is low. Stat xray placed and 1 ml digoxin IV placed by Dr Komal Melendez.

## 2022-09-26 ENCOUNTER — APPOINTMENT (OUTPATIENT)
Dept: GENERAL RADIOLOGY | Age: 72
DRG: 280 | End: 2022-09-26
Payer: MEDICARE

## 2022-09-26 LAB
AMYLASE FLUID: 11 U/L
ANION GAP SERPL CALCULATED.3IONS-SCNC: 9 MMOL/L (ref 4–16)
BUN BLDV-MCNC: 30 MG/DL (ref 6–23)
CALCIUM SERPL-MCNC: 9.8 MG/DL (ref 8.3–10.6)
CHLORIDE BLD-SCNC: 104 MMOL/L (ref 99–110)
CO2: 24 MMOL/L (ref 21–32)
CREAT SERPL-MCNC: 1.1 MG/DL (ref 0.6–1.1)
FLUID TYPE: NORMAL INDEX
GFR AFRICAN AMERICAN: 59 ML/MIN/1.73M2
GFR NON-AFRICAN AMERICAN: 49 ML/MIN/1.73M2
GLUCOSE BLD-MCNC: 104 MG/DL (ref 70–99)
GLUCOSE BLD-MCNC: 173 MG/DL (ref 70–99)
GLUCOSE BLD-MCNC: 194 MG/DL (ref 70–99)
GLUCOSE BLD-MCNC: 203 MG/DL (ref 70–99)
GLUCOSE, FLUID: 192 MG/DL
LACTATE DEHYDROGENASE, FLUID: 124 IU/L
LYMPHOCYTES, BODY FLUID: 94 %
MAGNESIUM: 2.1 MG/DL (ref 1.8–2.4)
MESOTHELIAL FLUID: 8 /100 WBC
MONOCYTE, FLUID: 5 %
NEUTROPHIL, FLUID: 1 %
POTASSIUM SERPL-SCNC: 4.1 MMOL/L (ref 3.5–5.1)
PROTEIN FLUID: 4.1 GM/DL
RBC FLUID: 191 /CU MM
SODIUM BLD-SCNC: 137 MMOL/L (ref 135–145)
WBC FLUID: 510 /CU MM

## 2022-09-26 PROCEDURE — 71045 X-RAY EXAM CHEST 1 VIEW: CPT

## 2022-09-26 PROCEDURE — 36415 COLL VENOUS BLD VENIPUNCTURE: CPT

## 2022-09-26 PROCEDURE — 87116 MYCOBACTERIA CULTURE: CPT

## 2022-09-26 PROCEDURE — 88341 IMHCHEM/IMCYTCHM EA ADD ANTB: CPT | Performed by: PATHOLOGY

## 2022-09-26 PROCEDURE — 82945 GLUCOSE OTHER FLUID: CPT

## 2022-09-26 PROCEDURE — 6360000002 HC RX W HCPCS

## 2022-09-26 PROCEDURE — 93308 TTE F-UP OR LMTD: CPT

## 2022-09-26 PROCEDURE — 94761 N-INVAS EAR/PLS OXIMETRY MLT: CPT

## 2022-09-26 PROCEDURE — C1729 CATH, DRAINAGE: HCPCS

## 2022-09-26 PROCEDURE — 6370000000 HC RX 637 (ALT 250 FOR IP): Performed by: INTERNAL MEDICINE

## 2022-09-26 PROCEDURE — 83615 LACTATE (LD) (LDH) ENZYME: CPT

## 2022-09-26 PROCEDURE — 84157 ASSAY OF PROTEIN OTHER: CPT

## 2022-09-26 PROCEDURE — 99232 SBSQ HOSP IP/OBS MODERATE 35: CPT | Performed by: INTERNAL MEDICINE

## 2022-09-26 PROCEDURE — 87015 SPECIMEN INFECT AGNT CONCNTJ: CPT

## 2022-09-26 PROCEDURE — 87102 FUNGUS ISOLATION CULTURE: CPT

## 2022-09-26 PROCEDURE — 6370000000 HC RX 637 (ALT 250 FOR IP): Performed by: NURSE PRACTITIONER

## 2022-09-26 PROCEDURE — 33017 PRCRD DRG 6YR+ W/O CGEN CAR: CPT

## 2022-09-26 PROCEDURE — APPSS45 APP SPLIT SHARED TIME 31-45 MINUTES: Performed by: NURSE PRACTITIONER

## 2022-09-26 PROCEDURE — 2580000003 HC RX 258: Performed by: INTERNAL MEDICINE

## 2022-09-26 PROCEDURE — 80048 BASIC METABOLIC PNL TOTAL CA: CPT

## 2022-09-26 PROCEDURE — 6360000002 HC RX W HCPCS: Performed by: INTERNAL MEDICINE

## 2022-09-26 PROCEDURE — 89051 BODY FLUID CELL COUNT: CPT

## 2022-09-26 PROCEDURE — 2700000000 HC OXYGEN THERAPY PER DAY

## 2022-09-26 PROCEDURE — 82150 ASSAY OF AMYLASE: CPT

## 2022-09-26 PROCEDURE — 88305 TISSUE EXAM BY PATHOLOGIST: CPT | Performed by: PATHOLOGY

## 2022-09-26 PROCEDURE — 87205 SMEAR GRAM STAIN: CPT

## 2022-09-26 PROCEDURE — 87070 CULTURE OTHR SPECIMN AEROBIC: CPT

## 2022-09-26 PROCEDURE — 88342 IMHCHEM/IMCYTCHM 1ST ANTB: CPT | Performed by: PATHOLOGY

## 2022-09-26 PROCEDURE — 2000000000 HC ICU R&B

## 2022-09-26 PROCEDURE — 6370000000 HC RX 637 (ALT 250 FOR IP): Performed by: STUDENT IN AN ORGANIZED HEALTH CARE EDUCATION/TRAINING PROGRAM

## 2022-09-26 PROCEDURE — 88108 CYTOPATH CONCENTRATE TECH: CPT | Performed by: PATHOLOGY

## 2022-09-26 PROCEDURE — 82962 GLUCOSE BLOOD TEST: CPT

## 2022-09-26 PROCEDURE — 83735 ASSAY OF MAGNESIUM: CPT

## 2022-09-26 PROCEDURE — 2709999900 HC NON-CHARGEABLE SUPPLY

## 2022-09-26 PROCEDURE — 0W9D3ZZ DRAINAGE OF PERICARDIAL CAVITY, PERCUTANEOUS APPROACH: ICD-10-PCS | Performed by: INTERNAL MEDICINE

## 2022-09-26 RX ORDER — FUROSEMIDE 10 MG/ML
40 INJECTION INTRAMUSCULAR; INTRAVENOUS 2 TIMES DAILY
Status: DISCONTINUED | OUTPATIENT
Start: 2022-09-26 | End: 2022-09-28

## 2022-09-26 RX ORDER — SODIUM CHLORIDE 9 MG/ML
INJECTION, SOLUTION INTRAVENOUS PRN
Status: DISCONTINUED | OUTPATIENT
Start: 2022-09-26 | End: 2022-10-03 | Stop reason: HOSPADM

## 2022-09-26 RX ORDER — LANOLIN ALCOHOL/MO/W.PET/CERES
3 CREAM (GRAM) TOPICAL ONCE
Status: COMPLETED | OUTPATIENT
Start: 2022-09-26 | End: 2022-09-26

## 2022-09-26 RX ORDER — 0.9 % SODIUM CHLORIDE 0.9 %
500 INTRAVENOUS SOLUTION INTRAVENOUS ONCE
Status: COMPLETED | OUTPATIENT
Start: 2022-09-26 | End: 2022-09-26

## 2022-09-26 RX ORDER — ACETAMINOPHEN 325 MG/1
650 TABLET ORAL EVERY 4 HOURS PRN
Status: DISCONTINUED | OUTPATIENT
Start: 2022-09-26 | End: 2022-10-03 | Stop reason: HOSPADM

## 2022-09-26 RX ORDER — HYDRALAZINE HYDROCHLORIDE 20 MG/ML
10 INJECTION INTRAMUSCULAR; INTRAVENOUS EVERY 10 MIN PRN
Status: DISCONTINUED | OUTPATIENT
Start: 2022-09-26 | End: 2022-10-03 | Stop reason: HOSPADM

## 2022-09-26 RX ORDER — ONDANSETRON 2 MG/ML
4 INJECTION INTRAMUSCULAR; INTRAVENOUS EVERY 6 HOURS PRN
Status: DISCONTINUED | OUTPATIENT
Start: 2022-09-26 | End: 2022-10-03 | Stop reason: HOSPADM

## 2022-09-26 RX ORDER — SODIUM CHLORIDE 0.9 % (FLUSH) 0.9 %
5-40 SYRINGE (ML) INJECTION EVERY 12 HOURS SCHEDULED
Status: DISCONTINUED | OUTPATIENT
Start: 2022-09-26 | End: 2022-10-03 | Stop reason: HOSPADM

## 2022-09-26 RX ORDER — SODIUM CHLORIDE 0.9 % (FLUSH) 0.9 %
5-40 SYRINGE (ML) INJECTION PRN
Status: DISCONTINUED | OUTPATIENT
Start: 2022-09-26 | End: 2022-10-03 | Stop reason: HOSPADM

## 2022-09-26 RX ORDER — GUAIFENESIN/DEXTROMETHORPHAN 100-10MG/5
10 SYRUP ORAL EVERY 4 HOURS PRN
Status: DISCONTINUED | OUTPATIENT
Start: 2022-09-26 | End: 2022-10-03 | Stop reason: HOSPADM

## 2022-09-26 RX ORDER — DILTIAZEM HYDROCHLORIDE 120 MG/1
120 CAPSULE, COATED, EXTENDED RELEASE ORAL DAILY
Status: DISCONTINUED | OUTPATIENT
Start: 2022-09-27 | End: 2022-09-29

## 2022-09-26 RX ADMIN — SODIUM CHLORIDE, PRESERVATIVE FREE 10 ML: 5 INJECTION INTRAVENOUS at 20:42

## 2022-09-26 RX ADMIN — AMIODARONE HYDROCHLORIDE 0.5 MG/MIN: 50 INJECTION, SOLUTION INTRAVENOUS at 20:18

## 2022-09-26 RX ADMIN — ALPRAZOLAM 0.25 MG: 0.25 TABLET ORAL at 16:28

## 2022-09-26 RX ADMIN — ACETAMINOPHEN 650 MG: 325 TABLET ORAL at 18:26

## 2022-09-26 RX ADMIN — MIRTAZAPINE 15 MG: 15 TABLET, FILM COATED ORAL at 19:55

## 2022-09-26 RX ADMIN — ONDANSETRON 4 MG: 2 INJECTION INTRAMUSCULAR; INTRAVENOUS at 19:55

## 2022-09-26 RX ADMIN — AMIODARONE HYDROCHLORIDE 1 MG/MIN: 50 INJECTION, SOLUTION INTRAVENOUS at 07:11

## 2022-09-26 RX ADMIN — Medication 3 MG: at 01:42

## 2022-09-26 RX ADMIN — SODIUM CHLORIDE, PRESERVATIVE FREE 10 ML: 5 INJECTION INTRAVENOUS at 20:41

## 2022-09-26 RX ADMIN — FUROSEMIDE 40 MG: 10 INJECTION, SOLUTION INTRAMUSCULAR; INTRAVENOUS at 18:32

## 2022-09-26 RX ADMIN — ROSUVASTATIN CALCIUM 10 MG: 5 TABLET, FILM COATED ORAL at 19:55

## 2022-09-26 RX ADMIN — OXYBUTYNIN CHLORIDE 5 MG: 5 TABLET ORAL at 19:55

## 2022-09-26 RX ADMIN — SODIUM CHLORIDE 500 ML: 9 INJECTION, SOLUTION INTRAVENOUS at 19:01

## 2022-09-26 RX ADMIN — METOPROLOL TARTRATE 50 MG: 50 TABLET, FILM COATED ORAL at 19:55

## 2022-09-26 ASSESSMENT — ENCOUNTER SYMPTOMS
BACK PAIN: 0
CHEST TIGHTNESS: 0
CONSTIPATION: 0
COUGH: 0
BLOOD IN STOOL: 0
EYE REDNESS: 0
ABDOMINAL PAIN: 0
VOICE CHANGE: 0
EYE ITCHING: 0
TROUBLE SWALLOWING: 0
SORE THROAT: 0
EYE PAIN: 0
EYE DISCHARGE: 0
SINUS PAIN: 0
PHOTOPHOBIA: 0
SHORTNESS OF BREATH: 1
VOMITING: 0
RHINORRHEA: 0
NAUSEA: 0
DIARRHEA: 0
ABDOMINAL DISTENTION: 0

## 2022-09-26 ASSESSMENT — PAIN DESCRIPTION - ORIENTATION
ORIENTATION: MID
ORIENTATION: MID

## 2022-09-26 ASSESSMENT — PAIN SCALES - GENERAL
PAINLEVEL_OUTOF10: 0
PAINLEVEL_OUTOF10: 4
PAINLEVEL_OUTOF10: 6

## 2022-09-26 ASSESSMENT — PAIN DESCRIPTION - LOCATION
LOCATION: CHEST
LOCATION: CHEST

## 2022-09-26 ASSESSMENT — PAIN DESCRIPTION - ONSET: ONSET: SUDDEN

## 2022-09-26 ASSESSMENT — PAIN DESCRIPTION - FREQUENCY: FREQUENCY: INTERMITTENT

## 2022-09-26 ASSESSMENT — PAIN - FUNCTIONAL ASSESSMENT: PAIN_FUNCTIONAL_ASSESSMENT: ACTIVITIES ARE NOT PREVENTED

## 2022-09-26 ASSESSMENT — PAIN DESCRIPTION - PAIN TYPE: TYPE: ACUTE PAIN

## 2022-09-26 ASSESSMENT — PAIN DESCRIPTION - DESCRIPTORS: DESCRIPTORS: ACHING;DISCOMFORT

## 2022-09-26 NOTE — CARE COORDINATION
Attempted to see pt for d/c planning. Pt is not in room at this time. CM will attempt to see pt for d/c planning when she returns to room.  TE

## 2022-09-26 NOTE — PLAN OF CARE
Problem: Discharge Planning  Goal: Discharge to home or other facility with appropriate resources  9/25/2022 2241 by Lucero Ding RN  Outcome: Progressing  9/25/2022 1004 by Abel Coronel LPN  Outcome: Progressing     Problem: Safety - Adult  Goal: Free from fall injury  9/25/2022 2241 by Lucero Ding RN  Outcome: Progressing  9/25/2022 1004 by Abel Coronel LPN  Outcome: Progressing

## 2022-09-26 NOTE — PROGRESS NOTES
Discussed IR bone biopsy. Patient is admitted with CHF. DRGs do not match under that admitting diagnosis. Notified Zhane BURNHAM on [facility]. IR will complete consult at this time.

## 2022-09-26 NOTE — PROGRESS NOTES
In-Patient Progress Note    Patient:  Diego Sandhu 71 y.o. female MRN: [id number]     Date of Service: 9/26/2022    Hospital Day: 4      Chief complaint: had concerns including Shortness of Breath (Started today; has recently been seen and had 2 paracentesis done; normally wears O2 at 3L/min and is currently requiring more to sub stain oxygen levels). Assessment and Plan   Diego Sandhu, a 71 y.o. female, with a history of Afib, CHF, Possible scleroderma, H/O breast cancer, B/L pleural effusion, was admitted on 9/23/2022 with complaints of had concerns including Shortness of Breath (Started today; has recently been seen and had 2 paracentesis done; normally wears O2 at 3L/min and is currently requiring more to sub stain oxygen levels). Assessment  Acute on chronic respiratory failure, 2/2 decompensated heart failure versus malignant pleural effusion. Elevated pro-BNP at 2712. Bilateral pleural effusion, 2/2 decompensated heart failure vs malignant pleural effusion. S/p bilateral thoracentesis by pulmonology on 9/25. Moderate pericardial effusion, s/p pericardiocentesis with removal of 600 cc of pericardial fluid on 9/26  NSTEMI likely demand ischemia, troponin 0.012 > 0.068 > 0.038. Stress test 05/2022 with no reversible perfusion defect. Afib RVR, hx of P.afib, currently sinus. Went out of rhythm and into RVR while getting thoracentesis. History of breast cancer with bone metastasis. Chronic respiratory failure on home oxygen at 2 to 3 L via nasal cannula. Plan  S/p pericardiocentesis with removal of 600 cc of pericardial fluid today. Resume Eliquis when cardiology is okay with it. Patient had stress test in may, which was negative, but if shortness of breath doesn't improve with thoracentesis, might be worthwhile to re-evaluate for ACS. On amiodarone 0.5 Mg/minute. IR for potential bone biopsy inpatient vs outpatient.   Please coordinate with interventional radiology. Diltiazem, furosemide, lisinopril are held because of hypotension in the morning. Blood pressure has stabilized after pericardiocentesis. Reintroduce slowly. Follow hematology, pulmonology, cardiology, and IR recs      # Peptic ulcer prophylaxis: diet  # DVT Prophylaxis: resume eliquis after discussing with cardio  #CODE STATUS: full      Current living situation: spouse  Expected Disposition: home  Estimated discharge date: 2-3 days      Review of System     Review of Systems   Constitutional:  Negative for activity change, appetite change, chills, fatigue and fever. HENT:  Negative for congestion, ear discharge, ear pain, hearing loss, nosebleeds, postnasal drip, rhinorrhea, sinus pain, sore throat, trouble swallowing and voice change. Eyes:  Negative for photophobia, pain, discharge, redness and itching. Respiratory:  Positive for shortness of breath (improved post thoracentesis). Negative for cough and chest tightness. Cardiovascular:  Negative for chest pain, palpitations and leg swelling. Gastrointestinal:  Negative for abdominal distention, abdominal pain, blood in stool, constipation, diarrhea, nausea and vomiting. Endocrine: Negative for cold intolerance, heat intolerance, polydipsia, polyphagia and polyuria. Genitourinary:  Negative for difficulty urinating, dysuria, flank pain, frequency, hematuria and urgency. Musculoskeletal:  Negative for arthralgias, back pain, gait problem, joint swelling and myalgias. Skin:  Negative for pallor, rash and wound. Allergic/Immunologic: Negative for environmental allergies and food allergies. Neurological:  Negative for dizziness, tremors, seizures, syncope, speech difficulty, weakness, light-headedness, numbness and headaches. Hematological:  Negative for adenopathy. Does not bruise/bleed easily.    Psychiatric/Behavioral:  Negative for agitation, confusion, decreased concentration, hallucinations, self-injury, sleep disturbance and suicidal ideas. The patient is not nervous/anxious and is not hyperactive. I have reviewed all pertinent PMHx, PSHx, FamHx, SocialHx, medications, and allergies and updated history as appropriate. Physical Exam   VITAL SIGNS:  BP 93/75   Pulse 78   Temp 97.9 °F (36.6 °C) (Axillary)   Resp 25   Ht 5' 5\" (1.651 m)   Wt 136 lb 14.4 oz (62.1 kg)   SpO2 99%   BMI 22.78 kg/m²   Tmax over 24 hours:  Temp (24hrs), Av °F (36.7 °C), Min:97.7 °F (36.5 °C), Max:98.2 °F (36.8 °C)      Patient Vitals for the past 6 hrs:   BP Temp Temp src Pulse Resp SpO2   22 1100 93/75 97.9 °F (36.6 °C) Axillary 78 25 99 %         No intake or output data in the 24 hours ending 22 1558    Wt Readings from Last 2 Encounters:   22 136 lb 14.4 oz (62.1 kg)   22 143 lb 4.8 oz (65 kg)     Body mass index is 22.78 kg/m². Physical Exam  Constitutional:       General: She is not in acute distress. Appearance: She is well-developed. HENT:      Head: Normocephalic and atraumatic. Right Ear: External ear normal.      Left Ear: External ear normal.      Nose: Nose normal.   Eyes:      General: No scleral icterus. Right eye: No discharge. Left eye: No discharge. Conjunctiva/sclera: Conjunctivae normal.      Pupils: Pupils are equal, round, and reactive to light. Neck:      Thyroid: No thyromegaly. Vascular: No JVD. Trachea: No tracheal deviation. Cardiovascular:      Rate and Rhythm: Tachycardia present. Rhythm irregular. Heart sounds: Normal heart sounds. No murmur heard. No friction rub. No gallop. Pulmonary:      Effort: Pulmonary effort is normal. No respiratory distress. Breath sounds: Normal breath sounds. No stridor. No wheezing or rales. Chest:      Chest wall: No tenderness. Abdominal:      General: Bowel sounds are normal. There is no distension. Palpations: Abdomen is soft. There is no mass. Tenderness:  There is no abdominal tenderness. There is no guarding or rebound. Hernia: No hernia is present. Genitourinary:     Vagina: Normal. No vaginal discharge. Rectum: Guaiac result negative. Musculoskeletal:         General: No tenderness or deformity. Normal range of motion. Cervical back: Normal range of motion and neck supple. Lymphadenopathy:      Cervical: No cervical adenopathy. Skin:     General: Skin is warm and dry. Capillary Refill: Capillary refill takes less than 2 seconds. Coloration: Skin is not pale. Findings: No erythema or rash. Neurological:      Mental Status: She is alert and oriented to person, place, and time. Cranial Nerves: No cranial nerve deficit. Motor: No abnormal muscle tone. Coordination: Coordination normal.      Deep Tendon Reflexes: Reflexes normal.   Psychiatric:         Behavior: Behavior normal.         Thought Content: Thought content normal.         Judgment: Judgment normal.         Current Medications      lidocaine  20 mL IntraDERmal Once    sodium chloride flush  5-40 mL IntraVENous 2 times per day    [Held by provider] apixaban  5 mg Oral BID    [Held by provider] dilTIAZem  120 mg Oral Daily    [Held by provider] lisinopril  20 mg Oral Daily    metoprolol tartrate  50 mg Oral BID    mirtazapine  15 mg Oral Nightly    oxybutynin  5 mg Oral BID    rosuvastatin  10 mg Oral Nightly    insulin lispro  0-4 Units SubCUTAneous TID WC    insulin lispro  0-4 Units SubCUTAneous Nightly    [Held by provider] furosemide  40 mg IntraVENous BID         Labs and Imaging Studies   Laboratory findings:  XR CHEST PORTABLE    Result Date: 9/24/2022  EXAMINATION: ONE XRAY VIEW OF THE CHEST 9/23/2022 9:58 pm COMPARISON: August 23, 2022 HISTORY: ORDERING SYSTEM PROVIDED HISTORY: dyspnea TECHNOLOGIST PROVIDED HISTORY: Reason for exam:->dyspnea Reason for Exam: shortness of breath Additional signs and symptoms: dyspnea FINDINGS: No lines or tubes.   Stable cardiomediastinal silhouette. The lungs show interval decrease in lung volume with worsening basilar opacities, bilateral effusions and pulmonary edema. No pneumothorax. No acute or aggressive osseous lesion. 1. Worsening basilar opacities, bilateral effusions and pulmonary edema suggestive of congestive heart failure. Superimposed pneumonia would be difficult to exclude.        Recent Results (from the past 24 hour(s))   POCT Glucose    Collection Time: 09/25/22  4:32 PM   Result Value Ref Range    POC Glucose 133 (H) 70 - 99 MG/DL   POCT Glucose    Collection Time: 09/25/22  8:47 PM   Result Value Ref Range    POC Glucose 185 (H) 70 - 99 MG/DL   POCT Glucose    Collection Time: 09/26/22  6:56 AM   Result Value Ref Range    POC Glucose 203 (H) 70 - 99 MG/DL   Basic Metabolic Panel    Collection Time: 09/26/22  9:19 AM   Result Value Ref Range    Sodium 137 135 - 145 MMOL/L    Potassium 4.1 3.5 - 5.1 MMOL/L    Chloride 104 99 - 110 mMol/L    CO2 24 21 - 32 MMOL/L    Anion Gap 9 4 - 16    BUN 30 (H) 6 - 23 MG/DL    Creatinine 1.1 0.6 - 1.1 MG/DL    Glucose 194 (H) 70 - 99 MG/DL    Calcium 9.8 8.3 - 10.6 MG/DL    GFR Non- 49 (L) >60 mL/min/1.73m2    GFR  59 (L) >60 mL/min/1.73m2   Magnesium    Collection Time: 09/26/22  9:19 AM   Result Value Ref Range    Magnesium 2.1 1.8 - 2.4 mg/dl           Electronically signed by Rosmery Gonzales MD on 9/26/2022 at 3:58 PM

## 2022-09-26 NOTE — PROGRESS NOTES
Pulmonary and Critical Care  Progress Note      VITALS:  BP (!) 112/39   Pulse 70   Temp 97.7 °F (36.5 °C) (Oral)   Resp 28   Ht 5' 5\" (1.651 m)   Wt 136 lb 14.4 oz (62.1 kg)   SpO2 99%   BMI 22.78 kg/m²     Subjective:   CHIEF COMPLAINT :SOB     HPI:                The patient is a 71 y.o. female is lying in the bed. She is in mild resp distress    Objective:   PHYSICAL EXAM:    LUNGS:Occasional basal crackles  Abd-soft, BS+,NT  Ext- 1+pedal edema  CVS-s1s2, no murmurs      DATA:    CBC:  Recent Labs     09/23/22 2202   WBC 13.5*   RBC 3.98*   HGB 11.4*   HCT 35.9*      MCV 90.2   MCH 28.6   MCHC 31.8*   RDW 14.9   SEGSPCT 77.0*      BMP:  Recent Labs     09/23/22 2202 09/24/22  0457 09/25/22  0249    136 138   K 3.8 4.1 4.2    102 103   CO2 21 21 25   BUN 30* 34* 36*   CREATININE 1.1 1.4* 1.1   CALCIUM 9.7 9.4 9.9   GLUCOSE 174* 214* 171*      ABG:  No results for input(s): PH, PO2ART, KHS7ICG, HCO3, BEART, O2SAT in the last 72 hours.   BNP  No results found for: BNP   D-Dimer:  No results found for: [facility]   Radiology:   Worsening bibasilar opacities, right greater than left with suspected   moderate size right-sided pleural effusion         Assessment/Plan     Patient Active Problem List    Diagnosis Date Noted    Acute systolic CHF (congestive heart failure) ([facility] 09/24/2022     Priority: Medium    Acute respiratory failure with hypoxia ([facility] 09/24/2022     Priority: Medium    Diastolic heart failure, unspecified HF chronicity ([address] 08/30/2022     Priority: Medium    Atrial fibrillation with RVR ([facility] [address] 08/18/2022     Priority: Medium    Varicose veins of both legs with edema 06/28/2022     Priority: Medium    Venous insufficiency 06/07/2022     Priority: Medium    Shortness of breath 06/07/2022     Priority: Medium    Edema of lower extremity 06/07/2022     Priority: Medium    Chest pain 05/12/2022     Priority: Medium    Peripheral edema 03/21/2022    Carcinoma of right female breast, unspecified estrogen receptor status, unspecified site of breast ([facility] [address] 02/16/2022    Hypercalcemia      Overview Note:     mild ~11, iPTH nl 8/2021      Diabetes type 2, controlled ([facility] [address]      Overview Note:     likely, w two random sugars >126      Migraine headache     GERD (gastroesophageal reflux disease)     Irritable bowel syndrome     Lumbar herniated disc     Low back pain      Overview Note:     replace inactive diagnosis      Hyperlipidemia     History of breast cancer      Overview Note:     R breast      Acute on chronic Hypoxic resp failure  Bilateral Pleural effusions s/p thoracentesis- improved  Recurrent right pleural effusion  Diastolic dysfunction  Afib on Eliquis  H/o Breast ca with mets to T12, L1       Diuresis  CHF optimization  I.O chart  If no better, needs repeat thoracentesis  Eliquis on hold for possible repeat thoracentesis  Oncology eval  Keep sats > 92%  DVT and GI Prophylaxis  C.w present management    Electronically signed by Juliocesar Moraes MD on 9/26/2022 at 10:31 AM

## 2022-09-26 NOTE — PROGRESS NOTES
Cardiology Progress Note     Today's Plan for pericardiocentesis   Admit Date:  9/23/2022    Consult reason/ Seen today for: CHF    Subjective and  Overnight Events: had episode of atrial fib with RVR yesterday - converted back to sinus       Telemetry SR  Occasional PAC/ PJC     Assessment / Plan:     Shortness of breath multifactorial   Pleural effusion - recurrent effusions: CT chest -large bilateral pleural effusions: for thoracentesis today   Pericardial effusion-small pericardial effusion noted on CT of chest- previously seen on echo in August-Echo shows moderate pleural effusion with early signs of tamponade-will proceed with pericardiocentesis   ? possible HFpEF :echo 08/2022 normal EF / mild LVH: on lasix   Elevated troponin -peak 0.068- ETT stress test 05/2022 no ischemia  Atrial fib - had a fib with RVR yesterday- now on amiodarone gtt: AC on hold for pericardiocentesis   Hypertension- had hypotension yesterday with atrial fib RVR -bp soft today -hold ACE   Hyperlipidemia: on statin   Breast cancer- per primary : CT concerning for mets to bone       History of Presenting Illness:    Chief complain on admission : 71 y. o.year old who is admitted for  Chief Complaint   Patient presents with    Shortness of Breath     Started today; has recently been seen and had 2 paracentesis done; normally wears O2 at 3L/min and is currently requiring more to sub stain oxygen levels        Past medical history:    has a past medical history of Anxiety, Breast carcinoma ([address] Cervical radiculitis, Chest pain, Depression, Diabetes type 2, controlled ([address] Family history of diabetes mellitus (DM), GERD (gastroesophageal reflux disease), Hiatal hernia, Hx of Doppler ultrasound, Hx of Doppler ultrasound, Hx of echocardiogram, Hypercalcemia, Hyperglycemia, Hyperlipidemia, Insomnia, Irritable bowel syndrome, LBP (low back pain), Lumbar herniated disc, Menopause, Migraine headache, Osteoporosis, Prediabetes, S/P colonoscopic polypectomy, and TIA (transient ischemic attack). Past surgical history:   has a past surgical history that includes Tubal ligation; Mastectomy (7/1997); Hysterectomy (5/1995); and Breast reconstruction (Right, 4/2013). Social History:   reports that she has never smoked. She has never used smokeless tobacco. She reports that she does not drink alcohol and does not use drugs. Family history:  family history includes Diabetes in her father, mother, and sister; Heart Disease in her father; High Blood Pressure in her father and mother; High Cholesterol in her sister; Other in her sister; Stroke in her paternal grandmother. Allergies   Allergen Reactions    Codeine      \"jittery\"  Feels anxoius       Review of Systems:   All 14 systems were reviewed and are negative  Except for the positive findings which are documented     BP (!) 112/39   Pulse 70   Temp 97.7 °F (36.5 °C) (Oral)   Resp 28   Ht 5' 5\" (1.651 m)   Wt 136 lb 14.4 oz (62.1 kg)   SpO2 99%   BMI 22.78 kg/m²   No intake or output data in the 24 hours ending 09/26/22 1128      Physical Exam:  Physical Exam  Eyes:      Extraocular Movements: Extraocular movements intact. Cardiovascular:      Rate and Rhythm: Normal rate and regular rhythm. Comments: Muffled   Pulmonary:      Effort: Tachypnea present. No respiratory distress. Breath sounds: Examination of the right-middle field reveals decreased breath sounds. Examination of the right-lower field reveals decreased breath sounds. Examination of the left-lower field reveals decreased breath sounds. Decreased breath sounds and rales present. Comments: Diminished   Abdominal:      Tenderness: There is no abdominal tenderness. Musculoskeletal:      Right lower leg: Edema (edema to mid thigh down) present. Left lower leg: Edema (edema to mid thigh down) present. Skin:     General: Skin is warm. Capillary Refill: Capillary refill takes less than 2 seconds. Neurological:      Mental Status: She is alert and oriented to person, place, and time. Medications:    lidocaine  20 mL IntraDERmal Once    sodium chloride flush  5-40 mL IntraVENous 2 times per day    [Held by provider] apixaban  5 mg Oral BID    [Held by provider] dilTIAZem  120 mg Oral Daily    [Held by provider] lisinopril  20 mg Oral Daily    metoprolol tartrate  50 mg Oral BID    mirtazapine  15 mg Oral Nightly    oxybutynin  5 mg Oral BID    rosuvastatin  10 mg Oral Nightly    insulin lispro  0-4 Units SubCUTAneous TID WC    insulin lispro  0-4 Units SubCUTAneous Nightly    [Held by provider] furosemide  40 mg IntraVENous BID      dextrose      amiodarone 0.5 mg/min (09/26/22 0927)    sodium chloride       guaiFENesin-dextromethorphan, glucose, dextrose bolus **OR** dextrose bolus, glucagon (rDNA), dextrose, sodium chloride flush, sodium chloride, ondansetron **OR** ondansetron, polyethylene glycol, acetaminophen **OR** acetaminophen, ALPRAZolam    Lab Data:  CBC:   Recent Labs     09/23/22 2202   WBC 13.5*   HGB 11.4*   HCT 35.9*   MCV 90.2          BMP:   Recent Labs     09/24/22 0457 09/25/22 0249 09/26/22  0919    138 137   K 4.1 4.2 4.1    103 104   CO2 21 25 24   BUN 34* 36* 30*   CREATININE 1.4* 1.1 1.1       PT/INR:   Recent Labs     09/25/22 0249   PROTIME 13.8   INR 1.07       BNP:    Recent Labs     09/23/22 2202   PROBNP 2,712*       TROPONIN:   Recent Labs     09/23/22 2202 09/24/22 0457 09/24/22  1800   TROPONINT 0.012* 0.068* 0.038*                Impression:  Principal Problem:    Acute systolic CHF (congestive heart failure) (HCC)  Active Problems:    Acute respiratory failure with hypoxia (HCC)  Resolved Problems:    * No resolved hospital problems.  *       All labs, medications and tests reviewed by myself, continue all other medications of all above medical condition listed as is except for changes mentioned above. Thank you   Please call with questions. Electronically signed by DORIS Yadav CNP on 2022 at 11:28 AM    I have seen ,spoken to  and examined this patient personally, independently of the GAURANG. I have reviewed the hospital care given to date and reviewed all pertinent labs and imaging. I have spoken with patient, nursing staff and provided written and verbal instructions . The above note has been reviewed     CARDIOLOGY ATTENDING ADDENDUM    HPI:  I have reviewed the above HPI  And agree with above     Pulse Range: Pulse  Av.4  Min: 70  Max: 140    Blood Presuure Range:  Systolic (53PSD), OV , Min:93 , WNF:579   ; Diastolic (63QDP), ZGS:53, Min:39, Max:75      Pulse ox Range: SpO2  Av.3 %  Min: 89 %  Max: 99 %    24hr I & O:  No intake or output data in the 24 hours ending 22 1323      BP 93/75   Pulse 78   Temp 97.9 °F (36.6 °C) (Axillary)   Resp 25   Ht 5' 5\" (1.651 m)   Wt 136 lb 14.4 oz (62.1 kg)   SpO2 99%   BMI 22.78 kg/m²       Physical Exam:  General:  Awake, alert, NAD  Head:normal  Eye:normal  Neck:  No JVD   Chest:  Clear to auscultation, respiration easy  Cardiovascular:  RRR S1S2  Abdomen:   nontender  Extremities:  TR edema  Pulses; palpable  Neuro: grossly normal      MEDICAL DECISION MAKING;    Pt assessed , chart reviewed, patient examined examined , all available data was reviewed, following is the plan which was discussed with GAURANG as well:    -Dyspnea probably secondary to pulmonary etiology given that she has large pleural effusion which is probably secondary to her malignancy her ejection fraction has been normal, had thoracentesis today  -Was seen for A. fib last month is in sinus rhythm now is on negative chronotropic agents patient is on Eliquis which will probably need to be held prior to thoracentesis, monitor A. fib with RVR postthoracentesis  -Possibility of a HFpEF however looks like the pleural effusions are coming from her malignancy await oncology input  -Hyperlipidemia on Crestor 10 mg p.o. daily  -Echo shows pericardial effusion cardiocentesis today          Joann Nicholas MD [facility]

## 2022-09-27 LAB
ANION GAP SERPL CALCULATED.3IONS-SCNC: 10 MMOL/L (ref 4–16)
BUN BLDV-MCNC: 23 MG/DL (ref 6–23)
CALCIUM SERPL-MCNC: 9.4 MG/DL (ref 8.3–10.6)
CHLORIDE BLD-SCNC: 103 MMOL/L (ref 99–110)
CO2: 23 MMOL/L (ref 21–32)
CREAT SERPL-MCNC: 1.3 MG/DL (ref 0.6–1.1)
GFR AFRICAN AMERICAN: 49 ML/MIN/1.73M2
GFR NON-AFRICAN AMERICAN: 40 ML/MIN/1.73M2
GLUCOSE BLD-MCNC: 198 MG/DL (ref 70–99)
GLUCOSE BLD-MCNC: 199 MG/DL (ref 70–99)
GLUCOSE BLD-MCNC: 209 MG/DL (ref 70–99)
GLUCOSE BLD-MCNC: 233 MG/DL (ref 70–99)
GLUCOSE BLD-MCNC: 295 MG/DL (ref 70–99)
MAGNESIUM: 1.9 MG/DL (ref 1.8–2.4)
POTASSIUM SERPL-SCNC: 4.5 MMOL/L (ref 3.5–5.1)
PRO-BNP: ABNORMAL PG/ML
SODIUM BLD-SCNC: 136 MMOL/L (ref 135–145)

## 2022-09-27 PROCEDURE — 2580000003 HC RX 258: Performed by: INTERNAL MEDICINE

## 2022-09-27 PROCEDURE — 82962 GLUCOSE BLOOD TEST: CPT

## 2022-09-27 PROCEDURE — 94761 N-INVAS EAR/PLS OXIMETRY MLT: CPT

## 2022-09-27 PROCEDURE — 99232 SBSQ HOSP IP/OBS MODERATE 35: CPT | Performed by: INTERNAL MEDICINE

## 2022-09-27 PROCEDURE — 6360000002 HC RX W HCPCS: Performed by: INTERNAL MEDICINE

## 2022-09-27 PROCEDURE — 80048 BASIC METABOLIC PNL TOTAL CA: CPT

## 2022-09-27 PROCEDURE — 2700000000 HC OXYGEN THERAPY PER DAY

## 2022-09-27 PROCEDURE — 6370000000 HC RX 637 (ALT 250 FOR IP): Performed by: INTERNAL MEDICINE

## 2022-09-27 PROCEDURE — 99233 SBSQ HOSP IP/OBS HIGH 50: CPT | Performed by: INTERNAL MEDICINE

## 2022-09-27 PROCEDURE — 2000000000 HC ICU R&B

## 2022-09-27 PROCEDURE — 6370000000 HC RX 637 (ALT 250 FOR IP): Performed by: PHYSICIAN ASSISTANT

## 2022-09-27 PROCEDURE — 36415 COLL VENOUS BLD VENIPUNCTURE: CPT

## 2022-09-27 PROCEDURE — 83880 ASSAY OF NATRIURETIC PEPTIDE: CPT

## 2022-09-27 PROCEDURE — 83735 ASSAY OF MAGNESIUM: CPT

## 2022-09-27 PROCEDURE — 93308 TTE F-UP OR LMTD: CPT

## 2022-09-27 RX ORDER — DEXTROSE MONOHYDRATE 100 MG/ML
INJECTION, SOLUTION INTRAVENOUS CONTINUOUS PRN
Status: DISCONTINUED | OUTPATIENT
Start: 2022-09-27 | End: 2022-10-03 | Stop reason: HOSPADM

## 2022-09-27 RX ORDER — INSULIN GLARGINE 100 [IU]/ML
10 INJECTION, SOLUTION SUBCUTANEOUS NIGHTLY
Status: DISCONTINUED | OUTPATIENT
Start: 2022-09-27 | End: 2022-09-28

## 2022-09-27 RX ADMIN — OXYBUTYNIN CHLORIDE 5 MG: 5 TABLET ORAL at 21:37

## 2022-09-27 RX ADMIN — ROSUVASTATIN CALCIUM 10 MG: 5 TABLET, FILM COATED ORAL at 21:37

## 2022-09-27 RX ADMIN — SODIUM CHLORIDE, PRESERVATIVE FREE 10 ML: 5 INJECTION INTRAVENOUS at 09:06

## 2022-09-27 RX ADMIN — FUROSEMIDE 40 MG: 10 INJECTION, SOLUTION INTRAMUSCULAR; INTRAVENOUS at 17:22

## 2022-09-27 RX ADMIN — AMIODARONE HYDROCHLORIDE 0.5 MG/MIN: 50 INJECTION, SOLUTION INTRAVENOUS at 09:15

## 2022-09-27 RX ADMIN — INSULIN GLARGINE 10 UNITS: 100 INJECTION, SOLUTION SUBCUTANEOUS at 21:40

## 2022-09-27 RX ADMIN — APIXABAN 5 MG: 5 TABLET, FILM COATED ORAL at 21:37

## 2022-09-27 RX ADMIN — MIRTAZAPINE 15 MG: 15 TABLET, FILM COATED ORAL at 21:37

## 2022-09-27 RX ADMIN — FUROSEMIDE 40 MG: 10 INJECTION, SOLUTION INTRAMUSCULAR; INTRAVENOUS at 09:06

## 2022-09-27 RX ADMIN — ALPRAZOLAM 0.25 MG: 0.25 TABLET ORAL at 21:37

## 2022-09-27 RX ADMIN — METOPROLOL TARTRATE 50 MG: 50 TABLET, FILM COATED ORAL at 21:28

## 2022-09-27 RX ADMIN — INSULIN LISPRO 2 UNITS: 100 INJECTION, SOLUTION INTRAVENOUS; SUBCUTANEOUS at 13:09

## 2022-09-27 RX ADMIN — OXYBUTYNIN CHLORIDE 5 MG: 5 TABLET ORAL at 09:05

## 2022-09-27 RX ADMIN — METOPROLOL TARTRATE 50 MG: 50 TABLET, FILM COATED ORAL at 09:05

## 2022-09-27 RX ADMIN — DILTIAZEM HYDROCHLORIDE 120 MG: 120 CAPSULE, COATED, EXTENDED RELEASE ORAL at 09:05

## 2022-09-27 NOTE — CARE COORDINATION
Discussed in IDR. Patient is from home; lives with family. Patient has Home O2 PTA. No HHC PTA. Has insurance that assist with Rx when needed; PCP is Dr Sumanth Burdick. No needs at this time. CM will remain available should needs arise.  Luz Maria Antoine RN

## 2022-09-27 NOTE — H&P
History and Physical      Name:  Patti Soto /Age/Sex: 1950  (71 y.o. female)   MRN & CSN:  [id number] & [id number] Admission Date/Time: 2022  9:51 PM   Location:  -A PCP: Lizett Dempsey MD       Hospital Day: 5     Attending physician: Dr. Patti Soto and Plan:   Patti Soto is a 71 y.o.  female with PMH of A. fib, CHF, scleroderma, history of breast cancer-27 years ago, bilateral pleural effusions, who presents with Acute systolic CHF (congestive heart failure) ([facility] [address]    Assessment and plan:   Acute on chronic congestive heart failure as evidenced by SOB with increase in oxygen demand compared to baseline, elevated BNP, elevated troponin, CXR with bilateral pleural effusions  proBNP-2712, troponin 0.012--> 0.068--> 0.038  Limited echo on -EF 50 to 55%, moderate AR/TR/HI  Continue Lasix 40 mg twice daily  Cardiology following    Pericardial effusion with tamponade physiology s/p pericardiocentesis on , r/o malignancy vs infectious/inflammatory etiology  600 cc straw-colored fluid removed  Drain in place, site examined-clean and dry  F/u fluid studies  Cardiology following    Recurrent bilateral pleural effusions rt> left, most likely transudative s/t malignant effusion with hx of breast cancer vs CHF  S/p left thoracentesis on   Cytology negative for malignancy dated 22  Follow-up cytology and fluid panel from     NSTEMI type II secondary to demand ischemia  Elevated troponin, EKG with no ST segment changes  Cardiac stress test on -no ischemic EKG changes    Hx A. fib RVR  On beta-blocker and calcium channel blocker  Eliquis-held for procedures on , restarted today  after checking with cardiologist    History of breast carcinoma, unspecified estrogen receptor status, with bone metastasis  Dx 27 years ago s/p right-sided mastectomy  PET scan on 2022-osseous metastatic disease involving multiple ribs and vertebral bodies, and left pelvis  Oncology recommended bone biopsy as outpatient      Chronic respiratory failure  On 2 L oxygen via nasal cannula at home    Diabetes mellitus type 2 without complications  Last [id number] 8/22-6 0.5  Continue SSI    Dyslipidemia-continue statin  Chronic venous insufficiency    Anxiety and depression-  Continue Remeron      Chronic Conditions: Continue all home medications except as stated above or contraindicated. BMI 22.75: kg/m2 Life style modifications    Tobacco dependence: Non-smoker, nonalcoholic    Disposition: ICU. patient was accepted for continue evaluation and treatment and improvement of clinical symptoms. Discussed assessment and treatment plan with the admitting and supervising physician Dr. Shiv Lara who agrees with the plan. Diet ADULT DIET; Regular; 3 carb choices (45 gm/meal); Low Fat/Low Chol/High Fiber/REYES   DVT Prophylaxis [] Lovenox, []  Heparin, [] SCDs, [] Warfarin  [] NOAC , Eliquis   GI Prophylaxis [] PPI,  [] H2 Blocker,  [] Carafate,  [x] Diet/Tube Feeds   Code Status Full Code     History of Present Illness:     Chief Complaint: Acute systolic CHF (congestive heart failure) (HCC)  Diya Kramer is a 71 y.o.  female, with past medical history significant for A. fib, CHF, scleroderma, history of breast cancer-27 years ago, bilateral pleural effusions, anxiety and depression, who presented to the emergency room on 9/23 with SOB/REECE. Patient has history of recurrent bilateral pleural effusion requiring thoracentesis. Admitted with acute on chronic SOB, noted to have large pleural effusions and pericardial effusion. Had thoracentesis and pericardiocentesis on 9/25 and 9/26 respectively.   Critical care team was consulted today for medical management while patient is in ICU    On Examination,the patient is a&o x4,   On 4 L nasal cannula, temp-98.7, HR-60-70, RR-22-25, O2 sats 91-99    Review of Systems   Seen and examined this morning:  States she feels much better postthoracentesis and pericardiocentesis  Denies SOB, chest pain/pressure/tightness palpitations nausea, vomiting/abdominal pain,  bowel or bladder dysfunction    Objective: Intake/Output Summary (Last 24 hours) at 9/27/2022 0857  Last data filed at 9/27/2022 0540  Gross per 24 hour   Intake 1017.26 ml   Output 1050 ml   Net -32.74 ml      Vitals:   Vitals:    09/27/22 0818   BP:    Pulse: 88   Resp: 22   Temp:    SpO2: 94%     Physical Exam:   GEN- Awake female,  sitting up in bed, appears to be stated age, not in respiratory distress  EYES- Pupils are equally round. No scleral erythema, discharge, or conjunctivitis. HENT- Mucous membranes are moist. Oral pharynx without exudates, no evidence of thrush. NECK- Supple, no apparent thyromegaly or masses. RESP-Clear to auscultation, no wheezes, rales or rhonchi. Symmetric chest movement while on room air. CARDIO/VASC-pericardial drain site clean and dry, S1/S2 auscultated. Afib  Peripheral pulses equal bilaterally and palpable. GI- Abdomen is soft, no tenderness, masses, or guarding. Bowel sounds present. MSK- No gross joint deformities. EXTREMITIES- pulses intact, 1+ pedal   SKIN- Normal coloration, warm, dry. NEURO-Cranial nerves appear grossly intact, normal speech, no lateralizing weakness. PSYCH-Awake, alert, oriented x 4. Past Medical History:      Past Medical History:   Diagnosis Date    Anxiety     Breast carcinoma ([facility] [address] 1997    R breast - MAMMOGRAM ANNUALLY, NEXT DUE 5/2020. Cervical radiculitis     Right     Chest pain     Depression     Diabetes type 2, controlled ([address] 2016    w two random sugars >126    Family history of diabetes mellitus (DM)     Mother and Father    GERD (gastroesophageal reflux disease)     UGI- sm HH , done at [address] [place] [address],[address] 3/26/14    Hiatal hernia     Hx of Doppler ultrasound 05/17/2022    Normal bilateral lower extremity ankle brachial indices.     Hx of Doppler ultrasound 05/17/2022 Significant reflux noted of the Right GSV SFJ (0.7s), GSV Knee (2.0s). Significant reflux noted in the Left GSV SFJ (1.8s). Hx of echocardiogram 05/12/2022    EF is estimated at 55-60%. Mitral annular calcification is present. Mild mitral, tricuspid and moderate aortic regurgitation is present. Mild Pulmonary hypertension noted with RVSP of 43mmHg. Hypercalcemia     mild ~11, iPTH nl 8/2021    Hyperglycemia     Hyperlipidemia     Insomnia     Irritable bowel syndrome     LBP (low back pain)     Lumbar herniated disc     L L4-5 HNP noted on MRI    Menopause     female exam every 2 yrs, due for recheck w me 2015    Migraine headache     ON TOPAMAX    Osteoporosis     Prediabetes     a1c 6.4 in Dec 2014    S/P colonoscopic polypectomy 06/07/2019    Dr Reena Weeks, recheck 5 yrs    TIA (transient ischemic attack)     INACTIVE PROBLEM     PSHX:  has a past surgical history that includes Tubal ligation; Mastectomy (7/1997); Hysterectomy (5/1995); and Breast reconstruction (Right, 4/2013). Allergies: Allergies   Allergen Reactions    Codeine      \"jittery\"  Feels anxoius       FAM HX: family history includes Diabetes in her father, mother, and sister; Heart Disease in her father; High Blood Pressure in her father and mother; High Cholesterol in her sister; Other in her sister; Stroke in her paternal grandmother.   Soc HX:   Social History     Socioeconomic History    Marital status:    Occupational History    Occupation: - [employer]     Comment: retired Sept 2015   Tobacco Use    Smoking status: Never    Smokeless tobacco: Never   Substance and Sexual Activity    Alcohol use: No    Drug use: No     Social Determinants of Health     Financial Resource Strain: Low Risk     Difficulty of Paying Living Expenses: Not hard at all   Food Insecurity: No Food Insecurity    Worried About [employer] [employer] in the Last Year: Never true    [address]  [employer] in the Last Year: Never true Transportation Needs: No Transportation Needs    Lack of Transportation (Medical): No    Lack of Transportation (Non-Medical): No   Physical Activity: Inactive    Days of Exercise per Week: 0 days    Minutes of Exercise per Session: 0 min   Stress: No Stress Concern Present    Feeling of Stress : Not at all   Intimate Partner Violence: Not At Risk    Fear of Current or Ex-Partner: No    Emotionally Abused: No    Physically Abused: No    Sexually Abused: No   Housing Stability: Low Risk     Unable to Pay for Housing in the Last Year: No    Number of Places Lived in the Last Year: 1    Unstable Housing in the Last Year: No       Social and family history reviewed with patient/family. Medications:     Home Medication   Prior to Admission medications    Medication Sig Start Date End Date Taking? Authorizing Provider   ALPRAZolam (XANAX) 0.25 MG tablet Take 1 tablet by mouth 2 times daily as needed for Anxiety for up to 30 days. 9/23/22 10/23/22  Alie Zamora MD   mirtazapine (REMERON) 15 MG tablet Take 1 tablet by mouth nightly 9/23/22   Alie Zamora MD   oxybutynin (DITROPAN) 5 MG tablet Take 1 tablet by mouth 2 times daily 9/6/22   Alie Zamora MD   apixaban (ELIQUIS) 5 MG TABS tablet Take 1 tablet by mouth 2 times daily 8/23/22   Javed Moss MD   lisinopril (PRINIVIL;ZESTRIL) 20 MG tablet Take 1 tablet by mouth daily 8/24/22   Javed Moss MD   metoprolol tartrate (LOPRESSOR) 50 MG tablet Take 1 tablet by mouth 2 times daily 8/23/22   Javed Moss MD   dilTIAZem (CARDIZEM CD) 120 MG extended release capsule Take 1 capsule by mouth daily 8/24/22   Javed Moss MD   amLODIPine-benazepril (LOTREL) 5-10 MG per capsule Take 1 capsule by mouth in the morning. . 8/16/22 8/23/22  Amber Zhong MD   allopurinol (ZYLOPRIM) 100 MG tablet Take 2 tablets by mouth in the morning.   Patient not taking: No sig reported 7/21/22 8/23/22  Alie Zamora MD   metFORMIN (GLUCOPHAGE) 500 MG tablet Take 1 tablet by mouth every morning (before breakfast) 5/6/22   Lizett Dempsey MD   glipiZIDE (GLUCOTROL) 5 MG tablet Take 1 tablet by mouth daily 5/6/22   Lizett Dempsey MD   rosuvastatin (CRESTOR) 10 MG tablet Take 1 tablet by mouth nightly 2/16/22   Lizett Dempsey MD   Lancets MISC 1 each by Does not apply route 2 times daily 2/16/22   Lizett Dempsey MD   blood glucose monitor strips Test twice a day & as needed for symptoms of irregular blood glucose. 2/16/22   Lizett Dempsey MD     Medications:    dilTIAZem  120 mg Oral Daily    furosemide  40 mg IntraVENous BID    sodium chloride flush  5-40 mL IntraVENous 2 times per day    lidocaine  20 mL IntraDERmal Once    sodium chloride flush  5-40 mL IntraVENous 2 times per day    [Held by provider] apixaban  5 mg Oral BID    [Held by provider] lisinopril  20 mg Oral Daily    metoprolol tartrate  50 mg Oral BID    mirtazapine  15 mg Oral Nightly    oxybutynin  5 mg Oral BID    rosuvastatin  10 mg Oral Nightly    insulin lispro  0-4 Units SubCUTAneous TID WC    insulin lispro  0-4 Units SubCUTAneous Nightly      Infusions:    sodium chloride      dextrose      amiodarone 0.5 mg/min (09/26/22 2018)    sodium chloride       PRN Meds: guaiFENesin-dextromethorphan, 10 mL, Q4H PRN  sodium chloride flush, 5-40 mL, PRN  sodium chloride, , PRN  acetaminophen, 650 mg, Q4H PRN  ondansetron, 4 mg, Q6H PRN  hydrALAZINE, 10 mg, Q10 Min PRN  glucose, 4 tablet, PRN  dextrose bolus, 125 mL, PRN   Or  dextrose bolus, 250 mL, PRN  glucagon (rDNA), 1 mg, PRN  dextrose, , Continuous PRN  sodium chloride flush, 5-40 mL, PRN  sodium chloride, , PRN  ondansetron, 4 mg, Q8H PRN  polyethylene glycol, 17 g, Daily PRN  acetaminophen, 650 mg, Q6H PRN  ALPRAZolam, 0.25 mg, BID PRN        No results for input(s): WBC, HGB, HCT, PLT in the last 72 hours.    Recent Labs     09/25/22  0249 09/26/22  0919    137   K 4.2 4.1    104   CO2 25 24   BUN 36* 30* CREATININE 1.1 1.1     No results for input(s): AST, ALT, ALB, BILIDIR, BILITOT, ALKPHOS in the last 72 hours. Recent Labs     09/25/22  0249   INR 1.07     Recent Labs     09/24/22  1800   TROPONINT 0.038*        Imaging reviewed  Echocardiogram limited    Result Date: 9/25/2022  Transthoracic Echocardiography Report (TTE)  Demographics   Patient Name       Shayna Friedman  Date of Study       09/25/2022                     A   Date of Birth      1950         Gender              Female   Age                71 year(s)         Race                   Patient Number     [id number]         Room Number         3113   Visit Number       [id number]   Corporate ID       [id number]   Accession Number   [id number]         Zulma Chambers RN, [facility]   Ordering Physician Dominique Gimenez MD    Interpreting        Carly Griffith MD  Procedure Type of Study   TTE procedure:ECHOCARDIOGRAM LIMITED. Procedure Date Date: 09/25/2022 Start: 04:08 PM Study Location: Portable Technical Quality: Adequate visualization Indications:Pericardial effusion. Patient Status: ASAP Height: 65 inches Weight: 143 pounds BSA: 1.72 m2 BMI: 23.8 kg/m2 Rhythm: Sinus rhythm HR: 92 bpm BP: 145/59 mmHg  Conclusions   Summary  This is a limited echocardiogram.  Left ventricular systolic function is normal.  Ejection fraction is visually estimated at 50-55%. Moderate aortic regurgitation ( ms). Mild to moderate tricuspid regurgitation. RVSP is 50 mmHg. Mild to moderate pulmonic regurgitation. Moderate pericardial effusion. There are some early signs of cardiac  tamponade. Doppler surrogate shows positive pulsus paradoxis and there is  mild early diastolic collapse of the right ventricle. Small bilateral pleural effusions s/o thoracentesis.    Signature ------------------------------------------------------------------  Electronically signed by Jodie Portillo MD  (Interpreting physician) on 09/25/2022 at 08:00 PM  ------------------------------------------------------------------   Findings   Left Ventricle  Left ventricular systolic function is normal.  Ejection fraction is visually estimated at 50-55%. Aortic Valve  Moderate aortic regurgitation ( ms). Tricuspid Valve  Mild to moderate tricuspid regurgitation. RVSP is 50 mmHg. Pulmonic Valve  Mild to moderate pulmonic regurgitation. Pericardial Effusion  Moderate pericardial effusion. There are some early signs of cardiac tamponade. Doppler surrogate shows  positive pulsus paradoxis and there is mild early diastolic collapse of the  right ventricle. Pleural Effusion  Small bilateral pleural effusions s/o thoracentesis. Doppler Measurements & Calculations                               Estimated RVSP: 50 mmHg                              Estimated RAP:3 mmHg    AV P1/2t: 267 msec   Estimated PASP: 44.47 mmHg TR Velocity:322 cm/s                              TR Gradient:41.47 mmHg      CT CHEST W CONTRAST    Result Date: 9/13/2022  ** ORIGINAL REPORT ** EXAMINATION: CT-ANGIO CHEST PE PROTOCOL [id number] DATE OF EXAM:  9/13/2022 8:19 AM DEMOGRAPHICS: 71years old Female INDICATION:              Contrast utilized and relevant clinical information: History:  Shortness of Breath. Number of Series/Images:  5. Contrast Medication(s): IOHEXOL 350 MG/ML IV SOLN Amount Administered = 67 mL COMPARISON: No existing relevant imaging study corresponding to the same anatomical region is available. TECHNIQUE: The study was performed with the rapid administration of intravenous contrast timed to best evaluate and opacify the pulmonary arteries and their branches. Axial source 3 mm images were obtained. 3-D reconstructions were performed.  Imaging and processing were performed per the institutional PE evaluation protocol. DOSE OPTIMIZATION: CT radiation dose optimization techniques (automated exposure control, and use of iterative reconstruction techniques, or adjustment of the mA and/or kV according to patient size) were used to limit patient radiation dose. FINDINGS: CT CHEST: Thyroid: The thyroid is unremarkable. Systemic arterial Vasculature: The thoracic aorta and arch vasculature have a normal contrasted appearance and are normal size and contour. There is no evidence for dissection. Coronary calcifications. Pulmonary arterial vasculature: The pulmonary arterial vasculature demonstrates good contrast opacification. There is no evidence for a focal filling defect. There is no evidence for focal stenosis or aneurysmal dilatation. 3-D reconstructed images: MIP images confirm and further demonstrate the findings from the axial source images. No additional focal abnormalities are seen. Heart: The heart size is normal.  Small amount of pericardial effusion. Mediastinum: No pathologically enlarged mediastinal lymph nodes are seen. No focal mass lesions are identified. There is no evidence for hiatal hernia. Pleura: Large bilateral pleural effusions. Tracheobronchial tree: The tracheobronchial tree is clear. No focal abnormalities are seen. Lungs : Segmental atelectasis in bilateral lung bases. There is near complete atelectasis of right middle. Upper abdomen: The visualized upper abdomen is grossly unremarkable. Bones: No significant bony abnormalities are noted. Chest wall: Status post right mastectomy with flap. [IMPRESSION] 1. No evidence of acute pulmonary embolus. 2.  Large bilateral pleural effusions with compressive atelectasis at bilateral lung bases left worse than right. There is near complete atelectasis of the right middle lobe. 3.  Small pericardial effusion. 4.  Coronary calcifications. This dictation was created with voice recognition software.   While attempts have been made to review the dictation as it is transcribed, on occasion the spoken word can be misinterpreted by the technology leading to omissions or inappropriate words, phrases or sentences. Electronically Signed by: Alice Cha MD, 9/13/2022 8:38 AM ** ADDENDUM: #1 ** On further evaluation, multiple osseous lesions identified involving the rib cage including left posterior fourth rib, right second rib laterally concerning for metastatic disease given the history of primary malignancy. These findings were discussed with the referring physician at 1:25 PM on 9/13/2022 Electronically Signed by: Alice Cha MD, 9/13/2022 1:25 PM     MRI ABDOMEN W WO CONTRAST    Result Date: 9/15/2022  Indication:     Liver lesion, &gt; 1cm, history of malignancy  Demographics: 71years old Female Contrast Utilized: Contrast Medication(s): GADOTERATE MEGLUMINE 0.5 MMOL/ML (376.9 MG/ML) INTRAVENOUS SOLUTION Amount Administered = 12 mL Clinical Info:  History:  Liver lesion, &gt; 1cm, history of malignancy, difficulty with breathholds. Number of Series/Images:  16.  Date of Service: 9/15/2022 10:29 PM TECHNIQUE: Multisequence, multiplanar MRI of the abdomen was performed both before and after the IV administration of contrast. MRCP protocol was utilized. COMPARISON: None FINDINGS: Soft tissues: No significant abnormalities are noted in the soft tissues, vasculature, or body wall. Bilateral pleural effusions. Bones: The L1 vertebral body shows abnormal low and high signal intensity in it on T2 equivalent images. Abnormality is also noted in the inferolateral lateral vertebral body of T11. These correlate with bone scan findings and are worrisome for metastatic disease. Abdominal cavity: No evidence of free air or free fluid. No peritoneal masses are noted. Liver: Normal signal intensity. The small abnormalities noted in the left lobe of the liver have characteristics typical of hemangiomas. Each measures about 2.9 cm.   They enhance peripherally in a nodular fashion and are still showing contrast enhancement on the delayed images. No vascular abnormalities. Normal enhancement pattern. No biliary abnormalities. Spleen: Normal size and signal intensity. No masses are identified. Normal enhancement pattern. Adrenals: Normal size and signal intensity. No masses. Normal enhancement pattern. Pancreas: Normal signal intensity. No masses. No ductal dilatation. Normal enhancement. Kidneys: Normal size and signal intensity. No masses. Normal enhancement. Bowel: Normal caliber. No abnormalities identified. [IMPRESSION] 1. Abnormalities noted on the computed tomography scan in the liver proved to represent hemangiomas, a benign process requiring no further follow-up. 2.  Bony abnormalities at T11 and L1 suggestive of metastatic disease. 3.  Bilateral pleural effusions. Electronically Signed by: Margarita Schaefer MD, 9/15/2022 11:03 PM Not Validated     NM Bone Scan Whole Body    Result Date: 9/14/2022  STUDY: NM-BONE SCAN, WHOLE BODY INDICATION: 71years old Female with history of fall and breast cancer. Evaluation for osseous metastatic disease. TECHNIQUE: The patient received 21.9 mCi of Tc-99m MDP injected intravenously. Three hours later, anterior and posterior whole body planar views were obtained, as well as multiple spot views. COMPARISON: Chest x-ray dated 9/13/2022, CTA chest PE protocol dated 9/13/2022, however no additional bone scans are available for comparison. FINDINGS: There is increased radiotracer uptake within the right second lateral rib, left fourth posterior rib, right lateral eighth rib, T11, T12 and L1 vertebral bodies and left ilium. There is also increased radiotracer uptake within multiple joints. Both kidneys and the urinary bladder are visualized and appear unremarkable. [IMPRESSION] Findings consistent with osseous metastatic disease within multiple ribs and vertebral bodies and possibly left pelvis.  Findings consistent with degenerative changes within multiple joints. Dictated by: Aris Jeffries (Resident), DO I, Gucci Cheema MD, have reviewed the images and agree with the above interpretation. Electronically Signed by: Gucci Cheema MD, 9/14/2022 1:33 PM 7 - Indicated     US Chest Pleural Space    Result Date: 9/13/2022  EXAMINATION: US-CHEST (FLUID MARKING) DATE OF EXAM:  9/13/2022 9:50 AM DEMOGRAPHICS: 71years old Female INDICATION:     Quantify fluid for possible thoracentesis     Reason for Exam:  Quantify fluid for possible thoracentesis. COMPARISON: No existing relevant imaging study corresponding to the same anatomical region is available. [IMPRESSION] FINDINGS/[IMPRESSION] There is approximately 900 ml of pleural fluid estimated within the posterior right hemithorax. The posterior right hemithorax was not marked for possible thoracentesis. There is approximately 968 ml of pleural fluid estimated within the posterior left hemithorax. The posterior left hemithorax was not marked for possible thoracentesis. This dictation was created with voice recognition software. While attempts have been made to review the dictation as it is transcribed, on occasion the spoken word can be misinterpreted by the technology leading to omissions or inappropriate words, phrases or sentences. Electronically Signed by: Gladys Bolaños, 9/13/2022 9:58 AM     CT ABDOMEN PELVIS W IV CONTRAST    Result Date: 9/15/2022  PROCEDURE: CT-ABD/PELVIS W IV CONTRAST ONLY DEMOGRAPHICS: 71years old Female INDICATION:     Breast cancer, invasive, stage IV, initial workup      Contrast utilized and relevant clinical information: History:  Breast cancer, invasive, stage IV, initial workup. Number of Series/Images:  4.   Contrast Medication(s): IOHEXOL 350 MG/ML IV SOLN Amount Administered = 50 mL COMPARISON:    TECHNIQUE: Contiguous axial slices of the abdomen and pelvis were submitted after the IV administration of contrast. Additional coronal reformatted images were submitted. CT radiation dose optimization techniques (automated exposure control, and use of iterative reconstruction techniques, or adjustment of the mA and/or kV according to patient size) were used to limit patient radiation dose. FINDINGS: CT ABDOMEN: Slices through lung bases show large right and mild/moderate left pleural effusion. Adjacent airspace disease mostly linear favored to be atelectasis. Moderate pericardial effusion with mild cardiomegaly Liver shows low-density lesion left hepatic lobe 2.5 x 1.6 cm with possible directly adjacent lesion posterior to this measuring 1.2 cm. Ill-defined low-density lesion in the left hepatic lobe estimated to measure 1.7 x 1.5 cm. Small hypodensity in the central right lobe is identified. The spines are concerning for metastases. This structure measures 6 mm Pancreas and spleen within normal limits allowing for splenic granulomatous calcifications Adrenal glands within normal limits Kidneys are symmetric Calcification of nondilated abdominal aorta No focal bladder abnormality Uterus not visualized likely surgically absent. A few diverticula in the sigmoid and descending colon without adjacent inflammation. Small and large bowel nondilated. Surgical clips along the right rectus abdominis are identified. There is a normal caliber air-filled appendix. No pathologically enlarged retroperitoneal or mesenteric adenopathy based on size criteria. No evidence of free air. Evaluation of bone windows shows levo scarring curvature lumbar spine. Mixed lysis and sclerosis involving the L1 vertebral body is noted concerning for metastatic disease. [IMPRESSION] Hypodense lesions within the liver particularly left lobe concerning for evidence of metastatic disease 2. Sclerosis within the L1 vertebral body may indicate osseous metastasis. This was demonstrated on the recent bone scan.  3. Ancillary findings as above   Please see above for additional details Electronically Signed by: Mikayla Bolaños MD, 9/15/2022 9:21 AM 8 - Indicated     XR CHEST PORTABLE    Result Date: 9/26/2022  EXAMINATION: ONE XRAY VIEW OF THE CHEST 9/26/2022 10:34 am COMPARISON: 09/25/2022. HISTORY: ORDERING SYSTEM PROVIDED HISTORY: Hypoxia TECHNOLOGIST PROVIDED HISTORY: Reason for exam:->Hypoxia Reason for Exam: Hypoxia FINDINGS: The cardiac silhouette is indeterminate. There is worsening bibasilar opacity, right greater than left. There is suspected right-sided moderate-sized pleural effusion. No evidence of pneumothorax is seen. Worsening bibasilar opacities, right greater than left with suspected moderate size right-sided pleural effusion. XR CHEST PORTABLE    Result Date: 9/25/2022  EXAMINATION: ONE XRAY VIEW OF THE CHEST 9/25/2022 1:14 pm COMPARISON: Chest radiograph 09/25/2022, 09/23/2022. HISTORY: ORDERING SYSTEM PROVIDED HISTORY: r/o pneumothorax TECHNOLOGIST PROVIDED HISTORY: Reason for exam:->r/o pneumothorax Reason for Exam: r/o pneumothorax FINDINGS: Single upright view provided. Stable mediastinal and cardiac silhouettes. Status post thoracentesis with improved bilateral pleural effusions and lung base aeration. Mild persistent bilateral effusions and lower lobe/lung base consolidation. No evidence of a pneumothorax. No free subdiaphragmatic air. Decreased bone mineral density. 1. Improved bilateral pleural effusions and lung base aeration. 2. No large pneumothorax. The questionable right apical trace pneumothorax cannot be identified on the upright view. XR CHEST PORTABLE    Result Date: 9/25/2022  EXAMINATION: ONE XRAY VIEW OF THE CHEST 9/25/2022 12:17 pm COMPARISON: Chest radiograph 09/23/2022. HISTORY: ORDERING SYSTEM PROVIDED HISTORY: check for pneumothorax TECHNOLOGIST PROVIDED HISTORY: Reason for exam:->check for pneumothorax Reason for Exam: check for pneumothorax FINDINGS: Single view provided. Enlarged cardiac silhouette.   Stable central pulmonary vascular prominence and perihilar interstitial reticulonodular densities which may represent edema. Interval improvement of bilateral pleural effusions with improved lung base aeration. Persistent bibasilar consolidation and mild. There is a subtle lucency in right hemithorax apex which could represent a trace pneumothorax. 1. Status post bilateral thoracentesis with improved effusions and lung base aeration. 2. Question trace right apical pneumothorax. 3. Mild residual bibasilar atelectasis and pleural effusions. XR CHEST PORTABLE    Result Date: 9/24/2022  EXAMINATION: ONE XRAY VIEW OF THE CHEST 9/23/2022 9:58 pm COMPARISON: August 23, 2022 HISTORY: ORDERING SYSTEM PROVIDED HISTORY: dyspnea TECHNOLOGIST PROVIDED HISTORY: Reason for exam:->dyspnea Reason for Exam: shortness of breath Additional signs and symptoms: dyspnea FINDINGS: No lines or tubes. Stable cardiomediastinal silhouette. The lungs show interval decrease in lung volume with worsening basilar opacities, bilateral effusions and pulmonary edema. No pneumothorax. No acute or aggressive osseous lesion. 1. Worsening basilar opacities, bilateral effusions and pulmonary edema suggestive of congestive heart failure. Superimposed pneumonia would be difficult to exclude. US THORACENTESIS    Result Date: 9/13/2022  EXAMINATION: US-THORACENTESIS FOR ASPIRATION W/IMAGING [id number] DATE OF EXAM:  9/13/2022 1:14 PM DEMOGRAPHICS: 71years old Female INDICATION:     thorocentesis     Reason for Exam:  thorocentesis. COMPARISON: Ultrasound chest fluid marking from 9/13/2022. PROCEDURE PERFORMED BY: Yunior Portillo PA-C ATTENDING RADIOLOGIST(Direct Supervision) : Dr. Poe Relic: The patient was brought to the sonography suite and placed on the table in an upright sitting position. Written Informed consent was obtained after the risks (bleeding, infection, pneumothorax, postoperative pain) and benefits were discussed with the patient.  An appropriate access site was chosen in the Left posterior chest utilizing real-time sonographic guidance. A procedural timeout was performed. The overlying area was then marked and prepped and draped in sterile fashion. Local anesthesia was achieved using 1% lidocaine. A small dermatotomy was made utilizing an 11 blade. Then, an 6 Western Yancy Pnz-Z-zbmalafz catheter was then advanced directly into the pleural space. There was immediate return of wilma-colored fluid. 1100 mL was evacuated. No immediate complications were encountered. The catheter was removed and hemostasis was achieved with direct pressure. The patient left the sonography suite in stable, unchanged condition. A post-procedure chest x-ray will be obtained. Permanent images were obtained and stored. [IMPRESSION] Technically successful ultrasound-guided Left thoracentesis. Films reviewed, interpreted and dictated by Dr. Italo Sofia. Transcribed by Irving Napier PA-C. This dictation was created with voice recognition software. While attempts have been made to review the dictation as it is transcribed, on occasion the spoken word can be misinterpreted by the technology leading to omissions or inappropriate words, phrases or sentences. Dictated by: Irving Napier (Radiology KIM), KIM I, Rayo Zavaleta MD, have reviewed the images and agree with the above interpretation. Electronically Signed by: Rayo Zavaleta MD, 9/13/2022 3:46 PM     XR Chest 1 VW    Result Date: 9/13/2022  EXAMINATION: XR-CHEST SINGLE (COUNT) DATE OF EXAM:  9/13/2022 1:46 PM DEMOGRAPHICS: 71years old Female INDICATION:     Post Thoracentesis-PleurX Placement     History:  Post Thoracentesis-PleurX Placement. Number of Series/Images:  1. COMPARISON: 9/13/2022 TECHNIQUE: Single AP portable chest radiograph was obtained. FINDINGS: Cardiomediastinal silhouette is obscured by right pleural effusion and underlying atelectasis or consolidation unchanged. Improved left pleural effusion.  Left basilar opacities unchanged. No definite pneumothorax seen. There is no acute osseous abnormality. [IMPRESSION] 1.  RIGHT pleural effusion and underlying atelectasis or consolidation unchanged. Improved left pleural effusion. Left basilar opacities unchanged. No definite pneumothorax seen    This dictation was created with voice recognition software. While attempts have been made to review the dictation as it is transcribed, on occasion the spoken word can be misinterpreted by the technology leading to omissions or inappropriate words, phrases or sentences. Electronically Signed by: Jose Koch, 9/13/2022 2:02 PM     US CHEST INCLUDING MEDIASTINUM    Result Date: 9/25/2022  EXAMINATION: ULTRASOUND OF THE CHEST 9/25/2022 12:07 pm COMPARISON: Chest radiograph done 09/23/2022. HISTORY: ORDERING SYSTEM PROVIDED HISTORY: Bilateral Thoracenteis at the bedside at 10:30 AM tomorrow TECHNOLOGIST PROVIDED HISTORY: Reason for exam:->Bilateral Thoracenteis at the bedside at 10:30 AM tomorrow FINDINGS/IMPRESSION: Skin marking was performed for bedside thoracentesis. Bilateral pleural effusions are demonstrated.           Electronically signed by Candie Lin PA-C on 9/27/2022 at 8:57 AM

## 2022-09-27 NOTE — PROGRESS NOTES
V2.0  [facility] Hospitalist Progress Note      Name:  Christiana Landers /Age/Sex: 1950  (71 y.o. female)   MRN & CSN:  [id number] & [id number] Encounter Date/Time: 2022 7:48 PM EDT    Location:  -A PCP: Victoria Aranda MD       Hospital Day: 5    Assessment and Plan:   Christiana Landers, a 71 y.o. female, with a history of Afib, CHF, Possible scleroderma, H/O breast cancer, B/L pleural effusion, was admitted on 2022 with complaints of had concerns including Shortness of Breath (Started today; has recently been seen and had 2 paracentesis done; normally wears O2 at 3L/min and is currently requiring more to sub stain oxygen levels). Assessment  Acute on chronic respiratory failure, 2/2 decompensated heart failure versus malignant pleural effusion. Elevated pro-BNP at 2712. Bilateral pleural effusion, 2/2 decompensated heart failure vs malignant pleural effusion. S/p bilateral thoracentesis by pulmonology on . Pericardial effusion with tamponade s/p pericardiocentesis with removal of 600 cc of pericardial fluid on   NSTEMI likely demand ischemia, troponin 0.012 > 0.068 > 0.038. Stress test 2022 with no reversible perfusion defect. Afib RVR, hx of P.afib, currently sinus. Went out of rhythm and into RVR while getting thoracentesis. History of breast cancer with bone metastasis. Chronic respiratory failure on home oxygen at 2 to 3 L via nasal cannula. Plan  S/p pericardiocentesis with removal of 600 cc of pericardial fluid, fluid studies pending  Resume Eliquis when cardiology clears  Patient had stress test in may, which was negative, but if shortness of breath doesn't improve with thoracentesis, might be worthwhile to re-evaluate for ACS. On amiodarone drip  IR for potential bone biopsy inpatient vs outpatient. Please coordinate with interventional radiology. Diltiazem, furosemide, lisinopril are held because of hypotension in the morning.   Blood pressure has stabilized after pericardiocentesis. Reintroduce slowly. Follow hematology, pulmonology, cardiology, and IR recs  Will follow peripherally, critical care as primary team.  Continue Lasix 40 mg twice daily    Diet ADULT DIET; Regular; 3 carb choices (45 gm/meal); Low Fat/Low Chol/High Fiber/REYES   DVT Prophylaxis [] Lovenox, []  Heparin, [x] SCDs, [] Ambulation,  [] Eliquis, [] Xarelto  [] Coumadin   Code Status Full Code   Disposition From: Home  Expected Disposition: Home  Estimated Date of Discharge: 2 to 3 days  Patient requires continued admission due to cardiology evaluation and pericardial fluid studies   Surrogate Decision Maker/ POA Oumar     Subjective:     Chief Complaint: Shortness of Breath (Started today; has recently been seen and had 2 paracentesis done; normally wears O2 at 3L/min and is currently requiring more to sub stain oxygen levels)       Suki Muhammad is a 71 y.o. female who presents with progressively worsening shortness of breath. Patient was seen and evaluated at bedside. Patient was alert oriented x3. Patient was hemodynamically stable no acute overnight events noticed       Objective: Intake/Output Summary (Last 24 hours) at 9/27/2022 1948  Last data filed at 9/27/2022 1845  Gross per 24 hour   Intake 1767.26 ml   Output 1050 ml   Net 717.26 ml        Vitals:   Vitals:    09/27/22 1900   BP: (!) 108/37   Pulse: 75   Resp: 19   Temp:    SpO2:        Physical Exam:   Physical Exam  Vitals reviewed. Constitutional:       Appearance: She is normal weight. She is ill-appearing. HENT:      Head: Normocephalic. Nose: Nose normal.      Mouth/Throat:      Mouth: Mucous membranes are dry. Pharynx: Oropharynx is clear. Eyes:      General: No scleral icterus. Conjunctiva/sclera: Conjunctivae normal.   Cardiovascular:      Rate and Rhythm: Normal rate and regular rhythm. Pulses: Normal pulses. Heart sounds: Normal heart sounds.  No murmur heard.  Pulmonary:      Effort: Pulmonary effort is normal.      Breath sounds: Rales present. No wheezing or rhonchi. Comments: Nasal cannula supplementation  Abdominal:      General: Bowel sounds are normal. There is no distension. Palpations: Abdomen is soft. Tenderness: There is no abdominal tenderness. Musculoskeletal:         General: No deformity. Normal range of motion. Cervical back: Normal range of motion and neck supple. Right lower leg: No edema. Left lower leg: No edema. Skin:     Coloration: Skin is not jaundiced or pale. Neurological:      General: No focal deficit present. Mental Status: She is alert and oriented to person, place, and time. Mental status is at baseline.           Medications:   Medications:    insulin glargine  10 Units SubCUTAneous Nightly    dilTIAZem  120 mg Oral Daily    furosemide  40 mg IntraVENous BID    sodium chloride flush  5-40 mL IntraVENous 2 times per day    lidocaine  20 mL IntraDERmal Once    sodium chloride flush  5-40 mL IntraVENous 2 times per day    apixaban  5 mg Oral BID    [Held by provider] lisinopril  20 mg Oral Daily    metoprolol tartrate  50 mg Oral BID    mirtazapine  15 mg Oral Nightly    oxybutynin  5 mg Oral BID    rosuvastatin  10 mg Oral Nightly    insulin lispro  0-4 Units SubCUTAneous TID WC    insulin lispro  0-4 Units SubCUTAneous Nightly      Infusions:    dextrose      sodium chloride      amiodarone 0.5 mg/min (09/27/22 0915)    sodium chloride       PRN Meds: glucose, 4 tablet, PRN  dextrose bolus, 125 mL, PRN   Or  dextrose bolus, 250 mL, PRN  glucagon (rDNA), 1 mg, PRN  dextrose, , Continuous PRN  guaiFENesin-dextromethorphan, 10 mL, Q4H PRN  sodium chloride flush, 5-40 mL, PRN  sodium chloride, , PRN  acetaminophen, 650 mg, Q4H PRN  ondansetron, 4 mg, Q6H PRN  hydrALAZINE, 10 mg, Q10 Min PRN  sodium chloride flush, 5-40 mL, PRN  sodium chloride, , PRN  ondansetron, 4 mg, Q8H PRN  polyethylene glycol, 17 g, Daily PRN  acetaminophen, 650 mg, Q6H PRN  ALPRAZolam, 0.25 mg, BID PRN        Labs      Recent Results (from the past 24 hour(s))   POCT Glucose    Collection Time: 09/26/22  8:38 PM   Result Value Ref Range    POC Glucose 173 (H) 70 - 99 MG/DL   POCT Glucose    Collection Time: 09/27/22  8:48 AM   Result Value Ref Range    POC Glucose 199 (H) 70 - 99 MG/DL   Basic Metabolic Panel    Collection Time: 09/27/22  8:55 AM   Result Value Ref Range    Sodium 136 135 - 145 MMOL/L    Potassium 4.5 3.5 - 5.1 MMOL/L    Chloride 103 99 - 110 mMol/L    CO2 23 21 - 32 MMOL/L    Anion Gap 10 4 - 16    BUN 23 6 - 23 MG/DL    Creatinine 1.3 (H) 0.6 - 1.1 MG/DL    Glucose 209 (H) 70 - 99 MG/DL    Calcium 9.4 8.3 - 10.6 MG/DL    GFR Non-African American 40 (L) >60 mL/min/1.73m2    GFR  49 (L) >60 mL/min/1.73m2   Magnesium    Collection Time: 09/27/22  8:55 AM   Result Value Ref Range    Magnesium 1.9 1.8 - 2.4 mg/dl   Brain Natriuretic Peptide    Collection Time: 09/27/22  8:55 AM   Result Value Ref Range    Pro-BNP 11,368 (H) <300 PG/ML   POCT Glucose    Collection Time: 09/27/22 12:33 PM   Result Value Ref Range    POC Glucose 295 (H) 70 - 99 MG/DL   POCT Glucose    Collection Time: 09/27/22  4:43 PM   Result Value Ref Range    POC Glucose 198 (H) 70 - 99 MG/DL        Imaging/Diagnostics Last 24 Hours   Echocardiogram limited    Result Date: 9/27/2022  Transthoracic Echocardiography Report (TTE)  Demographics   Patient Name       Eleuterio Galo  Date of Study       09/27/2022                     A   Date of Birth      1950         Gender              Female   Age                71 year(s)         Race                   Patient Number     [id number]         Room Number         2119   Visit Number       [id number]   Corporate ID       [id number]   Accession Number   [id number]         Sonographer         Shayy Pop                                                            RDCS, RDMS, RVT Ordering Physician Whitney Michael MD                 Physician           Nette CASAREZ  Procedure Type of Study   TTE procedure:ECHOCARDIOGRAM LIMITED. Procedure Date Date: 09/27/2022 Start: 08:40 AM Study Location: Portable Technical Quality: Fair visualization Indications:Pericardial effusion. Patient Status: Routine Height: 65 inches Weight: 143 pounds BSA: 1.72 m2 BMI: 23.8 kg/m2 HR: 82 bpm BP: 117/65 mmHg  Conclusions   Summary  This is a limited echocardiogram.  Left ventricular systolic function is normal.  Ejection fraction is visually estimated at 50-55%. Moderate aortic regurgitation is noted. Moderate to severe tricuspid regurgitation; RVSP: 55 mmHg. Severe PHTN. S/p pericardiocentesis with 560cc drained on 9/26/22; no evidence of any  pericardial effusion. Left pleural effusion. Signature   ------------------------------------------------------------------  Electronically signed by Willy Abel MD  (Interpreting physician) on 09/27/2022 at 09:45 AM  ------------------------------------------------------------------   Findings   Left Ventricle  Left ventricular systolic function is normal.  Ejection fraction is visually estimated at 50-55%. Aortic Valve  Moderate aortic regurgitation is noted. Tricuspid Valve  Moderate to severe tricuspid regurgitation; RVSP: 55 mmHg. Severe PHTN. Pericardial Effusion  S/p pericardiocentesis with 560cc drained on 9/26/22; no evidence of any  pericardial effusion. Pleural Effusion  Left pleural effusion.   M-Mode/2D Measurements & Calculations   LV Diastolic Dimension: 9.07 cm LV Systolic Dimension: 3.1 cm  LV FS:26.4 %                    LV Volume Diastolic: 44.8 ml  LV PW Diastolic: 9.30 cm        LV Volume Systolic: 51.9 ml  LV PW Systolic: 3.78 cm         LV EDV/LV EDV Index: 74.6 ml/43 m2LV ESV/LV  Septum Diastolic: 9.16 cm       ESV Index: 29.8 ml/17 m2  Septum Systolic: 0.97 cm EF Calculated (A4C): 60.1 %                                  EF Calculated (2D): 52 %  Doppler Measurements & Calculations                               Estimated RVSP: 55 mmHg                              Estimated RAP:3 mmHg    Estimated PASP: 40.21 mmHg TR Velocity:305 cm/s                              TR Gradient:37.21 mmHg      Echocardiogram limited    Result Date: 9/27/2022  Transthoracic Echocardiography Report (TTE)  Demographics   Patient Name       Bernett Barthel  Date of Study       09/26/2022                     A   Date of Birth      1950         Gender              Female   Age                71 year(s)         Race                   Patient Number     [id number]         Room Number         2119   Visit Number       [id number]   Corporate ID       [id number]   Accession Number   [id number]         Robyn Wallace                                                            [facility]   Ordering Physician Ace Joyner MD                 Physician           MD  Procedure Type of Study   TTE procedure:ECHOCARDIOGRAM LIMITED. Procedure Date Date: 09/26/2022 Start: 02:50 PM Study Location: OR Technical Quality: Fair visualization Indications:Pericardial effusion. Patient Status: Routine Height: 65 inches Weight: 143 pounds BSA: 1.72 m2 BMI: 23.8 kg/m2 BP: 112/39 mmHg  Conclusions   Summary  This is a limited echo during pericardiocentesis. See full report of procedure in EPIC   600cc of straw-colored fluid was drained, no pericardial effusion noted  post procedure.    Signature   ------------------------------------------------------------------  Electronically signed by Lionel Rodas MD (Interpreting  physician) on 09/27/2022 at 08:57 AM  ------------------------------------------------------------------      XR CHEST PORTABLE    Result Date: 9/26/2022  EXAMINATION: ONE XRAY VIEW OF THE CHEST 9/26/2022 10:34 am COMPARISON: 09/25/2022. HISTORY: ORDERING SYSTEM PROVIDED HISTORY: Hypoxia TECHNOLOGIST PROVIDED HISTORY: Reason for exam:->Hypoxia Reason for Exam: Hypoxia FINDINGS: The cardiac silhouette is indeterminate. There is worsening bibasilar opacity, right greater than left. There is suspected right-sided moderate-sized pleural effusion. No evidence of pneumothorax is seen. Worsening bibasilar opacities, right greater than left with suspected moderate size right-sided pleural effusion.        Electronically signed by Cady Siddiqi MD on 9/27/2022 at 7:48 PM

## 2022-09-27 NOTE — PROGRESS NOTES
CARDIOLOGY  NOTE        Name:  Noe Boyer /Age/Sex: 1950  (71 y.o. female)   MRN & CSN:  [id number] & [id number] Admission Date/Time: 2022  9:51 PM   Location:  -A PCP: Mikayla Page MD       Hospital Day: 5        PLAN FROM CARDIOLOGY FOR TODAY:   We will remove the pericardiocentesis catheter      - cardiology consult is for: Pericardial effusion    -  Interval history: Had pericardiocentesis done resume anticoagulation    ASSESSMENT/ PLAN:      -Dyspnea probably secondary to pulmonary etiology given that she has large pleural effusion which is probably secondary to her malignancy her ejection fraction has been normal, had thoracentesis today  -Was seen for A. fib last month is in sinus rhythm now is on negative chronotropic agents patient is on Eliquis which will probably need to be held prior to thoracentesis, monitor A. fib with RVR postthoracentesis  -Possibility of a HFpEF however looks like the pleural effusions are coming from her malignancy await oncology input  -Hyperlipidemia on Crestor 10 mg p.o. daily  -Status post pericardiocentesis fluid sent for analysis start anticoagulation          Subjective: Todays complain: Patient no shortness of breath    HPI:  Morgan Cabrera is a 71 y. o.year old who and presents with had concerns including Shortness of Breath (Started today; has recently been seen and had 2 paracentesis done; normally wears O2 at 3L/min and is currently requiring more to sub stain oxygen levels). Chief Complaint   Patient presents with    Shortness of Breath     Started today; has recently been seen and had 2 paracentesis done; normally wears O2 at 3L/min and is currently requiring more to sub stain oxygen levels           Objective: Temperature:  Current - Temp: 98.5 °F (36.9 °C);  Max - Temp  Av.5 °F (36.9 °C)  Min: 98 °F (36.7 °C)  Max: 98.8 °F (37.1 °C)    Respiratory Rate : Resp  Avg: 27.6  Min: 18  Max: 33    Pulse Range: Pulse  Av.6  Min: 78  Max: 119    Blood Presuure Range:  Systolic (97EFM), IEA:301 , Min:100 , JHONATHAN:086   ; Diastolic (11FFB), OYC:73, Min:39, Max:65      Pulse ox Range: SpO2  Av.5 %  Min: 88 %  Max: 99 %    24hr I & O:    Intake/Output Summary (Last 24 hours) at 2022 1308  Last data filed at 2022 0540  Gross per 24 hour   Intake 1017.26 ml   Output 1050 ml   Net -32.74 ml         /65   Pulse 90   Temp 98.5 °F (36.9 °C) (Oral)   Resp 22   Ht 5' 5\" (1.651 m)   Wt 136 lb 11 oz (62 kg)   SpO2 (!) 88%   BMI 22.75 kg/m²           Review of Systems:    No shortness of breath    TELEMETRY: Sinus rhythm   has a past medical history of Anxiety, Breast carcinoma (HCC), Cervical radiculitis, Chest pain, Depression, Diabetes type 2, controlled ([facility] [address] Family history of diabetes mellitus (DM), GERD (gastroesophageal reflux disease), Hiatal hernia, Hx of Doppler ultrasound, Hx of Doppler ultrasound, Hx of echocardiogram, Hypercalcemia, Hyperglycemia, Hyperlipidemia, Insomnia, Irritable bowel syndrome, LBP (low back pain), Lumbar herniated disc, Menopause, Migraine headache, Osteoporosis, Prediabetes, S/P colonoscopic polypectomy, and TIA (transient ischemic attack). has a past surgical history that includes Tubal ligation; Mastectomy (1997); Hysterectomy (1995); and Breast reconstruction (Right, 2013). Physical Exam:  General:  Awake, alert, NAD  Head:normal  Eye: Pupils equal and round  Neck:  No JVD, no carotid bruit noted   Chest:  Clear to auscultation, no signs of respiratory distress  Cardiovascular:  Normal rate and rhythm. S1 and S2 noted.  No murmurs rubs or gallops  Abdomen:   nontender  Extremities:  no edema  Pulses; palpable  Neuro: grossly normal    Medications:    insulin glargine  10 Units SubCUTAneous Nightly    dilTIAZem  120 mg Oral Daily    furosemide  40 mg IntraVENous BID    sodium chloride flush  5-40 mL IntraVENous 2 times per day    lidocaine  20 mL IntraDERmal Once    sodium chloride flush  5-40 mL IntraVENous 2 times per day    apixaban  5 mg Oral BID    [Held by provider] lisinopril  20 mg Oral Daily    metoprolol tartrate  50 mg Oral BID    mirtazapine  15 mg Oral Nightly    oxybutynin  5 mg Oral BID    rosuvastatin  10 mg Oral Nightly    insulin lispro  0-4 Units SubCUTAneous TID WC    insulin lispro  0-4 Units SubCUTAneous Nightly      dextrose      sodium chloride      amiodarone 0.5 mg/min (09/27/22 0915)    sodium chloride       glucose, dextrose bolus **OR** dextrose bolus, glucagon (rDNA), dextrose, guaiFENesin-dextromethorphan, sodium chloride flush, sodium chloride, acetaminophen, ondansetron, hydrALAZINE, sodium chloride flush, sodium chloride, ondansetron **OR** [DISCONTINUED] ondansetron, polyethylene glycol, [DISCONTINUED] acetaminophen **OR** acetaminophen, ALPRAZolam    Lab Data:  CBC: No results for input(s): WBC, HGB, HCT, MCV, PLT in the last 72 hours. BMP:   Recent Labs     09/25/22  0249 09/26/22  0919 09/27/22  0855    137 136   K 4.2 4.1 4.5    104 103   CO2 25 24 23   BUN 36* 30* 23   CREATININE 1.1 1.1 1.3*     LIVER PROFILE: No results for input(s): AST, ALT, LIPASE, BILIDIR, BILITOT, ALKPHOS in the last 72 hours. Invalid input(s): AMYLASE,  ALB  PT/INR:   Recent Labs     09/25/22  0249   PROTIME 13.8   INR 1.07     APTT:   Recent Labs     09/25/22  0249   APTT 34.5     BNP:  No results for input(s): BNP in the last 72 hours. TROPONIN: No results for input(s): TROPONINI in the last 72 hours. Recent Labs     09/24/22  1800   TROPONINT 0.038*        Labs, consult, tests reviewed                    Placido Velasco MD, PA-C 9/27/2022 1:08 PM     Please note this report has been partially produced using speech recognition software and may contain errors related to that system including errors in grammar, punctuation, and spelling, as well as words and phrases that may be inappropriate.  If there

## 2022-09-27 NOTE — PROGRESS NOTES
Pulmonary and Critical Care  Progress Note      VITALS:  /65   Pulse 90   Temp 98.5 °F (36.9 °C) (Oral)   Resp 22   Ht 5' 5\" (1.651 m)   Wt 136 lb 11 oz (62 kg)   SpO2 (!) 88%   BMI 22.75 kg/m²     Subjective:   CHIEF COMPLAINT :SOB     HPI:                The patient is a 71 y.o. female is lying in the bed. She is in mild resp distress. She had pericardiocentesis done yesterday    Objective:   PHYSICAL EXAM:    LUNGS:Occasional basal crackles  Abd-soft, BS+,NT  Ext- 1+pedal edema  CVS-s1s2, no murmurs      DATA:    CBC:  No results for input(s): WBC, RBC, HGB, HCT, PLT, MCV, MCH, MCHC, RDW, NRBC, SEGSPCT, BANDSPCT in the last 72 hours. BMP:  Recent Labs     09/25/22  0249 09/26/22  0919 09/27/22  0855    137 136   K 4.2 4.1 4.5    104 103   CO2 25 24 23   BUN 36* 30* 23   CREATININE 1.1 1.1 1.3*   CALCIUM 9.9 9.8 9.4   GLUCOSE 171* 194* 209*      ABG:  No results for input(s): PH, PO2ART, GWK7VXJ, HCO3, BEART, O2SAT in the last 72 hours.   BNP  No results found for: BNP   D-Dimer:  No results found for: [facility]   Radiology: None      Assessment/Plan     Patient Active Problem List    Diagnosis Date Noted    Acute systolic CHF (congestive heart failure) ([facility] [address] 09/24/2022     Priority: Medium    Acute respiratory failure with hypoxia ([facility] [address] 09/24/2022     Priority: Medium    Diastolic heart failure, unspecified HF chronicity ([facility] [address] 08/30/2022     Priority: Medium    Atrial fibrillation with RVR ([facility] [address] 08/18/2022     Priority: Medium    Varicose veins of both legs with edema 06/28/2022     Priority: Medium    Venous insufficiency 06/07/2022     Priority: Medium    Shortness of breath 06/07/2022     Priority: Medium    Edema of lower extremity 06/07/2022     Priority: Medium    Chest pain 05/12/2022     Priority: Medium    Peripheral edema 03/21/2022    Carcinoma of right female breast, unspecified estrogen receptor status, unspecified site of breast ([facility] [address] 02/16/2022    Hypercalcemia      Overview Note:     mild ~11, iPTH nl 8/2021      Diabetes type 2, controlled ([facility] [address]      Overview Note:     likely, w two random sugars >126      Migraine headache     GERD (gastroesophageal reflux disease)     Irritable bowel syndrome     Lumbar herniated disc     Low back pain      Overview Note:     replace inactive diagnosis      Hyperlipidemia     History of breast cancer      Overview Note:     R breast      Acute on chronic Hypoxic resp failure  Bilateral Pleural effusions s/p thoracentesis- improved  Recurrent right pleural effusion  Diastolic dysfunction  Afib on Eliquis  H/o Breast ca with mets to T12, L1  Pericardial effusion s/p pericardiocentesis       CXR in am  Diuresis  CHF optimization  ICS  OOB  Inhalers  Keep sats > 92%  DVT and GI Prophylaxis  C.w present management    Electronically signed by Gabe Salgado MD on 9/27/2022 at 1:00 PM

## 2022-09-28 ENCOUNTER — APPOINTMENT (OUTPATIENT)
Dept: ULTRASOUND IMAGING | Age: 72
DRG: 280 | End: 2022-09-28
Payer: MEDICARE

## 2022-09-28 ENCOUNTER — APPOINTMENT (OUTPATIENT)
Dept: GENERAL RADIOLOGY | Age: 72
DRG: 280 | End: 2022-09-28
Payer: MEDICARE

## 2022-09-28 LAB
ANION GAP SERPL CALCULATED.3IONS-SCNC: 11 MMOL/L (ref 4–16)
BASOPHILS ABSOLUTE: 0.1 K/CU MM
BASOPHILS RELATIVE PERCENT: 0.5 % (ref 0–1)
BUN BLDV-MCNC: 29 MG/DL (ref 6–23)
CALCIUM SERPL-MCNC: 9.4 MG/DL (ref 8.3–10.6)
CHLORIDE BLD-SCNC: 101 MMOL/L (ref 99–110)
CO2: 23 MMOL/L (ref 21–32)
CREAT SERPL-MCNC: 1.5 MG/DL (ref 0.6–1.1)
CULTURE: ABNORMAL
DIFFERENTIAL TYPE: ABNORMAL
EOSINOPHILS ABSOLUTE: 0.1 K/CU MM
EOSINOPHILS RELATIVE PERCENT: 1 % (ref 0–3)
ERYTHROCYTE SEDIMENTATION RATE: 26 MM/HR (ref 0–30)
GFR AFRICAN AMERICAN: 41 ML/MIN/1.73M2
GFR NON-AFRICAN AMERICAN: 34 ML/MIN/1.73M2
GLUCOSE BLD-MCNC: 186 MG/DL (ref 70–99)
GLUCOSE BLD-MCNC: 187 MG/DL (ref 70–99)
GLUCOSE BLD-MCNC: 196 MG/DL (ref 70–99)
GLUCOSE BLD-MCNC: 201 MG/DL (ref 70–99)
GLUCOSE BLD-MCNC: 210 MG/DL (ref 70–99)
GLUCOSE BLD-MCNC: 262 MG/DL (ref 70–99)
GLUCOSE BLD-MCNC: 312 MG/DL (ref 70–99)
GRAM SMEAR: ABNORMAL
HCT VFR BLD CALC: 36 % (ref 37–47)
HEMOGLOBIN: 11.2 GM/DL (ref 12.5–16)
IMMATURE NEUTROPHIL %: 0.5 % (ref 0–0.43)
LYMPHOCYTES ABSOLUTE: 1.1 K/CU MM
LYMPHOCYTES RELATIVE PERCENT: 12.2 % (ref 24–44)
Lab: ABNORMAL
MAGNESIUM: 1.9 MG/DL (ref 1.8–2.4)
MCH RBC QN AUTO: 28.8 PG (ref 27–31)
MCHC RBC AUTO-ENTMCNC: 31.1 % (ref 32–36)
MCV RBC AUTO: 92.5 FL (ref 78–100)
MONOCYTES ABSOLUTE: 0.9 K/CU MM
MONOCYTES RELATIVE PERCENT: 9.8 % (ref 0–4)
NUCLEATED RBC %: 0 %
PDW BLD-RTO: 15.4 % (ref 11.7–14.9)
PHOSPHORUS: 4.5 MG/DL (ref 2.5–4.9)
PLATELET # BLD: 271 K/CU MM (ref 140–440)
PMV BLD AUTO: 11 FL (ref 7.5–11.1)
POTASSIUM SERPL-SCNC: 4.2 MMOL/L (ref 3.5–5.1)
RBC # BLD: 3.89 M/CU MM (ref 4.2–5.4)
SEGMENTED NEUTROPHILS ABSOLUTE COUNT: 7 K/CU MM
SEGMENTED NEUTROPHILS RELATIVE PERCENT: 76 % (ref 36–66)
SODIUM BLD-SCNC: 135 MMOL/L (ref 135–145)
SPECIMEN: ABNORMAL
TOTAL IMMATURE NEUTOROPHIL: 0.05 K/CU MM
TOTAL NUCLEATED RBC: 0 K/CU MM
WBC # BLD: 9.2 K/CU MM (ref 4–10.5)

## 2022-09-28 PROCEDURE — 80048 BASIC METABOLIC PNL TOTAL CA: CPT

## 2022-09-28 PROCEDURE — 2580000003 HC RX 258: Performed by: INTERNAL MEDICINE

## 2022-09-28 PROCEDURE — 85025 COMPLETE CBC W/AUTO DIFF WBC: CPT

## 2022-09-28 PROCEDURE — 2580000003 HC RX 258: Performed by: STUDENT IN AN ORGANIZED HEALTH CARE EDUCATION/TRAINING PROGRAM

## 2022-09-28 PROCEDURE — 6370000000 HC RX 637 (ALT 250 FOR IP): Performed by: STUDENT IN AN ORGANIZED HEALTH CARE EDUCATION/TRAINING PROGRAM

## 2022-09-28 PROCEDURE — 86038 ANTINUCLEAR ANTIBODIES: CPT

## 2022-09-28 PROCEDURE — 71045 X-RAY EXAM CHEST 1 VIEW: CPT

## 2022-09-28 PROCEDURE — 94761 N-INVAS EAR/PLS OXIMETRY MLT: CPT

## 2022-09-28 PROCEDURE — 6370000000 HC RX 637 (ALT 250 FOR IP): Performed by: INTERNAL MEDICINE

## 2022-09-28 PROCEDURE — 85652 RBC SED RATE AUTOMATED: CPT

## 2022-09-28 PROCEDURE — 6360000002 HC RX W HCPCS: Performed by: INTERNAL MEDICINE

## 2022-09-28 PROCEDURE — 86255 FLUORESCENT ANTIBODY SCREEN: CPT

## 2022-09-28 PROCEDURE — 6360000002 HC RX W HCPCS: Performed by: NURSE PRACTITIONER

## 2022-09-28 PROCEDURE — 83735 ASSAY OF MAGNESIUM: CPT

## 2022-09-28 PROCEDURE — 76937 US GUIDE VASCULAR ACCESS: CPT

## 2022-09-28 PROCEDURE — C1751 CATH, INF, PER/CENT/MIDLINE: HCPCS

## 2022-09-28 PROCEDURE — 2060000000 HC ICU INTERMEDIATE R&B

## 2022-09-28 PROCEDURE — P9047 ALBUMIN (HUMAN), 25%, 50ML: HCPCS | Performed by: INTERNAL MEDICINE

## 2022-09-28 PROCEDURE — 6360000002 HC RX W HCPCS: Performed by: STUDENT IN AN ORGANIZED HEALTH CARE EDUCATION/TRAINING PROGRAM

## 2022-09-28 PROCEDURE — 99232 SBSQ HOSP IP/OBS MODERATE 35: CPT | Performed by: INTERNAL MEDICINE

## 2022-09-28 PROCEDURE — 2700000000 HC OXYGEN THERAPY PER DAY

## 2022-09-28 PROCEDURE — 36415 COLL VENOUS BLD VENIPUNCTURE: CPT

## 2022-09-28 PROCEDURE — P9045 ALBUMIN (HUMAN), 5%, 250 ML: HCPCS | Performed by: STUDENT IN AN ORGANIZED HEALTH CARE EDUCATION/TRAINING PROGRAM

## 2022-09-28 PROCEDURE — 36569 INSJ PICC 5 YR+ W/O IMAGING: CPT

## 2022-09-28 PROCEDURE — 82962 GLUCOSE BLOOD TEST: CPT

## 2022-09-28 PROCEDURE — 99222 1ST HOSP IP/OBS MODERATE 55: CPT | Performed by: INTERNAL MEDICINE

## 2022-09-28 PROCEDURE — 93971 EXTREMITY STUDY: CPT

## 2022-09-28 PROCEDURE — 02HV33Z INSERTION OF INFUSION DEVICE INTO SUPERIOR VENA CAVA, PERCUTANEOUS APPROACH: ICD-10-PCS | Performed by: RADIOLOGY

## 2022-09-28 PROCEDURE — 84100 ASSAY OF PHOSPHORUS: CPT

## 2022-09-28 PROCEDURE — 2709999900 HC NON-CHARGEABLE SUPPLY

## 2022-09-28 RX ORDER — SODIUM CHLORIDE 0.9 % (FLUSH) 0.9 %
5-40 SYRINGE (ML) INJECTION PRN
Status: DISCONTINUED | OUTPATIENT
Start: 2022-09-28 | End: 2022-10-03 | Stop reason: HOSPADM

## 2022-09-28 RX ORDER — INSULIN LISPRO 100 [IU]/ML
5 INJECTION, SOLUTION INTRAVENOUS; SUBCUTANEOUS
Status: DISCONTINUED | OUTPATIENT
Start: 2022-09-28 | End: 2022-10-01

## 2022-09-28 RX ORDER — FUROSEMIDE 10 MG/ML
40 INJECTION INTRAMUSCULAR; INTRAVENOUS ONCE
Status: DISCONTINUED | OUTPATIENT
Start: 2022-09-28 | End: 2022-09-28

## 2022-09-28 RX ORDER — INSULIN GLARGINE 100 [IU]/ML
10 INJECTION, SOLUTION SUBCUTANEOUS 2 TIMES DAILY
Status: DISCONTINUED | OUTPATIENT
Start: 2022-09-28 | End: 2022-10-01

## 2022-09-28 RX ORDER — MAGNESIUM SULFATE IN WATER 40 MG/ML
2000 INJECTION, SOLUTION INTRAVENOUS ONCE
Status: COMPLETED | OUTPATIENT
Start: 2022-09-28 | End: 2022-09-29

## 2022-09-28 RX ORDER — DIGOXIN 0.25 MG/ML
250 INJECTION INTRAMUSCULAR; INTRAVENOUS ONCE
Status: COMPLETED | OUTPATIENT
Start: 2022-09-28 | End: 2022-09-28

## 2022-09-28 RX ORDER — COLCHICINE 0.6 MG/1
0.6 TABLET ORAL DAILY
Status: DISCONTINUED | OUTPATIENT
Start: 2022-09-28 | End: 2022-09-29

## 2022-09-28 RX ORDER — ALBUMIN, HUMAN INJ 5% 5 %
25 SOLUTION INTRAVENOUS ONCE
Status: COMPLETED | OUTPATIENT
Start: 2022-09-28 | End: 2022-09-28

## 2022-09-28 RX ORDER — ALBUMIN (HUMAN) 12.5 G/50ML
25 SOLUTION INTRAVENOUS EVERY 8 HOURS
Status: DISPENSED | OUTPATIENT
Start: 2022-09-28 | End: 2022-09-30

## 2022-09-28 RX ORDER — INSULIN GLARGINE 100 [IU]/ML
15 INJECTION, SOLUTION SUBCUTANEOUS NIGHTLY
Status: DISCONTINUED | OUTPATIENT
Start: 2022-09-28 | End: 2022-09-28

## 2022-09-28 RX ORDER — FUROSEMIDE 40 MG/1
40 TABLET ORAL 2 TIMES DAILY
Status: DISCONTINUED | OUTPATIENT
Start: 2022-09-28 | End: 2022-10-01

## 2022-09-28 RX ORDER — PANTOPRAZOLE SODIUM 40 MG/1
40 TABLET, DELAYED RELEASE ORAL
Status: DISCONTINUED | OUTPATIENT
Start: 2022-09-29 | End: 2022-10-03 | Stop reason: HOSPADM

## 2022-09-28 RX ORDER — SODIUM CHLORIDE 0.9 % (FLUSH) 0.9 %
5-40 SYRINGE (ML) INJECTION EVERY 12 HOURS SCHEDULED
Status: DISCONTINUED | OUTPATIENT
Start: 2022-09-28 | End: 2022-10-03 | Stop reason: HOSPADM

## 2022-09-28 RX ORDER — SODIUM CHLORIDE 9 MG/ML
INJECTION, SOLUTION INTRAVENOUS PRN
Status: DISCONTINUED | OUTPATIENT
Start: 2022-09-28 | End: 2022-10-03 | Stop reason: HOSPADM

## 2022-09-28 RX ORDER — LIDOCAINE HYDROCHLORIDE 10 MG/ML
5 INJECTION, SOLUTION EPIDURAL; INFILTRATION; INTRACAUDAL; PERINEURAL ONCE
Status: DISCONTINUED | OUTPATIENT
Start: 2022-09-28 | End: 2022-10-03 | Stop reason: HOSPADM

## 2022-09-28 RX ADMIN — ALBUMIN (HUMAN) 25 G: 12.5 INJECTION, SOLUTION INTRAVENOUS at 03:43

## 2022-09-28 RX ADMIN — COLCHICINE 0.6 MG: 0.6 TABLET ORAL at 15:01

## 2022-09-28 RX ADMIN — SODIUM CHLORIDE, PRESERVATIVE FREE 10 ML: 5 INJECTION INTRAVENOUS at 23:09

## 2022-09-28 RX ADMIN — OXYBUTYNIN CHLORIDE 5 MG: 5 TABLET ORAL at 09:07

## 2022-09-28 RX ADMIN — FUROSEMIDE 40 MG: 10 INJECTION, SOLUTION INTRAMUSCULAR; INTRAVENOUS at 09:08

## 2022-09-28 RX ADMIN — INSULIN LISPRO 5 UNITS: 100 INJECTION, SOLUTION INTRAVENOUS; SUBCUTANEOUS at 17:53

## 2022-09-28 RX ADMIN — DILTIAZEM HYDROCHLORIDE 120 MG: 120 CAPSULE, COATED, EXTENDED RELEASE ORAL at 09:08

## 2022-09-28 RX ADMIN — ASPIRIN 650 MG: 325 TABLET, COATED ORAL at 22:55

## 2022-09-28 RX ADMIN — INSULIN LISPRO 1 UNITS: 100 INJECTION, SOLUTION INTRAVENOUS; SUBCUTANEOUS at 17:53

## 2022-09-28 RX ADMIN — MAGNESIUM SULFATE HEPTAHYDRATE 2000 MG: 2 INJECTION, SOLUTION INTRAVENOUS at 22:54

## 2022-09-28 RX ADMIN — ASPIRIN 650 MG: 325 TABLET, COATED ORAL at 15:01

## 2022-09-28 RX ADMIN — SODIUM CHLORIDE, PRESERVATIVE FREE 10 ML: 5 INJECTION INTRAVENOUS at 23:08

## 2022-09-28 RX ADMIN — SODIUM CHLORIDE, PRESERVATIVE FREE 10 ML: 5 INJECTION INTRAVENOUS at 23:06

## 2022-09-28 RX ADMIN — SODIUM CHLORIDE, PRESERVATIVE FREE 10 ML: 5 INJECTION INTRAVENOUS at 09:11

## 2022-09-28 RX ADMIN — APIXABAN 5 MG: 5 TABLET, FILM COATED ORAL at 22:56

## 2022-09-28 RX ADMIN — INSULIN GLARGINE 10 UNITS: 100 INJECTION, SOLUTION SUBCUTANEOUS at 22:58

## 2022-09-28 RX ADMIN — ALBUMIN (HUMAN) 25 G: 0.25 INJECTION, SOLUTION INTRAVENOUS at 23:06

## 2022-09-28 RX ADMIN — FUROSEMIDE 40 MG: 40 TABLET ORAL at 17:10

## 2022-09-28 RX ADMIN — INSULIN LISPRO 3 UNITS: 100 INJECTION, SOLUTION INTRAVENOUS; SUBCUTANEOUS at 12:44

## 2022-09-28 RX ADMIN — ALPRAZOLAM 0.25 MG: 0.25 TABLET ORAL at 22:55

## 2022-09-28 RX ADMIN — ROSUVASTATIN CALCIUM 10 MG: 5 TABLET, FILM COATED ORAL at 22:56

## 2022-09-28 RX ADMIN — OXYBUTYNIN CHLORIDE 5 MG: 5 TABLET ORAL at 22:56

## 2022-09-28 RX ADMIN — INSULIN GLARGINE 10 UNITS: 100 INJECTION, SOLUTION SUBCUTANEOUS at 15:01

## 2022-09-28 RX ADMIN — MIRTAZAPINE 15 MG: 15 TABLET, FILM COATED ORAL at 22:56

## 2022-09-28 RX ADMIN — DIGOXIN 250 MCG: 0.25 INJECTION INTRAMUSCULAR; INTRAVENOUS at 11:40

## 2022-09-28 RX ADMIN — APIXABAN 5 MG: 5 TABLET, FILM COATED ORAL at 09:08

## 2022-09-28 ASSESSMENT — PAIN SCALES - GENERAL
PAINLEVEL_OUTOF10: 0

## 2022-09-28 NOTE — PROGRESS NOTES
CARDIOLOGY  NOTE        Name:  Cyrus Monroy /Age/Sex: 1950  (71 y.o. female)   MRN & CSN:  [id number] & [id number] Admission Date/Time: 2022  9:51 PM   Location:  -A PCP: Carlos Alberto Basilio MD       Hospital Day: 6        PLAN FROM CARDIOLOGY FOR TODAY:   Can start anticoagulation for A. fib  Discussed with hospitalist and will agree adding anti-inflammatory i.e. colchicine for pericarditis patient has history of connective tissue disease      - cardiology consult is for: A. fib and pericardial effusion    -  Interval history: Patient is in sinus however went back into A. fib    ASSESSMENT/ PLAN:      Dyspnea probably secondary to pulmonary etiology given that she has large pleural effusion which is probably secondary to her malignancy her ejection fraction has been normal, had thoracentesis today  -Was seen for A. fib last month is in sinus rhythm now is on negative chronotropic agents patient is on Eliquis which will probably need to be held prior to thoracentesis, monitor A. fib with RVR postthoracentesis, another bout of tachycardia patient was given digoxin in sinus now we will also consult EP  -Possibility of a HFpEF however looks like the pleural effusions are coming from her malignancy await oncology input  -Hyperlipidemia on Crestor 10 mg p.o. daily  -Status post pericardiocentesis fluid sent for analysis start anticoagulation          Subjective: Todays complain: Patient no complaints    HPI:  Laura Rae is a 71 y. o.year old who and presents with had concerns including Shortness of Breath (Started today; has recently been seen and had 2 paracentesis done; normally wears O2 at 3L/min and is currently requiring more to sub stain oxygen levels).   Chief Complaint   Patient presents with    Shortness of Breath     Started today; has recently been seen and had 2 paracentesis done; normally wears O2 at 3L/min and is currently requiring more to sub stain oxygen levels           Objective: Temperature:  Current - Temp: 98.2 °F (36.8 °C); Max - Temp  Av.2 °F (36.8 °C)  Min: 97.8 °F (36.6 °C)  Max: 99 °F (37.2 °C)    Respiratory Rate : Resp  Av.6  Min: 12  Max: 33    Pulse Range: Pulse  Av.9  Min: 67  Max: 143    Blood Presuure Range:  Systolic (34CHV), BWQ:653 , Min:84 , YWZ:226   ; Diastolic (02GXT), VUD:06, Min:37, Max:128      Pulse ox Range: SpO2  Av.7 %  Min: 84 %  Max: 99 %    24hr I & O:    Intake/Output Summary (Last 24 hours) at 2022 1434  Last data filed at 2022 1051  Gross per 24 hour   Intake 1261.58 ml   Output --   Net 1261.58 ml         BP (!) 102/46   Pulse 82   Temp 98.2 °F (36.8 °C) (Oral)   Resp 27   Ht 5' 5\" (1.651 m)   Wt 136 lb 11 oz (62 kg)   SpO2 94%   BMI 22.75 kg/m²           Review of Systems:    No chest pain    TELEMETRY: Sinus rhythm   has a past medical history of Anxiety, Breast carcinoma (HCC), Cervical radiculitis, Chest pain, Depression, Diabetes type 2, controlled ([address] Family history of diabetes mellitus (DM), GERD (gastroesophageal reflux disease), Hiatal hernia, Hx of Doppler ultrasound, Hx of Doppler ultrasound, Hx of echocardiogram, Hypercalcemia, Hyperglycemia, Hyperlipidemia, Insomnia, Irritable bowel syndrome, LBP (low back pain), Lumbar herniated disc, Menopause, Migraine headache, Osteoporosis, Prediabetes, S/P colonoscopic polypectomy, and TIA (transient ischemic attack). has a past surgical history that includes Tubal ligation; Mastectomy (1997); Hysterectomy (1995); and Breast reconstruction (Right, 2013). Physical Exam:  General:  Awake, alert, NAD  Head:normal  Eye: Pupils equal and round  Neck:  No JVD, no carotid bruit noted   Chest:  Clear to auscultation, no signs of respiratory distress  Cardiovascular:  Normal rate and rhythm. S1 and S2 noted.  No murmurs rubs or gallops  Abdomen:   nontender  Extremities: Trace edema  Pulses; palpable  Neuro: grossly normal    Medications:    amiodarone bolus  150 mg IntraVENous Once    magnesium sulfate  2,000 mg IntraVENous Once    albumin human  25 g IntraVENous Q8H    colchicine  0.6 mg Oral Daily    [START ON 9/29/2022] pantoprazole  40 mg Oral QAM AC    furosemide  40 mg Oral BID    aspirin  650 mg Oral q8h    insulin glargine  10 Units SubCUTAneous BID    insulin lispro  5 Units SubCUTAneous TID WC    dilTIAZem  120 mg Oral Daily    sodium chloride flush  5-40 mL IntraVENous 2 times per day    lidocaine  20 mL IntraDERmal Once    sodium chloride flush  5-40 mL IntraVENous 2 times per day    apixaban  5 mg Oral BID    [Held by provider] lisinopril  20 mg Oral Daily    mirtazapine  15 mg Oral Nightly    oxybutynin  5 mg Oral BID    rosuvastatin  10 mg Oral Nightly    insulin lispro  0-4 Units SubCUTAneous TID WC    insulin lispro  0-4 Units SubCUTAneous Nightly      dextrose      sodium chloride      amiodarone Stopped (09/28/22 0011)    sodium chloride       glucose, dextrose bolus **OR** dextrose bolus, glucagon (rDNA), dextrose, guaiFENesin-dextromethorphan, sodium chloride flush, sodium chloride, acetaminophen, ondansetron, hydrALAZINE, sodium chloride flush, sodium chloride, ondansetron **OR** [DISCONTINUED] ondansetron, polyethylene glycol, [DISCONTINUED] acetaminophen **OR** acetaminophen, ALPRAZolam    Lab Data:  CBC:   Recent Labs     09/28/22  0602   WBC 9.2   HGB 11.2*   HCT 36.0*   MCV 92.5        BMP:   Recent Labs     09/26/22  0919 09/27/22  0855 09/28/22  0602    136 135   K 4.1 4.5 4.2    103 101   CO2 24 23 23   PHOS  --   --  4.5   BUN 30* 23 29*   CREATININE 1.1 1.3* 1.5*     LIVER PROFILE: No results for input(s): AST, ALT, LIPASE, BILIDIR, BILITOT, ALKPHOS in the last 72 hours. Invalid input(s): AMYLASE,  ALB  PT/INR: No results for input(s): PROTIME, INR in the last 72 hours. APTT: No results for input(s): APTT in the last 72 hours.   BNP:  No results for input(s): BNP in the last 72 hours. TROPONIN: No results for input(s): TROPONINI in the last 72 hours. No results for input(s): TROPONINT in the last 72 hours. Labs, consult, tests reviewed                    Jose Singh MD, PA-C 9/28/2022 2:34 PM     Please note this report has been partially produced using speech recognition software and may contain errors related to that system including errors in grammar, punctuation, and spelling, as well as words and phrases that may be inappropriate. If there are any questions or concerns please feel free to contact the dictating provider for clarification.

## 2022-09-28 NOTE — PROGRESS NOTES
ONCOLOGY HEMATOLOGY CARE ([facility])  PROGRESS NOTE      2022  6:35 AM    Patient:    Arely Renee  : 1950   71 y.o. MRN: [id number]  Admitted: 2022  9:51 PM ATT: Shila Lopez., DO   -A  AdmitDx: Pleural effusion [J90]  Acute respiratory failure with hypoxia (HCC) [R73.42]  Acute systolic CHF (congestive heart failure) ([facility] [address] [I50.21]  PCP: Tay De La O MD      Patient was seen and examined today. Feeling very cold today  No chest pain, increased sob    She was admitted to [facility] in 2022 with symptoms of shortness of breath. Echocardiogram with moderate pericardial effusion. Was also found to have bilateral pleural effusion. Underwent thoracentesis. Cytology negative for any malignancy. No lung nodules or lymphadenopathy. CAT scan of the chest at that time. She was admitted to [facility] in 2022. She had a bone scan performed on 2022 which revealed osseous metastatic disease in multiple ribs and vertebral bodies and also possibly left hemipelvis. Findings suspicious for metastatic disease. CT scan of the abdomen pelvis was also done on September 15, 2022 which revealed hypodense lesions within the liver. Sclerosis within the L1 vertebrae. Subsequently an MRI of the abdomen revealed hemangiomas. She had another thoracentesis on 2022, cytology negative for any malignancy. CA 27-29 was 19  was 83 CA 19-9 was less than 0.8 ferritin of 135 iron saturations of 9% D80 966 folic acid 67.5 CEA 4.5     Currently admitted with worsening shortness of breath. Some chest discomfort. He is on home oxygen. Poor appetite. No weight loss. Denied any abdominal pain. No joint pains or bone pains. 2022 CBC WBC of 13.5 hemoglobin of 11.4 hematocrit 35. 4MCV of 90 and platelets of 685  CMP with sodium 135 potassium 3.8 creatinine 1.1 albumin 3.3 rest of the LFTs within normal limits. Chest x-ray with bilateral worsening opacities and pleural effusions and pulmonary edema. PHYSICAL EXAM :    Vitals: BP (!) 96/47   Pulse 67   Temp 99 °F (37.2 °C) (Oral)   Resp 12   Ht 5' 5\" (1.651 m)   Wt 136 lb 11 oz (62 kg)   SpO2 95%   BMI 22.75 kg/m²     CONSTITUTIONAL: awake, alert, tired appearing, shivering alittle   EYES: MAIRA,No palor, no icetrus  ENT: ATNC  NECK: No JVD  HEMATOLOGIC/LYMPHATIC: no cervical, supraclavicular or axillary lymphadenopathy   LUNGS: no wheeze  CARDIOVASCULAR: s1s2 rrr no murmurs  ABDOMEN: soft ntnd bs pos  NEUROLOGIC: GI  SKIN: No rash  EXTREMITIES: no LE edema bilaterally  Right breast with reconstruction changes  Left breast with no mass, no lad  LABORATORY RESULTS  CBC: No results for input(s): WBC, HGB, PLT in the last 72 hours. BMP:    Recent Labs     09/26/22  0919 09/27/22  0855    136   K 4.1 4.5    103   CO2 24 23   BUN 30* 23   CREATININE 1.1 1.3*   GLUCOSE 194* 209*     Hepatic: No results for input(s): AST, ALT, ALB, BILITOT, ALKPHOS in the last 72 hours. INR: No results for input(s): INR in the last 72 hours. RADIOLOGY REPORTS  Noted    ASSESSMENT & RECOMMENDATIONS:  History of invasive ductal carcinoma the right breast in 1999 status postmastectomy, ER/MT positive looks like, received adjuvant chemotherapy followed by endocrine therapy for 10 years. Now with a recurrent bilateral pleural effusions, possible pericardial effusion. Thoracentesis twice negative for any malignancy. Bone scan with bone lesions. Pericardial fluid and pleural fluid cytology pending, will discuss with pathology. Recommended PET as OP and bone biopsy if cytology negative again. Will be followed [facility] oncology after discharge     Chills: r/o infection, note hypotensive as well. Anemia, most probably multifactorial.  Note B12 levels low as well.   on replacement     CHF, A. fib.  followed by cardiology    Continue other medical care     ECOG Performance Status (ECOG Scale 0-4): 1-2      We will continue to follow the patient. Thank you for allowing us to participate in the care of this patient.       [address]

## 2022-09-28 NOTE — PROGRESS NOTES
Pulmonary and Critical Care  Progress Note      VITALS:  BP (!) 89/57   Pulse (!) 112   Temp 98.1 °F (36.7 °C) (Oral)   Resp 30   Ht 5' 5\" (1.651 m)   Wt 136 lb 11 oz (62 kg)   SpO2 93%   BMI 22.75 kg/m²     Subjective:   CHIEF COMPLAINT :SOB     HPI:                The patient is a 71 y.o. female is lying in the bed. She is in mild resp distress    Objective:   PHYSICAL EXAM:    LUNGS:Decreased air entry bilateral bases  Abd-soft, BS+,NT  Ext- 1 + pedal edema  CVS-s1s2, no murmurs      DATA:    CBC:  Recent Labs     09/28/22  0602   WBC 9.2   RBC 3.89*   HGB 11.2*   HCT 36.0*      MCV 92.5   MCH 28.8   MCHC 31.1*   RDW 15.4*   SEGSPCT 76.0*      BMP:  Recent Labs     09/26/22  0919 09/27/22  0855 09/28/22  0602    136 135   K 4.1 4.5 4.2    103 101   CO2 24 23 23   BUN 30* 23 29*   CREATININE 1.1 1.3* 1.5*   CALCIUM 9.8 9.4 9.4   GLUCOSE 194* 209* 196*      ABG:  No results for input(s): PH, PO2ART, TCI2LKG, HCO3, BEART, O2SAT in the last 72 hours. BNP  No results found for: BNP   D-Dimer:  No results found for: [facility]   Radiology:   Cardiomediastinal silhouette is stable. Similar bilateral pleural effusions   and bibasilar airspace opacities. No pneumothorax. No gross bony   abnormality.   Left PICC tip projects at the mid [facility]         Assessment/Plan     Patient Active Problem List    Diagnosis Date Noted    Acute systolic CHF (congestive heart failure) ([address] 09/24/2022     Priority: Medium    Acute respiratory failure with hypoxia ([address] 09/24/2022     Priority: Medium    Diastolic heart failure, unspecified HF chronicity ([address] 08/30/2022     Priority: Medium    Atrial fibrillation with RVR ([address] 08/18/2022     Priority: Medium    Varicose veins of both legs with edema 06/28/2022     Priority: Medium    Venous insufficiency 06/07/2022     Priority: Medium    Shortness of breath 06/07/2022     Priority: Medium    Edema of lower extremity 06/07/2022     Priority: Medium    Chest pain 05/12/2022     Priority: Medium    Peripheral edema 03/21/2022    Carcinoma of right female breast, unspecified estrogen receptor status, unspecified site of breast ([facility] [address] 02/16/2022    Hypercalcemia      Overview Note:     mild ~11, iPTH nl 8/2021      Diabetes type 2, controlled ([facility] [address]      Overview Note:     likely, w two random sugars >126      Migraine headache     GERD (gastroesophageal reflux disease)     Irritable bowel syndrome     Lumbar herniated disc     Low back pain      Overview Note:     replace inactive diagnosis      Hyperlipidemia     History of breast cancer      Overview Note:     R breast      Acute on chronic Hypoxic resp failure- improving  Bilateral Pleural effusions s/p thoracentesis- improved  Recurrent right pleural effusion  Diastolic dysfunction  Afib on Eliquis  H/o Breast ca with mets to T12, L1  Pericardial effusion s/p pericardiocentesis       Check Prealbumin  Diuresis  25% Albumin  CHF optimization  CXR in am  ICS  OOB  Keep sats > 92%  Await transfer to the step down  DVT and GI Prophylaxis  C/w present management    Electronically signed by Earlene Basilio MD on 9/28/2022 at 12:01 PM
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
## 2022-09-28 NOTE — PROGRESS NOTES
Answered bed alarm to patient attempting to get out of bed without alerting staff. Patient not disoriented, but states that she is [place] a dream\". Patient removed her IV access and oxygen. Assisted patient to bathroom. Patient back to bed without incident. Updated Dr. Latricia Siegel MD of this.

## 2022-09-28 NOTE — CONSULTS
Consult for PICC completed. Indication for line: Limited/no vessel suitable for conventional peripheral access    PICC line insertion education provided to patient and , risks and benefits discussed and reviewed with patient and . Patient verbalized understanding, questions answered. Consent signed by patient's  Néstor Anton due to patient's physical weakness per patient's request. Time out done @ [id number]. [employer]ralf Blue Advance ® Triple lumen PICC line inserted into left brachial vein x 1 attempt without difficulty per protocol. Patient converted from NSR to a-fib at start of procedure, stat CXR ordered and completed, awaiting PICC confirmation at this time. Brisk blood return noted and flushes without resistance on all 3 lumens. Pressure held at site x 10 minutes due to bleeding, StatSeal placed over site. Patient tolerated procedure well. Ultrasound photos of vein diameter provided below. Consent placed in chart. Primary nurse Lorna notified and aware. Addendum @ [phone]: CXR reveals left PICC tip projects at the mid SVC. OK TO USE PICC. Please consult IV/PICC team if patient's needs change, questions, or concerns.

## 2022-09-28 NOTE — PROGRESS NOTES
V2.0  [facility] Hospitalist Progress Note      Name:  Levar Kaur /Age/Sex: 1950  (71 y.o. female)   MRN & CSN:  [id number] & [id number] Encounter Date/Time: 2022 11:29 AM EDT    Location:  -A PCP: Neil Gibbs MD       Hospital Day: 6    Assessment and Plan:   Levar Kaur is a 71 y.o. female with past medical history of diastolic heart failure, paroxysmal atrial fibrillation, type 2 diabetes mellitus, chronic respiratory failure, recurrent admission for bilateral pleural effusions, depression, hypertension, history of invasive ductal carcinoma of the right breast  status post mastectomy, ER/ID positive, history of tobacco use and hyperlipidemia who presents with progressively worsening shortness of breath. Acute on chronic hypoxic respiratory failure  Possible contributing factors include, pericardial effusion with tamponade physiology, compressive atelectasis due to bilateral pleural effusion, decompensated heart failure with preserved ejection fraction. Utilize supplemental oxygen to maintain oxygen saturation above 92%. Rest of the treatment plan as below    Pericardial effusion with tamponade physiology  Status post pericardiocentesis with removal of 600 cc of pericardial fluid on   Removal of pericardiocentesis catheter 2022  Mild lymphocytic predominance on cytology  Negative for infection  Although this pericardial effusion can be explained by diastolic heart failure, there remains a concern about underlying autoimmune disease process given her Raynaud's phenomena. Will initiate aspirin high intensity and colchicine, discussed with cardiology. Protonix for stress ulcer prophylaxis  There still remains concern for metastatic effusion given history of breast cancer. Oncology is following, appreciate recommendations.     Recurrent bilateral pleural effusion  Status postthoracentesis 2022  Appears to be borderline exudate  Similarly with the pericardial effusion, diastolic heart failure can explain this but the recurrence of disease and concerning cutaneous features my suspicion for autoimmune disease is high. Pulmonology following  Diuretics per cardiology    Acute kidney injury  Likely in the setting of diuretics  Will de-escalate  Avoid nephrotoxic agents  Monitor renal function parameters in a.m. Raynaud's phenomena  Never seen a Rheumatologist due to frequent hospitalizations  Per chart review: Negative rheumatoid factor, negative Anti- Scl-70, recently negative RODOLFO, ESR 26. Rheumatology consulted  Patient needs nailfold capillaroscopy, can be done outpatient. Anti-SCL 70 is moderately sensitive, 20% hence recommend getting anti-centromere, RNA polymerase III and U3-RNP, PM/Scl, or Th/To antibodies, will defer to rheumatology. Acute on chronic diastolic heart failure on admission  -Improving  Mildly hypervolemic  De-escalate diuretics  Blood pressure control    Paroxysmal atrial fibrillation with RVR  Continue Eliquis  Continue amiodarone drip per cardiology    Moderate-severe pulmonary hypertension  Elevated RVSP and tricuspid regurgitation  Discussed with cardiology for the possibility of right heart catheterization for further classification of disease process  Optimization of preload and afterload, maintain net negative fluid balance. Chronic respiratory failure on home oxygen 2 to 3 L via nasal cannula  No PFTs on chart, recent CT angiogram chest did show very minimal emphysematous disease process but no overt obstructive disease pattern on imaging. Unsure about the etiology, could be the pulmonary hypertension.   Utilize supplemental oxygen to maintain saturation above 92%  Pulmonology following, appreciate recommendations    Type 2 diabetes mellitus without long-term use of insulin  With hyperglycemia  Adjusted basal bolus insulin  Sliding scale insulin to target 140-180    Hyperlipidemia  Continue Crestor    History of invasive ductal carcinoma of the right breast status post mastectomy  ER/NE positive  On prior imaging there were some concern for bony metastasis  Will need bone biopsy for definitive diagnosis  Oncology following    Critical care team signed off today. Will transfer patient to stepdown. Diet ADULT DIET; Regular; 3 carb choices (45 gm/meal); Low Fat/Low Chol/High Fiber/REYES   DVT Prophylaxis [] Lovenox, []  Heparin, [] SCDs, [] Ambulation,  [x] Eliquis, [] Xarelto  [] Coumadin   Code Status Full Code   Disposition From: Home  Expected Disposition: Home  Estimated Date of Discharge: 2 to 3 days  Patient requires continued admission due to rheumatology evaluation, improvement in respiratory status, right heart catheterization   Surrogate Decision Maker/ POA Spouse     Subjective:     Chief Complaint: Shortness of Breath (Started today; has recently been seen and had 2 paracentesis done; normally wears O2 at 3L/min and is currently requiring more to sub stain oxygen levels)       Arely Renee is a 71 y.o. female who presents with progressively worsening shortness of breath. Patient states that over the past 5-1/2 months she has been experiencing skin thickening of her bilateral upper and lower extremities and approximately that is the timeline when she started wearing oxygen at home. Patient also endorses discoloration of her digits of bilateral upper and lower extremities with cold exposure, which alleviated with warm temperature. Objective: Intake/Output Summary (Last 24 hours) at 9/28/2022 1403  Last data filed at 9/28/2022 1051  Gross per 24 hour   Intake 1261.58 ml   Output --   Net 1261.58 ml        Vitals:   Vitals:    09/28/22 1300   BP: (!) 115/47   Pulse: 80   Resp: 30   Temp:    SpO2:        Physical Exam:   Physical Exam  Vitals reviewed. Constitutional:       Appearance: She is normal weight. She is ill-appearing. HENT:      Head: Normocephalic and atraumatic.       Nose: Nose normal. Mouth/Throat:      Mouth: Mucous membranes are dry. Pharynx: Oropharynx is clear. Eyes:      General: No scleral icterus. Conjunctiva/sclera: Conjunctivae normal.   Cardiovascular:      Rate and Rhythm: Normal rate. Rhythm irregular. Pulses: Normal pulses. Heart sounds: Murmur heard. Pulmonary:      Effort: Pulmonary effort is normal.      Breath sounds: Rales present. No wheezing or rhonchi. Comments: 4 L nasal cannula oxygen  Abdominal:      General: Bowel sounds are normal. There is no distension. Palpations: Abdomen is soft. Tenderness: There is no abdominal tenderness. Musculoskeletal:         General: No deformity. Normal range of motion. Cervical back: Normal range of motion and neck supple. No rigidity or tenderness. Right lower leg: Edema present. Left lower leg: Edema present. Skin:     Coloration: Skin is not jaundiced or pale. Comments: Skin thickening bilateral upper and lower extremities   Neurological:      General: No focal deficit present. Mental Status: She is alert and oriented to person, place, and time. Mental status is at baseline.           Medications:   Medications:    amiodarone bolus  150 mg IntraVENous Once    magnesium sulfate  2,000 mg IntraVENous Once    albumin human  25 g IntraVENous Q8H    colchicine  0.6 mg Oral Daily    [START ON 9/29/2022] pantoprazole  40 mg Oral QAM AC    furosemide  40 mg Oral BID    aspirin  650 mg Oral q8h    insulin glargine  10 Units SubCUTAneous BID    insulin lispro  5 Units SubCUTAneous TID WC    dilTIAZem  120 mg Oral Daily    sodium chloride flush  5-40 mL IntraVENous 2 times per day    lidocaine  20 mL IntraDERmal Once    sodium chloride flush  5-40 mL IntraVENous 2 times per day    apixaban  5 mg Oral BID    [Held by provider] lisinopril  20 mg Oral Daily    mirtazapine  15 mg Oral Nightly    oxybutynin  5 mg Oral BID    rosuvastatin  10 mg Oral Nightly    insulin lispro  0-4 Units SubCUTAneous TID WC    insulin lispro  0-4 Units SubCUTAneous Nightly      Infusions:    dextrose      sodium chloride      amiodarone Stopped (09/28/22 0011)    sodium chloride       PRN Meds: glucose, 4 tablet, PRN  dextrose bolus, 125 mL, PRN   Or  dextrose bolus, 250 mL, PRN  glucagon (rDNA), 1 mg, PRN  dextrose, , Continuous PRN  guaiFENesin-dextromethorphan, 10 mL, Q4H PRN  sodium chloride flush, 5-40 mL, PRN  sodium chloride, , PRN  acetaminophen, 650 mg, Q4H PRN  ondansetron, 4 mg, Q6H PRN  hydrALAZINE, 10 mg, Q10 Min PRN  sodium chloride flush, 5-40 mL, PRN  sodium chloride, , PRN  ondansetron, 4 mg, Q8H PRN  polyethylene glycol, 17 g, Daily PRN  acetaminophen, 650 mg, Q6H PRN  ALPRAZolam, 0.25 mg, BID PRN      Labs      Recent Results (from the past 24 hour(s))   POCT Glucose    Collection Time: 09/27/22  4:43 PM   Result Value Ref Range    POC Glucose 198 (H) 70 - 99 MG/DL   POCT Glucose    Collection Time: 09/27/22  9:31 PM   Result Value Ref Range    POC Glucose 233 (H) 70 - 99 MG/DL   POCT Glucose    Collection Time: 09/28/22  4:26 AM   Result Value Ref Range    POC Glucose 187 (H) 70 - 99 MG/DL   Basic Metabolic Panel    Collection Time: 09/28/22  6:02 AM   Result Value Ref Range    Sodium 135 135 - 145 MMOL/L    Potassium 4.2 3.5 - 5.1 MMOL/L    Chloride 101 99 - 110 mMol/L    CO2 23 21 - 32 MMOL/L    Anion Gap 11 4 - 16    BUN 29 (H) 6 - 23 MG/DL    Creatinine 1.5 (H) 0.6 - 1.1 MG/DL    Glucose 196 (H) 70 - 99 MG/DL    Calcium 9.4 8.3 - 10.6 MG/DL    GFR Non- 34 (L) >60 mL/min/1.73m2    GFR  41 (L) >60 mL/min/1.73m2   Magnesium    Collection Time: 09/28/22  6:02 AM   Result Value Ref Range    Magnesium 1.9 1.8 - 2.4 mg/dl   CBC with Auto Differential    Collection Time: 09/28/22  6:02 AM   Result Value Ref Range    WBC 9.2 4.0 - 10.5 K/CU MM    RBC 3.89 (L) 4.2 - 5.4 M/CU MM    Hemoglobin 11.2 (L) 12.5 - 16.0 GM/DL    Hematocrit 36.0 (L) 37 - 47 %    MCV 92.5 78 - 100 FL    MCH 28.8 27 - 31 PG    MCHC 31.1 (L) 32.0 - 36.0 %    RDW 15.4 (H) 11.7 - 14.9 %    Platelets 944 240 - 763 K/CU MM    MPV 11.0 7.5 - 11.1 FL    Differential Type AUTOMATED DIFFERENTIAL     Segs Relative 76.0 (H) 36 - 66 %    Lymphocytes % 12.2 (L) 24 - 44 %    Monocytes % 9.8 (H) 0 - 4 %    Eosinophils % 1.0 0 - 3 %    Basophils % 0.5 0 - 1 %    Segs Absolute 7.0 K/CU MM    Lymphocytes Absolute 1.1 K/CU MM    Monocytes Absolute 0.9 K/CU MM    Eosinophils Absolute 0.1 K/CU MM    Basophils Absolute 0.1 K/CU MM    Nucleated RBC % 0.0 %    Total Nucleated RBC 0.0 K/CU MM    Total Immature Neutrophil 0.05 K/CU MM    Immature Neutrophil % 0.5 (H) 0 - 0.43 %   Phosphorus    Collection Time: 09/28/22  6:02 AM   Result Value Ref Range    Phosphorus 4.5 2.5 - 4.9 MG/DL   POCT Glucose    Collection Time: 09/28/22  9:06 AM   Result Value Ref Range    POC Glucose 186 (H) 70 - 99 MG/DL   Sedimentation Rate    Collection Time: 09/28/22 10:09 AM   Result Value Ref Range    Sed Rate 26 0 - 30 MM/HR   POCT Glucose    Collection Time: 09/28/22 12:43 PM   Result Value Ref Range    POC Glucose 312 (H) 70 - 99 MG/DL        Imaging/Diagnostics Last 24 Hours   Echocardiogram limited    Result Date: 9/27/2022  Transthoracic Echocardiography Report (TTE)  Demographics   Patient Name       Rita Srinivasan  Date of Study       09/27/2022                     A   Date of Birth      1950         Gender              Female   Age                71 year(s)         Race                   Patient Number     [id number]         Room Number         2119   Visit Number       [id number]   Corporate ID       [id number]   Accession Number   [id number]         Chris Hopper, RVT   Ordering Physician Ifeanyi Uribe MD                 Physician           Nette CASAREZ  Procedure Type of Study TTE procedure:ECHOCARDIOGRAM LIMITED. Procedure Date Date: 09/27/2022 Start: 08:40 AM Study Location: Portable Technical Quality: Fair visualization Indications:Pericardial effusion. Patient Status: Routine Height: 65 inches Weight: 143 pounds BSA: 1.72 m2 BMI: 23.8 kg/m2 HR: 82 bpm BP: 117/65 mmHg  Conclusions   Summary  This is a limited echocardiogram.  Left ventricular systolic function is normal.  Ejection fraction is visually estimated at 50-55%. Moderate aortic regurgitation is noted. Moderate to severe tricuspid regurgitation; RVSP: 55 mmHg. Severe PHTN. S/p pericardiocentesis with 560cc drained on 9/26/22; no evidence of any  pericardial effusion. Left pleural effusion. Signature   ------------------------------------------------------------------  Electronically signed by Agueda Rojas MD  (Interpreting physician) on 09/27/2022 at 09:45 AM  ------------------------------------------------------------------   Findings   Left Ventricle  Left ventricular systolic function is normal.  Ejection fraction is visually estimated at 50-55%. Aortic Valve  Moderate aortic regurgitation is noted. Tricuspid Valve  Moderate to severe tricuspid regurgitation; RVSP: 55 mmHg. Severe PHTN. Pericardial Effusion  S/p pericardiocentesis with 560cc drained on 9/26/22; no evidence of any  pericardial effusion. Pleural Effusion  Left pleural effusion.   M-Mode/2D Measurements & Calculations   LV Diastolic Dimension: 7.65 cm LV Systolic Dimension: 3.1 cm  LV FS:26.4 %                    LV Volume Diastolic: 43.5 ml  LV PW Diastolic: 7.21 cm        LV Volume Systolic: 61.2 ml  LV PW Systolic: 2.71 cm         LV EDV/LV EDV Index: 74.6 ml/43 m2LV ESV/LV  Septum Diastolic: 1.17 cm       ESV Index: 29.8 ml/17 m2  Septum Systolic: 7.61 cm        EF Calculated (A4C): 60.1 %                                  EF Calculated (2D): 52 %  Doppler Measurements & Calculations                               Estimated RVSP: 55 mmHg                              Estimated RAP:3 mmHg    Estimated PASP: 40.21 mmHg TR Velocity:305 cm/s                              TR Gradient:37.21 mmHg      Echocardiogram limited    Result Date: 9/27/2022  Transthoracic Echocardiography Report (TTE)  Demographics   Patient Name       Janie Segundo  Date of Study       09/26/2022                     A   Date of Birth      1950         Gender              Female   Age                71 year(s)         Race                   Patient Number     [id number]         Room Number         2119   Visit Number       [id number]   Corporate ID       [id number]   Accession Number   [id number]         Janie Lemus RDMS   Ordering Physician Neftali Monterroso MD                 Physician           MD  Procedure Type of Study   TTE procedure:ECHOCARDIOGRAM LIMITED. Procedure Date Date: 09/26/2022 Start: 02:50 PM Study Location: OR Technical Quality: Fair visualization Indications:Pericardial effusion. Patient Status: Routine Height: 65 inches Weight: 143 pounds BSA: 1.72 m2 BMI: 23.8 kg/m2 BP: 112/39 mmHg  Conclusions   Summary  This is a limited echo during pericardiocentesis. See full report of procedure in EPIC   600cc of straw-colored fluid was drained, no pericardial effusion noted  post procedure. Signature   ------------------------------------------------------------------  Electronically signed by Bettina Multani MD (Interpreting  physician) on 09/27/2022 at 08:57 AM  ------------------------------------------------------------------      XR CHEST PORTABLE    Result Date: 9/28/2022  EXAMINATION: ONE XRAY VIEW OF THE CHEST 9/28/2022 6:16 am COMPARISON: None available. Down time procedure.  HISTORY: ORDERING SYSTEM PROVIDED HISTORY: hypoxia TECHNOLOGIST PROVIDED HISTORY: Reason for exam:->hypoxia Reason for Exam: hypoxia FINDINGS: The heart is enlarged. The mediastinal contours are normal.  There is pulmonary edema with moderate bilateral pleural effusions and associated bibasilar atelectasis. No pneumothorax. Surgical clip is noted in the right axilla. The bones are osteopenic. Cardiomegaly with pulmonary edema and moderate bilateral pleural effusions with associated bibasilar atelectasis.        Electronically signed by David Stokes MD on 9/28/2022 at 2:03 PM

## 2022-09-28 NOTE — CONSULTS
Discussed consult with primary nurse. Consult canceled. Extended already placed in [place] [id number] on 9-25-22. Please consult IV/PICC team for any questions or if patient's needs change.

## 2022-09-28 NOTE — PROGRESS NOTES
Inpatient Progress Note 9/28/2022        Gallito Alan  1950  [id number]      Assessment/Plan:    Acute respiratory failure with hypoxia  Chronic recurrent bilateral pleural effusions reportedly transudative in etiology (actually borderline exudate)  History of diastolic heart failure, acute on chronic  Chronic right heart failure pulmonary hypertension  Pericardial effusion with tamponade physiology status post pericardial drain placement  NSTEMI  Atrial fibrillation per history  History of advanced breast cancer (bony metastases)    Titrate off supplemental O2  Medical management of CHF    Transfer out of ICU setting. Transfer care Hospital medicine service    Subjective:    Notes improvement of dyspnea, fatigue. No acute overnight issues reported. Review of Systems     Physical Exam:          BP (!) 102/46   Pulse 74   Temp 99 °F (37.2 °C) (Oral)   Resp 23   Ht 5' 5\" (1.651 m)   Wt 136 lb 11 oz (62 kg)   SpO2 99%   BMI 22.75 kg/m²       General: NAD, awake, alert vitals reviewed. Eyes: EOMI  ENT: neck supple  Cardiovascular: Regular rate. Respiratory:Basilar crackles  Gastrointestinal: Soft, non tender  Genitourinary: no suprapubic tenderness  Musculoskeletal: Mild lege edema, warm to palpation, intact pulses. Skin: warm, dry  Neuro: Alert, interactive, no deficits. Psych: Mood appropriate.     Current Medications:   amiodarone bolus  150 mg IntraVENous Once    furosemide  40 mg IntraVENous Once    insulin glargine  10 Units SubCUTAneous Nightly    dilTIAZem  120 mg Oral Daily    furosemide  40 mg IntraVENous BID    sodium chloride flush  5-40 mL IntraVENous 2 times per day    lidocaine  20 mL IntraDERmal Once    sodium chloride flush  5-40 mL IntraVENous 2 times per day    apixaban  5 mg Oral BID    [Held by provider] lisinopril  20 mg Oral Daily    mirtazapine  15 mg Oral Nightly    oxybutynin  5 mg Oral BID    rosuvastatin  10 mg Oral Nightly    insulin lispro  0-4 Units SubCUTAneous TID WC    insulin lispro  0-4 Units SubCUTAneous Nightly       Labs, Imaging and Studies reviewed:    Echocardiogram limited    Result Date: 9/27/2022  Transthoracic Echocardiography Report (TTE)  Demographics   Patient Name       Nancy Barr  Date of Study       09/27/2022                     A   Date of Birth      1950         Gender              Female   Age                71 year(s)         Race                   Patient Number     [id number]         Room Number         2119   Visit Number       [id number]   Corporate ID       [id number]   Accession Number   [id number]         Fiona Nair RDMS, RVT   Ordering Physician Peace Wing MD                 Physician           Nette CASAREZ  Procedure Type of Study   TTE procedure:ECHOCARDIOGRAM LIMITED. Procedure Date Date: 09/27/2022 Start: 08:40 AM Study Location: Portable Technical Quality: Fair visualization Indications:Pericardial effusion. Patient Status: Routine Height: 65 inches Weight: 143 pounds BSA: 1.72 m2 BMI: 23.8 kg/m2 HR: 82 bpm BP: 117/65 mmHg  Conclusions   Summary  This is a limited echocardiogram.  Left ventricular systolic function is normal.  Ejection fraction is visually estimated at 50-55%. Moderate aortic regurgitation is noted. Moderate to severe tricuspid regurgitation; RVSP: 55 mmHg. Severe PHTN. S/p pericardiocentesis with 560cc drained on 9/26/22; no evidence of any  pericardial effusion. Left pleural effusion.    Signature   ------------------------------------------------------------------  Electronically signed by Mega Kennedy MD  (Interpreting physician) on 09/27/2022 at 09:45 AM  ------------------------------------------------------------------   Findings   Left Ventricle  Left ventricular systolic function is normal.  Ejection fraction is visually estimated at 50-55%. Aortic Valve  Moderate aortic regurgitation is noted. Tricuspid Valve  Moderate to severe tricuspid regurgitation; RVSP: 55 mmHg. Severe PHTN. Pericardial Effusion  S/p pericardiocentesis with 560cc drained on 9/26/22; no evidence of any  pericardial effusion. Pleural Effusion  Left pleural effusion. M-Mode/2D Measurements & Calculations   LV Diastolic Dimension: 3.30 cm LV Systolic Dimension: 3.1 cm  LV FS:26.4 %                    LV Volume Diastolic: 50.2 ml  LV PW Diastolic: 9.37 cm        LV Volume Systolic: 96.0 ml  LV PW Systolic: 0.49 cm         LV EDV/LV EDV Index: 74.6 ml/43 m2LV ESV/LV  Septum Diastolic: 2.88 cm       ESV Index: 29.8 ml/17 m2  Septum Systolic: 5.90 cm        EF Calculated (A4C): 60.1 %                                  EF Calculated (2D): 52 %  Doppler Measurements & Calculations                               Estimated RVSP: 55 mmHg                              Estimated RAP:3 mmHg    Estimated PASP: 40.21 mmHg TR Velocity:305 cm/s                              TR Gradient:37.21 mmHg      Echocardiogram limited    Result Date: 9/27/2022  Transthoracic Echocardiography Report (TTE)  Demographics   Patient Name       Catie Denson  Date of Study       09/26/2022                     A   Date of Birth      1950         Gender              Female   Age                71 year(s)         Race                   Patient Number     [id number]         Room Number         2119   Visit Number       [id number]   Corporate ID       [id number]   Accession Number   [id number]         Mayank Mcdonald RDMS   Ordering Physician Madalyn Clarke MD                 Physician           MD  Procedure Type of Study   TTE procedure:ECHOCARDIOGRAM LIMITED.   Procedure Date Date: 09/26/2022 Start: 02:50 PM Study Location: OR Technical Quality: Fair visualization Indications:Pericardial effusion. Patient Status: Routine Height: 65 inches Weight: 143 pounds BSA: 1.72 m2 BMI: 23.8 kg/m2 BP: 112/39 mmHg  Conclusions   Summary  This is a limited echo during pericardiocentesis. See full report of procedure in EPIC   600cc of straw-colored fluid was drained, no pericardial effusion noted  post procedure. Signature   ------------------------------------------------------------------  Electronically signed by Bettina Multani MD (Interpreting  physician) on 09/27/2022 at 08:57 AM  ------------------------------------------------------------------      XR CHEST PORTABLE    Result Date: 9/26/2022  EXAMINATION: ONE XRAY VIEW OF THE CHEST 9/26/2022 10:34 am COMPARISON: 09/25/2022. HISTORY: ORDERING SYSTEM PROVIDED HISTORY: Hypoxia TECHNOLOGIST PROVIDED HISTORY: Reason for exam:->Hypoxia Reason for Exam: Hypoxia FINDINGS: The cardiac silhouette is indeterminate. There is worsening bibasilar opacity, right greater than left. There is suspected right-sided moderate-sized pleural effusion. No evidence of pneumothorax is seen. Worsening bibasilar opacities, right greater than left with suspected moderate size right-sided pleural effusion.        Recent Results (from the past 24 hour(s))   POCT Glucose    Collection Time: 09/27/22  8:48 AM   Result Value Ref Range    POC Glucose 199 (H) 70 - 99 MG/DL   Basic Metabolic Panel    Collection Time: 09/27/22  8:55 AM   Result Value Ref Range    Sodium 136 135 - 145 MMOL/L    Potassium 4.5 3.5 - 5.1 MMOL/L    Chloride 103 99 - 110 mMol/L    CO2 23 21 - 32 MMOL/L    Anion Gap 10 4 - 16    BUN 23 6 - 23 MG/DL    Creatinine 1.3 (H) 0.6 - 1.1 MG/DL    Glucose 209 (H) 70 - 99 MG/DL    Calcium 9.4 8.3 - 10.6 MG/DL    GFR Non-African American 40 (L) >60 mL/min/1.73m2    GFR  49 (L) >60 mL/min/1.73m2   Magnesium    Collection Time: 09/27/22  8:55 AM   Result Value Ref Range    Magnesium 1.9 1.8 - 2.4 mg/dl   Brain Natriuretic Peptide    Collection Time: 09/27/22  8:55 AM   Result Value Ref Range    Pro-BNP 11,368 (H) <300 PG/ML   POCT Glucose    Collection Time: 09/27/22 12:33 PM   Result Value Ref Range    POC Glucose 295 (H) 70 - 99 MG/DL   POCT Glucose    Collection Time: 09/27/22  4:43 PM   Result Value Ref Range    POC Glucose 198 (H) 70 - 99 MG/DL   POCT Glucose    Collection Time: 09/27/22  9:31 PM   Result Value Ref Range    POC Glucose 233 (H) 70 - 99 MG/DL   POCT Glucose    Collection Time: 09/28/22  4:26 AM   Result Value Ref Range    POC Glucose 187 (H) 70 - 99 MG/DL   Basic Metabolic Panel    Collection Time: 09/28/22  6:02 AM   Result Value Ref Range    Sodium 135 135 - 145 MMOL/L    Potassium 4.2 3.5 - 5.1 MMOL/L    Chloride 101 99 - 110 mMol/L    CO2 23 21 - 32 MMOL/L    Anion Gap 11 4 - 16    BUN 29 (H) 6 - 23 MG/DL    Creatinine 1.5 (H) 0.6 - 1.1 MG/DL    Glucose 196 (H) 70 - 99 MG/DL    Calcium 9.4 8.3 - 10.6 MG/DL    GFR Non- 34 (L) >60 mL/min/1.73m2    GFR  41 (L) >60 mL/min/1.73m2   Magnesium    Collection Time: 09/28/22  6:02 AM   Result Value Ref Range    Magnesium 1.9 1.8 - 2.4 mg/dl   CBC with Auto Differential    Collection Time: 09/28/22  6:02 AM   Result Value Ref Range    WBC 9.2 4.0 - 10.5 K/CU MM    RBC 3.89 (L) 4.2 - 5.4 M/CU MM    Hemoglobin 11.2 (L) 12.5 - 16.0 GM/DL    Hematocrit 36.0 (L) 37 - 47 %    MCV 92.5 78 - 100 FL    MCH 28.8 27 - 31 PG    MCHC 31.1 (L) 32.0 - 36.0 %    RDW 15.4 (H) 11.7 - 14.9 %    Platelets 259 317 - 297 K/CU MM    MPV 11.0 7.5 - 11.1 FL    Differential Type AUTOMATED DIFFERENTIAL     Segs Relative 76.0 (H) 36 - 66 %    Lymphocytes % 12.2 (L) 24 - 44 %    Monocytes % 9.8 (H) 0 - 4 %    Eosinophils % 1.0 0 - 3 %    Basophils % 0.5 0 - 1 %    Segs Absolute 7.0 K/CU MM    Lymphocytes Absolute 1.1 K/CU MM    Monocytes Absolute 0.9 K/CU MM    Eosinophils Absolute 0.1 K/CU MM    Basophils Absolute 0.1 K/CU MM Nucleated RBC % 0.0 %    Total Nucleated RBC 0.0 K/CU MM    Total Immature Neutrophil 0.05 K/CU MM    Immature Neutrophil % 0.5 (H) 0 - 0.43 %   Phosphorus    Collection Time: 09/28/22  6:02 AM   Result Value Ref Range    Phosphorus 4.5 2.5 - 4.9 MG/DL       Electronically signed by Tyree Aguilera DO on 9/28/2022 at 7:56 AM   [phone]

## 2022-09-29 ENCOUNTER — APPOINTMENT (OUTPATIENT)
Dept: GENERAL RADIOLOGY | Age: 72
DRG: 280 | End: 2022-09-29
Payer: MEDICARE

## 2022-09-29 ENCOUNTER — TELEPHONE (OUTPATIENT)
Dept: CARDIOLOGY CLINIC | Age: 72
End: 2022-09-29

## 2022-09-29 PROBLEM — I48.92 ATRIAL FLUTTER WITH RAPID VENTRICULAR RESPONSE (HCC): Status: ACTIVE | Noted: 2022-09-29

## 2022-09-29 LAB
BASOPHILS ABSOLUTE: 0 K/CU MM
BASOPHILS RELATIVE PERCENT: 0.5 % (ref 0–1)
CULTURE: ABNORMAL
DIFFERENTIAL TYPE: ABNORMAL
EOSINOPHILS ABSOLUTE: 0.1 K/CU MM
EOSINOPHILS RELATIVE PERCENT: 1.4 % (ref 0–3)
GLUCOSE BLD-MCNC: 130 MG/DL (ref 70–99)
GLUCOSE BLD-MCNC: 136 MG/DL (ref 70–99)
GLUCOSE BLD-MCNC: 207 MG/DL (ref 70–99)
GRAM SMEAR: ABNORMAL
HCT VFR BLD CALC: 31.8 % (ref 37–47)
HEMOGLOBIN: 9.9 GM/DL (ref 12.5–16)
IMMATURE NEUTROPHIL %: 0.5 % (ref 0–0.43)
LYMPHOCYTES ABSOLUTE: 1.2 K/CU MM
LYMPHOCYTES RELATIVE PERCENT: 14.3 % (ref 24–44)
Lab: ABNORMAL
MCH RBC QN AUTO: 28.6 PG (ref 27–31)
MCHC RBC AUTO-ENTMCNC: 31.1 % (ref 32–36)
MCV RBC AUTO: 91.9 FL (ref 78–100)
MONOCYTES ABSOLUTE: 1 K/CU MM
MONOCYTES RELATIVE PERCENT: 11.7 % (ref 0–4)
NUCLEATED RBC %: 0 %
PDW BLD-RTO: 15.2 % (ref 11.7–14.9)
PHOSPHORUS: 3.8 MG/DL (ref 2.5–4.9)
PLATELET # BLD: 272 K/CU MM (ref 140–440)
PMV BLD AUTO: 10.1 FL (ref 7.5–11.1)
PREALBUMIN: 7 MG/DL (ref 20–40)
RBC # BLD: 3.46 M/CU MM (ref 4.2–5.4)
SEGMENTED NEUTROPHILS ABSOLUTE COUNT: 6.1 K/CU MM
SEGMENTED NEUTROPHILS RELATIVE PERCENT: 71.6 % (ref 36–66)
SPECIMEN: ABNORMAL
TOTAL IMMATURE NEUTOROPHIL: 0.04 K/CU MM
TOTAL NUCLEATED RBC: 0 K/CU MM
WBC # BLD: 8.5 K/CU MM (ref 4–10.5)

## 2022-09-29 PROCEDURE — 94664 DEMO&/EVAL PT USE INHALER: CPT

## 2022-09-29 PROCEDURE — 99211 OFF/OP EST MAY X REQ PHY/QHP: CPT

## 2022-09-29 PROCEDURE — 2060000000 HC ICU INTERMEDIATE R&B

## 2022-09-29 PROCEDURE — 99223 1ST HOSP IP/OBS HIGH 75: CPT | Performed by: INTERNAL MEDICINE

## 2022-09-29 PROCEDURE — 6370000000 HC RX 637 (ALT 250 FOR IP): Performed by: INTERNAL MEDICINE

## 2022-09-29 PROCEDURE — 6370000000 HC RX 637 (ALT 250 FOR IP): Performed by: STUDENT IN AN ORGANIZED HEALTH CARE EDUCATION/TRAINING PROGRAM

## 2022-09-29 PROCEDURE — 97116 GAIT TRAINING THERAPY: CPT

## 2022-09-29 PROCEDURE — 99232 SBSQ HOSP IP/OBS MODERATE 35: CPT | Performed by: INTERNAL MEDICINE

## 2022-09-29 PROCEDURE — APPSS45 APP SPLIT SHARED TIME 31-45 MINUTES: Performed by: NURSE PRACTITIONER

## 2022-09-29 PROCEDURE — 93308 TTE F-UP OR LMTD: CPT

## 2022-09-29 PROCEDURE — 84134 ASSAY OF PREALBUMIN: CPT

## 2022-09-29 PROCEDURE — 82962 GLUCOSE BLOOD TEST: CPT

## 2022-09-29 PROCEDURE — 6360000002 HC RX W HCPCS: Performed by: INTERNAL MEDICINE

## 2022-09-29 PROCEDURE — 94150 VITAL CAPACITY TEST: CPT

## 2022-09-29 PROCEDURE — 94761 N-INVAS EAR/PLS OXIMETRY MLT: CPT

## 2022-09-29 PROCEDURE — 2580000003 HC RX 258: Performed by: STUDENT IN AN ORGANIZED HEALTH CARE EDUCATION/TRAINING PROGRAM

## 2022-09-29 PROCEDURE — 2580000003 HC RX 258: Performed by: INTERNAL MEDICINE

## 2022-09-29 PROCEDURE — 97166 OT EVAL MOD COMPLEX 45 MIN: CPT

## 2022-09-29 PROCEDURE — 2700000000 HC OXYGEN THERAPY PER DAY

## 2022-09-29 PROCEDURE — 97530 THERAPEUTIC ACTIVITIES: CPT

## 2022-09-29 PROCEDURE — 80048 BASIC METABOLIC PNL TOTAL CA: CPT

## 2022-09-29 PROCEDURE — 97162 PT EVAL MOD COMPLEX 30 MIN: CPT

## 2022-09-29 PROCEDURE — P9047 ALBUMIN (HUMAN), 25%, 50ML: HCPCS | Performed by: INTERNAL MEDICINE

## 2022-09-29 PROCEDURE — 84100 ASSAY OF PHOSPHORUS: CPT

## 2022-09-29 PROCEDURE — 71045 X-RAY EXAM CHEST 1 VIEW: CPT

## 2022-09-29 PROCEDURE — 97535 SELF CARE MNGMENT TRAINING: CPT

## 2022-09-29 PROCEDURE — 6370000000 HC RX 637 (ALT 250 FOR IP): Performed by: NURSE PRACTITIONER

## 2022-09-29 PROCEDURE — 85025 COMPLETE CBC W/AUTO DIFF WBC: CPT

## 2022-09-29 RX ORDER — DILTIAZEM HYDROCHLORIDE 180 MG/1
180 CAPSULE, COATED, EXTENDED RELEASE ORAL DAILY
Status: DISCONTINUED | OUTPATIENT
Start: 2022-09-30 | End: 2022-10-03 | Stop reason: HOSPADM

## 2022-09-29 RX ORDER — AMIODARONE HYDROCHLORIDE 200 MG/1
200 TABLET ORAL 2 TIMES DAILY
Status: DISCONTINUED | OUTPATIENT
Start: 2022-09-29 | End: 2022-10-03 | Stop reason: HOSPADM

## 2022-09-29 RX ORDER — DOCUSATE SODIUM 100 MG/1
100 CAPSULE, LIQUID FILLED ORAL 2 TIMES DAILY
Status: DISCONTINUED | OUTPATIENT
Start: 2022-09-29 | End: 2022-10-03 | Stop reason: HOSPADM

## 2022-09-29 RX ORDER — AMIODARONE HYDROCHLORIDE 200 MG/1
200 TABLET ORAL DAILY
Status: DISCONTINUED | OUTPATIENT
Start: 2022-10-14 | End: 2022-10-03 | Stop reason: HOSPADM

## 2022-09-29 RX ORDER — SENNA PLUS 8.6 MG/1
1 TABLET ORAL NIGHTLY
Status: DISCONTINUED | OUTPATIENT
Start: 2022-09-29 | End: 2022-10-03 | Stop reason: HOSPADM

## 2022-09-29 RX ORDER — SENNA PLUS 8.6 MG/1
1 TABLET ORAL NIGHTLY
Status: DISCONTINUED | OUTPATIENT
Start: 2022-09-29 | End: 2022-09-29

## 2022-09-29 RX ORDER — PAROXETINE HYDROCHLORIDE 20 MG/1
20 TABLET, FILM COATED ORAL DAILY
Status: DISCONTINUED | OUTPATIENT
Start: 2022-09-29 | End: 2022-10-03 | Stop reason: HOSPADM

## 2022-09-29 RX ADMIN — SENNOSIDES 8.6 MG: 8.6 TABLET, FILM COATED ORAL at 10:29

## 2022-09-29 RX ADMIN — AMIODARONE HYDROCHLORIDE 0.5 MG/MIN: 50 INJECTION, SOLUTION INTRAVENOUS at 02:23

## 2022-09-29 RX ADMIN — AMIODARONE HYDROCHLORIDE 200 MG: 200 TABLET ORAL at 20:34

## 2022-09-29 RX ADMIN — ALBUMIN (HUMAN) 25 G: 0.25 INJECTION, SOLUTION INTRAVENOUS at 16:08

## 2022-09-29 RX ADMIN — ALPRAZOLAM 0.25 MG: 0.25 TABLET ORAL at 09:21

## 2022-09-29 RX ADMIN — APIXABAN 5 MG: 5 TABLET, FILM COATED ORAL at 09:21

## 2022-09-29 RX ADMIN — ALBUMIN (HUMAN) 25 G: 0.25 INJECTION, SOLUTION INTRAVENOUS at 04:37

## 2022-09-29 RX ADMIN — SODIUM CHLORIDE, PRESERVATIVE FREE 10 ML: 5 INJECTION INTRAVENOUS at 10:29

## 2022-09-29 RX ADMIN — FUROSEMIDE 40 MG: 40 TABLET ORAL at 16:09

## 2022-09-29 RX ADMIN — PAROXETINE HYDROCHLORIDE 20 MG: 20 TABLET, FILM COATED ORAL at 16:09

## 2022-09-29 RX ADMIN — ASPIRIN 650 MG: 325 TABLET, COATED ORAL at 06:09

## 2022-09-29 RX ADMIN — ALBUMIN (HUMAN) 25 G: 0.25 INJECTION, SOLUTION INTRAVENOUS at 20:54

## 2022-09-29 RX ADMIN — SODIUM CHLORIDE, PRESERVATIVE FREE 10 ML: 5 INJECTION INTRAVENOUS at 20:35

## 2022-09-29 RX ADMIN — OXYBUTYNIN CHLORIDE 5 MG: 5 TABLET ORAL at 20:35

## 2022-09-29 RX ADMIN — DOCUSATE SODIUM 100 MG: 100 CAPSULE, LIQUID FILLED ORAL at 09:21

## 2022-09-29 RX ADMIN — ROSUVASTATIN CALCIUM 10 MG: 5 TABLET, FILM COATED ORAL at 20:35

## 2022-09-29 RX ADMIN — SODIUM CHLORIDE, PRESERVATIVE FREE 10 ML: 5 INJECTION INTRAVENOUS at 22:11

## 2022-09-29 RX ADMIN — ALPRAZOLAM 0.25 MG: 0.25 TABLET ORAL at 22:09

## 2022-09-29 RX ADMIN — INSULIN LISPRO 5 UNITS: 100 INJECTION, SOLUTION INTRAVENOUS; SUBCUTANEOUS at 16:08

## 2022-09-29 RX ADMIN — DILTIAZEM HYDROCHLORIDE 120 MG: 120 CAPSULE, COATED, EXTENDED RELEASE ORAL at 09:21

## 2022-09-29 RX ADMIN — OXYBUTYNIN CHLORIDE 5 MG: 5 TABLET ORAL at 09:21

## 2022-09-29 RX ADMIN — ASPIRIN 650 MG: 325 TABLET, COATED ORAL at 16:08

## 2022-09-29 RX ADMIN — PANTOPRAZOLE SODIUM 40 MG: 40 TABLET, DELAYED RELEASE ORAL at 06:09

## 2022-09-29 RX ADMIN — FUROSEMIDE 40 MG: 40 TABLET ORAL at 09:21

## 2022-09-29 RX ADMIN — INSULIN GLARGINE 10 UNITS: 100 INJECTION, SOLUTION SUBCUTANEOUS at 10:33

## 2022-09-29 RX ADMIN — COLCHICINE 0.6 MG: 0.6 TABLET ORAL at 09:21

## 2022-09-29 RX ADMIN — MIRTAZAPINE 15 MG: 15 TABLET, FILM COATED ORAL at 20:35

## 2022-09-29 RX ADMIN — INSULIN LISPRO 5 UNITS: 100 INJECTION, SOLUTION INTRAVENOUS; SUBCUTANEOUS at 10:29

## 2022-09-29 RX ADMIN — DOCUSATE SODIUM 100 MG: 100 CAPSULE, LIQUID FILLED ORAL at 20:34

## 2022-09-29 ASSESSMENT — ENCOUNTER SYMPTOMS
VOMITING: 0
COLOR CHANGE: 0
BLOOD IN STOOL: 0
PHOTOPHOBIA: 0
CHEST TIGHTNESS: 0
SHORTNESS OF BREATH: 1
NAUSEA: 0
CONSTIPATION: 0
COUGH: 1
BACK PAIN: 0
WHEEZING: 0
ABDOMINAL PAIN: 0
EYE PAIN: 0
DIARRHEA: 0

## 2022-09-29 NOTE — PROGRESS NOTES
V2.0  [facility] [place]      Name:  Caty Hong /Age/Sex: 1950  (71 y.o. female)   MRN & CSN:  [id number] & [id number] Encounter Date/Time: 2022 11:29 AM EDT    Location:  -A PCP: Lalo Alex MD       Hospital Day: 7    Assessment and Plan:   Caty Hong is a 71 y.o. female with past medical history of diastolic heart failure, paroxysmal atrial fibrillation, type 2 diabetes mellitus, chronic respiratory failure, recurrent admission for bilateral pleural effusions, depression, hypertension, history of invasive ductal carcinoma of the right breast  status post mastectomy, ER/CA positive, history of tobacco use and hyperlipidemia who presents with progressively worsening shortness of breath. Acute on chronic hypoxic respiratory failure  Possible contributing factors include, pericardial effusion with tamponade physiology, compressive atelectasis due to bilateral pleural effusion, decompensated heart failure with preserved ejection fraction. Utilize supplemental oxygen to maintain oxygen saturation above 92%. Rest of the treatment plan as below    Pericardial effusion with tamponade physiology  Status post pericardiocentesis with removal of 600 cc of pericardial fluid on   Removal of pericardiocentesis catheter 2022  Mild lymphocytic predominance on cytology  Negative for infection  Although this pericardial effusion can be explained by diastolic heart failure, there remains a concern about underlying autoimmune disease process given her Raynaud's phenomena. Continue aspirin and colchicine  Protonix for stress ulcer prophylaxis  There still remains concern for metastatic effusion given history of breast cancer. Oncology is following, recommend PET scan as outpatient and bone biopsy. Patient will be followed by Psychiatric oncology after discharge.     Recurrent bilateral pleural effusion  Status postthoracentesis 2022  Appears to be borderline exudate  Similarly with the pericardial effusion, diastolic heart failure can explain this but the recurrence of disease and concerning cutaneous features my suspicion for autoimmune disease is high. Pulmonology following  Chest x-ray pending    Acute kidney injury  Likely in the setting of diuretics  Will de-escalate diuretics  BMP and UA pending  Avoid nephrotoxic agents  Monitor renal function parameters in a.m. Raynaud's phenomena  Never seen a Rheumatologist due to frequent hospitalizations  Per chart review: Negative rheumatoid factor, negative Anti- Scl-70, recently negative RODOLFO, ESR 26. Rheumatology consulted  Patient needs nailfold capillaroscopy, can be done outpatient. Anti-SCL 70 is moderately sensitive, 20% hence recommend getting anti-centromere, RNA polymerase III and U3-RNP, PM/Scl, or Th/To antibodies, will defer to rheumatology. Superficial venous thrombosis  Removal of IV catheters  Conservative management with cold compresses and limb elevation, nursing order placed. Normocytic anemia  No signs of overt bleed  Concern for anemia of chronic disease  Monitor CBC  Transfuse if hemoglobin less than 7    Acute on chronic diastolic heart failure on admission  -Improving and appears clinically euvolemic  De-escalate diuretics  Blood pressure control    Paroxysmal atrial fibrillation with RVR  Continue Eliquis  Continue amiodarone drip per cardiology    Moderate-severe pulmonary hypertension  Elevated RVSP and tricuspid regurgitation  Discussed with cardiology for the possibility of right heart catheterization for further classification of disease process  Optimization of preload and afterload, maintain euvolemic state. Chronic respiratory failure on home oxygen 2 to 3 L via nasal cannula  No PFTs on chart, recent CT angiogram chest did show very minimal emphysematous disease process but no overt obstructive disease pattern on imaging.   Unsure about the etiology, could be the pulmonary hypertension. Utilize supplemental oxygen to maintain saturation above 92%  Pulmonology following, appreciate recommendations    Type 2 diabetes mellitus without long-term use of insulin  With hyperglycemia  Adjusted basal bolus insulin  Sliding scale insulin to target 140-180    Hyperlipidemia  Continue Crestor    History of invasive ductal carcinoma of the right breast status post mastectomy  ER/NM positive  On prior imaging there were some concern for bony metastasis  Will need bone biopsy for definitive diagnosis  Oncology following    Continue to monitor on stepdown unit    Diet ADULT DIET; Regular; 3 carb choices (45 gm/meal); Low Fat/Low Chol/High Fiber/REYES   DVT Prophylaxis [] Lovenox, []  Heparin, [] SCDs, [] Ambulation,  [x] Eliquis, [] Xarelto  [] Coumadin   Code Status Full Code   Disposition From: Home  Expected Disposition: Home  Estimated Date of Discharge: 2 to 3 days  Patient requires continued admission due to rheumatology evaluation, improvement in respiratory status, right heart catheterization   Surrogate Decision Maker/ POA Spouse     Subjective:     Chief Complaint: Shortness of Breath (Started today; has recently been seen and had 2 paracentesis done; normally wears O2 at 3L/min and is currently requiring more to sub stain oxygen levels)       Libertad Fuller is a 71 y.o. female who presents with progressively worsening shortness of breath. Patient was seen and evaluated at bedside. No acute overnight events. Patient is hemodynamically stable. Patient is complaining of right upper extremity pain. Explained her about conservative management of superficial venous thrombosis. Objective:      Intake/Output Summary (Last 24 hours) at 9/29/2022 0728  Last data filed at 9/28/2022 1800  Gross per 24 hour   Intake 640 ml   Output --   Net 640 ml          Vitals:   Vitals:    09/29/22 0536   BP: (!) 129/45   Pulse: 83   Resp:    Temp:    SpO2:        Physical Exam:   Physical Exam  Vitals reviewed. Constitutional:       Appearance: She is normal weight. She is ill-appearing. HENT:      Head: Normocephalic and atraumatic. Nose: Nose normal.      Mouth/Throat:      Mouth: Mucous membranes are dry. Pharynx: Oropharynx is clear. Eyes:      General: No scleral icterus. Conjunctiva/sclera: Conjunctivae normal.   Cardiovascular:      Rate and Rhythm: Normal rate. Rhythm irregular. Pulses: Normal pulses. Heart sounds: Murmur heard. Pulmonary:      Effort: Pulmonary effort is normal.      Breath sounds: Rales present. No wheezing or rhonchi. Comments: 4 L nasal cannula oxygen  Abdominal:      General: Bowel sounds are normal. There is no distension. Palpations: Abdomen is soft. Tenderness: There is no abdominal tenderness. Musculoskeletal:         General: No deformity. Normal range of motion. Cervical back: Normal range of motion and neck supple. No rigidity or tenderness. Right lower leg: Edema present. Left lower leg: Edema present. Skin:     Coloration: Skin is not jaundiced or pale. Comments: Skin thickening bilateral upper and lower extremities   Neurological:      General: No focal deficit present. Mental Status: She is alert and oriented to person, place, and time. Mental status is at baseline.           Medications:   Medications:    amiodarone bolus  150 mg IntraVENous Once    albumin human  25 g IntraVENous Q8H    colchicine  0.6 mg Oral Daily    pantoprazole  40 mg Oral QAM AC    furosemide  40 mg Oral BID    aspirin  650 mg Oral q8h    insulin glargine  10 Units SubCUTAneous BID    insulin lispro  5 Units SubCUTAneous TID WC    lidocaine PF  5 mL IntraDERmal Once    sodium chloride flush  5-40 mL IntraVENous 2 times per day    dilTIAZem  120 mg Oral Daily    sodium chloride flush  5-40 mL IntraVENous 2 times per day    lidocaine  20 mL IntraDERmal Once    sodium chloride flush  5-40 mL IntraVENous 2 times per day    apixaban  5 mg Oral BID    [Held by provider] lisinopril  20 mg Oral Daily    mirtazapine  15 mg Oral Nightly    oxybutynin  5 mg Oral BID    rosuvastatin  10 mg Oral Nightly    insulin lispro  0-4 Units SubCUTAneous TID WC    insulin lispro  0-4 Units SubCUTAneous Nightly      Infusions:    sodium chloride      dextrose      sodium chloride      amiodarone 0.5 mg/min (09/29/22 0223)    sodium chloride       PRN Meds: sodium chloride flush, 5-40 mL, PRN  sodium chloride, , PRN  glucose, 4 tablet, PRN  dextrose bolus, 125 mL, PRN   Or  dextrose bolus, 250 mL, PRN  glucagon (rDNA), 1 mg, PRN  dextrose, , Continuous PRN  guaiFENesin-dextromethorphan, 10 mL, Q4H PRN  sodium chloride flush, 5-40 mL, PRN  sodium chloride, , PRN  acetaminophen, 650 mg, Q4H PRN  ondansetron, 4 mg, Q6H PRN  hydrALAZINE, 10 mg, Q10 Min PRN  sodium chloride flush, 5-40 mL, PRN  sodium chloride, , PRN  ondansetron, 4 mg, Q8H PRN  polyethylene glycol, 17 g, Daily PRN  acetaminophen, 650 mg, Q6H PRN  ALPRAZolam, 0.25 mg, BID PRN      Labs      Recent Results (from the past 24 hour(s))   POCT Glucose    Collection Time: 09/28/22  9:06 AM   Result Value Ref Range    POC Glucose 186 (H) 70 - 99 MG/DL   Sedimentation Rate    Collection Time: 09/28/22 10:09 AM   Result Value Ref Range    Sed Rate 26 0 - 30 MM/HR   POCT Glucose    Collection Time: 09/28/22 12:43 PM   Result Value Ref Range    POC Glucose 312 (H) 70 - 99 MG/DL   POCT Glucose    Collection Time: 09/28/22  2:57 PM   Result Value Ref Range    POC Glucose 262 (H) 70 - 99 MG/DL   POCT Glucose    Collection Time: 09/28/22  5:52 PM   Result Value Ref Range    POC Glucose 201 (H) 70 - 99 MG/DL   POCT Glucose    Collection Time: 09/28/22  8:59 PM   Result Value Ref Range    POC Glucose 210 (H) 70 - 99 MG/DL   CBC with Auto Differential    Collection Time: 09/29/22  6:21 AM   Result Value Ref Range    WBC 8.5 4.0 - 10.5 K/CU MM    RBC 3.46 (L) 4.2 - 5.4 M/CU MM    Hemoglobin 9.9 (L) 12.5 - 16.0 GM/DL    Hematocrit 31.8 (L) 37 - 47 %    MCV 91.9 78 - 100 FL    MCH 28.6 27 - 31 PG    MCHC 31.1 (L) 32.0 - 36.0 %    RDW 15.2 (H) 11.7 - 14.9 %    Platelets 824 817 - 361 K/CU MM    MPV 10.1 7.5 - 11.1 FL    Differential Type AUTOMATED DIFFERENTIAL     Segs Relative 71.6 (H) 36 - 66 %    Lymphocytes % 14.3 (L) 24 - 44 %    Monocytes % 11.7 (H) 0 - 4 %    Eosinophils % 1.4 0 - 3 %    Basophils % 0.5 0 - 1 %    Segs Absolute 6.1 K/CU MM    Lymphocytes Absolute 1.2 K/CU MM    Monocytes Absolute 1.0 K/CU MM    Eosinophils Absolute 0.1 K/CU MM    Basophils Absolute 0.0 K/CU MM    Nucleated RBC % 0.0 %    Total Nucleated RBC 0.0 K/CU MM    Total Immature Neutrophil 0.04 K/CU MM    Immature Neutrophil % 0.5 (H) 0 - 0.43 %   Phosphorus    Collection Time: 09/29/22  6:21 AM   Result Value Ref Range    Phosphorus 3.8 2.5 - 4.9 MG/DL   Prealbumin    Collection Time: 09/29/22  6:21 AM   Result Value Ref Range    Prealbumin 7 (L) 20 - 40 mg/dL   POCT Glucose    Collection Time: 09/29/22  6:57 AM   Result Value Ref Range    POC Glucose 136 (H) 70 - 99 MG/DL        Imaging/Diagnostics Last 24 Hours   Echocardiogram limited    Result Date: 9/27/2022  Transthoracic Echocardiography Report (TTE)  Demographics   Patient Name       Francisca Sepulveda  Date of Study       09/27/2022                     A   Date of Birth      1950         Gender              Female   Age                71 year(s)         Race                   Patient Number     [id number]         Room Number         2119   Visit Number       [id number]   Corporate ID       [id number]   Accession Number   [id number]         Castro Estrada RDMS, RVT   Ordering Physician Pavan Roper MD                 Physician           Nette CASAREZ  Procedure Type of Study   TTE procedure:ECHOCARDIOGRAM LIMITED.   Procedure Date Date: 09/27/2022 Start: 08:40 AM Study Location: Portable Technical Quality: Fair visualization Indications:Pericardial effusion. Patient Status: Routine Height: 65 inches Weight: 143 pounds BSA: 1.72 m2 BMI: 23.8 kg/m2 HR: 82 bpm BP: 117/65 mmHg  Conclusions   Summary  This is a limited echocardiogram.  Left ventricular systolic function is normal.  Ejection fraction is visually estimated at 50-55%. Moderate aortic regurgitation is noted. Moderate to severe tricuspid regurgitation; RVSP: 55 mmHg. Severe PHTN. S/p pericardiocentesis with 560cc drained on 9/26/22; no evidence of any  pericardial effusion. Left pleural effusion. Signature   ------------------------------------------------------------------  Electronically signed by Cristina Lockwood MD  (Interpreting physician) on 09/27/2022 at 09:45 AM  ------------------------------------------------------------------   Findings   Left Ventricle  Left ventricular systolic function is normal.  Ejection fraction is visually estimated at 50-55%. Aortic Valve  Moderate aortic regurgitation is noted. Tricuspid Valve  Moderate to severe tricuspid regurgitation; RVSP: 55 mmHg. Severe PHTN. Pericardial Effusion  S/p pericardiocentesis with 560cc drained on 9/26/22; no evidence of any  pericardial effusion. Pleural Effusion  Left pleural effusion.   M-Mode/2D Measurements & Calculations   LV Diastolic Dimension: 4.87 cm LV Systolic Dimension: 3.1 cm  LV FS:26.4 %                    LV Volume Diastolic: 29.2 ml  LV PW Diastolic: 9.69 cm        LV Volume Systolic: 70.5 ml  LV PW Systolic: 4.65 cm         LV EDV/LV EDV Index: 74.6 ml/43 m2LV ESV/LV  Septum Diastolic: 4.25 cm       ESV Index: 29.8 ml/17 m2  Septum Systolic: 9.19 cm        EF Calculated (A4C): 60.1 %                                  EF Calculated (2D): 52 %  Doppler Measurements & Calculations                               Estimated RVSP: 55 mmHg                              Estimated RAP:3 mmHg    Estimated PASP: 40.21 mmHg TR Velocity:305 cm/s                              TR Gradient:37.21 mmHg      Echocardiogram limited    Result Date: 9/27/2022  Transthoracic Echocardiography Report (TTE)  Demographics   Patient Name       Kathy Mcdonald  Date of Study       09/26/2022                     A   Date of Birth      1950         Gender              Female   Age                71 year(s)         Race                   Patient Number     [id number]         Room Number         2119   Visit Number       [id number]   Corporate ID       [id number]   Accession Number   [id number]         Jesus Gann RDMS   Ordering Physician Justine Tim MD                 Physician           MD  Procedure Type of Study   TTE procedure:ECHOCARDIOGRAM LIMITED. Procedure Date Date: 09/26/2022 Start: 02:50 PM Study Location: OR Technical Quality: Fair visualization Indications:Pericardial effusion. Patient Status: Routine Height: 65 inches Weight: 143 pounds BSA: 1.72 m2 BMI: 23.8 kg/m2 BP: 112/39 mmHg  Conclusions   Summary  This is a limited echo during pericardiocentesis. See full report of procedure in EPIC   600cc of straw-colored fluid was drained, no pericardial effusion noted  post procedure. Signature   ------------------------------------------------------------------  Electronically signed by Ita Mcdonald MD (Interpreting  physician) on 09/27/2022 at 08:57 AM  ------------------------------------------------------------------      XR CHEST PORTABLE    Result Date: 9/28/2022  EXAMINATION: ONE XRAY VIEW OF THE CHEST 9/28/2022 6:16 am COMPARISON: None available. Down time procedure. HISTORY: ORDERING SYSTEM PROVIDED HISTORY: hypoxia TECHNOLOGIST PROVIDED HISTORY: Reason for exam:->hypoxia Reason for Exam: hypoxia FINDINGS: The heart is enlarged. The mediastinal contours are normal.  There is pulmonary edema with moderate bilateral pleural effusions and associated bibasilar atelectasis. No pneumothorax. Surgical clip is noted in the right axilla. The bones are osteopenic. Cardiomegaly with pulmonary edema and moderate bilateral pleural effusions with associated bibasilar atelectasis.        Electronically signed by Kenyon Tesfaye MD on 9/29/2022 at 7:28 AM

## 2022-09-29 NOTE — PROGRESS NOTES
Pulmonary and Critical Care  Progress Note      VITALS:  BP (!) 129/45   Pulse 83   Temp 97.8 °F (36.6 °C)   Resp 25   Ht 5' 5\" (1.651 m)   Wt 136 lb 11 oz (62 kg)   SpO2 97%   BMI 22.75 kg/m²     Subjective:   CHIEF COMPLAINT :SOB     HPI:                The patient is a 71 y.o. female is lying in the bed. She is not in acute resp distress    Objective:   PHYSICAL EXAM:    LUNGS:Decreased air entry bilateral bases  Abd-soft, BS+,NT  Ext- 1 + pedal edema  CVS-s1s2, no murmurs      DATA:    CBC:  Recent Labs     09/28/22  0602 09/29/22  0621   WBC 9.2 8.5   RBC 3.89* 3.46*   HGB 11.2* 9.9*   HCT 36.0* 31.8*    272   MCV 92.5 91.9   MCH 28.8 28.6   MCHC 31.1* 31.1*   RDW 15.4* 15.2*   SEGSPCT 76.0* 71.6*      BMP:  Recent Labs     09/27/22  0855 09/28/22  0602    135   K 4.5 4.2    101   CO2 23 23   BUN 23 29*   CREATININE 1.3* 1.5*   CALCIUM 9.4 9.4   GLUCOSE 209* 196*      ABG:  No results for input(s): PH, PO2ART, WVM6HJU, HCO3, BEART, O2SAT in the last 72 hours. BNP  No results found for: BNP   D-Dimer:  No results found for: [facility]   Radiology:   Interval improved appearance of vascular congestion. Similar appearance of   basilar airspace disease and effusions.          Assessment/Plan     Patient Active Problem List    Diagnosis Date Noted    Acute systolic CHF (congestive heart failure) ([facility] [address] 09/24/2022     Priority: Medium    Acute respiratory failure with hypoxia ([address] 09/24/2022     Priority: Medium    Diastolic heart failure, unspecified HF chronicity ([address] 08/30/2022     Priority: Medium    Atrial fibrillation with RVR ([facility] [address] 08/18/2022     Priority: Medium    Varicose veins of both legs with edema 06/28/2022     Priority: Medium    Venous insufficiency 06/07/2022     Priority: Medium    Shortness of breath 06/07/2022     Priority: Medium    Edema of lower extremity 06/07/2022     Priority: Medium    Chest pain 05/12/2022     Priority: Medium    Peripheral edema 03/21/2022 Carcinoma of right female breast, unspecified estrogen receptor status, unspecified site of breast ([facility] [address] 02/16/2022    Hypercalcemia      Overview Note:     mild ~11, iPTH nl 8/2021      Diabetes type 2, controlled ([facility] [address]      Overview Note:     likely, w two random sugars >126      Migraine headache     GERD (gastroesophageal reflux disease)     Irritable bowel syndrome     Lumbar herniated disc     Low back pain      Overview Note:     replace inactive diagnosis      Hyperlipidemia     History of breast cancer      Overview Note:     R breast      Acute on chronic Hypoxic resp failure- improving  Bilateral Pleural effusions s/p thoracentesis- recurrent  Recurrent right pleural effusion  Diastolic dysfunction  Afib on Eliquis  H/o Breast ca with mets to T12, L1  Pericardial effusion s/p pericardiocentesis  Moderate Malnutrition       BMP now  Diuresis  Nutritionist thompson  Keep sats > 92%  Thoracentesis tomorrow at 12:15  ICS  OOB  Hold Eliquis for the procedure tomorrow  C/w present management    Electronically signed by Shante Ramirez MD on 9/29/2022 at 10:45 AM

## 2022-09-29 NOTE — PROGRESS NOTES
[address] [facility] (formerly [facility]3] OSCAR Posey, 1950, 2030/2030-A, 9/29/2022    Discharge Recommendation: home with HHOT, 24 hr support    History:  [place]:  The primary encounter diagnosis was Acute respiratory failure with hypoxia ([address] Diagnoses of Pleural effusion, Recurrent right pleural effusion, and Recurrent left pleural effusion were also pertinent to this visit. Subjective:  Patient states: \"You guys have the perfect personalities for this job\"  Pain: general discomfort L arm  Communication with other providers: coeval with PT Joshua  Restrictions: general precautions, fall risk,   Spouse joining at bedside    Home Setup/Prior level of function:  Social/Functional History  Lives With: Spouse  Type of Home: Condo  Home Layout: One level  Home Access: Level entry  Bathroom Shower/Tub: Shower chair with back, Tub/Shower unit  Bathroom Toilet: Standard  Bathroom Equipment: Toilet raiser, Grab bars in shower  Has the patient had two or more falls in the past year or any fall with injury in the past year?: Yes  ADL Assistance: Needs assistance ( assists with intermittent ADLs)  Homemaking Assistance: Needs assistance  Ambulation Assistance: Independent  Transfer Assistance: Independent  Additional Comments: 3L O2 at baseline    Examination:  Observation: Pt was semi fowlers in bed upon arrival, agreeable to session.  3L O2, picc line, PIV, tele  Vision: WFL with glasses  Hearing: WFL  Objective Measures: stable    Body Systems and functions:  ROM: WFL in BUE  Strength: 4/5 in BUE  Sensation: WFL, dulled sensation in B feet  Tone: normotonic in BUE  Coordination: movements fluid and coordinated  Activity Tolerance: fair+    Activities of Daily Living (ADLs):  Feeding: ind  Grooming: CGA  Toileting: CGA  UB dressing/bathing: ind  LB dressing/bathing: SBA-min A seated    *pt ADL function inferred from gross functional assessment of mobility, balance, posture, safety awareness, activity tolerance (unless otherwise indicated)    Cognitive and Psychosocial Functioning:  Overall cognitive status: A/Ox4. WNL. Slightly tangential  Affect: friendly    Functional Mobility:  Bed Mobility: min A supine to sit HHA  Sit <> Stand: CGA    Ambulation: CGA using FWW      AM-PAC 6 click short form for inpatient daily activity:   How much help from another person does the patient currently need. .. Unable  Dep A Lot  Max A A Lot   Mod A A Little  Min A A Little   CGA  SBA None   Mod I  Indep  Sup   1. Putting on and taking off regular lower body clothing? [] 1    [] 2   [] 2   [x] 3   [] 3   [] 4      2. Bathing (including washing, rinsing, drying)? [] 1   [] 2   [] 2 [x] 3 [] 3 [] 4   3. Toileting, which includes using toilet, bedpan, or urinal? [] 1    [] 2   [] 2   [] 3   [x] 3   [] 4     4. Putting on and taking off regular upper body clothing? [] 1   [] 2   [] 2   [] 3   [] 3    [x] 4      5. Taking care of personal grooming such as brushing teeth? [] 1   [] 2    [] 2 [] 3    [] 3   [x] 4      6. Eating meals? [] 1   [] 2   [] 2   [] 3   [] 3   [x] 4        Raw Score:  21      24/24 = unimpaired  23/24 = 1-20% impaired   20/24-22/24 = 21-40% impaired  15/24-19/24 = 41-59% impaired   10/24-14/24 = 60%-79% impaired  7/24-9/24 = 80%-99% impaired  6/24 = 100% impaired     Treatment:  Self Care Training (10 minutes):   Self care training was performed today. Cues were given for safety, sequence, UE/LE placement, visual cues, and balance. Activities performed today included pt adjusting socks while sitting EOB with SBA and inc time. Pt later demo toilet transfer with CGA, voiding, demo pericare with SBA while seated. Pt standing with CGA using grab bars, washing hands at sink with CGA. Therapeutic Activity Training (15 minutes): Therapeutic activity training was instructed today. Cues were given for safety, sequence, UE/LE placement, visual cues, and balance.     Activities performed today included demo supine to sit EOB with min A HHA for trunk. Pt resting shortly at EOB with SBA, standing with CGA. Pt ambulating to bathroom with CGA using FWW, performing above self cares. Pt ambulating out of bathroom, short functional distance in hallway with CGA using FWW with x1 standing rest break. Pt returning to room, sitting in chair with CGA. Pt arm positioned and ice pack reapplied, edu pt and spouse on therapy POC and discharge disposition. Education: Role of OT, OT POC, discharge needs, safety, benefits of EOB/OOB activity, AD/DME needs, Home safety  Safety Measures: Gait belt used for safety of pt and therapist, Left in recliner, Alarm in place, call light and phone within reach, lines managed    Assessment:  Pt is a 71 yr old female from home with spouse who presents with acute systolic CHF. Prior to admission, pt was requiring assistance with ADLs and IADLs, ind with mobility using no AD. Pt currently close to baseline, ind-min A for ADLs, CGA for mobility using FWW. Pt with slight generalized weakness, decreased activity tolerance. Pt would benefit from continued IP OT services during their stay, and discharge to home with HHOT, 24 hr supervision/assistance.     Complexity: mod  Prognosis: good  Barriers: deconditioning    Plan:  Plan: 3+/week    Treatment to include: Strengthening, ROM, Balance Training, Functional Mobility Training, Endurance Training, Gait Training, Pain Management, Safety Education and Training, Patient+Caregiver Education and Training, Equipment Evaluation Education and Procurement, Positioning, Self Care Training, Home Management Training, Coordination Training    Pt Would Benefit from Continued Edu on none  Adaptive Equipment Recommendations: FWW    Goals:  Time frame for goals: 2 weeks  Pt will complete feeding tasks with ind  Pt will complete grooming tasks with ind standing at sink  Pt will complete toileting tasks with ind using standard commode  Pt will complete UB dressing and bathing tasks with ind  Pt will complete LB dressing and bathing tasks with ind  Pt will complete therapeutic exercise/activity to increase independence in ADL/IADL function  Pt will practice functional transfers and mobility with AD for increased safety and independence    Time:   Time in: 1305  Time out: 1341  Treatment Minutes: 25  Evaluation Minutes: 10  Total time: 36    Electronically signed by:      CHAMP Jones  9/29/2022, 1:13 PM

## 2022-09-29 NOTE — PROGRESS NOTES
Cardiology Progress Note     Today's Plan EP consult     Admit Date:  9/23/2022    Consult reason/ Seen today for: CHF    Subjective and  Overnight Events:  continues with mild shortness of breath. Tired this am    Telemetry SR    Assessment / Plan:   Atrial fib- had A fib with RVR yesterday - started on amiodarone gtt- given dose of digoxin- converted to SR : on AC with eliquis:increase Cardizem  to 180 mg : EP consulted   Pericardial effusion - s/p pericardiocentesis for 600 cc   Pericarditis- - started on ASA and colchicine   HFpEF- clinically improving ; edema is improving- has ongoing shortness of continue po lasix:           History of Presenting Illness:    Chief complain on admission : 71 y. o.year old who is admitted for  Chief Complaint   Patient presents with    Shortness of Breath     Started today; has recently been seen and had 2 paracentesis done; normally wears O2 at 3L/min and is currently requiring more to sub stain oxygen levels        Past medical history:    has a past medical history of Anxiety, Breast carcinoma ([address] Cervical radiculitis, Chest pain, Depression, Diabetes type 2, controlled ([address] Family history of diabetes mellitus (DM), GERD (gastroesophageal reflux disease), Hiatal hernia, Hx of Doppler ultrasound, Hx of Doppler ultrasound, Hx of echocardiogram, Hypercalcemia, Hyperglycemia, Hyperlipidemia, Insomnia, Irritable bowel syndrome, LBP (low back pain), Lumbar herniated disc, Menopause, Migraine headache, Osteoporosis, Prediabetes, S/P colonoscopic polypectomy, and TIA (transient ischemic attack). Past surgical history:   has a past surgical history that includes Tubal ligation; Mastectomy (7/1997); Hysterectomy (5/1995); and Breast reconstruction (Right, 4/2013). Social History:   reports that she has never smoked.  She has never used smokeless tobacco. She reports that she does not drink alcohol and does not use drugs. Family history:  family history includes Diabetes in her father, mother, and sister; Heart Disease in her father; High Blood Pressure in her father and mother; High Cholesterol in her sister; Other in her sister; Stroke in her paternal grandmother. Allergies   Allergen Reactions    Codeine      \"jittery\"  Feels anxoius       Review of Systems:   All 14 systems were reviewed and are negative  Except for the positive findings which are documented     BP (!) 129/45   Pulse 83   Temp 97.8 °F (36.6 °C)   Resp 25   Ht 5' 5\" (1.651 m)   Wt 136 lb 11 oz (62 kg)   SpO2 97%   BMI 22.75 kg/m²     Intake/Output Summary (Last 24 hours) at 9/29/2022 1135  Last data filed at 9/28/2022 1800  Gross per 24 hour   Intake 400 ml   Output --   Net 400 ml       Physical Exam:  Physical Exam  Vitals reviewed. Eyes:      Extraocular Movements: Extraocular movements intact. Cardiovascular:      Rate and Rhythm: Regular rhythm. Pulses: Normal pulses. Heart sounds: Normal heart sounds. Pulmonary:      Breath sounds: Rales present. Musculoskeletal:      Right lower leg: Edema present. Left lower leg: Edema present. Skin:     General: Skin is warm and dry. Capillary Refill: Capillary refill takes less than 2 seconds. Neurological:      General: No focal deficit present. Mental Status: She is alert.         Medications:    docusate sodium  100 mg Oral BID    senna  1 tablet Oral Nightly    amiodarone bolus  150 mg IntraVENous Once    albumin human  25 g IntraVENous Q8H    colchicine  0.6 mg Oral Daily    pantoprazole  40 mg Oral QAM AC    furosemide  40 mg Oral BID    aspirin  650 mg Oral q8h    insulin glargine  10 Units SubCUTAneous BID    insulin lispro  5 Units SubCUTAneous TID WC    lidocaine PF  5 mL IntraDERmal Once    sodium chloride flush  5-40 mL IntraVENous 2 times per day    dilTIAZem  120 mg Oral Daily    sodium chloride flush  5-40 mL IntraVENous 2 times per day lidocaine  20 mL IntraDERmal Once    sodium chloride flush  5-40 mL IntraVENous 2 times per day    apixaban  5 mg Oral BID    [Held by provider] lisinopril  20 mg Oral Daily    mirtazapine  15 mg Oral Nightly    oxybutynin  5 mg Oral BID    rosuvastatin  10 mg Oral Nightly    insulin lispro  0-4 Units SubCUTAneous TID WC    insulin lispro  0-4 Units SubCUTAneous Nightly      sodium chloride      dextrose      sodium chloride      amiodarone 0.5 mg/min (09/29/22 0223)    sodium chloride       sodium chloride flush, sodium chloride, glucose, dextrose bolus **OR** dextrose bolus, glucagon (rDNA), dextrose, guaiFENesin-dextromethorphan, sodium chloride flush, sodium chloride, acetaminophen, ondansetron, hydrALAZINE, sodium chloride flush, sodium chloride, ondansetron **OR** [DISCONTINUED] ondansetron, polyethylene glycol, [DISCONTINUED] acetaminophen **OR** acetaminophen, ALPRAZolam    Lab Data:  CBC:   Recent Labs     09/28/22  0602 09/29/22  0621   WBC 9.2 8.5   HGB 11.2* 9.9*   HCT 36.0* 31.8*   MCV 92.5 91.9    272     BMP:   Recent Labs     09/27/22  0855 09/28/22  0602 09/29/22  0621    135  --    K 4.5 4.2  --     101  --    CO2 23 23  --    PHOS  --  4.5 3.8   BUN 23 29*  --    CREATININE 1.3* 1.5*  --      PT/INR: No results for input(s): PROTIME, INR in the last 72 hours. BNP:    Recent Labs     09/27/22  0855   PROBNP 11,368*     TROPONIN: No results for input(s): TROPONINT in the last 72 hours. Impression:  Principal Problem:    Acute systolic CHF (congestive heart failure) (HCC)  Active Problems:    Acute respiratory failure with hypoxia (HCC)  Resolved Problems:    * No resolved hospital problems. *       All labs, medications and tests reviewed by myself, continue all other medications of all above medical condition listed as is except for changes mentioned above. Thank you   Please call with questions.     Electronically signed by DORIS Day - NATIVIDAD on 9/29/2022 at 11:35 AM    I have seen ,spoken to  and examined this patient personally, independently of the GAURANG. I have reviewed the hospital care given to date and reviewed all pertinent labs and imaging. I have spoken with patient, nursing staff and provided written and verbal instructions . The above note has been reviewed     CARDIOLOGY ATTENDING ADDENDUM    HPI:  I have reviewed the above HPI  And agree with above     Pulse Range: Pulse  Av.3  Min: 79  Max: 134    Blood Presuure Range:  Systolic (87XZQ), UYA:400 , Min:102 , YRZ:677   ; Diastolic (73CBI), LJY:87, Min:39, Max:63      Pulse ox Range: SpO2  Av %  Min: 87 %  Max: 100 %    24hr I & O:    Intake/Output Summary (Last 24 hours) at 2022 1207  Last data filed at 2022 1800  Gross per 24 hour   Intake 400 ml   Output --   Net 400 ml         BP (!) 129/45   Pulse 79   Temp 97.8 °F (36.6 °C)   Resp 24   Ht 5' 5\" (1.651 m)   Wt 136 lb 11 oz (62 kg)   SpO2 97%   BMI 22.75 kg/m²       Physical Exam:  General:  Awake, alert, NAD  Head:normal  Eye:normal  Neck:  No JVD   Chest:  Clear to auscultation, respiration easy  Cardiovascular:  RRR S1S2  Abdomen:   nontender  Extremities:  tr edema  Pulses; palpable  Neuro: grossly normal      MEDICAL DECISION MAKING;    Pt assessed , chart reviewed, patient examined examined , all available data was reviewed, following is the plan which was discussed with GAURANG as well:    Dyspnea probably secondary to pulmonary etiology given that she has large pleural effusion which is probably secondary to her malignancy her ejection fraction has been normal, had thoracentesis   -Was seen for A. fib last month is in sinus rhythm now is on negative chronotropic agents patient is on Eliquis which will probably need to be held prior to thoracentesis, monitor A. fib with RVR postthoracentesis, another bout of tachycardia patient was given digoxin in sinus now we will also consult EP  -Possibility of a HFpEF however looks like the pleural effusions are coming from her malignancy await oncology input  -Hyperlipidemia on Crestor 10 mg p.o. daily  -Status post pericardiocentesis fluid sent for analysis start anticoagulation          Brain Jhon CASAREZ [facility]

## 2022-09-29 NOTE — PROGRESS NOTES
Pt is alert, oriented, and cooperative. Pt medicated without complication. Pt ambulates with staff to bathroom and returns without incident,  at bedside and is agreeable and cooperative. Pt sees physicians, off amio drip and on oral medications. Pt urinates spontaneously and throughout the day. The output is unmeasured as she flushes before or puts paper in the hat. Pt educated each time about need for accuracy. Pt has paxil added back at her request. Pt held anticoagulant for thoracentesis on Friday. Pt has all needs met call light in reach, denies complaints, thanks staff.

## 2022-09-29 NOTE — PROGRESS NOTES
Pt call light rang, this aide was in another room, when I got to the light the bed alarm started going off upon entry to room pts  was turning off bed alarm and trying to get pt up by him self. Pt was drowsy, could not keep their eyes open and half falling asleep sitting up. This aid told the  to not shut off the bed alarm and to never get pt out of bed without a RN or NA at bedside. The pt could fall and harm them selves and or him and it would fall on us. I told him that going around our safety measures was counter productive and could result in a longer hospital stay.

## 2022-09-29 NOTE — PLAN OF CARE
Problem: Discharge Planning  Goal: Discharge to home or other facility with appropriate resources  Outcome: Progressing  Flowsheets (Taken 9/29/2022 0802)  Discharge to home or other facility with appropriate resources: Identify barriers to discharge with patient and caregiver     Problem: Safety - Adult  Goal: Free from fall injury  Outcome: Progressing     Problem: Chronic Conditions and Co-morbidities  Goal: Patient's chronic conditions and co-morbidity symptoms are monitored and maintained or improved  Outcome: Progressing  Flowsheets (Taken 9/29/2022 0802)  Care Plan - Patient's Chronic Conditions and Co-Morbidity Symptoms are Monitored and Maintained or Improved: Monitor and assess patient's chronic conditions and comorbid symptoms for stability, deterioration, or improvement     Problem: Pain  Goal: Verbalizes/displays adequate comfort level or baseline comfort level  Outcome: Progressing  Flowsheets (Taken 9/29/2022 0802)  Verbalizes/displays adequate comfort level or baseline comfort level: Encourage patient to monitor pain and request assistance

## 2022-09-29 NOTE — CONSULTS
Electrophysiology Consult Note      Reason for consultation:  atrial fibrillation    Chief complaint : Shortness of breath    Referring physician: Jon Ly      Primary care physician: Ellie Bradshaw MD      History of Present Illness:     Flavia Trent is a 71year old female with a history of breast carcinoma, diabetes type 2, hypertension, TIA, atrial fibrillation, scleroderma, recurrent bilateral pleural effusions presents with complaints of worsening shortness of breath with and without exertions. She has been admitted for 6 days. She was found to have recurrent bilateral pleural effusions requiring thoracentesis. Additionally during this admission she was found to have a pericardial effusion requiring a pericardial drain. Denies chest pain, syncope or presyncope    During this admission, patient noted to have an episode of atrial fibrillation with RVR. She reports that when she had the atrial fibrillation she had palpitations. Patient's  is at bedside. He reports that patient was first diagnosed with atrial fibrillation in August.  He states patient was given IV medication at that time for the afib and she converted to sinus rhythm. He states that again last month she was admitted for pleural effusion, had afib however again converted to sinus rhythm. Patient has not been cardioverted     Pastmedical history:   Past Medical History:   Diagnosis Date    Anxiety     Breast carcinoma ([address] 1997    R breast - MAMMOGRAM ANNUALLY, NEXT DUE 5/2020. Cervical radiculitis     Right     Chest pain     Depression     Diabetes type 2, controlled ([address] 2016    w two random sugars >126    Family history of diabetes mellitus (DM)     Mother and Father    GERD (gastroesophageal reflux disease)     UGI- sm HH , done at [address] [place] [address],[address] 3/26/14    Hiatal hernia     Hx of Doppler ultrasound 05/17/2022    Normal bilateral lower extremity ankle brachial indices.     Hx of Doppler ultrasound 05/17/2022    Significant reflux noted of the Right GSV SFJ (0.7s), GSV Knee (2.0s). Significant reflux noted in the Left GSV SFJ (1.8s). Hx of echocardiogram 05/12/2022    EF is estimated at 55-60%. Mitral annular calcification is present. Mild mitral, tricuspid and moderate aortic regurgitation is present. Mild Pulmonary hypertension noted with RVSP of 43mmHg. Hypercalcemia     mild ~11, iPTH nl 8/2021    Hyperglycemia     Hyperlipidemia     Insomnia     Irritable bowel syndrome     LBP (low back pain)     Lumbar herniated disc     L L4-5 HNP noted on MRI    Menopause     female exam every 2 yrs, due for recheck w me 2015    Migraine headache     ON TOPAMAX    Osteoporosis     Prediabetes     a1c 6.4 in Dec 2014    S/P colonoscopic polypectomy 06/07/2019    Dr Verónica Escoto, recheck 5 yrs    TIA (transient ischemic attack)     INACTIVE PROBLEM       Surgical history :   Past Surgical History:   Procedure Laterality Date    BREAST RECONSTRUCTION Right 4/2013    Dr Joseline Ragland ([facility])  5/1995    LIANG/BSO    MASTECTOMY  7/1997    Right breast for breast cancer    TUBAL LIGATION         Family history:   Family History   Problem Relation Age of Onset    High Blood Pressure Mother     Diabetes Mother     Diabetes Father     Heart Disease Father         open heart surgery    High Blood Pressure Father     Diabetes Sister     High Cholesterol Sister     Other Sister         hypochondriac    Stroke Paternal Grandmother             Social history :  reports that she has never smoked. She has never used smokeless tobacco. She reports that she does not drink alcohol and does not use drugs. Allergies   Allergen Reactions    Codeine      \"jittery\"  Feels anxoius       No current facility-administered medications on file prior to encounter.      Current Outpatient Medications on File Prior to Encounter   Medication Sig Dispense Refill    ALPRAZolam (XANAX) 0.25 MG tablet Take 1 tablet by mouth 2 times daily as needed for Anxiety for up to 30 days. 40 tablet 1    mirtazapine (REMERON) 15 MG tablet Take 1 tablet by mouth nightly 30 tablet 3    oxybutynin (DITROPAN) 5 MG tablet Take 1 tablet by mouth 2 times daily 60 tablet 3    apixaban (ELIQUIS) 5 MG TABS tablet Take 1 tablet by mouth 2 times daily 60 tablet 1    lisinopril (PRINIVIL;ZESTRIL) 20 MG tablet Take 1 tablet by mouth daily 30 tablet 1    metoprolol tartrate (LOPRESSOR) 50 MG tablet Take 1 tablet by mouth 2 times daily 60 tablet 1    dilTIAZem (CARDIZEM CD) 120 MG extended release capsule Take 1 capsule by mouth daily 30 capsule 1    [DISCONTINUED] amLODIPine-benazepril (LOTREL) 5-10 MG per capsule Take 1 capsule by mouth in the morning. . 30 capsule 3    [DISCONTINUED] allopurinol (ZYLOPRIM) 100 MG tablet Take 2 tablets by mouth in the morning. (Patient not taking: No sig reported) 180 tablet 1    metFORMIN (GLUCOPHAGE) 500 MG tablet Take 1 tablet by mouth every morning (before breakfast) 90 tablet 1    glipiZIDE (GLUCOTROL) 5 MG tablet Take 1 tablet by mouth daily 90 tablet 1    rosuvastatin (CRESTOR) 10 MG tablet Take 1 tablet by mouth nightly 90 tablet 1    Lancets MISC 1 each by Does not apply route 2 times daily 100 each 0    blood glucose monitor strips Test twice a day & as needed for symptoms of irregular blood glucose. 100 strip 3       Review of Systems:   Review of Systems   Constitutional:  Positive for fatigue. Negative for activity change, chills and fever. HENT:  Negative for congestion, ear pain and tinnitus. Eyes:  Negative for photophobia, pain and visual disturbance. Respiratory:  Positive for cough and shortness of breath. Negative for chest tightness and wheezing. Cardiovascular:  Positive for palpitations and leg swelling. Negative for chest pain. Gastrointestinal:  Negative for abdominal pain, blood in stool, constipation, diarrhea, nausea and vomiting.    Endocrine: Negative for cold intolerance and heat intolerance. Genitourinary:  Negative for dysuria, flank pain and hematuria. Musculoskeletal:  Negative for arthralgias, back pain, myalgias and neck stiffness. Skin:  Negative for color change and rash. Allergic/Immunologic: Negative for food allergies. Neurological:  Negative for dizziness, light-headedness, numbness and headaches. Hematological:  Does not bruise/bleed easily. Psychiatric/Behavioral:  Negative for agitation, behavioral problems and confusion. Examination:      Vitals:    09/29/22 0424 09/29/22 0438 09/29/22 0536 09/29/22 1145   BP: (!) 127/39  (!) 129/45    Pulse: 81 83 83 79   Resp: 25   24   Temp: 97.8 °F (36.6 °C)      TempSrc:       SpO2: 97%   97%   Weight:       Height:            Body mass index is 22.75 kg/m². Physical Exam  Constitutional:       General: She is not in acute distress. Appearance: She is ill-appearing. She is not diaphoretic. HENT:      Head: Normocephalic and atraumatic. Right Ear: External ear normal.      Left Ear: External ear normal.      Nose: No congestion. Eyes:      General:         Right eye: No discharge. Left eye: No discharge. Pupils: Pupils are equal, round, and reactive to light. Comments: Pale conunctiva   Cardiovascular:      Rate and Rhythm: Normal rate and regular rhythm. Heart sounds: Murmur (grade 2/6 systolic murmur) heard. Pulmonary:      Breath sounds: Rhonchi and rales present. Abdominal:      General: Abdomen is flat. Bowel sounds are normal. There is no distension. Musculoskeletal:      Cervical back: Neck supple. Right lower leg: Edema present. Left lower leg: Edema present. Skin:     General: Skin is warm. Neurological:      General: No focal deficit present. Mental Status: She is alert and oriented to person, place, and time.    Psychiatric:         Mood and Affect: Mood normal.             CBC:   Lab Results   Component Value Date/Time WBC 8.5 09/29/2022 06:21 AM    HGB 9.9 09/29/2022 06:21 AM    HCT 31.8 09/29/2022 06:21 AM     09/29/2022 06:21 AM     Lipids:  Lab Results   Component Value Date    CHOL 148 09/25/2022    TRIG 250 (H) 09/25/2022    HDL 32 (L) 09/25/2022    LDLCALC 66 09/25/2022    LDLDIRECT 87 06/10/2022     PT/INR:   Lab Results   Component Value Date/Time    INR 1.07 09/25/2022 02:49 AM        BMP:    Lab Results   Component Value Date     09/28/2022    K 4.2 09/28/2022     09/28/2022    CO2 23 09/28/2022    BUN 29 (H) 09/28/2022     CMP:   Lab Results   Component Value Date    AST 15 09/23/2022    PROT 6.4 09/23/2022    BILITOT 0.4 09/23/2022    ALKPHOS 75 09/23/2022     TSH:    Lab Results   Component Value Date/Time    TSH 2.90 07/21/2022 09:55 AM       EKGINTERPRETATION - EKG Interpretation:        IMPRESSION / RECOMMENDATIONS:     Atrial flutter with RVR  PAF  3. Acute on chronic hypoxic respiratory failure  4. Pericardial effusion s/p pericardiocentesis  5. Recurrent bilateral pleural effusions status postthoracentesis  6. Acute kidney injury  7. Acute on chronic diastolic heart failure  8. Moderate to severe pulmonary hypertension  9. Diabetes mellitus type  10. Hyperlipidemia  11. History of invasive ductal carcinoma of right breast s/p mastectomy      Patient appears to be in atrial flutter with RVR now converted. She is also high risk for going into atrial fibrillation given her history of PAF in the past and now sp pericardial irritation could be triggering her to have atrial arrhythmias. Amiodarone is a reasonable choice to control atrial fibrillation in the setting. Continue with amiodarone overall 200 mg twice daily for 14 days and 200 mg daily after that. Patient is placed on colchicine because of some pericardial pain. Patient denies any pain at this time so I would recommend coming off colchicine given the interaction with amiodarone.   Amiodarone can increase the levels of colchicine and there is risk of myopathy and rhabdomyolysis with it    Continue Cardizem    Will follow along      Thanks again for allowing me to participate in care of this patient. Please call me if you have any questions. With best regards. Matthew Mariano MD, 9/29/2022 4:16 PM     Please note this report has been partially produced using speech recognition software and may contain errors related to that system including errors in grammar, punctuation, and spelling, as well as words and phrases that may be inappropriate. If there are any questions or concerns please feel free to contact the dictating provider for clarification.

## 2022-09-29 NOTE — PROGRESS NOTES
Admitting diagnosis-Acute systolic CHF   Cardiologist- [facility]  Heart Failure Education Nurse consulted-yes   Ejection fraction 50-55 % as of 9/26/22  Pro BNP- 2712 on 9/26  Hospital follow up appt-  10/18 Nacho 10/25 1171 [address] Price referreal   Cardiac rehabilitation referral-N/A   ICD information-N/A   Smoking Cessation information and referral-N/A   Pneumonia vaccine-   PCPChavez Lynn MD   Patient has a digital scale-  Able to obtain medications without difficulty-   Heart failure specific Medications-   Hx; pericardial effusion, paroxysmal atrial fibrillation, type 2 diabetes mellitus, chronic respiratory failure, recurrent admission for bilateral pleural effusions, depression, hypertension, history of invasive ductal carcinoma of the right breast 1999 status post mastectomy, ER/TN positive, history of tobacco use and hyperlipidemia         Met with patient and spouse on 9/29. Spouse reports patient has \"had a bad day. \"Patient fatigued. Will return for heart failure education closer to discharge. 9/30/22 Revisited patient and spouse. Patient still weak and fatigued. Education deferred for time closer to discharge. Per Cardiology note symptoms likely due to malignancy. 10/3/22 Met with patient and spouse. Asked if they have questions or concerns about managing heart failure symptoms. Spouse and patient have good understanding of patient's less common diseases and symptom management.

## 2022-09-29 NOTE — CONSULTS
[address] [address] Continence Nurse  Consult Note       Joanna Lozoya  AGE: 71 y.o. GENDER: female  : 1950  TODAY'S DATE:  2022    Subjective:     Reason for CWOCN Evaluation and Assessment: wound assessment      Joanna Lozoya is a 71 y.o. female referred by:   [x] Physician  [] Nursing  [] Other:     Wound Identification:  Wound Type: pressure  Contributing Factors: diabetes, chronic pressure, decreased mobility, decreased tissue oxygenation, and anticoagulation therapy        PAST MEDICAL HISTORY        Diagnosis Date    Anxiety     Breast carcinoma ([facility]     R breast - MAMMOGRAM ANNUALLY, NEXT DUE 2020. Cervical radiculitis     Right     Chest pain     Depression     Diabetes type 2, controlled ([facility]     w two random sugars >126    Family history of diabetes mellitus (DM)     Mother and Father    GERD (gastroesophageal reflux disease)     UGI- sm HH , done at [address] [place] [address],[address] 3/26/14    Hiatal hernia     Hx of Doppler ultrasound 2022    Normal bilateral lower extremity ankle brachial indices. Hx of Doppler ultrasound 2022    Significant reflux noted of the Right GSV SFJ (0.7s), GSV Knee (2.0s). Significant reflux noted in the Left GSV SFJ (1.8s). Hx of echocardiogram 2022    EF is estimated at 55-60%. Mitral annular calcification is present. Mild mitral, tricuspid and moderate aortic regurgitation is present. Mild Pulmonary hypertension noted with RVSP of 43mmHg.     Hypercalcemia     mild ~11, iPTH nl 2021    Hyperglycemia     Hyperlipidemia     Insomnia     Irritable bowel syndrome     LBP (low back pain)     Lumbar herniated disc     L L4-5 HNP noted on MRI    Menopause     female exam every 2 yrs, due for recheck w me     Migraine headache     ON TOPAMAX    Osteoporosis     Prediabetes     a1c 6.4 in Dec 2014    S/P colonoscopic polypectomy 2019    Dr Evon Schneider, recheck 5 yrs    TIA (transient ischemic attack)     INACTIVE PROBLEM       PAST SURGICAL HISTORY    Past Surgical History:   Procedure Laterality Date    BREAST RECONSTRUCTION Right 4/2013    Dr Rex Dickey ([facility])  5/1995    LIANG/BSO    MASTECTOMY  7/1997    Right breast for breast cancer    TUBAL LIGATION         FAMILY HISTORY    Family History   Problem Relation Age of Onset    High Blood Pressure Mother     Diabetes Mother     Diabetes Father     Heart Disease Father         open heart surgery    High Blood Pressure Father     Diabetes Sister     High Cholesterol Sister     Other Sister         hypochondriac    Stroke Paternal Grandmother        SOCIAL HISTORY    Social History     Tobacco Use    Smoking status: Never    Smokeless tobacco: Never   Substance Use Topics    Alcohol use: No    Drug use: No       ALLERGIES    Allergies   Allergen Reactions    Codeine      \"jittery\"  Feels anxoius       MEDICATIONS    No current facility-administered medications on file prior to encounter. Current Outpatient Medications on File Prior to Encounter   Medication Sig Dispense Refill    ALPRAZolam (XANAX) 0.25 MG tablet Take 1 tablet by mouth 2 times daily as needed for Anxiety for up to 30 days. 40 tablet 1    mirtazapine (REMERON) 15 MG tablet Take 1 tablet by mouth nightly 30 tablet 3    oxybutynin (DITROPAN) 5 MG tablet Take 1 tablet by mouth 2 times daily 60 tablet 3    apixaban (ELIQUIS) 5 MG TABS tablet Take 1 tablet by mouth 2 times daily 60 tablet 1    lisinopril (PRINIVIL;ZESTRIL) 20 MG tablet Take 1 tablet by mouth daily 30 tablet 1    metoprolol tartrate (LOPRESSOR) 50 MG tablet Take 1 tablet by mouth 2 times daily 60 tablet 1    dilTIAZem (CARDIZEM CD) 120 MG extended release capsule Take 1 capsule by mouth daily 30 capsule 1    [DISCONTINUED] amLODIPine-benazepril (LOTREL) 5-10 MG per capsule Take 1 capsule by mouth in the morning. . 30 capsule 3    [DISCONTINUED] allopurinol (ZYLOPRIM) 100 MG tablet Take 2 tablets by mouth in the morning.  (Patient not taking: No sig reported) 180 tablet 1    metFORMIN (GLUCOPHAGE) 500 MG tablet Take 1 tablet by mouth every morning (before breakfast) 90 tablet 1    glipiZIDE (GLUCOTROL) 5 MG tablet Take 1 tablet by mouth daily 90 tablet 1    rosuvastatin (CRESTOR) 10 MG tablet Take 1 tablet by mouth nightly 90 tablet 1    Lancets MISC 1 each by Does not apply route 2 times daily 100 each 0    blood glucose monitor strips Test twice a day & as needed for symptoms of irregular blood glucose.  100 strip 3         Objective:      BP (!) 129/45   Pulse 83   Temp 97.8 °F (36.6 °C)   Resp 25   Ht 5' 5\" (1.651 m)   Wt 136 lb 11 oz (62 kg)   SpO2 97%   BMI 22.75 kg/m²   Yohan Risk Score: Yohan Scale Score: 19    LABS    CBC:   Lab Results   Component Value Date/Time    WBC 8.5 09/29/2022 06:21 AM    RBC 3.46 09/29/2022 06:21 AM    HGB 9.9 09/29/2022 06:21 AM    HCT 31.8 09/29/2022 06:21 AM    MCV 91.9 09/29/2022 06:21 AM    MCH 28.6 09/29/2022 06:21 AM    MCHC 31.1 09/29/2022 06:21 AM    RDW 15.2 09/29/2022 06:21 AM     09/29/2022 06:21 AM    MPV 10.1 09/29/2022 06:21 AM     CMP:    Lab Results   Component Value Date/Time     09/28/2022 06:02 AM    K 4.2 09/28/2022 06:02 AM     09/28/2022 06:02 AM    CO2 23 09/28/2022 06:02 AM    BUN 29 09/28/2022 06:02 AM    CREATININE 1.5 09/28/2022 06:02 AM    GFRAA 41 09/28/2022 06:02 AM    GFRAA >60 09/14/2012 08:31 AM    AGRATIO 1.3 09/21/2022 10:55 AM    LABGLOM 34 09/28/2022 06:02 AM    LABGLOM 79 05/07/2014 07:41 AM    GLUCOSE 196 09/28/2022 06:02 AM    PROT 6.4 09/23/2022 10:02 PM    PROT 7.4 09/14/2012 08:31 AM    LABALBU 3.3 09/23/2022 10:02 PM    CALCIUM 9.4 09/28/2022 06:02 AM    BILITOT 0.4 09/23/2022 10:02 PM    ALKPHOS 75 09/23/2022 10:02 PM    AST 15 09/23/2022 10:02 PM    ALT 12 09/23/2022 10:02 PM     Albumin:    Lab Results   Component Value Date/Time    LABALBU 3.3 09/23/2022 10:02 PM     PT/INR:    Lab Results   Component Value Date/Time    PROTIME 13.8 09/25/2022 02:49 AM    INR 1.07 09/25/2022 02:49 AM     HgBA1c:    Lab Results   Component Value Date/Time    LABA1C 6.5 08/18/2022 07:10 AM         Assessment:     Patient Active Problem List   Diagnosis    Hyperlipidemia    History of breast cancer    Lumbar herniated disc    Low back pain    GERD (gastroesophageal reflux disease)    Irritable bowel syndrome    Migraine headache    Diabetes type 2, controlled ([facility])    Hypercalcemia    Carcinoma of right female breast, unspecified estrogen receptor status, unspecified site of breast ([facility])    Peripheral edema    Chest pain    Venous insufficiency    Shortness of breath    Edema of lower extremity    Varicose veins of both legs with edema    Atrial fibrillation with RVR ([facility])    Diastolic heart failure, unspecified HF chronicity ([facility])    Acute systolic CHF (congestive heart failure) ([facility])    Acute respiratory failure with hypoxia ([facility])       Measurements:  Wound 09/29/22 Coccyx (Active)   Wound Image   09/29/22 0830   Wound Etiology Pressure Stage 2 09/29/22 0830   Dressing Status New dressing applied 09/29/22 0830   Wound Cleansed Cleansed with saline 09/29/22 0830   Dressing/Treatment Collagen;Silicone border 34/79/33 0830   Wound Length (cm) 0.5 cm 09/29/22 0830   Wound Width (cm) 0.5 cm 09/29/22 0830   Wound Depth (cm) 0.1 cm 09/29/22 0830   Wound Surface Area (cm^2) 0.25 cm^2 09/29/22 0830   Wound Volume (cm^3) 0.025 cm^3 09/29/22 0830   Distance Tunneling (cm) 0 cm 09/29/22 0830   Tunneling Position ___ O'Clock 0 09/29/22 0830   Undermining Starts ___ O'Clock 0 09/29/22 0830   Undermining Ends___ O'Clock 0 09/29/22 0830   Undermining Maxium Distance (cm) 0 09/29/22 0830   Wound Assessment Pink/red 09/29/22 0830   Drainage Amount Scant 09/29/22 0830   Drainage Description Serous 09/29/22 0830   Odor None 09/29/22 0830   [place]-wound Assessment Intact; Maceration 09/29/22 0830   Margins Defined edges 09/29/22 0830   Wound Thickness Description not for Pressure Injury Partial thickness 09/29/22 0830   Number of days: 0       Response to treatment:  Well tolerated by patient. Pain Assessment:  Severity:  none  Quality of pain: na  Wound Pain Timing/Severity: na  Premedicated: no    Plan:     Plan of Care: Wound 09/29/22 Coccyx-Dressing/Treatment: Collagen, Silicone border    Pt in bed. Spouse with pt. Stated has had wound on tailbone for at least a few months. Probable stage 2 noted to coccyx. Appears clean. Some macerated scar tissue noted to malia wound. Picture and measurements taken. Collagen and silicone foam border recommended and applied. Educated on pressure relief techniques. Heels intact. Pt is a mild risk for skin breakdown AEB Yohan. Follow Yohan orders. 3    Specialty Bed Required : yes  [] Low Air Loss   [x] Pressure Redistribution  [] Fluid Immersion  [] Bariatric  [] Total Pressure Relief  [] Other:     Discharge Plan:  Placement for patient upon discharge: tbd  Hospice Care: no  Patient appropriate for Outpatient [address]: [facility]    Patient/Caregiver Teaching:  Level of patient/caregiver understanding able to:   Voiced understanding.         Electronically signed by Criss Kwon RN, Priya Glover on 9/29/2022 at 9:58 AM

## 2022-09-29 NOTE — PLAN OF CARE
9/28/22 - tried calling floor multiple times @ 21:36 hours with no answer will try again later.     Patient has ultrasound order

## 2022-09-29 NOTE — PROGRESS NOTES
Physical Therapy  [facility] ACUTE CARE PHYSICAL THERAPY EVALUATION  uSki Muhammad, 1950, 2030/2030-A, 9/29/2022    History  [place]:  The primary encounter diagnosis was Acute respiratory failure with hypoxia ([address] Diagnoses of Pleural effusion, Recurrent right pleural effusion, and Recurrent left pleural effusion were also pertinent to this visit. Patient  has a past medical history of Anxiety, Breast carcinoma ([address] Cervical radiculitis, Chest pain, Depression, Diabetes type 2, controlled ([address] Family history of diabetes mellitus (DM), GERD (gastroesophageal reflux disease), Hiatal hernia, Hx of Doppler ultrasound, Hx of Doppler ultrasound, Hx of echocardiogram, Hypercalcemia, Hyperglycemia, Hyperlipidemia, Insomnia, Irritable bowel syndrome, LBP (low back pain), Lumbar herniated disc, Menopause, Migraine headache, Osteoporosis, Prediabetes, S/P colonoscopic polypectomy, and TIA (transient ischemic attack). Patient  has a past surgical history that includes Tubal ligation; Mastectomy (7/1997); Hysterectomy (5/1995); and Breast reconstruction (Right, 4/2013). Assessment:  Pt presents 6 days s/p admission for SOB ; 4 thoracentesis and 1 pericardiocentesis since admission. Pt is from home w/ family, 1 step to enter their 1 level home, ind w/o AD, assist for home mgmt/ADL's from , 2-3 L at baseline. She demonstrates impairments in endurance, balance, strength, functional mobility, safety. She is improving; remains limited by fluid retention and endurance, anticipating improvement to facilitate safe return home. Recommending home w/ assist + HH PT. Complexity: Moderate  Prognosis: Good, comorbid conditions and endurance may impact prognosis  Plan 3+/week, 1 week  Equipment: no new equipment anticipated    Recommendations for NURSING mobility: amb to and from BR     Subjective:  Patient states:  \"Why do they put showers in here if you can't use them? \"    Pain: L arm discomfort that she does not rate  Communication with other providers:  Handoff to RN, co-eval with JANIA Sabillon  Restrictions: fall risk; general precautions, 2-3 L of nc O2 at baseline    Home Setup/Prior level of function  Social/Functional History  Lives With: Spouse  Type of Home: Condo  Home Layout: One level  Home Access: Level entry  Bathroom Shower/Tub: Shower chair with back, Tub/Shower unit  Bathroom Toilet: Standard  Bathroom Equipment: Toilet raiser, Grab bars in shower  Has the patient had two or more falls in the past year or any fall with injury in the past year?: Yes  ADL Assistance: Needs assistance ( assists with intermittent ADLs)  Homemaking Assistance: Needs assistance  Ambulation Assistance: Independent  Transfer Assistance: Independent  Additional Comments: 3L O2 at baseline    Examination of body systems (includes body structures/functions, activity/participation limitations):  Observation:  Supine, awake, alert, agreeable. She is pleasant, but upset about recent functional decline and lack of recent mobility. Tele, BP cuff, pulse ox, 3 L of nc o2, peripheral IV, PICC port in place upon arrival.  Posture: fair + ; slight fwd head and shoulders  Vision:  glasses at all times  Hearing:  slight [place]  Cardiopulmonary:  WFL  Cognition: A&O x 4 ; alert, follows commands w/o difficulty, attention intact, memory appears intact, good safety awareness, min cues needed for sequencing/setup, no cues needed for initiation, good insight into deficits    Musculoskeletal  ROM R/L: AROM WFL. Strength R/L:  grossly 4+/5  Sensation: WFL  Tone: normotonic  Coordination: WFL  Proprioception: WFL  Gait pattern: consistent, steady, shuffling, slight crouched gait w/ onset of fatigue    Mobility:  Supine to sit:  min A  Transfers: CGA  Sitting balance:  fair +. Standing balance:  fair +.     Gait: 55 ft using FWW at CGA ; dec stuart, cues for pacing and PLB, dec step length BL, near shuffling quality ; limited primarily by endurance and SOB    [facility] 6 Clicks Inpatient Mobility:  AM-PAC Inpatient Mobility Raw Score : 18    Goals:  Pt Goals: return home  Short Term Goals  Time Frame for Short term goals: 1 week  Short term goal 1: pt will complete bed mobility at [facility]  Short term goal 2: pt will complete transfers at [facility]  Short term goal 3: pt will ambulate 125 ft using LRAD at [facility]       Treatment plan:  Bed mobility, functional mobility, transfers, balance, gait, TA, TX, strength activities    Treatment:  Therapeutic Activity Training:   Therapeutic activity training was instructed today. Cues were given for safety, sequence, UE/LE placement, awareness, and balance. Activities performed today included bed mobility training, sup-sit, sit-stand, SPT. Gait Training:  Cues were given for safety, sequence, device management, balance, posture, awareness, path.       Positioned for comfort and pressure relief  Safety: patient left in chair, chair alarmed, call light within reach, RN notified, gait belt used, all needs met    Time:   Time in: 1305  Time out: 1341  Timed treatment minutes: 26 TA, GT  Total time: 39    Electronically signed by:    Myra Olivera PT, DPT  9/29/2022, 3:31 PM

## 2022-09-30 ENCOUNTER — HOSPITAL ENCOUNTER (OUTPATIENT)
Dept: CARDIAC CATH/INVASIVE PROCEDURES | Age: 72
Discharge: HOME | End: 2022-09-30

## 2022-09-30 ENCOUNTER — APPOINTMENT (OUTPATIENT)
Dept: GENERAL RADIOLOGY | Age: 72
DRG: 280 | End: 2022-09-30
Payer: MEDICARE

## 2022-09-30 ENCOUNTER — APPOINTMENT (OUTPATIENT)
Dept: INTERVENTIONAL RADIOLOGY/VASCULAR | Age: 72
DRG: 280 | End: 2022-09-30
Payer: MEDICARE

## 2022-09-30 PROBLEM — E44.0 MODERATE MALNUTRITION (HCC): Status: ACTIVE | Noted: 2022-09-30

## 2022-09-30 LAB
ALBUMIN SERPL-MCNC: 3.9 GM/DL (ref 3.4–5)
ALP BLD-CCNC: 57 IU/L (ref 40–128)
ALT SERPL-CCNC: 5 U/L (ref 10–40)
ANION GAP SERPL CALCULATED.3IONS-SCNC: 14 MMOL/L (ref 4–16)
ANTI-NUCLEAR ANTIBODY (ANA): NORMAL
AST SERPL-CCNC: 9 IU/L (ref 15–37)
BASOPHILS ABSOLUTE: 0.1 K/CU MM
BASOPHILS RELATIVE PERCENT: 0.6 % (ref 0–1)
BILIRUB SERPL-MCNC: 0.6 MG/DL (ref 0–1)
BUN BLDV-MCNC: 22 MG/DL (ref 6–23)
CALCIUM SERPL-MCNC: 8.8 MG/DL (ref 8.3–10.6)
CHLORIDE BLD-SCNC: 100 MMOL/L (ref 99–110)
CO2: 25 MMOL/L (ref 21–32)
CREAT SERPL-MCNC: 1 MG/DL (ref 0.6–1.1)
DIFFERENTIAL TYPE: ABNORMAL
EKG DIAGNOSIS: NORMAL
EKG Q-T INTERVAL: 324 MS
EKG QRS DURATION: 80 MS
EKG QTC CALCULATION (BAZETT): 470 MS
EKG R AXIS: 21 DEGREES
EKG T AXIS: -71 DEGREES
EKG VENTRICULAR RATE: 127 BPM
EOSINOPHILS ABSOLUTE: 0.2 K/CU MM
EOSINOPHILS RELATIVE PERCENT: 1.8 % (ref 0–3)
FLUID TYPE: NORMAL INDEX
FLUID TYPE: NORMAL INDEX
GFR AFRICAN AMERICAN: >60 ML/MIN/1.73M2
GFR NON-AFRICAN AMERICAN: 55 ML/MIN/1.73M2
GLUCOSE BLD-MCNC: 127 MG/DL (ref 70–99)
GLUCOSE BLD-MCNC: 147 MG/DL (ref 70–99)
GLUCOSE BLD-MCNC: 151 MG/DL (ref 70–99)
GLUCOSE BLD-MCNC: 163 MG/DL (ref 70–99)
GLUCOSE BLD-MCNC: 170 MG/DL (ref 70–99)
GLUCOSE BLD-MCNC: 182 MG/DL (ref 70–99)
GLUCOSE BLD-MCNC: 190 MG/DL (ref 70–99)
GLUCOSE BLD-MCNC: 86 MG/DL (ref 70–99)
GLUCOSE, FLUID: 181 MG/DL
GLUCOSE, FLUID: 184 MG/DL
HCT VFR BLD CALC: 31.9 % (ref 37–47)
HEMOGLOBIN: 9.9 GM/DL (ref 12.5–16)
IMMATURE NEUTROPHIL %: 0.4 % (ref 0–0.43)
LACTATE DEHYDROGENASE, FLUID: 107 IU/L
LACTATE DEHYDROGENASE, FLUID: 107 IU/L
LYMPHOCYTES ABSOLUTE: 1 K/CU MM
LYMPHOCYTES RELATIVE PERCENT: 9.5 % (ref 24–44)
LYMPHOCYTES, BODY FLUID: 61 %
LYMPHOCYTES, BODY FLUID: 86 %
MAGNESIUM: 1.7 MG/DL (ref 1.8–2.4)
MCH RBC QN AUTO: 28.3 PG (ref 27–31)
MCHC RBC AUTO-ENTMCNC: 31 % (ref 32–36)
MCV RBC AUTO: 91.1 FL (ref 78–100)
MESOTHELIAL FLUID: 11 /100 WBC
MESOTHELIAL FLUID: 15 /100 WBC
MONOCYTE, FLUID: 37 %
MONOCYTE, FLUID: 7 %
MONOCYTES ABSOLUTE: 1.2 K/CU MM
MONOCYTES RELATIVE PERCENT: 11.9 % (ref 0–4)
NEUTROPHIL, FLUID: 2 %
NEUTROPHIL, FLUID: 7 %
NUCLEATED RBC %: 0 %
OTHER CELLS FLUID: NORMAL
OTHER CELLS FLUID: NORMAL
PDW BLD-RTO: 15.1 % (ref 11.7–14.9)
PH FLUID: 8.5
PH FLUID: 8.5
PLATELET # BLD: 310 K/CU MM (ref 140–440)
PMV BLD AUTO: 10.8 FL (ref 7.5–11.1)
POTASSIUM SERPL-SCNC: 3.2 MMOL/L (ref 3.5–5.1)
PROTEIN FLUID: 2.5 G/DL
PROTEIN FLUID: 2.7 G/DL
RBC # BLD: 3.5 M/CU MM (ref 4.2–5.4)
RBC FLUID: 2000 /CU MM
RBC FLUID: 452 /CU MM
SEGMENTED NEUTROPHILS ABSOLUTE COUNT: 7.8 K/CU MM
SEGMENTED NEUTROPHILS RELATIVE PERCENT: 75.8 % (ref 36–66)
SODIUM BLD-SCNC: 139 MMOL/L (ref 135–145)
TOTAL IMMATURE NEUTOROPHIL: 0.04 K/CU MM
TOTAL NUCLEATED RBC: 0 K/CU MM
TOTAL PROTEIN: 5.5 GM/DL (ref 6.4–8.2)
WBC # BLD: 10.3 K/CU MM (ref 4–10.5)
WBC FLUID: 202 /CU MM
WBC FLUID: 314 /CU MM

## 2022-09-30 PROCEDURE — 32555 ASPIRATE PLEURA W/ IMAGING: CPT

## 2022-09-30 PROCEDURE — 82945 GLUCOSE OTHER FLUID: CPT

## 2022-09-30 PROCEDURE — 6360000002 HC RX W HCPCS: Performed by: INTERNAL MEDICINE

## 2022-09-30 PROCEDURE — 83986 ASSAY PH BODY FLUID NOS: CPT

## 2022-09-30 PROCEDURE — 6370000000 HC RX 637 (ALT 250 FOR IP): Performed by: INTERNAL MEDICINE

## 2022-09-30 PROCEDURE — P9047 ALBUMIN (HUMAN), 25%, 50ML: HCPCS | Performed by: INTERNAL MEDICINE

## 2022-09-30 PROCEDURE — 94150 VITAL CAPACITY TEST: CPT

## 2022-09-30 PROCEDURE — 6370000000 HC RX 637 (ALT 250 FOR IP): Performed by: STUDENT IN AN ORGANIZED HEALTH CARE EDUCATION/TRAINING PROGRAM

## 2022-09-30 PROCEDURE — 99024 POSTOP FOLLOW-UP VISIT: CPT | Performed by: INTERNAL MEDICINE

## 2022-09-30 PROCEDURE — 80053 COMPREHEN METABOLIC PANEL: CPT

## 2022-09-30 PROCEDURE — 99232 SBSQ HOSP IP/OBS MODERATE 35: CPT | Performed by: INTERNAL MEDICINE

## 2022-09-30 PROCEDURE — 0W9B3ZZ DRAINAGE OF LEFT PLEURAL CAVITY, PERCUTANEOUS APPROACH: ICD-10-PCS | Performed by: INTERNAL MEDICINE

## 2022-09-30 PROCEDURE — 99233 SBSQ HOSP IP/OBS HIGH 50: CPT | Performed by: INTERNAL MEDICINE

## 2022-09-30 PROCEDURE — 93010 ELECTROCARDIOGRAM REPORT: CPT | Performed by: INTERNAL MEDICINE

## 2022-09-30 PROCEDURE — 2060000000 HC ICU INTERMEDIATE R&B

## 2022-09-30 PROCEDURE — 85025 COMPLETE CBC W/AUTO DIFF WBC: CPT

## 2022-09-30 PROCEDURE — 32555 ASPIRATE PLEURA W/ IMAGING: CPT | Performed by: INTERNAL MEDICINE

## 2022-09-30 PROCEDURE — 84157 ASSAY OF PROTEIN OTHER: CPT

## 2022-09-30 PROCEDURE — 71045 X-RAY EXAM CHEST 1 VIEW: CPT

## 2022-09-30 PROCEDURE — 82962 GLUCOSE BLOOD TEST: CPT

## 2022-09-30 PROCEDURE — 89051 BODY FLUID CELL COUNT: CPT

## 2022-09-30 PROCEDURE — 6370000000 HC RX 637 (ALT 250 FOR IP): Performed by: NURSE PRACTITIONER

## 2022-09-30 PROCEDURE — 94761 N-INVAS EAR/PLS OXIMETRY MLT: CPT

## 2022-09-30 PROCEDURE — 88108 CYTOPATH CONCENTRATE TECH: CPT

## 2022-09-30 PROCEDURE — 6360000002 HC RX W HCPCS

## 2022-09-30 PROCEDURE — 2580000003 HC RX 258: Performed by: STUDENT IN AN ORGANIZED HEALTH CARE EDUCATION/TRAINING PROGRAM

## 2022-09-30 PROCEDURE — 2700000000 HC OXYGEN THERAPY PER DAY

## 2022-09-30 PROCEDURE — 83880 ASSAY OF NATRIURETIC PEPTIDE: CPT

## 2022-09-30 PROCEDURE — 84100 ASSAY OF PHOSPHORUS: CPT

## 2022-09-30 PROCEDURE — 83735 ASSAY OF MAGNESIUM: CPT

## 2022-09-30 PROCEDURE — 83615 LACTATE (LD) (LDH) ENZYME: CPT

## 2022-09-30 PROCEDURE — 87205 SMEAR GRAM STAIN: CPT

## 2022-09-30 PROCEDURE — 99232 SBSQ HOSP IP/OBS MODERATE 35: CPT | Performed by: NURSE PRACTITIONER

## 2022-09-30 PROCEDURE — 93005 ELECTROCARDIOGRAM TRACING: CPT | Performed by: STUDENT IN AN ORGANIZED HEALTH CARE EDUCATION/TRAINING PROGRAM

## 2022-09-30 PROCEDURE — 6360000002 HC RX W HCPCS: Performed by: NURSE PRACTITIONER

## 2022-09-30 PROCEDURE — 87070 CULTURE OTHR SPECIMN AEROBIC: CPT

## 2022-09-30 PROCEDURE — 2580000003 HC RX 258: Performed by: INTERNAL MEDICINE

## 2022-09-30 PROCEDURE — 88305 TISSUE EXAM BY PATHOLOGIST: CPT

## 2022-09-30 RX ORDER — MAGNESIUM SULFATE IN WATER 40 MG/ML
2000 INJECTION, SOLUTION INTRAVENOUS ONCE
Status: COMPLETED | OUTPATIENT
Start: 2022-09-30 | End: 2022-09-30

## 2022-09-30 RX ORDER — POTASSIUM CHLORIDE 7.45 MG/ML
10 INJECTION INTRAVENOUS PRN
Status: DISCONTINUED | OUTPATIENT
Start: 2022-09-30 | End: 2022-10-03 | Stop reason: HOSPADM

## 2022-09-30 RX ORDER — OXYCODONE HYDROCHLORIDE 5 MG/1
2.5 TABLET ORAL EVERY 4 HOURS PRN
Status: DISCONTINUED | OUTPATIENT
Start: 2022-09-30 | End: 2022-10-03 | Stop reason: HOSPADM

## 2022-09-30 RX ORDER — DIGOXIN 0.25 MG/ML
250 INJECTION INTRAMUSCULAR; INTRAVENOUS EVERY 4 HOURS
Status: COMPLETED | OUTPATIENT
Start: 2022-09-30 | End: 2022-09-30

## 2022-09-30 RX ORDER — ASPIRIN 81 MG/1
81 TABLET ORAL EVERY 8 HOURS
Status: DISCONTINUED | OUTPATIENT
Start: 2022-09-30 | End: 2022-10-03 | Stop reason: HOSPADM

## 2022-09-30 RX ORDER — POTASSIUM CHLORIDE 20 MEQ/1
40 TABLET, EXTENDED RELEASE ORAL PRN
Status: DISCONTINUED | OUTPATIENT
Start: 2022-09-30 | End: 2022-10-03 | Stop reason: HOSPADM

## 2022-09-30 RX ADMIN — DIGOXIN 250 MCG: 250 INJECTION, SOLUTION INTRAMUSCULAR; INTRAVENOUS at 20:36

## 2022-09-30 RX ADMIN — FUROSEMIDE 40 MG: 40 TABLET ORAL at 10:47

## 2022-09-30 RX ADMIN — SODIUM CHLORIDE, PRESERVATIVE FREE 10 ML: 5 INJECTION INTRAVENOUS at 11:05

## 2022-09-30 RX ADMIN — DOCUSATE SODIUM 100 MG: 100 CAPSULE, LIQUID FILLED ORAL at 10:46

## 2022-09-30 RX ADMIN — APIXABAN 5 MG: 5 TABLET, FILM COATED ORAL at 20:35

## 2022-09-30 RX ADMIN — SODIUM CHLORIDE, PRESERVATIVE FREE 10 ML: 5 INJECTION INTRAVENOUS at 11:23

## 2022-09-30 RX ADMIN — SENNOSIDES 8.6 MG: 8.6 TABLET, FILM COATED ORAL at 20:36

## 2022-09-30 RX ADMIN — ASPIRIN 650 MG: 325 TABLET, COATED ORAL at 10:46

## 2022-09-30 RX ADMIN — MIRTAZAPINE 15 MG: 15 TABLET, FILM COATED ORAL at 20:36

## 2022-09-30 RX ADMIN — POTASSIUM CHLORIDE 40 MEQ: 1500 TABLET, EXTENDED RELEASE ORAL at 10:46

## 2022-09-30 RX ADMIN — ASPIRIN 81 MG: 81 TABLET, COATED ORAL at 16:20

## 2022-09-30 RX ADMIN — ASPIRIN 81 MG: 81 TABLET, COATED ORAL at 20:35

## 2022-09-30 RX ADMIN — AMIODARONE HYDROCHLORIDE 200 MG: 200 TABLET ORAL at 20:36

## 2022-09-30 RX ADMIN — ROSUVASTATIN CALCIUM 10 MG: 5 TABLET, FILM COATED ORAL at 20:35

## 2022-09-30 RX ADMIN — SODIUM CHLORIDE, PRESERVATIVE FREE 10 ML: 5 INJECTION INTRAVENOUS at 11:06

## 2022-09-30 RX ADMIN — ACETAMINOPHEN 650 MG: 325 TABLET ORAL at 10:56

## 2022-09-30 RX ADMIN — INSULIN LISPRO 5 UNITS: 100 INJECTION, SOLUTION INTRAVENOUS; SUBCUTANEOUS at 12:32

## 2022-09-30 RX ADMIN — DIGOXIN 250 MCG: 250 INJECTION, SOLUTION INTRAMUSCULAR; INTRAVENOUS at 16:20

## 2022-09-30 RX ADMIN — DOCUSATE SODIUM 100 MG: 100 CAPSULE, LIQUID FILLED ORAL at 20:35

## 2022-09-30 RX ADMIN — PANTOPRAZOLE SODIUM 40 MG: 40 TABLET, DELAYED RELEASE ORAL at 10:46

## 2022-09-30 RX ADMIN — DIGOXIN 250 MCG: 250 INJECTION, SOLUTION INTRAMUSCULAR; INTRAVENOUS at 06:59

## 2022-09-30 RX ADMIN — AMIODARONE HYDROCHLORIDE 200 MG: 200 TABLET ORAL at 10:46

## 2022-09-30 RX ADMIN — OXYBUTYNIN CHLORIDE 5 MG: 5 TABLET ORAL at 20:36

## 2022-09-30 RX ADMIN — SODIUM CHLORIDE, PRESERVATIVE FREE 10 ML: 5 INJECTION INTRAVENOUS at 20:37

## 2022-09-30 RX ADMIN — OXYBUTYNIN CHLORIDE 5 MG: 5 TABLET ORAL at 10:46

## 2022-09-30 RX ADMIN — DILTIAZEM HYDROCHLORIDE 180 MG: 180 CAPSULE, COATED, EXTENDED RELEASE ORAL at 10:47

## 2022-09-30 RX ADMIN — PAROXETINE HYDROCHLORIDE 20 MG: 20 TABLET, FILM COATED ORAL at 10:47

## 2022-09-30 RX ADMIN — DIGOXIN 250 MCG: 250 INJECTION, SOLUTION INTRAMUSCULAR; INTRAVENOUS at 10:47

## 2022-09-30 RX ADMIN — FUROSEMIDE 40 MG: 40 TABLET ORAL at 20:35

## 2022-09-30 RX ADMIN — ALBUMIN (HUMAN) 25 G: 0.25 INJECTION, SOLUTION INTRAVENOUS at 05:10

## 2022-09-30 RX ADMIN — MAGNESIUM SULFATE HEPTAHYDRATE 2000 MG: 2 INJECTION, SOLUTION INTRAVENOUS at 11:00

## 2022-09-30 RX ADMIN — INSULIN GLARGINE 10 UNITS: 100 INJECTION, SOLUTION SUBCUTANEOUS at 10:57

## 2022-09-30 ASSESSMENT — PAIN - FUNCTIONAL ASSESSMENT
PAIN_FUNCTIONAL_ASSESSMENT: ACTIVITIES ARE NOT PREVENTED
PAIN_FUNCTIONAL_ASSESSMENT: ACTIVITIES ARE NOT PREVENTED

## 2022-09-30 ASSESSMENT — PAIN DESCRIPTION - PAIN TYPE: TYPE: ACUTE PAIN

## 2022-09-30 ASSESSMENT — PAIN DESCRIPTION - DESCRIPTORS
DESCRIPTORS: BURNING
DESCRIPTORS: BURNING

## 2022-09-30 ASSESSMENT — PAIN DESCRIPTION - LOCATION
LOCATION: ARM
LOCATION: ARM

## 2022-09-30 ASSESSMENT — PAIN DESCRIPTION - ONSET: ONSET: ON-GOING

## 2022-09-30 ASSESSMENT — PAIN DESCRIPTION - FREQUENCY: FREQUENCY: CONTINUOUS

## 2022-09-30 ASSESSMENT — PAIN DESCRIPTION - ORIENTATION
ORIENTATION: RIGHT
ORIENTATION: RIGHT

## 2022-09-30 ASSESSMENT — PAIN SCALES - GENERAL
PAINLEVEL_OUTOF10: 5
PAINLEVEL_OUTOF10: 0
PAINLEVEL_OUTOF10: 5

## 2022-09-30 ASSESSMENT — PAIN SCALES - WONG BAKER: WONGBAKER_NUMERICALRESPONSE: 0

## 2022-09-30 NOTE — PROGRESS NOTES
Admit Date:  9/23/2022    Admission diagnosis / Complaint: shortness of breath      Subjective:  Ms. Mitchell Reeves is resting in bed. Objective:   BP (!) 123/40   Pulse (!) 109   Temp 98.5 °F (36.9 °C) (Oral)   Resp 29   Ht 5' 5\" (1.651 m)   Wt 136 lb 11 oz (62 kg)   SpO2 96%   BMI 22.75 kg/m²     Intake/Output Summary (Last 24 hours) at 9/30/2022 0947  Last data filed at 9/29/2022 1805  Gross per 24 hour   Intake 490 ml   Output 900 ml   Net -410 ml       TELEMETRY: atrial fibrillation   has a past medical history of Anxiety, Breast carcinoma (HCC), Cervical radiculitis, Chest pain, Depression, Diabetes type 2, controlled ([facility] [address] Family history of diabetes mellitus (DM), GERD (gastroesophageal reflux disease), Hiatal hernia, Hx of Doppler ultrasound, Hx of Doppler ultrasound, Hx of echocardiogram, Hypercalcemia, Hyperglycemia, Hyperlipidemia, Insomnia, Irritable bowel syndrome, LBP (low back pain), Lumbar herniated disc, Menopause, Migraine headache, Osteoporosis, Prediabetes, S/P colonoscopic polypectomy, and TIA (transient ischemic attack). has a past surgical history that includes Tubal ligation; Mastectomy (7/1997); Hysterectomy (5/1995); and Breast reconstruction (Right, 4/2013).       Physical Exam:  General: sleepy, easily arousable, Confused  Skin:  Warm and dry  Neck:  JVD not appreciated  Chest:  rhonchi and rales  Cardiovascular:  irregular rate and rhythm,  S1S2, grade 2/6 systolic murmur  Abdomen:  Soft nontender  Extremities:  trace edema    Medications:    digoxin  250 mcg IntraVENous Q4H    docusate sodium  100 mg Oral BID    senna  1 tablet Oral Nightly    dilTIAZem  180 mg Oral Daily    PARoxetine  20 mg Oral Daily    amiodarone  200 mg Oral BID    [START ON 10/14/2022] amiodarone  200 mg Oral Daily    albumin human  25 g IntraVENous Q8H    pantoprazole  40 mg Oral QAM AC    furosemide  40 mg Oral BID    aspirin  650 mg Oral q8h    insulin glargine  10 Units SubCUTAneous BID    insulin lispro  5 Units SubCUTAneous TID     lidocaine PF  5 mL IntraDERmal Once    sodium chloride flush  5-40 mL IntraVENous 2 times per day    sodium chloride flush  5-40 mL IntraVENous 2 times per day    lidocaine  20 mL IntraDERmal Once    sodium chloride flush  5-40 mL IntraVENous 2 times per day    apixaban  5 mg Oral BID    [Held by provider] lisinopril  20 mg Oral Daily    mirtazapine  15 mg Oral Nightly    oxybutynin  5 mg Oral BID    rosuvastatin  10 mg Oral Nightly    insulin lispro  0-4 Units SubCUTAneous TID WC    insulin lispro  0-4 Units SubCUTAneous Nightly      sodium chloride      dextrose      sodium chloride      sodium chloride       sodium chloride flush, sodium chloride, glucose, dextrose bolus **OR** dextrose bolus, glucagon (rDNA), dextrose, guaiFENesin-dextromethorphan, sodium chloride flush, sodium chloride, acetaminophen, ondansetron, hydrALAZINE, sodium chloride flush, sodium chloride, ondansetron **OR** [DISCONTINUED] ondansetron, polyethylene glycol, [DISCONTINUED] acetaminophen **OR** acetaminophen, ALPRAZolam    Lab Data:  CBC:   Recent Labs     09/28/22  0602 09/29/22  0621 09/30/22  0600   WBC 9.2 8.5 10.3   HGB 11.2* 9.9* 9.9*   HCT 36.0* 31.8* 31.9*   MCV 92.5 91.9 91.1    272 310     BMP:   Recent Labs     09/28/22  0602 09/29/22  0621 09/30/22  0600     --  139   K 4.2  --  3.2*     --  100   CO2 23  --  25   PHOS 4.5 3.8  --    BUN 29*  --  22   CREATININE 1.5*  --  1.0     LIVER PROFILE:   Recent Labs     09/30/22  0600   AST 9*   ALT 5*   BILITOT 0.6   ALKPHOS 57           Assessment and Plan    Atrial flutter with RVR  Hypokalemia  Hypomagnesemia  New onset confusion  PAF  Acute on Chronic hypoxic respiratory failure  Pericardial effusion s/p pericardiocentesis  Recurrent bilateral pleural effusions status post thoracentesis  Acute kidney injury  Acute on chronic diastolic heart failure  Moderate to severe pulmonary hypertension  Diabetes mellitus type  Hyperlipidemia  History of invasive ductal carcinoma of right breast s/p mastectomy      Patient in afib overnight  BP is soft  Continue amiodarone 200 mg BID for 14 days, then 200 mg daily  Continue Cardizem  mg daily. Ok to [place] as BP and heart rate allow. May need to consider midodrine for BP support  Continue AC if no contraindication    Recommend to keep k 4.0-4.5 and Mag 2.0-2.2  Will replete K per protocol  Will give Mag 2000 mg IV            DORIS Guzman CNP, 9/30/2022 9:47 AM     Please note this report has been partially produced using speech recognition software and may contain errors related to that system including errors in grammar, punctuation, and spelling, as well as words and phrases that may be inappropriate. If there are any questions or concerns please feel free to contact the dictating provider for clarification.

## 2022-09-30 NOTE — PLAN OF CARE
Problem: Discharge Planning  Goal: Discharge to home or other facility with appropriate resources  9/30/2022 1448 by Manisha Ceja RN  Outcome: Progressing  9/30/2022 1222 by Manisha Ceja RN  Outcome: Progressing     Problem: Safety - Adult  Goal: Free from fall injury  9/30/2022 1448 by Manisha Ceja RN  Outcome: Progressing  9/30/2022 1222 by Manisha Ceja RN  Outcome: Progressing     Problem: Chronic Conditions and Co-morbidities  Goal: Patient's chronic conditions and co-morbidity symptoms are monitored and maintained or improved  9/30/2022 1448 by Manisha Ceja RN  Outcome: Progressing  9/30/2022 1222 by Manisha Ceja RN  Outcome: Progressing     Problem: Pain  Goal: Verbalizes/displays adequate comfort level or baseline comfort level  9/30/2022 1448 by Manisha Ceja RN  Outcome: Progressing  9/30/2022 1222 by Manisha Ceja RN  Outcome: Progressing     Problem: Nutrition Deficit:  Goal: Optimize nutritional status  Outcome: Progressing

## 2022-09-30 NOTE — PROGRESS NOTES
EKG completed result abnormal, charge nurse and provider all call notified. No new order Provider still unable to be reach.

## 2022-09-30 NOTE — PROGRESS NOTES
Nurse able to reach provider Rosa Rea see new orders. Provider read message and state he tried to call back to floor but was unable to reach unit. Instead the phone call went else where.

## 2022-09-30 NOTE — PROGRESS NOTES
Pt heart rhythm change from sinus to Afib rate 150 at rest. Pt not symptomatic. Nurse left voice message and secure chat with Zulma Gomez and Jacki Persaud. No reply from either provider. Nurse continue to monitor pt rhythm and safety.

## 2022-09-30 NOTE — PROGRESS NOTES
ONCOLOGY HEMATOLOGY CARE ([facility])  PROGRESS NOTE      2022  4:57 AM    Patient:    Juani Patel  : 1950   71 y.o. MRN: [id number]  Admitted: 2022  9:51 PM ATT: Eric Mast MD   [id number]  AdmitDx: Pleural effusion [J90]  Acute respiratory failure with hypoxia ([address] [Y70.56]  Acute systolic CHF (congestive heart failure) ([facility] [address] [I50.21]  PCP: Ellie Bradshaw MD      Patient was seen and examined today. She was asleep most of the time  Feels weak and tired  Cold/chills  Breathing better though    Had a lengthy discussion with  and daughter    22, 22: Pericardial fluid and pleural fluid cytology negative for malignancy    22: CXR:  Status post bilateral thoracentesis with improved effusions and lung base   aeration. No pneumothorax. She was admitted to [facility] in 2022 with symptoms of shortness of breath. Echocardiogram with moderate pericardial effusion. Was also found to have bilateral pleural effusion. Underwent thoracentesis. Cytology negative for any malignancy. No lung nodules or lymphadenopathy. CAT scan of the chest at that time. She was admitted to [facility] in 2022. She had a bone scan performed on 2022 which revealed osseous metastatic disease in multiple ribs and vertebral bodies and also possibly left hemipelvis. Findings suspicious for metastatic disease. CT scan of the abdomen pelvis was also done on September 15, 2022 which revealed hypodense lesions within the liver. Sclerosis within the L1 vertebrae. Subsequently an MRI of the abdomen revealed hemangiomas. She had another thoracentesis on 2022, cytology negative for any malignancy. CA 27-29 was 19  was 83 CA 19-9 was less than 0.8 ferritin of 135 iron saturations of 9% G35 357 folic acid 95.0 CEA 4.5     Currently admitted with worsening shortness of breath.   Some chest discomfort. He is on home oxygen. Poor appetite. No weight loss. Denied any abdominal pain. No joint pains or bone pains. 9/23/2022 CBC WBC of 13.5 hemoglobin of 11.4 hematocrit 35. 4MCV of 90 and platelets of 561  CMP with sodium 135 potassium 3.8 creatinine 1.1 albumin 3.3 rest of the LFTs within normal limits. Chest x-ray with bilateral worsening opacities and pleural effusions and pulmonary edema. PHYSICAL EXAM :    Vitals: BP (!) 123/40   Pulse 96   Temp 98.5 °F (36.9 °C) (Oral)   Resp 29   Ht 5' 5\" (1.651 m)   Wt 136 lb 11 oz (62 kg)   SpO2 96%   BMI 22.75 kg/m²     CONSTITUTIONAL: awake, alert, tired appearing, asleep  EYES: palor  ENT: ATNC  NECK: No JVD  HEMATOLOGIC/LYMPHATIC: no cervical, supraclavicular or axillary lymphadenopathy   LUNGS:CTAB, poor effort though  CARDIOVASCULAR: s1s2 rrr no murmurs  ABDOMEN: soft ntnd bs pos  NEUROLOGIC: GI  SKIN: No rash  EXTREMITIES: no LE edema bilaterally  Right breast with reconstruction changes  Left breast with no mass, no lad  LABORATORY RESULTS  CBC:   Recent Labs     09/28/22  0602 09/29/22  0621   WBC 9.2 8.5   HGB 11.2* 9.9*    272     BMP:    Recent Labs     09/27/22  0855 09/28/22  0602    135   K 4.5 4.2    101   CO2 23 23   BUN 23 29*   CREATININE 1.3* 1.5*   GLUCOSE 209* 196*     Hepatic: No results for input(s): AST, ALT, ALB, BILITOT, ALKPHOS in the last 72 hours. INR: No results for input(s): INR in the last 72 hours. RADIOLOGY REPORTS  Noted    ASSESSMENT & RECOMMENDATIONS:  History of invasive ductal carcinoma the right breast in 1999 status postmastectomy, ER/OK positive looks like, received adjuvant chemotherapy followed by endocrine therapy for 10 years. Now with a recurrent bilateral pleural effusions,  pericardial effusion. Ca 27-29 negative. Thoracentesis twice negative for any malignancy in the past and this time as well. Outside Bone scan with bone lesions.   MRI abdomen done outside with no liver lesions. Pericardial fluid and pleural fluid cytology neg for malignancy. Recommended PET as OP and bone biopsy for further evaluation and treatment plan  Will be followed [facility] oncology after discharge     Pericardial fluid and recurrent pleural fluid: ?etiology unclear, ? inflammatory. Sed rate normal. RODOLFO pending    AFIB RVR: is being followed by cardiology     Anemia, most probably multifactorial.  Note B12 levels low as well. on replacement    Continue other medical care     ECOG Performance Status (ECOG Scale 0-4): 1-2      We will continue to follow the patient. Thank you for allowing us to participate in the care of this patient.       [address]

## 2022-09-30 NOTE — PROGRESS NOTES
V2.0  [facility]      Name:  Noe Boyer /Age/Sex: 1950  (71 y.o. female)   MRN & CSN:  [id number] & [id number] Encounter Date/Time: 2022 11:29 AM EDT    Location:  -A PCP: Mikayla Page MD       Hospital Day: 8    Assessment and Plan:   Noe Boyer is a 71 y.o. female with past medical history of diastolic heart failure, paroxysmal atrial fibrillation, type 2 diabetes mellitus, chronic respiratory failure, recurrent admission for bilateral pleural effusions, depression, hypertension, history of invasive ductal carcinoma of the right breast  status post mastectomy, ER/CT positive, history of tobacco use and hyperlipidemia who presents with progressively worsening shortness of breath. Acute on chronic hypoxic respiratory failure  -Improving, decreasing oxygen requirement  Possible contributing factors include, pericardial effusion with tamponade physiology, compressive atelectasis due to bilateral pleural effusion, decompensated heart failure with preserved ejection fraction. Utilize supplemental oxygen to maintain oxygen saturation above 92%. Rest of the treatment plan as below    Pericardial effusion with tamponade physiology  Status post pericardiocentesis with removal of 600 cc of pericardial fluid on   Removal of pericardiocentesis catheter 2022  Mild lymphocytic predominance on cytology  Negative for infection  Although this pericardial effusion can be explained by diastolic heart failure, there remains a concern about underlying autoimmune disease process given her Raynaud's phenomena. Continue ASA, colchicine discontinued by cardiology due to interaction with amiodarone. Protonix for stress ulcer prophylaxis  There still remains concern for metastatic effusion given history of breast cancer. Oncology is following, recommend PET scan as outpatient and bone biopsy.   Patient will be followed by [facility] oncology after discharge. Repeat limited echo reviewed. Recurrent bilateral pleural effusion  Status postthoracentesis 9/25/2022  Appears to be borderline exudate  Similarly with the pericardial effusion, diastolic heart failure can explain this but the recurrence of disease and concerning cutaneous features my suspicion for autoimmune disease is high. Pulmonology following, ordered repeat thoracentesis    Paroxysmal atrial fibrillation with RVR  Uncontrolled rate overnight, for some unknown reason RN paged his were not going to the on-call night physician. Discussed with unit manager to fix this issue. Continue Eliquis  PO Amiodarone loading dose and then maintenance. Digoxin loading dose ordered by cardiology    Acute kidney injury  Likely in the setting of diuretics  BMP and UA pending, discussed with RN regarding getting the labs done. Avoid nephrotoxic agents    Raynaud's phenomena  Never seen a Rheumatologist due to frequent hospitalizations  Per chart review: Negative rheumatoid factor, negative Anti- Scl-70, recently negative RODOLFO, ESR 26. Rheumatology consulted, confusion regarding the provider to be consulted. With the help of administration new consult sent to Dr. Lele Segovia. Patient needs nailfold capillaroscopy. Anti-SCL 70 is moderately sensitive, 20% hence recommend getting anti-centromere, RNA polymerase III and U3-RNP, PM/Scl, or Th/To antibodies, will defer to rheumatology. Superficial venous thrombosis RUE  Conservative management with cold compresses and limb elevation, nursing order placed.     Normocytic anemia  No signs of overt bleed  Concern for anemia of chronic disease  Monitor CBC  Transfuse if hemoglobin less than 7    Acute on chronic diastolic heart failure on admission  -Improving and appears clinically euvolemic  Blood pressure control    Moderate-severe pulmonary hypertension  Elevated RVSP and tricuspid regurgitation  Discussed with cardiology for the possibility of right heart catheterization for further classification of disease process  Optimization of preload and afterload, maintain euvolemic state. Chronic respiratory failure on home oxygen 2 to 3 L via nasal cannula  No PFTs on chart, recent CT angiogram chest did show very minimal emphysematous disease process but no overt obstructive disease pattern on imaging. Unsure about the etiology, could be the pulmonary hypertension. Utilize supplemental oxygen to maintain saturation above 92%  Pulmonology following, appreciate recommendations    Type 2 diabetes mellitus without long-term use of insulin  With hyperglycemia  Adjusted basal bolus insulin  Sliding scale insulin to target 140-180    Hyperlipidemia  Continue Crestor    History of invasive ductal carcinoma of the right breast status post mastectomy  ER/CA positive  On prior imaging there were some concern for bony metastasis  Will need bone biopsy for definitive diagnosis  Oncology following    Continue to monitor on stepdown unit    Diet ADULT DIET; Regular; 3 carb choices (45 gm/meal); Low Fat/Low Chol/High Fiber/REYES   DVT Prophylaxis [] Lovenox, []  Heparin, [] SCDs, [] Ambulation,  [x] Eliquis, [] Xarelto  [] Coumadin   Code Status Full Code   Disposition From: Home  Expected Disposition: Home  Estimated Date of Discharge: 1-2 days  Patient requires continued admission due to rheumatology evaluation, right heart catheterization   Surrogate Decision Maker/ POA Spouse     Subjective:     Chief Complaint: Shortness of Breath (Started today; has recently been seen and had 2 paracentesis done; normally wears O2 at 3L/min and is currently requiring more to sub stain oxygen levels)       Jonathan Hoff is a 71 y.o. female who presents with progressively worsening shortness of breath. Patient was seen and evaluated at bedside. Patient had an eventful overnight.   After switching to p.o. amiodarone, her heart rate was uncontrolled and she remained in RVR with heart rate up to 150. This morning she was tired and sleepy. Stated that she did not sleep overnight.  was present at bedside, frustrated at current care. Discussed future plan. Objective: Intake/Output Summary (Last 24 hours) at 9/30/2022 1148  Last data filed at 9/29/2022 1805  Gross per 24 hour   Intake 490 ml   Output 500 ml   Net -10 ml          Vitals:   Vitals:    09/30/22 1000   BP: (!) 113/55   Pulse: (!) 120   Resp: (!) 32   Temp: 99.3 °F (37.4 °C)   SpO2: 94%       Physical Exam:   Physical Exam  Vitals reviewed. Constitutional:       Appearance: She is normal weight. She is ill-appearing. HENT:      Head: Normocephalic and atraumatic. Nose: Nose normal.      Mouth/Throat:      Mouth: Mucous membranes are dry. Pharynx: Oropharynx is clear. Eyes:      General: No scleral icterus. Conjunctiva/sclera: Conjunctivae normal.   Cardiovascular:      Rate and Rhythm: Normal rate. Rhythm irregular. Pulses: Normal pulses. Heart sounds: Murmur heard. Pulmonary:      Effort: Pulmonary effort is normal.      Breath sounds: Rales present. No wheezing or rhonchi. Comments: 3L nasal cannula oxygen  Abdominal:      General: Bowel sounds are normal. There is no distension. Palpations: Abdomen is soft. Tenderness: There is no abdominal tenderness. Musculoskeletal:         General: No deformity. Normal range of motion. Cervical back: Normal range of motion and neck supple. No rigidity or tenderness. Right lower leg: Edema present. Left lower leg: Edema present. Skin:     Coloration: Skin is not jaundiced or pale. Comments: Skin thickening bilateral upper and lower extremities   Neurological:      General: No focal deficit present. Mental Status: She is alert and oriented to person, place, and time. Mental status is at baseline.           Medications:   Medications:    digoxin  250 mcg IntraVENous Q4H    magnesium sulfate  2,000 mg IntraVENous Once aspirin  81 mg Oral q8h    docusate sodium  100 mg Oral BID    senna  1 tablet Oral Nightly    dilTIAZem  180 mg Oral Daily    PARoxetine  20 mg Oral Daily    amiodarone  200 mg Oral BID    [START ON 10/14/2022] amiodarone  200 mg Oral Daily    albumin human  25 g IntraVENous Q8H    pantoprazole  40 mg Oral QAM AC    furosemide  40 mg Oral BID    insulin glargine  10 Units SubCUTAneous BID    insulin lispro  5 Units SubCUTAneous TID WC    lidocaine PF  5 mL IntraDERmal Once    sodium chloride flush  5-40 mL IntraVENous 2 times per day    sodium chloride flush  5-40 mL IntraVENous 2 times per day    lidocaine  20 mL IntraDERmal Once    sodium chloride flush  5-40 mL IntraVENous 2 times per day    apixaban  5 mg Oral BID    [Held by provider] lisinopril  20 mg Oral Daily    mirtazapine  15 mg Oral Nightly    oxybutynin  5 mg Oral BID    rosuvastatin  10 mg Oral Nightly    insulin lispro  0-4 Units SubCUTAneous TID WC    insulin lispro  0-4 Units SubCUTAneous Nightly      Infusions:    sodium chloride      dextrose      sodium chloride      sodium chloride       PRN Meds: potassium chloride, 40 mEq, PRN   Or  potassium alternative oral replacement, 40 mEq, PRN   Or  potassium chloride, 10 mEq, PRN  oxyCODONE, 2.5 mg, Q4H PRN  sodium chloride flush, 5-40 mL, PRN  sodium chloride, , PRN  glucose, 4 tablet, PRN  dextrose bolus, 125 mL, PRN   Or  dextrose bolus, 250 mL, PRN  glucagon (rDNA), 1 mg, PRN  dextrose, , Continuous PRN  guaiFENesin-dextromethorphan, 10 mL, Q4H PRN  sodium chloride flush, 5-40 mL, PRN  sodium chloride, , PRN  acetaminophen, 650 mg, Q4H PRN  ondansetron, 4 mg, Q6H PRN  hydrALAZINE, 10 mg, Q10 Min PRN  sodium chloride flush, 5-40 mL, PRN  sodium chloride, , PRN  ondansetron, 4 mg, Q8H PRN  polyethylene glycol, 17 g, Daily PRN  acetaminophen, 650 mg, Q6H PRN  ALPRAZolam, 0.25 mg, BID PRN      Labs      Recent Results (from the past 24 hour(s))   POCT Glucose    Collection Time: 09/29/22  3:21 PM Result Value Ref Range    POC Glucose 130 (H) 70 - 99 MG/DL   POCT Glucose    Collection Time: 09/29/22  8:38 PM   Result Value Ref Range    POC Glucose 207 (H) 70 - 99 MG/DL   EKG 12 Lead    Collection Time: 09/30/22 12:16 AM   Result Value Ref Range    Ventricular Rate 127 BPM    Atrial Rate 110 BPM    QRS Duration 80 ms    Q-T Interval 324 ms    QTc Calculation (Bazett) 470 ms    R Axis 21 degrees    T Axis -71 degrees    Diagnosis       Atrial fibrillation with rapid ventricular response  ST & T wave abnormality, consider anterior ischemia  Abnormal ECG  When compared with ECG of 25-SEP-2022 14:00,  Atrial fibrillation has replaced Atrial flutter     POCT Glucose    Collection Time: 09/30/22  2:25 AM   Result Value Ref Range    POC Glucose 147 (H) 70 - 99 MG/DL   POCT Glucose    Collection Time: 09/30/22  4:48 AM   Result Value Ref Range    POC Glucose 190 (H) 70 - 99 MG/DL   CBC with Auto Differential    Collection Time: 09/30/22  6:00 AM   Result Value Ref Range    WBC 10.3 4.0 - 10.5 K/CU MM    RBC 3.50 (L) 4.2 - 5.4 M/CU MM    Hemoglobin 9.9 (L) 12.5 - 16.0 GM/DL    Hematocrit 31.9 (L) 37 - 47 %    MCV 91.1 78 - 100 FL    MCH 28.3 27 - 31 PG    MCHC 31.0 (L) 32.0 - 36.0 %    RDW 15.1 (H) 11.7 - 14.9 %    Platelets 458 555 - 945 K/CU MM    MPV 10.8 7.5 - 11.1 FL    Differential Type AUTOMATED DIFFERENTIAL     Segs Relative 75.8 (H) 36 - 66 %    Lymphocytes % 9.5 (L) 24 - 44 %    Monocytes % 11.9 (H) 0 - 4 %    Eosinophils % 1.8 0 - 3 %    Basophils % 0.6 0 - 1 %    Segs Absolute 7.8 K/CU MM    Lymphocytes Absolute 1.0 K/CU MM    Monocytes Absolute 1.2 K/CU MM    Eosinophils Absolute 0.2 K/CU MM    Basophils Absolute 0.1 K/CU MM    Nucleated RBC % 0.0 %    Total Nucleated RBC 0.0 K/CU MM    Total Immature Neutrophil 0.04 K/CU MM    Immature Neutrophil % 0.4 0 - 0.43 %   Comprehensive Metabolic Panel w/ Reflex to MG    Collection Time: 09/30/22  6:00 AM   Result Value Ref Range    Sodium 139 135 - 145 MMOL/L Potassium 3.2 (L) 3.5 - 5.1 MMOL/L    Chloride 100 99 - 110 mMol/L    CO2 25 21 - 32 MMOL/L    BUN 22 6 - 23 MG/DL    Creatinine 1.0 0.6 - 1.1 MG/DL    Glucose 163 (H) 70 - 99 MG/DL    Calcium 8.8 8.3 - 10.6 MG/DL    Albumin 3.9 3.4 - 5.0 GM/DL    Total Protein 5.5 (L) 6.4 - 8.2 GM/DL    Total Bilirubin 0.6 0.0 - 1.0 MG/DL    ALT 5 (L) 10 - 40 U/L    AST 9 (L) 15 - 37 IU/L    Alkaline Phosphatase 57 40 - 128 IU/L    GFR Non- 55 (L) >60 mL/min/1.73m2    GFR African American >60 >60 mL/min/1.73m2    Anion Gap 14 4 - 16   Magnesium    Collection Time: 09/30/22  6:00 AM   Result Value Ref Range    Magnesium 1.7 (L) 1.8 - 2.4 mg/dl   POCT Glucose    Collection Time: 09/30/22  7:37 AM   Result Value Ref Range    POC Glucose 170 (H) 70 - 99 MG/DL   POCT Glucose    Collection Time: 09/30/22 10:52 AM   Result Value Ref Range    POC Glucose 151 (H) 70 - 99 MG/DL        Imaging/Diagnostics Last 24 Hours   Echocardiogram limited    Result Date: 9/27/2022  Transthoracic Echocardiography Report (TTE)  Demographics   Patient Name       Linsey Chacko  Date of Study       09/27/2022                     A   Date of Birth      1950         Gender              Female   Age                71 year(s)         Race                   Patient Number     [id number]         Room Number         2119   Visit Number       [id number]   Corporate ID       [id number]   Accession Number   [id number]         Gladys Logan RDMS, ZELALEMT   Ordering Physician Nevada Kearns, Christoper Severs MD                 Physician           Nette CASAREZ  Procedure Type of Study   TTE procedure:ECHOCARDIOGRAM LIMITED. Procedure Date Date: 09/27/2022 Start: 08:40 AM Study Location: Portable Technical Quality: Fair visualization Indications:Pericardial effusion.  Patient Status: Routine Height: 65 inches Weight: 143 pounds BSA: 1.72 m2 BMI: 23.8 kg/m2 HR: 82 bpm BP: 117/65 mmHg  Conclusions   Summary  This is a limited echocardiogram.  Left ventricular systolic function is normal.  Ejection fraction is visually estimated at 50-55%. Moderate aortic regurgitation is noted. Moderate to severe tricuspid regurgitation; RVSP: 55 mmHg. Severe PHTN. S/p pericardiocentesis with 560cc drained on 9/26/22; no evidence of any  pericardial effusion. Left pleural effusion. Signature   ------------------------------------------------------------------  Electronically signed by Yeyo Layton MD  (Interpreting physician) on 09/27/2022 at 09:45 AM  ------------------------------------------------------------------   Findings   Left Ventricle  Left ventricular systolic function is normal.  Ejection fraction is visually estimated at 50-55%. Aortic Valve  Moderate aortic regurgitation is noted. Tricuspid Valve  Moderate to severe tricuspid regurgitation; RVSP: 55 mmHg. Severe PHTN. Pericardial Effusion  S/p pericardiocentesis with 560cc drained on 9/26/22; no evidence of any  pericardial effusion. Pleural Effusion  Left pleural effusion.   M-Mode/2D Measurements & Calculations   LV Diastolic Dimension: 5.33 cm LV Systolic Dimension: 3.1 cm  LV FS:26.4 %                    LV Volume Diastolic: 11.2 ml  LV PW Diastolic: 3.39 cm        LV Volume Systolic: 33.4 ml  LV PW Systolic: 0.69 cm         LV EDV/LV EDV Index: 74.6 ml/43 m2LV ESV/LV  Septum Diastolic: 5.51 cm       ESV Index: 29.8 ml/17 m2  Septum Systolic: 0.32 cm        EF Calculated (A4C): 60.1 %                                  EF Calculated (2D): 52 %  Doppler Measurements & Calculations                               Estimated RVSP: 55 mmHg                              Estimated RAP:3 mmHg    Estimated PASP: 40.21 mmHg TR Velocity:305 cm/s                              TR Gradient:37.21 mmHg      Echocardiogram limited    Result Date: 9/27/2022  Transthoracic Echocardiography Report (TTE)  Demographics   Patient Name       Francisca Sepulveda  Date of Study       09/26/2022                     A   Date of Birth      1950         Gender              Female   Age                71 year(s)         Race                   Patient Number     [id number]         Room Number         2119   Visit Number       [id number]   Corporate ID       [id number]   Accession Number   [id number]         Monica Pitts RDMS   Ordering Physician Isaac Escalona MD                 Physician           MD  Procedure Type of Study   TTE procedure:ECHOCARDIOGRAM LIMITED. Procedure Date Date: 09/26/2022 Start: 02:50 PM Study Location: OR Technical Quality: Fair visualization Indications:Pericardial effusion. Patient Status: Routine Height: 65 inches Weight: 143 pounds BSA: 1.72 m2 BMI: 23.8 kg/m2 BP: 112/39 mmHg  Conclusions   Summary  This is a limited echo during pericardiocentesis. See full report of procedure in EPIC   600cc of straw-colored fluid was drained, no pericardial effusion noted  post procedure. Signature   ------------------------------------------------------------------  Electronically signed by Freedom Gorman MD (Interpreting  physician) on 09/27/2022 at 08:57 AM  ------------------------------------------------------------------      XR CHEST PORTABLE    Result Date: 9/28/2022  EXAMINATION: ONE XRAY VIEW OF THE CHEST 9/28/2022 6:16 am COMPARISON: None available. Down time procedure. HISTORY: ORDERING SYSTEM PROVIDED HISTORY: hypoxia TECHNOLOGIST PROVIDED HISTORY: Reason for exam:->hypoxia Reason for Exam: hypoxia FINDINGS: The heart is enlarged. The mediastinal contours are normal.  There is pulmonary edema with moderate bilateral pleural effusions and associated bibasilar atelectasis.   No pneumothorax. Surgical clip is noted in the right axilla. The bones are osteopenic. Cardiomegaly with pulmonary edema and moderate bilateral pleural effusions with associated bibasilar atelectasis.        Electronically signed by Aziza Uribe MD on 9/30/2022 at 11:48 AM

## 2022-09-30 NOTE — PROGRESS NOTES
Pt remain awake, anxious and up to rest room to urine. Pt unable to sleep. She remain in fall precaution, bed exit on, x2 side rail up, call light in reach, pt room change to nurse station because impulsiveness to get out bed alone and being unsteady.

## 2022-09-30 NOTE — PROGRESS NOTES
On call provider to floor for pt change in orientation and new confusion. Pt disorient to self, place and situation. NIH pass. No new order will continue to monitor safety.

## 2022-09-30 NOTE — PROGRESS NOTES
Pt is alert oriented, cooperative, and she is feeling much better after thoracentesis. Family at bedside, agreeable, heart rate stabilized and cardiology at bedside. Pt has all needs met.

## 2022-09-30 NOTE — PROGRESS NOTES
Comprehensive Nutrition Assessment    Type and Reason for Visit:  Consult    Nutrition Recommendations/Plan:   Modify diet order  Add high protein oral nutrition supplements  Encourage po intake as able  Please document all po intake, weight trends, poc     Malnutrition Assessment:  Malnutrition Status: Moderate malnutrition (09/30/22 1352)    Context:  Acute Illness       Nutrition Assessment:    Pt admitted for pleural effusion, acute respiratory failure with hypoxia, acute systolic CHF, PMH: osteoporosis, HLD, DM, Breast Ca, pt currently on 3 carb/cardiac/low sodium diet, pt consuming % of meals documented in the past 72 hr, pt resting earlier this morning and  asked that I not wake pt as she had a rough night, returned in the afternoon, reports UBW ~137# a few weeks ago when she was weighed at [facility],  8.4% wt loss x 3 mo per chart review, denies any chewing or swallowing, ate 100% of lunch today, reports feeling weak, c/o feeling constipated, reports that she received medication for this, denies any abdominal pain, NFPE completed, pt meets criteria for malnutrition, will follow at high nutrition risk    Nutrition Related Findings:    K+ 3.3, Glucose 163 Wound Type: Pressure Injury, Stage II       Current Nutrition Intake & Therapies:    Average Meal Intake: %, 51-75%  Average Supplements Intake: None Ordered  ADULT DIET; Regular; 3 carb choices (45 gm/meal); Low Fat/Low Chol/High Fiber/REYES    Anthropometric Measures:  Height: 5' 5\" (165.1 cm)  Ideal Body Weight (IBW): 125 lbs (57 kg)   Current Body Weight: 136 lb 11 oz (62 kg), 109.3 % IBW.  Weight Source: Bed Scale  Current BMI (kg/m2): 22.7  Usual Body Weight: 149 lb 4 oz (67.7 kg) ((6/7/22) per chart review)  % Weight Change (Calculated): -8.4  Weight Adjustment For: No Adjustment  BMI Categories: Normal Weight (BMI 22.0 to 24.9) age over 72    Estimated Daily Nutrient Needs:  Energy Requirements Based On: Kcal/kg  Weight Used for Energy Requirements: Current  Energy (kcal/day): 9095-2655 (28-32 kcal/kg)  Weight Used for Protein Requirements: Current  Protein (g/day): 74-93 (1.2-1.5 g/kg)  Method Used for Fluid Requirements: 1 ml/kcal  Fluid (ml/day): 9301-0548    Nutrition Diagnosis: In context of acute illness or injury, Moderate malnutrition related to inadequate protein-energy intake as evidenced by weight loss 7.5% in 3 months, mild muscle loss, mild loss of subcutaneous fat    Nutrition Interventions:   Food and/or Nutrient Delivery: Modify Current Diet, Start Oral Nutrition Supplement  Nutrition Education/Counseling: No recommendation at this time  Coordination of Nutrition Care: Continue to monitor while inpatient  Goals:     Goals: PO intake 75% or greater, by next RD assessment  Nutrition Monitoring and Evaluation:   Behavioral-Environmental Outcomes: None Identified  Food/Nutrient Intake Outcomes: Supplement Intake, Food and Nutrient Intake  Physical Signs/Symptoms Outcomes: Biochemical Data, GI Status, Hemodynamic Status, Fluid Status or Edema, Weight, Nutrition Focused Physical Findings, Skin, Meal Time Behavior    Discharge Planning:     Too soon to determine     Phil Zacarias [address], [address] [place] [address],   Contact: [phone]

## 2022-09-30 NOTE — OR NURSING
Bilateral thoracentesis by Dr Candice Paniagua @bedside    PT TRANSPORTED FROM:                                    TO THE IR ROOM:          BARRIER PRECAUTIONS & STERILE TECHNIQUE:               Pt placed on VS Monitor. Pt prepped and draped in a sterile fashion with chlorhexadine.     PAIN/LOCAL ANESTHESIA/SEDATION MANAGEMENT:           Local: Lidocaine 1% given by Dr          Sedation: none             Fentanyl:             Versed:     INTRAOPERATIVE:           ACCESS TIME:           US/FLUORO: u/s guided          WIRE USED:           SHEATH USED:           CATHETER USED:           FINAL IMAGE TAKEN TO CONFIRM PLACEMENT OF:           CONTRAST/CC:     STERILE DRESSINGS:     SPECIMENS: Right pleural: 700cc clear yellow fluid removed; specimen sent to lab                          Left pleural: 700cc clear dark yellow fluid removed; specimen sent to lab    EBL:   <3OF         COMPLICATIONS/ OUTCOME:   None        REPORT CALLED TO: Clyde/Mendy, pt nurse

## 2022-09-30 NOTE — PROCEDURES
Zacarias Jimenez is a 71 y.o. female patient. 1. Acute respiratory failure with hypoxia ([facility]    2. Pleural effusion    3. Recurrent right pleural effusion    4. Recurrent left pleural effusion      Past Medical History:   Diagnosis Date    Anxiety     Breast carcinoma ([facility] [facility] 1997    R breast - MAMMOGRAM ANNUALLY, NEXT DUE 5/2020. Cervical radiculitis     Right     Chest pain     Depression     Diabetes type 2, controlled ([facility] [facility] 2016    w two random sugars >126    Family history of diabetes mellitus (DM)     Mother and Father    GERD (gastroesophageal reflux disease)     UGI- sm HH , done at [address] [place] [address],[address] 3/26/14    Hiatal hernia     Hx of Doppler ultrasound 05/17/2022    Normal bilateral lower extremity ankle brachial indices. Hx of Doppler ultrasound 05/17/2022    Significant reflux noted of the Right GSV SFJ (0.7s), GSV Knee (2.0s). Significant reflux noted in the Left GSV SFJ (1.8s). Hx of echocardiogram 05/12/2022    EF is estimated at 55-60%. Mitral annular calcification is present. Mild mitral, tricuspid and moderate aortic regurgitation is present. Mild Pulmonary hypertension noted with RVSP of 43mmHg. Hypercalcemia     mild ~11, iPTH nl 8/2021    Hyperglycemia     Hyperlipidemia     Insomnia     Irritable bowel syndrome     LBP (low back pain)     Lumbar herniated disc     L L4-5 HNP noted on MRI    Menopause     female exam every 2 yrs, due for recheck w me 2015    Migraine headache     ON TOPAMAX    Osteoporosis     Prediabetes     a1c 6.4 in Dec 2014    S/P colonoscopic polypectomy 06/07/2019    Dr Marry Angelo, recheck 5 yrs    TIA (transient ischemic attack)     INACTIVE PROBLEM     Blood pressure (!) 113/55, pulse (!) 120, temperature 99.3 °F (37.4 °C), temperature source Oral, resp. rate (!) 32, height 5' 5\" (1.651 m), weight 136 lb 11 oz (62 kg), SpO2 94 %, not currently breastfeeding.   Right Thoracentesis  INDICATION: RECURRENT RIGHT PLEURAL EFFUSION  ESTIMATED BLOOD LOSS:

## 2022-09-30 NOTE — PROGRESS NOTES
Pulmonary and Critical Care  Progress Note      VITALS:  BP (!) 113/55   Pulse (!) 120   Temp 99.3 °F (37.4 °C) (Oral)   Resp (!) 32   Ht 5' 5\" (1.651 m)   Wt 136 lb 11 oz (62 kg)   SpO2 94%   BMI 22.75 kg/m²     Subjective:   CHIEF COMPLAINT :SOB     HPI:                The patient is a 71 y.o. female is lying in the bed. She is in mild resp distress    Objective:   PHYSICAL EXAM:    LUNGS:Decreased air entry bilateral bases  Abd-soft, BS+,NT  Ext- 1 + pedal edema  CVS-s1s2, no murmurs      DATA:    CBC:  Recent Labs     09/28/22  0602 09/29/22  0621 09/30/22  0600   WBC 9.2 8.5 10.3   RBC 3.89* 3.46* 3.50*   HGB 11.2* 9.9* 9.9*   HCT 36.0* 31.8* 31.9*    272 310   MCV 92.5 91.9 91.1   MCH 28.8 28.6 28.3   MCHC 31.1* 31.1* 31.0*   RDW 15.4* 15.2* 15.1*   SEGSPCT 76.0* 71.6* 75.8*      BMP:  Recent Labs     09/28/22  0602 09/30/22  0600    139   K 4.2 3.2*    100   CO2 23 25   BUN 29* 22   CREATININE 1.5* 1.0   CALCIUM 9.4 8.8   GLUCOSE 196* 163*      ABG:  No results for input(s): PH, PO2ART, CTP3TJZ, HCO3, BEART, O2SAT in the last 72 hours.   BNP  No results found for: BNP   D-Dimer:  No results found for: [facility]   Radiology: None      Assessment/Plan     Patient Active Problem List    Diagnosis Date Noted    Atrial flutter with rapid ventricular response ([facility] 09/29/2022     Priority: Medium    Acute systolic CHF (congestive heart failure) ([facility] [address] 09/24/2022     Priority: Medium    Acute respiratory failure with hypoxia ([address] 09/24/2022     Priority: Medium    Diastolic heart failure, unspecified HF chronicity ([address] 08/30/2022     Priority: Medium    Atrial fibrillation with RVR ([address] 08/18/2022     Priority: Medium    Varicose veins of both legs with edema 06/28/2022     Priority: Medium    Venous insufficiency 06/07/2022     Priority: Medium    Shortness of breath 06/07/2022     Priority: Medium    Edema of lower extremity 06/07/2022     Priority: Medium    Chest pain 05/12/2022 Priority: Medium    Peripheral edema 03/21/2022    Carcinoma of right female breast, unspecified estrogen receptor status, unspecified site of breast ([facility] [address] 02/16/2022    Hypercalcemia      Overview Note:     mild ~11, iPTH nl 8/2021      Diabetes type 2, controlled ([facility] [address]      Overview Note:     likely, w two random sugars >126      Migraine headache     GERD (gastroesophageal reflux disease)     Irritable bowel syndrome     Lumbar herniated disc     Low back pain      Overview Note:     replace inactive diagnosis      Hyperlipidemia     History of breast cancer      Overview Note:     R breast        Acute on chronic Hypoxic resp failure- improving  Bilateral Pleural effusions s/p thoracentesis- recurrent  Recurrent right pleural effusion  Diastolic dysfunction  Afib on Eliquis  H/o Breast ca with mets to T12, L1  Pericardial effusion s/p pericardiocentesis  Moderate Malnutrition     Bilateral Thoracentesis  Diuresis  ICS  OOB  Keep sats > 92%  CHF optimization  CXR post procedure  Await placement  CDrakew present management    Electronically signed by Darya Damon MD on 9/30/2022 at 12:23 PM

## 2022-09-30 NOTE — PROGRESS NOTES
[id number] [facility] OSCAR Posey, 1950, 2025/2025-A, 9/30/2022    Attempted PT/OT cotx this AM, planned to re-attempt this afternoon. Per charting/RN, pt with inc confusion last night and did not sleep, is very tired today and has elevated HR/afib. Staff using christine steady to get pt to bathroom. Will hold therapy tx today, re-attempt as schedule allows once pt stabilizes/more appropriate.     Rubi Goff, OTR/L  9/30/2022, 1:02 PM

## 2022-09-30 NOTE — PROGRESS NOTES
CARDIOLOGY  NOTE        Name:  Diya Kramer /Age/Sex: 1950  (71 y.o. female)   MRN & CSN:  [id number] & [id number] Admission Date/Time: 2022  9:51 PM   Location:  -A PCP: Sadi Corrales MD       Hospital Day: 8        PLAN FROM CARDIOLOGY FOR TODAY:   Heart rate control and anticoagulation and follow-up EP recommendation  Patient had thoracentesis done      - cardiology consult is for: A. fib    -  Interval history: Went back into A. fib with RVR and digitalizing her    ASSESSMENT/ PLAN:      Dyspnea probably secondary to pulmonary etiology given that she has large pleural effusion which is probably secondary to her malignancy her ejection fraction has been normal, had thoracentesis   -Was seen for A. fib last month is in sinus rhythm now is on negative chronotropic agents patient is on Eliquis which will probably need to be held prior to thoracentesis, monitor A. fib with RVR postthoracentesis, another bout of tachycardia patient was given digoxin in sinus now we will also consult EP  A. fib management per EP  -Possibility of a HFpEF however looks like the pleural effusions are coming from her malignancy await oncology input  -Hyperlipidemia on Crestor 10 mg p.o. daily  -Status post pericardiocentesis fluid sent for analysis start anticoagulation          Subjective: Todays complain: Patient short of breath    HPI:  Vlad Esteves is a 71 y. o.year old who and presents with had concerns including Shortness of Breath (Started today; has recently been seen and had 2 paracentesis done; normally wears O2 at 3L/min and is currently requiring more to sub stain oxygen levels).   Chief Complaint   Patient presents with    Shortness of Breath     Started today; has recently been seen and had 2 paracentesis done; normally wears O2 at 3L/min and is currently requiring more to sub stain oxygen levels           Objective: Temperature: Current - Temp: 99.3 °F (37.4 °C); Max - Temp  Av.9 °F (37.2 °C)  Min: 98.5 °F (36.9 °C)  Max: 99.3 °F (37.4 °C)    Respiratory Rate : Resp  Av.6  Min: 23  Max: 34    Pulse Range: Pulse  Av.9  Min: 95  Max: 139    Blood Presuure Range:  Systolic (04GYW), HIK:296 , Min:87 , BHN:900   ; Diastolic (98GFN), OBC:81, Min:37, Max:78      Pulse ox Range: SpO2  Av.5 %  Min: 94 %  Max: 100 %    24hr I & O:    Intake/Output Summary (Last 24 hours) at 2022 1320  Last data filed at 2022 1805  Gross per 24 hour   Intake 250 ml   Output 500 ml   Net -250 ml         BP (!) 113/55   Pulse 95   Temp 99.3 °F (37.4 °C) (Oral)   Resp 28   Ht 5' 5\" (1.651 m)   Wt 136 lb 11 oz (62 kg)   SpO2 94%   BMI 22.75 kg/m²           Review of Systems:    Short of breath    TELEMETRY: Atrial fibrillation   has a past medical history of Anxiety, Breast carcinoma (HCC), Cervical radiculitis, Chest pain, Depression, Diabetes type 2, controlled ([facility] [address] Family history of diabetes mellitus (DM), GERD (gastroesophageal reflux disease), Hiatal hernia, Hx of Doppler ultrasound, Hx of Doppler ultrasound, Hx of echocardiogram, Hypercalcemia, Hyperglycemia, Hyperlipidemia, Insomnia, Irritable bowel syndrome, LBP (low back pain), Lumbar herniated disc, Menopause, Migraine headache, Osteoporosis, Prediabetes, S/P colonoscopic polypectomy, and TIA (transient ischemic attack). has a past surgical history that includes Tubal ligation; Mastectomy (1997); Hysterectomy (1995); and Breast reconstruction (Right, 2013). Physical Exam:  General:  Awake, alert, NAD  Head:normal  Eye: Pupils equal and round  Neck:  No JVD, no carotid bruit noted   Chest:  Clear to auscultation, no signs of respiratory distress  Cardiovascular:  Normal rate and rhythm. S1 and S2 noted.  No murmurs rubs or gallops  Abdomen:   nontender  Extremities: Trace edema  Pulses; palpable  Neuro: grossly normal    Medications:    digoxin  250 mcg IntraVENous Q4H    aspirin  81 mg Oral q8h    docusate sodium  100 mg Oral BID    senna  1 tablet Oral Nightly    dilTIAZem  180 mg Oral Daily    PARoxetine  20 mg Oral Daily    amiodarone  200 mg Oral BID    [START ON 10/14/2022] amiodarone  200 mg Oral Daily    pantoprazole  40 mg Oral QAM AC    furosemide  40 mg Oral BID    insulin glargine  10 Units SubCUTAneous BID    insulin lispro  5 Units SubCUTAneous TID WC    lidocaine PF  5 mL IntraDERmal Once    sodium chloride flush  5-40 mL IntraVENous 2 times per day    sodium chloride flush  5-40 mL IntraVENous 2 times per day    lidocaine  20 mL IntraDERmal Once    sodium chloride flush  5-40 mL IntraVENous 2 times per day    apixaban  5 mg Oral BID    [Held by provider] lisinopril  20 mg Oral Daily    mirtazapine  15 mg Oral Nightly    oxybutynin  5 mg Oral BID    rosuvastatin  10 mg Oral Nightly    insulin lispro  0-4 Units SubCUTAneous TID WC    insulin lispro  0-4 Units SubCUTAneous Nightly      sodium chloride      dextrose      sodium chloride      sodium chloride       potassium chloride **OR** potassium alternative oral replacement **OR** potassium chloride, oxyCODONE, sodium chloride flush, sodium chloride, glucose, dextrose bolus **OR** dextrose bolus, glucagon (rDNA), dextrose, guaiFENesin-dextromethorphan, sodium chloride flush, sodium chloride, acetaminophen, ondansetron, hydrALAZINE, sodium chloride flush, sodium chloride, ondansetron **OR** [DISCONTINUED] ondansetron, polyethylene glycol, [DISCONTINUED] acetaminophen **OR** acetaminophen, ALPRAZolam    Lab Data:  CBC:   Recent Labs     09/28/22  0602 09/29/22 0621 09/30/22  0600   WBC 9.2 8.5 10.3   HGB 11.2* 9.9* 9.9*   HCT 36.0* 31.8* 31.9*   MCV 92.5 91.9 91.1    272 310     BMP:   Recent Labs     09/28/22  0602 09/29/22  0621 09/30/22  0600     --  139   K 4.2  --  3.2*     --  100   CO2 23  --  25   PHOS 4.5 3.8  --    BUN 29*  --  22   CREATININE 1.5*  --  1.0     LIVER PROFILE:   Recent Labs     09/30/22  0600   AST 9*   ALT 5*   BILITOT 0.6   ALKPHOS 57     PT/INR: No results for input(s): PROTIME, INR in the last 72 hours. APTT: No results for input(s): APTT in the last 72 hours. BNP:  No results for input(s): BNP in the last 72 hours. TROPONIN: No results for input(s): TROPONINI in the last 72 hours. No results for input(s): TROPONINT in the last 72 hours. Labs, consult, tests reviewed                    Emma Norton MD, PA-C 9/30/2022 1:20 PM     Please note this report has been partially produced using speech recognition software and may contain errors related to that system including errors in grammar, punctuation, and spelling, as well as words and phrases that may be inappropriate. If there are any questions or concerns please feel free to contact the dictating provider for clarification.

## 2022-09-30 NOTE — PROGRESS NOTES
Pt is alert and oriented, cooperative with care. Pt was confused this morning in her mentation, however her memory is intact and she is aware of the overnight events. She uses the staratedy to bathroom due to elevated heart rate, does well. Pt and family agreeable. Family agreeable to plan of care after speaking with physicians. Pt is resting in bed, after having no sleep overnight, position of comfort. Denies need, thanks staff for care.

## 2022-09-30 NOTE — PROCEDURES
Bernice Mckeon is a 71 y.o. female patient. 1. Acute respiratory failure with hypoxia ([facility] [address]    2. Pleural effusion    3. Recurrent right pleural effusion    4. Recurrent left pleural effusion      Past Medical History:   Diagnosis Date    Anxiety     Breast carcinoma ([facility] [address] 1997    R breast - MAMMOGRAM ANNUALLY, NEXT DUE 5/2020. Cervical radiculitis     Right     Chest pain     Depression     Diabetes type 2, controlled ([facility] [address] 2016    w two random sugars >126    Family history of diabetes mellitus (DM)     Mother and Father    GERD (gastroesophageal reflux disease)     UGI- sm HH , done at [address] [place] [address],[address] 3/26/14    Hiatal hernia     Hx of Doppler ultrasound 05/17/2022    Normal bilateral lower extremity ankle brachial indices. Hx of Doppler ultrasound 05/17/2022    Significant reflux noted of the Right GSV SFJ (0.7s), GSV Knee (2.0s). Significant reflux noted in the Left GSV SFJ (1.8s). Hx of echocardiogram 05/12/2022    EF is estimated at 55-60%. Mitral annular calcification is present. Mild mitral, tricuspid and moderate aortic regurgitation is present. Mild Pulmonary hypertension noted with RVSP of 43mmHg. Hypercalcemia     mild ~11, iPTH nl 8/2021    Hyperglycemia     Hyperlipidemia     Insomnia     Irritable bowel syndrome     LBP (low back pain)     Lumbar herniated disc     L L4-5 HNP noted on MRI    Menopause     female exam every 2 yrs, due for recheck w me 2015    Migraine headache     ON TOPAMAX    Osteoporosis     Prediabetes     a1c 6.4 in Dec 2014    S/P colonoscopic polypectomy 06/07/2019    Dr Poli Cardona, recheck 5 yrs    TIA (transient ischemic attack)     INACTIVE PROBLEM     Blood pressure (!) 113/55, pulse (!) 120, temperature 99.3 °F (37.4 °C), temperature source Oral, resp. rate (!) 32, height 5' 5\" (1.651 m), weight 136 lb 11 oz (62 kg), SpO2 94 %, not currently breastfeeding.   Left thoracentesis  INDICATION: RECURRENT LEFT PLEURAL EFFUSION  ESTIMATED BLOOD LOSS: NONE  Thoracentesis    Date/Time: 9/30/2022 1:12 PM  Performed by: Nani Ramirez MD  Authorized by: Nani Ramirez MD   Consent: Verbal consent obtained. Written consent obtained. Risks and benefits: risks, benefits and alternatives were discussed  Consent given by: spouse  Patient understanding: patient states understanding of the procedure being performed  Patient consent: the patient's understanding of the procedure matches consent given  Procedure consent: procedure consent matches procedure scheduled  Relevant documents: relevant documents present and verified  Test results: test results available and properly labeled  Site marked: the operative site was marked  Imaging studies: imaging studies available  Patient identity confirmed: verbally with patient and arm band  Time out: Immediately prior to procedure a \"time out\" was called to verify the correct patient, procedure, equipment, support staff and site/side marked as required.     1% Lidocaine 10 ml  700 ml wilma fluid drained as before  CXR STAT  Left Pleural fluid analysis    Nani Ramirez MD  9/30/2022

## 2022-10-01 LAB
ANCA IFA PATTERN: NORMAL
ANION GAP SERPL CALCULATED.3IONS-SCNC: 11 MMOL/L (ref 4–16)
BASOPHILS ABSOLUTE: 0.1 K/CU MM
BASOPHILS RELATIVE PERCENT: 0.5 % (ref 0–1)
BUN BLDV-MCNC: 24 MG/DL (ref 6–23)
CALCIUM SERPL-MCNC: 8.9 MG/DL (ref 8.3–10.6)
CHLORIDE BLD-SCNC: 99 MMOL/L (ref 99–110)
CO2: 27 MMOL/L (ref 21–32)
CREAT SERPL-MCNC: 1.4 MG/DL (ref 0.6–1.1)
DIFFERENTIAL TYPE: ABNORMAL
EOSINOPHILS ABSOLUTE: 0.1 K/CU MM
EOSINOPHILS RELATIVE PERCENT: 1.5 % (ref 0–3)
ERYTHROCYTE SEDIMENTATION RATE: 59 MM/HR (ref 0–30)
GFR AFRICAN AMERICAN: 45 ML/MIN/1.73M2
GFR NON-AFRICAN AMERICAN: 37 ML/MIN/1.73M2
GLUCOSE BLD-MCNC: 171 MG/DL (ref 70–99)
GLUCOSE BLD-MCNC: 192 MG/DL (ref 70–99)
GLUCOSE BLD-MCNC: 226 MG/DL (ref 70–99)
GLUCOSE BLD-MCNC: 226 MG/DL (ref 70–99)
GLUCOSE BLD-MCNC: 239 MG/DL (ref 70–99)
HCT VFR BLD CALC: 32.6 % (ref 37–47)
HEMOGLOBIN: 10.1 GM/DL (ref 12.5–16)
IMMATURE NEUTROPHIL %: 0.3 % (ref 0–0.43)
LYMPHOCYTES ABSOLUTE: 0.8 K/CU MM
LYMPHOCYTES RELATIVE PERCENT: 8.8 % (ref 24–44)
MAGNESIUM: 2 MG/DL (ref 1.8–2.4)
MCH RBC QN AUTO: 28.1 PG (ref 27–31)
MCHC RBC AUTO-ENTMCNC: 31 % (ref 32–36)
MCV RBC AUTO: 90.8 FL (ref 78–100)
MONOCYTES ABSOLUTE: 1 K/CU MM
MONOCYTES RELATIVE PERCENT: 11.1 % (ref 0–4)
NEUTROPHIL CYTOPLASMIC AB IGG: NORMAL
NUCLEATED RBC %: 0 %
PDW BLD-RTO: 14.9 % (ref 11.7–14.9)
PLATELET # BLD: 332 K/CU MM (ref 140–440)
PMV BLD AUTO: 9.9 FL (ref 7.5–11.1)
POTASSIUM SERPL-SCNC: 3.9 MMOL/L (ref 3.5–5.1)
RBC # BLD: 3.59 M/CU MM (ref 4.2–5.4)
SEGMENTED NEUTROPHILS ABSOLUTE COUNT: 7.3 K/CU MM
SEGMENTED NEUTROPHILS RELATIVE PERCENT: 77.8 % (ref 36–66)
SODIUM BLD-SCNC: 137 MMOL/L (ref 135–145)
TOTAL IMMATURE NEUTOROPHIL: 0.03 K/CU MM
TOTAL NUCLEATED RBC: 0 K/CU MM
WBC # BLD: 9.4 K/CU MM (ref 4–10.5)

## 2022-10-01 PROCEDURE — 6370000000 HC RX 637 (ALT 250 FOR IP): Performed by: INTERNAL MEDICINE

## 2022-10-01 PROCEDURE — APPSS45 APP SPLIT SHARED TIME 31-45 MINUTES: Performed by: NURSE PRACTITIONER

## 2022-10-01 PROCEDURE — 6370000000 HC RX 637 (ALT 250 FOR IP): Performed by: STUDENT IN AN ORGANIZED HEALTH CARE EDUCATION/TRAINING PROGRAM

## 2022-10-01 PROCEDURE — 94761 N-INVAS EAR/PLS OXIMETRY MLT: CPT

## 2022-10-01 PROCEDURE — 86160 COMPLEMENT ANTIGEN: CPT

## 2022-10-01 PROCEDURE — 85025 COMPLETE CBC W/AUTO DIFF WBC: CPT

## 2022-10-01 PROCEDURE — 81001 URINALYSIS AUTO W/SCOPE: CPT

## 2022-10-01 PROCEDURE — 99233 SBSQ HOSP IP/OBS HIGH 50: CPT | Performed by: INTERNAL MEDICINE

## 2022-10-01 PROCEDURE — 99232 SBSQ HOSP IP/OBS MODERATE 35: CPT | Performed by: INTERNAL MEDICINE

## 2022-10-01 PROCEDURE — 83735 ASSAY OF MAGNESIUM: CPT

## 2022-10-01 PROCEDURE — 2700000000 HC OXYGEN THERAPY PER DAY

## 2022-10-01 PROCEDURE — 2580000003 HC RX 258: Performed by: STUDENT IN AN ORGANIZED HEALTH CARE EDUCATION/TRAINING PROGRAM

## 2022-10-01 PROCEDURE — 85652 RBC SED RATE AUTOMATED: CPT

## 2022-10-01 PROCEDURE — 6370000000 HC RX 637 (ALT 250 FOR IP): Performed by: NURSE PRACTITIONER

## 2022-10-01 PROCEDURE — 2060000000 HC ICU INTERMEDIATE R&B

## 2022-10-01 PROCEDURE — 80048 BASIC METABOLIC PNL TOTAL CA: CPT

## 2022-10-01 PROCEDURE — 82962 GLUCOSE BLOOD TEST: CPT

## 2022-10-01 RX ORDER — PREDNISONE 10 MG/1
20 TABLET ORAL DAILY
Status: DISCONTINUED | OUTPATIENT
Start: 2022-10-01 | End: 2022-10-03

## 2022-10-01 RX ORDER — INSULIN GLARGINE 100 [IU]/ML
10 INJECTION, SOLUTION SUBCUTANEOUS NIGHTLY
Status: DISCONTINUED | OUTPATIENT
Start: 2022-10-02 | End: 2022-10-01

## 2022-10-01 RX ORDER — INSULIN GLARGINE 100 [IU]/ML
12 INJECTION, SOLUTION SUBCUTANEOUS NIGHTLY
Status: DISCONTINUED | OUTPATIENT
Start: 2022-10-02 | End: 2022-10-03 | Stop reason: HOSPADM

## 2022-10-01 RX ORDER — FUROSEMIDE 40 MG/1
40 TABLET ORAL DAILY
Status: DISCONTINUED | OUTPATIENT
Start: 2022-10-02 | End: 2022-10-03 | Stop reason: HOSPADM

## 2022-10-01 RX ORDER — INSULIN LISPRO 100 [IU]/ML
4 INJECTION, SOLUTION INTRAVENOUS; SUBCUTANEOUS
Status: DISCONTINUED | OUTPATIENT
Start: 2022-10-01 | End: 2022-10-03 | Stop reason: HOSPADM

## 2022-10-01 RX ADMIN — APIXABAN 5 MG: 5 TABLET, FILM COATED ORAL at 08:17

## 2022-10-01 RX ADMIN — DILTIAZEM HYDROCHLORIDE 180 MG: 180 CAPSULE, COATED, EXTENDED RELEASE ORAL at 08:17

## 2022-10-01 RX ADMIN — ASPIRIN 81 MG: 81 TABLET, COATED ORAL at 13:13

## 2022-10-01 RX ADMIN — APIXABAN 5 MG: 5 TABLET, FILM COATED ORAL at 20:21

## 2022-10-01 RX ADMIN — INSULIN LISPRO 1 UNITS: 100 INJECTION, SOLUTION INTRAVENOUS; SUBCUTANEOUS at 18:22

## 2022-10-01 RX ADMIN — AMIODARONE HYDROCHLORIDE 200 MG: 200 TABLET ORAL at 08:17

## 2022-10-01 RX ADMIN — SODIUM CHLORIDE, PRESERVATIVE FREE 10 ML: 5 INJECTION INTRAVENOUS at 08:18

## 2022-10-01 RX ADMIN — ASPIRIN 81 MG: 81 TABLET, COATED ORAL at 20:21

## 2022-10-01 RX ADMIN — INSULIN GLARGINE 10 UNITS: 100 INJECTION, SOLUTION SUBCUTANEOUS at 08:24

## 2022-10-01 RX ADMIN — PREDNISONE 20 MG: 10 TABLET ORAL at 11:20

## 2022-10-01 RX ADMIN — PANTOPRAZOLE SODIUM 40 MG: 40 TABLET, DELAYED RELEASE ORAL at 05:19

## 2022-10-01 RX ADMIN — PAROXETINE HYDROCHLORIDE 20 MG: 20 TABLET, FILM COATED ORAL at 08:17

## 2022-10-01 RX ADMIN — DOCUSATE SODIUM 100 MG: 100 CAPSULE, LIQUID FILLED ORAL at 08:17

## 2022-10-01 RX ADMIN — AMIODARONE HYDROCHLORIDE 200 MG: 200 TABLET ORAL at 20:21

## 2022-10-01 RX ADMIN — INSULIN LISPRO 4 UNITS: 100 INJECTION, SOLUTION INTRAVENOUS; SUBCUTANEOUS at 13:27

## 2022-10-01 RX ADMIN — SODIUM CHLORIDE, PRESERVATIVE FREE 10 ML: 5 INJECTION INTRAVENOUS at 20:22

## 2022-10-01 RX ADMIN — INSULIN LISPRO 5 UNITS: 100 INJECTION, SOLUTION INTRAVENOUS; SUBCUTANEOUS at 08:23

## 2022-10-01 RX ADMIN — OXYBUTYNIN CHLORIDE 5 MG: 5 TABLET ORAL at 20:22

## 2022-10-01 RX ADMIN — DOCUSATE SODIUM 100 MG: 100 CAPSULE, LIQUID FILLED ORAL at 20:22

## 2022-10-01 RX ADMIN — FUROSEMIDE 40 MG: 40 TABLET ORAL at 08:17

## 2022-10-01 RX ADMIN — MIRTAZAPINE 15 MG: 15 TABLET, FILM COATED ORAL at 20:21

## 2022-10-01 RX ADMIN — INSULIN LISPRO 4 UNITS: 100 INJECTION, SOLUTION INTRAVENOUS; SUBCUTANEOUS at 18:22

## 2022-10-01 RX ADMIN — ASPIRIN 81 MG: 81 TABLET, COATED ORAL at 05:19

## 2022-10-01 RX ADMIN — SENNOSIDES 8.6 MG: 8.6 TABLET, FILM COATED ORAL at 20:21

## 2022-10-01 RX ADMIN — ROSUVASTATIN CALCIUM 10 MG: 5 TABLET, FILM COATED ORAL at 20:22

## 2022-10-01 RX ADMIN — OXYBUTYNIN CHLORIDE 5 MG: 5 TABLET ORAL at 08:17

## 2022-10-01 NOTE — PROGRESS NOTES
V2.0  [facility] Hospitalist Progress Note      Name:  Patti Soto /Age/Sex: 1950  (71 y.o. female)   MRN & CSN:  [id number] & [id number] Encounter Date/Time: 10/1/2022 11:29 AM EDT    Location:  -A PCP: Lizett Dempsey MD       Hospital Day: 9    Assessment and Plan:   Patti Soto is a 71 y.o. female with past medical history of diastolic heart failure, paroxysmal atrial fibrillation, type 2 diabetes mellitus, chronic respiratory failure, recurrent admission for bilateral pleural effusions, depression, hypertension, history of invasive ductal carcinoma of the right breast  status post mastectomy, ER/TN positive, history of tobacco use and hyperlipidemia who presents with progressively worsening shortness of breath. Acute on chronic hypoxic respiratory failure  -Resolved, at baseline oxygen requirement, 2 L nasal cannula  Possible contributing factors include, pericardial effusion with tamponade physiology, compressive atelectasis due to bilateral pleural effusion. Utilize supplemental oxygen to maintain oxygen saturation above 92%. Rest of the treatment plan as below    Pericardial effusion with tamponade physiology  Status post pericardiocentesis with removal of 600 cc of pericardial fluid on   Removal of pericardiocentesis catheter 2022  Repeat limited echo on 2022, no pericardial effusion  Mild lymphocytic predominance on cytology  Negative for infection  Although this pericardial effusion can be explained by diastolic heart failure, there remains a concern about underlying autoimmune disease process given her Raynaud's phenomena. Continue ASA, colchicine discontinued by cardiology due to interaction with amiodarone. Protonix for stress ulcer prophylaxis  There still remains concern for metastatic effusion given history of breast cancer. Oncology is following, recommend PET scan as outpatient and bone biopsy.   Patient will be followed by [facility] oncology after discharge. Recurrent bilateral pleural effusion  Status postthoracentesis 9/25/2022  Repeat bilateral thoracentesis on 9/30/2022 by pulmonology  Appears to be borderline exudate  Similarly with the pericardial effusion, diastolic heart failure can explain this but the recurrence of disease and concerning cutaneous features my suspicion for autoimmune disease is high. Pulmonology following, appreciate recommendations    Paroxysmal atrial fibrillation with RVR  Currently rate controlled  Continue Eliquis  PO Amiodarone loading dose and then maintenance. Digoxin loading dose completed    Acute kidney injury-resolved  Likely in the setting of diuretics  Avoid nephrotoxic agents  Today's labs pending    Raynaud's phenomena  Never seen a Rheumatologist due to frequent hospitalizations  Per chart review: Negative rheumatoid factor, negative Anti- Scl-70, recently negative RODOLFO, ESR 26.  Patient needs nailfold capillaroscopy. Anti-SCL 70 is moderately sensitive, 20% hence recommend getting anti-centromere, RNA polymerase III and U3-RNP, PM/Scl, or Th/To antibodies, will defer to rheumatology. Rheumatology evaluation pending    Superficial venous thrombosis RUE  Conservative management with cold compresses and limb elevation    Moderate malnutrition  Dietary supplementation  Dietitian following, appreciate recommendations    Normocytic anemia  No signs of overt bleed  Concern for anemia of chronic disease  Monitor CBC, today's lab pending  Transfuse if hemoglobin less than 7    Acute on chronic diastolic heart failure on admission  Currently euvolemic  Blood pressure control  Will consider adding Jardiance upon discharge    Moderate-severe pulmonary hypertension  Elevated RVSP and tricuspid regurgitation  Recommend right heart catheterization, will discuss with cardiology  Optimization of preload and afterload, maintain euvolemic state.     Chronic respiratory failure on home oxygen 2 to 3 L via nasal cannula  No PFTs on chart, recent CT angiogram chest did show very minimal emphysematous disease process but no overt obstructive disease pattern on imaging. Unsure about the etiology, could be the pulmonary hypertension. Utilize supplemental oxygen to maintain saturation above 92%  Pulmonology following, appreciate recommendations    Type 2 diabetes mellitus without long-term use of insulin  With hyperglycemia  Adjusted basal bolus insulin  Sliding scale insulin to target 140-180  Will consider adding Jardiance upon discharge    Hyperlipidemia  Continue Crestor    History of invasive ductal carcinoma of the right breast status post mastectomy  ER/IN positive  On prior imaging there were some concern for bony metastasis  Will need bone biopsy for definitive diagnosis  Oncology following    Continue to monitor on stepdown unit    Diet ADULT DIET; Regular; 4 carb choices (60 gm/meal); Low Fat/Low Chol/High Fiber/REYES  ADULT ORAL NUTRITION SUPPLEMENT; Breakfast, Dinner; Low Calorie/High Protein Oral Supplement   DVT Prophylaxis [] Lovenox, []  Heparin, [] SCDs, [] Ambulation,  [x] Eliquis, [] Xarelto  [] Coumadin   Code Status Full Code   Disposition From: Home  Expected Disposition: Home  Estimated Date of Discharge: 1-2 days  Patient requires continued admission due to rheumatology evaluation, right heart catheterization   Surrogate Decision Maker/ POA Spouse     Subjective:     Chief Complaint: Shortness of Breath (Started today; has recently been seen and had 2 paracentesis done; normally wears O2 at 3L/min and is currently requiring more to sub stain oxygen levels)       Cyrus Monroy is a 71 y.o. female who presents with progressively worsening shortness of breath. Patient was seen and evaluated at bedside. Patient appeared more alert and awake today. Hemodynamically stable, heart rate is well controlled. She denies any acute overnight events or significant complaints.   States she wants her hair done.    Objective: Intake/Output Summary (Last 24 hours) at 10/1/2022 0829  Last data filed at 9/30/2022 1245  Gross per 24 hour   Intake 360 ml   Output --   Net 360 ml        Vitals:   Vitals:    10/01/22 0807   BP: (!) 114/42   Pulse: 86   Resp: 19   Temp: 98.8 °F (37.1 °C)   SpO2: 99%       Physical Exam:   Physical Exam  Vitals reviewed. Constitutional:       Appearance: She is normal weight. She is ill-appearing. HENT:      Head: Normocephalic and atraumatic. Nose: Nose normal.      Mouth/Throat:      Mouth: Mucous membranes are dry. Pharynx: Oropharynx is clear. Eyes:      General: No scleral icterus. Conjunctiva/sclera: Conjunctivae normal.   Cardiovascular:      Rate and Rhythm: Normal rate. Rhythm irregular. Pulses: Normal pulses. Heart sounds: Murmur heard. Pulmonary:      Effort: Pulmonary effort is normal.      Breath sounds: Rales present. No wheezing or rhonchi. Comments: 2L nasal cannula oxygen  Abdominal:      General: Bowel sounds are normal. There is no distension. Palpations: Abdomen is soft. Tenderness: There is no abdominal tenderness. Musculoskeletal:         General: No deformity. Normal range of motion. Cervical back: Normal range of motion and neck supple. No rigidity or tenderness. Right lower leg: Edema present. Left lower leg: Edema present. Skin:     Coloration: Skin is not jaundiced or pale. Comments: Skin thickening bilateral upper and lower extremities   Neurological:      General: No focal deficit present. Mental Status: She is alert and oriented to person, place, and time. Mental status is at baseline.           Medications:   Medications:    aspirin  81 mg Oral q8h    docusate sodium  100 mg Oral BID    senna  1 tablet Oral Nightly    dilTIAZem  180 mg Oral Daily    PARoxetine  20 mg Oral Daily    amiodarone  200 mg Oral BID    [START ON 10/14/2022] amiodarone  200 mg Oral Daily    pantoprazole  40 mg Oral QAM AC    furosemide  40 mg Oral BID    insulin glargine  10 Units SubCUTAneous BID    insulin lispro  5 Units SubCUTAneous TID     lidocaine PF  5 mL IntraDERmal Once    sodium chloride flush  5-40 mL IntraVENous 2 times per day    sodium chloride flush  5-40 mL IntraVENous 2 times per day    lidocaine  20 mL IntraDERmal Once    sodium chloride flush  5-40 mL IntraVENous 2 times per day    apixaban  5 mg Oral BID    [Held by provider] lisinopril  20 mg Oral Daily    mirtazapine  15 mg Oral Nightly    oxybutynin  5 mg Oral BID    rosuvastatin  10 mg Oral Nightly    insulin lispro  0-4 Units SubCUTAneous TID WC    insulin lispro  0-4 Units SubCUTAneous Nightly      Infusions:    sodium chloride      dextrose      sodium chloride      sodium chloride       PRN Meds: potassium chloride, 40 mEq, PRN   Or  potassium alternative oral replacement, 40 mEq, PRN   Or  potassium chloride, 10 mEq, PRN  oxyCODONE, 2.5 mg, Q4H PRN  sodium chloride flush, 5-40 mL, PRN  sodium chloride, , PRN  glucose, 4 tablet, PRN  dextrose bolus, 125 mL, PRN   Or  dextrose bolus, 250 mL, PRN  glucagon (rDNA), 1 mg, PRN  dextrose, , Continuous PRN  guaiFENesin-dextromethorphan, 10 mL, Q4H PRN  sodium chloride flush, 5-40 mL, PRN  sodium chloride, , PRN  acetaminophen, 650 mg, Q4H PRN  ondansetron, 4 mg, Q6H PRN  hydrALAZINE, 10 mg, Q10 Min PRN  sodium chloride flush, 5-40 mL, PRN  sodium chloride, , PRN  ondansetron, 4 mg, Q8H PRN  polyethylene glycol, 17 g, Daily PRN  acetaminophen, 650 mg, Q6H PRN  ALPRAZolam, 0.25 mg, BID PRN      Labs      Recent Results (from the past 24 hour(s))   POCT Glucose    Collection Time: 09/30/22 10:52 AM   Result Value Ref Range    POC Glucose 151 (H) 70 - 99 MG/DL   POCT Glucose    Collection Time: 09/30/22 12:05 PM   Result Value Ref Range    POC Glucose 182 (H) 70 - 99 MG/DL   pH, Body Fluid    Collection Time: 09/30/22 12:50 PM   Result Value Ref Range    pH, Fluid 8.5    PLEURAL/PERITONEAL FLUID PANEL Collection Time: 09/30/22 12:50 PM   Result Value Ref Range    Glucose, Fluid 184 >60 MG/DL    LD, Fluid 107 <200 IU/L    Protein, Fluid 2.5 G/DL    Fluid Type PLEURAL FLUID INDEX    RBC, Fluid 452 /CU MM    WBC, Fluid 202 /CU MM    Neutrophil Count, Fluid 7 %    Lymphocytes, Body Fluid 86 %    Monocyte Count, Fluid 7 %    Mesothelial, Fluid 15 /100 WBC    Other Cells, Fluid RIGHT SIDE PLEURAL    pH, Body Fluid    Collection Time: 09/30/22  1:00 PM   Result Value Ref Range    pH, Fluid 8.5    PLEURAL/PERITONEAL FLUID PANEL    Collection Time: 09/30/22  1:00 PM   Result Value Ref Range    Glucose, Fluid 181 >60 MG/DL    LD, Fluid 107 <200 IU/L    Protein, Fluid 2.7 G/DL    Fluid Type PLEURAL FLUID INDEX    RBC, Fluid 2,000 /CU MM    WBC, Fluid 314 /CU MM    Neutrophil Count, Fluid 2 %    Lymphocytes, Body Fluid 61 %    Monocyte Count, Fluid 37 %    Mesothelial, Fluid 11 /100 WBC    Other Cells, Fluid LEFT SIDE PLEURAL    POCT Glucose    Collection Time: 09/30/22  4:18 PM   Result Value Ref Range    POC Glucose 127 (H) 70 - 99 MG/DL   POCT Glucose    Collection Time: 09/30/22  8:39 PM   Result Value Ref Range    POC Glucose 86 70 - 99 MG/DL   POCT Glucose    Collection Time: 10/01/22  7:42 AM   Result Value Ref Range    POC Glucose 171 (H) 70 - 99 MG/DL        Imaging/Diagnostics Last 24 Hours   Echocardiogram limited    Result Date: 9/27/2022  Transthoracic Echocardiography Report (TTE)  Demographics   Patient Name       Daquan Butler  Date of Study       09/27/2022                     A   Date of Birth      1950         Gender              Female   Age                71 year(s)         Race                   Patient Number     [id number]         Room Number         2119   Visit Number       [id number]   Corporate ID       [id number]   Accession Number   [id number]         Sonographer         Shayy Pop                                                            RDCS, RDMS, RVT   Ordering Physician Panda Martin MD                 Physician           Netet CASAREZ  Procedure Type of Study   TTE procedure:ECHOCARDIOGRAM LIMITED. Procedure Date Date: 09/27/2022 Start: 08:40 AM Study Location: Portable Technical Quality: Fair visualization Indications:Pericardial effusion. Patient Status: Routine Height: 65 inches Weight: 143 pounds BSA: 1.72 m2 BMI: 23.8 kg/m2 HR: 82 bpm BP: 117/65 mmHg  Conclusions   Summary  This is a limited echocardiogram.  Left ventricular systolic function is normal.  Ejection fraction is visually estimated at 50-55%. Moderate aortic regurgitation is noted. Moderate to severe tricuspid regurgitation; RVSP: 55 mmHg. Severe PHTN. S/p pericardiocentesis with 560cc drained on 9/26/22; no evidence of any  pericardial effusion. Left pleural effusion. Signature   ------------------------------------------------------------------  Electronically signed by Skyler Rodriguez MD  (Interpreting physician) on 09/27/2022 at 09:45 AM  ------------------------------------------------------------------   Findings   Left Ventricle  Left ventricular systolic function is normal.  Ejection fraction is visually estimated at 50-55%. Aortic Valve  Moderate aortic regurgitation is noted. Tricuspid Valve  Moderate to severe tricuspid regurgitation; RVSP: 55 mmHg. Severe PHTN. Pericardial Effusion  S/p pericardiocentesis with 560cc drained on 9/26/22; no evidence of any  pericardial effusion. Pleural Effusion  Left pleural effusion.   M-Mode/2D Measurements & Calculations   LV Diastolic Dimension: 8.81 cm LV Systolic Dimension: 3.1 cm  LV FS:26.4 %                    LV Volume Diastolic: 28.4 ml  LV PW Diastolic: 8.90 cm        LV Volume Systolic: 33.0 ml  LV PW Systolic: 0.64 cm         LV EDV/LV EDV Index: 74.6 ml/43 m2LV ESV/LV  Septum Diastolic: 2.09 cm       ESV Index: 29.8 ml/17 m2  Septum Systolic: 8.93 cm EF Calculated (A4C): 60.1 %                                  EF Calculated (2D): 52 %  Doppler Measurements & Calculations                               Estimated RVSP: 55 mmHg                              Estimated RAP:3 mmHg    Estimated PASP: 40.21 mmHg TR Velocity:305 cm/s                              TR Gradient:37.21 mmHg      Echocardiogram limited    Result Date: 9/27/2022  Transthoracic Echocardiography Report (TTE)  Demographics   Patient Name       Bernett Barthel  Date of Study       09/26/2022                     A   Date of Birth      1950         Gender              Female   Age                71 year(s)         Race                   Patient Number     [id number]         Room Number         2119   Visit Number       [id number]   Corporate ID       [id number]   Accession Number   [id number]         Robyn Wallace                                                            [facility]   Ordering Physician Ace Joyner MD                 Physician           MD  Procedure Type of Study   TTE procedure:ECHOCARDIOGRAM LIMITED. Procedure Date Date: 09/26/2022 Start: 02:50 PM Study Location: OR Technical Quality: Fair visualization Indications:Pericardial effusion. Patient Status: Routine Height: 65 inches Weight: 143 pounds BSA: 1.72 m2 BMI: 23.8 kg/m2 BP: 112/39 mmHg  Conclusions   Summary  This is a limited echo during pericardiocentesis. See full report of procedure in EPIC   600cc of straw-colored fluid was drained, no pericardial effusion noted  post procedure.    Signature   ------------------------------------------------------------------  Electronically signed by Lionel Rodas MD (Interpreting  physician) on 09/27/2022 at 08:57 AM  ------------------------------------------------------------------      XR CHEST PORTABLE    Result Date: 9/28/2022  EXAMINATION: ONE XRAY VIEW OF THE CHEST 9/28/2022 6:16 am COMPARISON: None available. Down time procedure. HISTORY: ORDERING SYSTEM PROVIDED HISTORY: hypoxia TECHNOLOGIST PROVIDED HISTORY: Reason for exam:->hypoxia Reason for Exam: hypoxia FINDINGS: The heart is enlarged. The mediastinal contours are normal.  There is pulmonary edema with moderate bilateral pleural effusions and associated bibasilar atelectasis. No pneumothorax. Surgical clip is noted in the right axilla. The bones are osteopenic. Cardiomegaly with pulmonary edema and moderate bilateral pleural effusions with associated bibasilar atelectasis.        Electronically signed by Sulema Gresham MD on 10/1/2022 at 8:29 AM

## 2022-10-01 NOTE — PROGRESS NOTES
Pulmonary and Critical Care  Progress Note      VITALS:  BP (!) 114/42   Pulse 87   Temp 98.8 °F (37.1 °C) (Oral)   Resp 17   Ht 5' 5\" (1.651 m)   Wt 130 lb 1.1 oz (59 kg)   SpO2 99%   BMI 21.64 kg/m²     Subjective:   CHIEF COMPLAINT :SOB     HPI:                The patient is a 71 y.o. female is lying in the bed. She is not in acute resp distress    Objective:   PHYSICAL EXAM:    LUNGS:Decreased air entry bilateral bases  Abd-soft, BS+,NT  Ext- 1 + pedal edema  CVS-s1s2, no murmurs      DATA:    CBC:  Recent Labs     09/29/22  0621 09/30/22  0600 10/01/22  1110   WBC 8.5 10.3 9.4   RBC 3.46* 3.50* 3.59*   HGB 9.9* 9.9* 10.1*   HCT 31.8* 31.9* 32.6*    310 332   MCV 91.9 91.1 90.8   MCH 28.6 28.3 28.1   MCHC 31.1* 31.0* 31.0*   RDW 15.2* 15.1* 14.9   SEGSPCT 71.6* 75.8* 77.8*      BMP:  Recent Labs     09/30/22  0600 10/01/22  1110    137   K 3.2* 3.9    99   CO2 25 27   BUN 22 24*   CREATININE 1.0 1.4*   CALCIUM 8.8 8.9   GLUCOSE 163* 226*      ABG:  No results for input(s): PH, PO2ART, QXL1IPL, HCO3, BEART, O2SAT in the last 72 hours. BNP  No results found for: BNP   D-Dimer:  No results found for: [facility]   Radiology:   Status post bilateral thoracentesis with improved effusions and lung base   aeration. No pneumothorax.              Assessment/Plan     Patient Active Problem List    Diagnosis Date Noted    Moderate malnutrition ([address] 09/30/2022     Priority: Medium    Atrial flutter with rapid ventricular response ([address] 09/29/2022     Priority: Medium    Acute systolic CHF (congestive heart failure) ([address] 09/24/2022     Priority: Medium    Acute respiratory failure with hypoxia ([address] 09/24/2022     Priority: Medium    Diastolic heart failure, unspecified HF chronicity ([facility] 08/30/2022     Priority: Medium    Atrial fibrillation with RVR ([facility] 08/18/2022     Priority: Medium    Varicose veins of both legs with edema 06/28/2022     Priority: Medium    Venous insufficiency 06/07/2022 Priority: Medium    Shortness of breath 06/07/2022     Priority: Medium    Edema of lower extremity 06/07/2022     Priority: Medium    Chest pain 05/12/2022     Priority: Medium    Peripheral edema 03/21/2022    Carcinoma of right female breast, unspecified estrogen receptor status, unspecified site of breast ([facility] 02/16/2022    Hypercalcemia      Overview Note:     mild ~11, iPTH nl 8/2021      Diabetes type 2, controlled ([facility]      Overview Note:     likely, w two random sugars >126      Migraine headache     GERD (gastroesophageal reflux disease)     Irritable bowel syndrome     Lumbar herniated disc     Low back pain      Overview Note:     replace inactive diagnosis      Hyperlipidemia     History of breast cancer      Overview Note:     R breast      Acute on chronic Hypoxic resp failure- improving  Bilateral Pleural effusions s/p thoracentesis- recurrent  Diastolic dysfunction  Afib on Eliquis  H/o Breast ca with mets to T12, L1  Pericardial effusion s/p pericardiocentesis  Moderate Malnutrition       Diuresis  ICS  OOB  Keep sats > 92%  Diuresis  CHF optimization  C/w present management    Electronically signed by Jocelin Faulkner MD on 10/1/2022 at 1:07 PM

## 2022-10-01 NOTE — PLAN OF CARE
Problem: Discharge Planning  Goal: Discharge to home or other facility with appropriate resources  Outcome: Progressing  Flowsheets (Taken 10/1/2022 6289)  Discharge to home or other facility with appropriate resources:   Identify barriers to discharge with patient and caregiver   Arrange for needed discharge resources and transportation as appropriate   Identify discharge learning needs (meds, wound care, etc)   Arrange for interpreters to assist at discharge as needed   Refer to discharge planning if patient needs post-hospital services based on physician order or complex needs related to functional status, cognitive ability or social support system     Problem: Safety - Adult  Goal: Free from fall injury  Outcome: Progressing     Problem: Chronic Conditions and Co-morbidities  Goal: Patient's chronic conditions and co-morbidity symptoms are monitored and maintained or improved  Outcome: Progressing  Flowsheets (Taken 10/1/2022 8111)  Care Plan - Patient's Chronic Conditions and Co-Morbidity Symptoms are Monitored and Maintained or Improved:   Monitor and assess patient's chronic conditions and comorbid symptoms for stability, deterioration, or improvement   Update acute care plan with appropriate goals if chronic or comorbid symptoms are exacerbated and prevent overall improvement and discharge   Collaborate with multidisciplinary team to address chronic and comorbid conditions and prevent exacerbation or deterioration     Problem: Pain  Goal: Verbalizes/displays adequate comfort level or baseline comfort level  Outcome: Progressing  Flowsheets (Taken 10/1/2022 0807)  Verbalizes/displays adequate comfort level or baseline comfort level:   Encourage patient to monitor pain and request assistance   Assess pain using appropriate pain scale   Administer analgesics based on type and severity of pain and evaluate response   Implement non-pharmacological measures as appropriate and evaluate response   Consider cultural and social influences on pain and pain management   Notify Licensed Independent Practitioner if interventions unsuccessful or patient reports new pain     Problem: Nutrition Deficit:  Goal: Optimize nutritional status  Outcome: Progressing     Problem: Skin/Tissue Integrity  Goal: Absence of new skin breakdown  Description: 1. Monitor for areas of redness and/or skin breakdown  2. Assess vascular access sites hourly  3. Every 4-6 hours minimum:  Change oxygen saturation probe site  4. Every 4-6 hours:  If on nasal continuous positive airway pressure, respiratory therapy assess nares and determine need for appliance change or resting period.   Outcome: Progressing

## 2022-10-01 NOTE — PROGRESS NOTES
[facility] ([facility] PHYSICAL REHABILITATION [place]  [address], [address] [facility],Third Floor  Phone: [phone]    Fax [phone]                  Ze Saenz MD, Jodi Atwood MD, [address] Hod[address] Carranza MD, MD Rebecca Cleveland MD Wallene Patter, MD Dina Burr, MD Arlan Bride, DORIS Barakat, DORIS Torres, DORIS Medellin, APRGISELLE Gomes PA-C     Cardiology Progress Note     Today's Plan: continue to monitor    Admit Date:  9/23/2022    Consult reason/ Seen today for:     Subjective and  Overnight Events:  Patient reports that she is feeling better this morning. The thoracentesis helped. Breathing is improved. She is on 2 L nasal cannula which she is on at baseline at home. Breath sounds are diminished in bases. Plan:   Possible diastolic heart failure. Given patient history of cancer could be due to cancer for pleural effusions. Primary is also working her up for auto immune given her hx of raynaud's phenomenon. Pericordal effusion: post pericardiocenteses. Pleural effusions: sp thoracentesis 2 this admission. ? Pleurex catheter needed. Atrial fibrillation RVR: In sinus rhythm today rate is well controlled. Continue amiodarone 200 mg twice daily. Cardizem 100 mg daily and Eliquis 5 mg twice daily. Appreciate electrophysiology consult input. Telemetry Reviewed:   sinus rhythm    History of Presenting Illness:    Chief complain on admission : 71 y. o.year old who is admitted for  Chief Complaint   Patient presents with    Shortness of Breath     Started today; has recently been seen and had 2 paracentesis done; normally wears O2 at 3L/min and is currently requiring more to sub stain oxygen levels        Past medical history:    has a past medical history of Anxiety, Breast carcinoma ([address] Cervical radiculitis, Chest pain, Depression, Diabetes type 2, controlled ([address] Family history of diabetes mellitus (DM), GERD (gastroesophageal reflux disease), Hiatal hernia, Hx of Doppler ultrasound, Hx of Doppler ultrasound, Hx of echocardiogram, Hypercalcemia, Hyperglycemia, Hyperlipidemia, Insomnia, Irritable bowel syndrome, LBP (low back pain), Lumbar herniated disc, Menopause, Migraine headache, Osteoporosis, Prediabetes, S/P colonoscopic polypectomy, and TIA (transient ischemic attack). Past surgical history:   has a past surgical history that includes Tubal ligation; Mastectomy (7/1997); Hysterectomy (5/1995); and Breast reconstruction (Right, 4/2013). Social History:   reports that she has never smoked. She has never used smokeless tobacco. She reports that she does not drink alcohol and does not use drugs. Family history:  family history includes Diabetes in her father, mother, and sister; Heart Disease in her father; High Blood Pressure in her father and mother; High Cholesterol in her sister; Other in her sister; Stroke in her paternal grandmother. Allergies   Allergen Reactions    Codeine      \"jittery\"  Feels anxoius       Review of Systems   All 14 systems were reviewed and are negative  Except for the positive findings  which as documented     BP (!) 114/42   Pulse 87   Temp 98.8 °F (37.1 °C) (Oral)   Resp 17   Ht 5' 5\" (1.651 m)   Wt 130 lb 1.1 oz (59 kg)   SpO2 99%   BMI 21.64 kg/m²   No intake or output data in the 24 hours ending 10/01/22 1251    Physical Exam  Vitals reviewed. Constitutional:       General: She is not in acute distress. Appearance: Normal appearance. She is not ill-appearing. Interventions: Nasal cannula in place. HENT:      Head: Atraumatic. Neck:      Vascular: No carotid bruit. Cardiovascular:      Rate and Rhythm: Normal rate and regular rhythm. Pulses: Normal pulses. Heart sounds: Normal heart sounds. No murmur heard. Pulmonary:      Effort: Pulmonary effort is normal. No respiratory distress.       Breath sounds: Examination of the right-lower field reveals decreased breath sounds. Examination of the left-lower field reveals decreased breath sounds. Decreased breath sounds present. Musculoskeletal:         General: No swelling or deformity. Cervical back: Neck supple. No muscular tenderness. Neurological:      Mental Status: She is alert.            Medications:    [START ON 10/2/2022] insulin glargine  12 Units SubCUTAneous Nightly    insulin lispro  4 Units SubCUTAneous TID WC    predniSONE  20 mg Oral Daily    aspirin  81 mg Oral q8h    docusate sodium  100 mg Oral BID    senna  1 tablet Oral Nightly    dilTIAZem  180 mg Oral Daily    PARoxetine  20 mg Oral Daily    amiodarone  200 mg Oral BID    [START ON 10/14/2022] amiodarone  200 mg Oral Daily    pantoprazole  40 mg Oral QAM AC    furosemide  40 mg Oral BID    lidocaine PF  5 mL IntraDERmal Once    sodium chloride flush  5-40 mL IntraVENous 2 times per day    sodium chloride flush  5-40 mL IntraVENous 2 times per day    lidocaine  20 mL IntraDERmal Once    sodium chloride flush  5-40 mL IntraVENous 2 times per day    apixaban  5 mg Oral BID    [Held by provider] lisinopril  20 mg Oral Daily    mirtazapine  15 mg Oral Nightly    oxybutynin  5 mg Oral BID    rosuvastatin  10 mg Oral Nightly    insulin lispro  0-4 Units SubCUTAneous TID WC    insulin lispro  0-4 Units SubCUTAneous Nightly      sodium chloride      dextrose      sodium chloride      sodium chloride       potassium chloride **OR** potassium alternative oral replacement **OR** potassium chloride, oxyCODONE, sodium chloride flush, sodium chloride, glucose, dextrose bolus **OR** dextrose bolus, glucagon (rDNA), dextrose, guaiFENesin-dextromethorphan, sodium chloride flush, sodium chloride, acetaminophen, ondansetron, hydrALAZINE, sodium chloride flush, sodium chloride, ondansetron **OR** [DISCONTINUED] ondansetron, polyethylene glycol, [DISCONTINUED] acetaminophen **OR** acetaminophen, ALPRAZolam    Lab Data:  CBC:   Recent Labs 09/29/22  0621 09/30/22  0600 10/01/22  1110   WBC 8.5 10.3 9.4   HGB 9.9* 9.9* 10.1*   HCT 31.8* 31.9* 32.6*   MCV 91.9 91.1 90.8    310 332     BMP:   Recent Labs     09/29/22 0621 09/30/22  0600 10/01/22  1110   NA  --  139 137   K  --  3.2* 3.9   CL  --  100 99   CO2  --  25 27   PHOS 3.8  --   --    BUN  --  22 24*   CREATININE  --  1.0 1.4*     PT/INR: No results for input(s): PROTIME, INR in the last 72 hours. BNP:  No results for input(s): PROBNP in the last 72 hours. TROPONIN: No results for input(s): TROPONINT in the last 72 hours. All labs, medications and tests reviewed by myself , continue all other medications of all above medical condition listed as is except for changes mentioned above. Thank you very much for consult , please call with questions.     Electronically signed by DORIS Buck CNP on 10/1/2022 at 12:51 PM

## 2022-10-01 NOTE — PROGRESS NOTES
Per request of Dr Cleopatra Dewey, Dr Broderick Blend contacted to inquire about him coming to see pt. Dr Dilan Mireles did not respond. VM left. Dr Cleopatra Dewey made aware.

## 2022-10-01 NOTE — PROGRESS NOTES
CARDIOLOGY PROGRESS NOTE                                                  Name:  Veronica Gomez /Age/Sex: 1950  (71 y.o. female)   MRN & CSN:  [id number] & [id number] Admission Date/Time: 2022  9:51 PM   Location:  -A PCP: Roe Fuller MD         Admit Date:  2022  Hospital Day: 9      SUBJECTIVE:   Seen patient as follow up as consultation for      S/p IR assisted pleural effusion drainage  Breathing better         No intake or output data in the 24 hours ending 10/01/22 1412    Assessment/Plan:     Pericardial effusion/tamponade: S/p pericardiocentesis. 600 cc out. Removal of catheter. Follow-up echocardiogram does not show residual effusion. Cytology sent. Respiratory failure/shortness of breath: Multifactorial, pleural effusion/pericardial effusion s/p drainage. Symptoms improved. Supplemental oxygen. Pulmonary follow-up. Atrial fibrillation: Currently in sinus rhythm. On amiodarone/digoxin/Cardizem/Eliquis. EP evaluation  Heart failure with preserved ejection fraction: Stable. History of cancer with recurrent pleural effusions. Suspected metastatic disease. Oncology work-up, follow-up  Hyperlipidemia: Continue with statin therapy  History of severe pulm hypertension: Patient was scheduled for left and right heart cardiac catheterization. Currently not stable to undergo procedures in acute setting. Malignancy needs to be ruled out first.  Outpatient follow-up.        Past medical history:    has a past medical history of Anxiety, Breast carcinoma ([address] Cervical radiculitis, Chest pain, Depression, Diabetes type 2, controlled ([address] Family history of diabetes mellitus (DM), GERD (gastroesophageal reflux disease), Hiatal hernia, Hx of Doppler ultrasound, Hx of Doppler ultrasound, Hx of echocardiogram, Hypercalcemia, Hyperglycemia, Hyperlipidemia, Insomnia, Irritable bowel syndrome, LBP (low back pain), Lumbar herniated disc, Menopause, Migraine headache, Osteoporosis, Prediabetes, S/P colonoscopic polypectomy, and TIA (transient ischemic attack). Past surgical history:   has a past surgical history that includes Tubal ligation; Mastectomy (7/1997); Hysterectomy (5/1995); and Breast reconstruction (Right, 4/2013). Social History:   reports that she has never smoked. She has never used smokeless tobacco. She reports that she does not drink alcohol and does not use drugs. Family history:  family history includes Diabetes in her father, mother, and sister; Heart Disease in her father; High Blood Pressure in her father and mother; High Cholesterol in her sister; Other in her sister; Stroke in her paternal grandmother. OBJECTIVE:     BP (!) 114/42   Pulse 87   Temp 98.8 °F (37.1 °C) (Oral)   Resp 17   Ht 5' 5\" (1.651 m)   Wt 130 lb 1.1 oz (59 kg)   SpO2 99%   BMI 21.64 kg/m²   No intake or output data in the 24 hours ending 10/01/22 1412    Physical Exam:    Constitutional:  Well developed, Well nourished, No acute distress, Non-toxic appearance. HENT:  Normocephalic, Atraumatic, Bilateral external ears normal, Oropharynx moist, No oral exudates, Nose normal. Neck- Normal range of motion, No tenderness, Supple, No stridor. Eyes:  EOMI, Conjunctiva normal, No discharge. Respiratory: Fine basilar crackles  Cardiovascular S1-S2 No  Murmurs, added sounds. Normal rate rhythm. No rubs gallops. Carotid pulses and amplitude are normal no bruit noted. Pedal pulses normal femoral pulses normal.  No pedal edema  GI:  Bowel sounds normal, Soft, No tenderness  : No CVA tenderness. Musculoskeletal: No edema, No tenderness, No cyanosis, No clubbing. Back- No tenderness. Integument:  Warm, Dry, No erythema, No rash. Lymphatic:  No lymphadenopathy noted. Neurologic:  Alert & oriented x 3, No focal deficits noted.    Psychiatric:  Affect normal, Judgment normal, Mood normal.           MEDICATIONS:     [START ON 10/2/2022] insulin glargine  12 Units SubCUTAneous Nightly    insulin lispro  4 Units SubCUTAneous TID WC    predniSONE  20 mg Oral Daily    aspirin  81 mg Oral q8h    docusate sodium  100 mg Oral BID    senna  1 tablet Oral Nightly    dilTIAZem  180 mg Oral Daily    PARoxetine  20 mg Oral Daily    amiodarone  200 mg Oral BID    [START ON 10/14/2022] amiodarone  200 mg Oral Daily    pantoprazole  40 mg Oral QAM AC    furosemide  40 mg Oral BID    lidocaine PF  5 mL IntraDERmal Once    sodium chloride flush  5-40 mL IntraVENous 2 times per day    sodium chloride flush  5-40 mL IntraVENous 2 times per day    lidocaine  20 mL IntraDERmal Once    sodium chloride flush  5-40 mL IntraVENous 2 times per day    apixaban  5 mg Oral BID    [Held by provider] lisinopril  20 mg Oral Daily    mirtazapine  15 mg Oral Nightly    oxybutynin  5 mg Oral BID    rosuvastatin  10 mg Oral Nightly    insulin lispro  0-4 Units SubCUTAneous TID WC    insulin lispro  0-4 Units SubCUTAneous Nightly      sodium chloride      dextrose      sodium chloride      sodium chloride       potassium chloride **OR** potassium alternative oral replacement **OR** potassium chloride, oxyCODONE, sodium chloride flush, sodium chloride, glucose, dextrose bolus **OR** dextrose bolus, glucagon (rDNA), dextrose, guaiFENesin-dextromethorphan, sodium chloride flush, sodium chloride, acetaminophen, ondansetron, hydrALAZINE, sodium chloride flush, sodium chloride, ondansetron **OR** [DISCONTINUED] ondansetron, polyethylene glycol, [DISCONTINUED] acetaminophen **OR** acetaminophen, ALPRAZolam  Allergies   Allergen Reactions    Codeine      \"jittery\"  Feels anxoius       Lab Data:  CBC:   Recent Labs     09/29/22  0621 09/30/22  0600 10/01/22  1110   WBC 8.5 10.3 9.4   HGB 9.9* 9.9* 10.1*   HCT 31.8* 31.9* 32.6*   MCV 91.9 91.1 90.8    310 332     BMP:   Recent Labs     09/29/22  0621 09/30/22  0600 10/01/22  1110   NA  --  139 137   K  --  3.2* 3.9   CL  --  100 99   CO2  --  25 27   PHOS 3.8  -- --    BUN  --  22 24*   CREATININE  --  1.0 1.4*     LIVER PROFILE:   Recent Labs     09/30/22  0600   AST 9*   ALT 5*   BILITOT 0.6   ALKPH[facility] [address] Lynch MD, MD 10/1/2022 2:12 PM

## 2022-10-01 NOTE — PROGRESS NOTES
Pt ambulated independently to bathroom with walker hr was as high as 150, after pt returned to bed hr returned to 80-90s within 3 minuets.  Pt resting in bed comfortably with no pain at this time

## 2022-10-02 ENCOUNTER — APPOINTMENT (OUTPATIENT)
Dept: GENERAL RADIOLOGY | Age: 72
DRG: 280 | End: 2022-10-02
Payer: MEDICARE

## 2022-10-02 LAB
ANION GAP SERPL CALCULATED.3IONS-SCNC: 12 MMOL/L (ref 4–16)
BACTERIA: NEGATIVE /HPF
BILIRUBIN URINE: NEGATIVE
BLOOD, URINE: NORMAL
BUN BLDV-MCNC: 26 MG/DL (ref 6–23)
CALCIUM SERPL-MCNC: 9.3 MG/DL (ref 8.3–10.6)
CHLORIDE BLD-SCNC: 98 MMOL/L (ref 99–110)
CLARITY: NORMAL
CO2: 27 MMOL/L (ref 21–32)
COLOR: YELLOW
CREAT SERPL-MCNC: 1.2 MG/DL (ref 0.6–1.1)
GFR AFRICAN AMERICAN: 54 ML/MIN/1.73M2
GFR NON-AFRICAN AMERICAN: 44 ML/MIN/1.73M2
GLUCOSE BLD-MCNC: 130 MG/DL (ref 70–99)
GLUCOSE BLD-MCNC: 137 MG/DL (ref 70–99)
GLUCOSE BLD-MCNC: 198 MG/DL (ref 70–99)
GLUCOSE BLD-MCNC: 222 MG/DL (ref 70–99)
GLUCOSE BLD-MCNC: 325 MG/DL (ref 70–99)
GLUCOSE, URINE: NEGATIVE MG/DL
HYALINE CASTS: >20 /LPF
KETONES, URINE: NEGATIVE MG/DL
LEUKOCYTE ESTERASE, URINE: NORMAL
NITRITE URINE, QUANTITATIVE: NEGATIVE
PH, URINE: 6
POTASSIUM SERPL-SCNC: 3.6 MMOL/L (ref 3.5–5.1)
PROTEIN UA: 30 MG/DL
RBC URINE: 21 /HPF
SODIUM BLD-SCNC: 137 MMOL/L (ref 135–145)
SPECIFIC GRAVITY UA: 1.02
TRICHOMONAS: NORMAL /HPF
UROBILINOGEN, URINE: 0.2 MG/DL
WBC UA: 1549 /HPF

## 2022-10-02 PROCEDURE — 6370000000 HC RX 637 (ALT 250 FOR IP): Performed by: INTERNAL MEDICINE

## 2022-10-02 PROCEDURE — 71045 X-RAY EXAM CHEST 1 VIEW: CPT

## 2022-10-02 PROCEDURE — 80048 BASIC METABOLIC PNL TOTAL CA: CPT

## 2022-10-02 PROCEDURE — 94761 N-INVAS EAR/PLS OXIMETRY MLT: CPT

## 2022-10-02 PROCEDURE — 2060000000 HC ICU INTERMEDIATE R&B

## 2022-10-02 PROCEDURE — 6370000000 HC RX 637 (ALT 250 FOR IP): Performed by: NURSE PRACTITIONER

## 2022-10-02 PROCEDURE — 2700000000 HC OXYGEN THERAPY PER DAY

## 2022-10-02 PROCEDURE — 6370000000 HC RX 637 (ALT 250 FOR IP): Performed by: STUDENT IN AN ORGANIZED HEALTH CARE EDUCATION/TRAINING PROGRAM

## 2022-10-02 PROCEDURE — 2580000003 HC RX 258: Performed by: INTERNAL MEDICINE

## 2022-10-02 PROCEDURE — APPSS45 APP SPLIT SHARED TIME 31-45 MINUTES: Performed by: NURSE PRACTITIONER

## 2022-10-02 PROCEDURE — 2580000003 HC RX 258: Performed by: STUDENT IN AN ORGANIZED HEALTH CARE EDUCATION/TRAINING PROGRAM

## 2022-10-02 PROCEDURE — 82962 GLUCOSE BLOOD TEST: CPT

## 2022-10-02 PROCEDURE — 99233 SBSQ HOSP IP/OBS HIGH 50: CPT | Performed by: INTERNAL MEDICINE

## 2022-10-02 RX ORDER — LANOLIN ALCOHOL/MO/W.PET/CERES
3 CREAM (GRAM) TOPICAL NIGHTLY PRN
Status: DISCONTINUED | OUTPATIENT
Start: 2022-10-02 | End: 2022-10-03 | Stop reason: HOSPADM

## 2022-10-02 RX ADMIN — ASPIRIN 81 MG: 81 TABLET, COATED ORAL at 05:54

## 2022-10-02 RX ADMIN — ASPIRIN 81 MG: 81 TABLET, COATED ORAL at 14:08

## 2022-10-02 RX ADMIN — SODIUM CHLORIDE, PRESERVATIVE FREE 10 ML: 5 INJECTION INTRAVENOUS at 21:24

## 2022-10-02 RX ADMIN — OXYBUTYNIN CHLORIDE 5 MG: 5 TABLET ORAL at 21:24

## 2022-10-02 RX ADMIN — PREDNISONE 20 MG: 10 TABLET ORAL at 08:07

## 2022-10-02 RX ADMIN — DOCUSATE SODIUM 100 MG: 100 CAPSULE, LIQUID FILLED ORAL at 08:07

## 2022-10-02 RX ADMIN — DOCUSATE SODIUM 100 MG: 100 CAPSULE, LIQUID FILLED ORAL at 21:24

## 2022-10-02 RX ADMIN — SODIUM CHLORIDE, PRESERVATIVE FREE 10 ML: 5 INJECTION INTRAVENOUS at 08:09

## 2022-10-02 RX ADMIN — SODIUM CHLORIDE, PRESERVATIVE FREE 10 ML: 5 INJECTION INTRAVENOUS at 21:25

## 2022-10-02 RX ADMIN — DILTIAZEM HYDROCHLORIDE 180 MG: 180 CAPSULE, COATED, EXTENDED RELEASE ORAL at 08:07

## 2022-10-02 RX ADMIN — INSULIN LISPRO 4 UNITS: 100 INJECTION, SOLUTION INTRAVENOUS; SUBCUTANEOUS at 08:18

## 2022-10-02 RX ADMIN — AMIODARONE HYDROCHLORIDE 200 MG: 200 TABLET ORAL at 08:07

## 2022-10-02 RX ADMIN — OXYBUTYNIN CHLORIDE 5 MG: 5 TABLET ORAL at 08:06

## 2022-10-02 RX ADMIN — APIXABAN 5 MG: 5 TABLET, FILM COATED ORAL at 21:24

## 2022-10-02 RX ADMIN — INSULIN LISPRO 3 UNITS: 100 INJECTION, SOLUTION INTRAVENOUS; SUBCUTANEOUS at 16:27

## 2022-10-02 RX ADMIN — SENNOSIDES 8.6 MG: 8.6 TABLET, FILM COATED ORAL at 21:24

## 2022-10-02 RX ADMIN — AMIODARONE HYDROCHLORIDE 200 MG: 200 TABLET ORAL at 21:23

## 2022-10-02 RX ADMIN — Medication 3 MG: at 01:26

## 2022-10-02 RX ADMIN — INSULIN LISPRO 4 UNITS: 100 INJECTION, SOLUTION INTRAVENOUS; SUBCUTANEOUS at 11:43

## 2022-10-02 RX ADMIN — INSULIN GLARGINE 12 UNITS: 100 INJECTION, SOLUTION SUBCUTANEOUS at 21:33

## 2022-10-02 RX ADMIN — MIRTAZAPINE 15 MG: 15 TABLET, FILM COATED ORAL at 23:14

## 2022-10-02 RX ADMIN — APIXABAN 5 MG: 5 TABLET, FILM COATED ORAL at 08:07

## 2022-10-02 RX ADMIN — PANTOPRAZOLE SODIUM 40 MG: 40 TABLET, DELAYED RELEASE ORAL at 05:54

## 2022-10-02 RX ADMIN — ASPIRIN 81 MG: 81 TABLET, COATED ORAL at 21:23

## 2022-10-02 RX ADMIN — ROSUVASTATIN CALCIUM 10 MG: 5 TABLET, FILM COATED ORAL at 21:24

## 2022-10-02 RX ADMIN — PAROXETINE HYDROCHLORIDE 20 MG: 20 TABLET, FILM COATED ORAL at 08:07

## 2022-10-02 RX ADMIN — INSULIN LISPRO 4 UNITS: 100 INJECTION, SOLUTION INTRAVENOUS; SUBCUTANEOUS at 16:27

## 2022-10-02 RX ADMIN — Medication 3 MG: at 23:14

## 2022-10-02 NOTE — H&P
[address] [place] [address], [address] [place] [address]                              HISTORY AND PHYSICAL    PATIENT NAME: Kelly Napier                :        1950  MED REC NO:   [id number]                          ROOM:         ACCOUNT NO:   [de-identified]                           ADMIT DATE: 2022  PROVIDER:     Karen Hollis MD    HISTORY OF PRESENT ILLNESS:  The patient is a 71-year-old female who has  been admitted here with complaints of shortness of breath. The patient  has been experiencing Raynaud's for the last number of years and I have  been consulted to rule out an underlying connective tissue disorder. PAST MEDICAL HISTORY:  The patient carries a past medical history of  diastolic heart failure, diabetes, hypertension, history of invasive  ductal carcinoma of the right breast.  On further questioning, the  patient complains of arthralgias involving the joints of the hands and  the feet. According to the patient, the problem has been going on for  the last 6-8 months. Denies any oral ulcers, malar rashes, chest pain,  hematuria, melena, hematemesis. Past medical history is remarkable for  history of hypertension, pleuropericardial effusion, diabetes, and  atrial fibrillation. The patient has had pericardiocentesis done in the  past.    PAST SURGICAL HISTORY:  Remarkable for hysterectomy and breast  reconstruction. SOCIAL HISTORY:  Denies any smoking or alcohol use. FAMILY HISTORY:  Unremarkable for diagnosis of lupus or rheumatoid. PHYSICAL EXAMINATION:  MUSCULOSKELETAL:  On examination, the patient's musculoskeletal  examination reveals pain on palpation of the small joints of the hands  and wrists. SKIN:  Examination reveals thickening of the skin with skin tightening  distally. The patient has skin changes of the lower extremity as well  with skin thickening and tightening.   No vasculitic skin lesions. HEENT:  No oral ulcers or malar rashes. Dry oral mucosa. CHEST:  No rubs audible. Decreased breath sounds at the bases. LABORATORY STUDIES:  Review of labs reveal a WBC count of 10.3,  hemoglobin is 9.9, creatinine is 1 mg/dL. Rheumatoid factor is  negative. SSA, SSB antibodies are negative. RODOLFO done previously was  reported in the negative range and ACL antibody is negative. ASSESSMENT AND PLAN:  Differential diagnosis in this patient with  Raynaud's and skin changes of the upper and lower extremities include a  limited form of scleroderma versus amyloidosis. We will obtain  anticentromere antibodies, C3-C4 double-stranded DNA. As per the  patient, the symptoms have been going on for quite some time. We will  try to obtain old records for previous workup, underlying pulmonary  hypertension needs to be ruled out. We will continue to follow. We  will start the patient on low-dose steroids until all the lab work is  available.     Christine Diaz MD    D: 10/01/2022 9:27:52       T: 10/01/2022 9:32:20     /[email or web address]  Job#: [id number]     Doc#: [id number]    CC:

## 2022-10-02 NOTE — PROGRESS NOTES
[facility] ([facility] PHYSICAL REHABILITATION [place]  [address], [address] [facility],Third Floor  Phone: [phone]    Fax [phone]                  Hammad Mann MD, Renu Horton MD, Bairon Triplett MD, Verner Ink, MD Trudell Hollingshead, MD Delpha Level, MD Ary Hsu, MD Lauraine Celestine, DORIS De Oliveira, ODRIS Chen, DORIS Sevilla, DORIS Stafford Plan, PA-C     Cardiology Progress Note     Today's Plan: continue to monitor    Admit Date:  9/23/2022    Consult reason/ Seen today for:     Subjective and  Overnight Events:  patient is resting today in bed. She reports being more tired today. She is off oxygen and has been maintaining her saturation. Plan:   Possible diastolic heart failure. Given patient history of cancer could be due to cancer for pleural effusions. Primary is also working her up for auto immune given her hx of raynaud's phenomenon. Pericardial effusion: post pericardiocenteses. Pleural effusions: sp thoracentesis 2 this admission.  reports possibly setting something up for out patient thoracentesis. Possible cancer: patient and wife want to follow oncology in town. Atrial fibrillation RVR: In sinus rhythm today rate is well controlled. Continue amiodarone 200 mg twice daily. Cardizem 180 mg daily and Eliquis 5 mg twice daily. Appreciate electrophysiology consult input. Telemetry Reviewed:   sinus rhythm    History of Presenting Illness:    Chief complain on admission : 71 y. o.year old who is admitted for  Chief Complaint   Patient presents with    Shortness of Breath     Started today; has recently been seen and had 2 paracentesis done; normally wears O2 at 3L/min and is currently requiring more to sub stain oxygen levels          Past medical history:    has a past medical history of Anxiety, Breast carcinoma ([address] Cervical radiculitis, Chest pain, Depression, Diabetes type 2, controlled ([address] Family history of diabetes mellitus (DM), GERD (gastroesophageal reflux disease), Hiatal hernia, Hx of Doppler ultrasound, Hx of Doppler ultrasound, Hx of echocardiogram, Hypercalcemia, Hyperglycemia, Hyperlipidemia, Insomnia, Irritable bowel syndrome, LBP (low back pain), Lumbar herniated disc, Menopause, Migraine headache, Osteoporosis, Prediabetes, S/P colonoscopic polypectomy, and TIA (transient ischemic attack). Past surgical history:   has a past surgical history that includes Tubal ligation; Mastectomy (7/1997); Hysterectomy (5/1995); and Breast reconstruction (Right, 4/2013). Social History:   reports that she has never smoked. She has never used smokeless tobacco. She reports that she does not drink alcohol and does not use drugs. Family history:  family history includes Diabetes in her father, mother, and sister; Heart Disease in her father; High Blood Pressure in her father and mother; High Cholesterol in her sister; Other in her sister; Stroke in her paternal grandmother. Allergies   Allergen Reactions    Codeine      \"jittery\"  Feels anxoius       Review of Systems   All 14 systems were reviewed and are negative  Except for the positive findings  which as documented     BP (!) 118/54   Pulse 84   Temp 98.9 °F (37.2 °C) (Axillary)   Resp 20   Ht 5' 5\" (1.651 m)   Wt 134 lb 7.7 oz (61 kg)   SpO2 97%   BMI 22.38 kg/m²     Intake/Output Summary (Last 24 hours) at 10/2/2022 1220  Last data filed at 10/2/2022 1206  Gross per 24 hour   Intake 840 ml   Output 400 ml   Net 440 ml       Physical Exam  Vitals reviewed. Constitutional:       General: She is not in acute distress. Appearance: Normal appearance. She is not ill-appearing. Interventions: Nasal cannula in place. HENT:      Head: Atraumatic. Neck:      Vascular: No carotid bruit. Cardiovascular:      Rate and Rhythm: Normal rate and regular rhythm. Pulses: Normal pulses. Heart sounds: Normal heart sounds.  No murmur heard.  Pulmonary:      Effort: Pulmonary effort is normal. No respiratory distress. Breath sounds: Examination of the right-lower field reveals decreased breath sounds. Examination of the left-lower field reveals decreased breath sounds. Decreased breath sounds present. Musculoskeletal:         General: No swelling or deformity. Cervical back: Neck supple. No muscular tenderness. Neurological:      Mental Status: She is alert.            Medications:    insulin glargine  12 Units SubCUTAneous Nightly    insulin lispro  4 Units SubCUTAneous TID WC    predniSONE  20 mg Oral Daily    [Held by provider] furosemide  40 mg Oral Daily    aspirin  81 mg Oral q8h    docusate sodium  100 mg Oral BID    senna  1 tablet Oral Nightly    dilTIAZem  180 mg Oral Daily    PARoxetine  20 mg Oral Daily    amiodarone  200 mg Oral BID    [START ON 10/14/2022] amiodarone  200 mg Oral Daily    pantoprazole  40 mg Oral QAM AC    lidocaine PF  5 mL IntraDERmal Once    sodium chloride flush  5-40 mL IntraVENous 2 times per day    sodium chloride flush  5-40 mL IntraVENous 2 times per day    lidocaine  20 mL IntraDERmal Once    sodium chloride flush  5-40 mL IntraVENous 2 times per day    apixaban  5 mg Oral BID    [Held by provider] lisinopril  20 mg Oral Daily    mirtazapine  15 mg Oral Nightly    oxybutynin  5 mg Oral BID    rosuvastatin  10 mg Oral Nightly    insulin lispro  0-4 Units SubCUTAneous TID WC    insulin lispro  0-4 Units SubCUTAneous Nightly      sodium chloride      dextrose      sodium chloride      sodium chloride       melatonin, potassium chloride **OR** potassium alternative oral replacement **OR** potassium chloride, oxyCODONE, sodium chloride flush, sodium chloride, glucose, dextrose bolus **OR** dextrose bolus, glucagon (rDNA), dextrose, guaiFENesin-dextromethorphan, sodium chloride flush, sodium chloride, acetaminophen, ondansetron, hydrALAZINE, sodium chloride flush, sodium chloride, ondansetron **OR** [DISCONTINUED] ondansetron, polyethylene glycol, [DISCONTINUED] acetaminophen **OR** acetaminophen, ALPRAZolam    Lab Data:  CBC:   Recent Labs     09/30/22  0600 10/01/22  1110   WBC 10.3 9.4   HGB 9.9* 10.1*   HCT 31.9* 32.6*   MCV 91.1 90.8    332       BMP:   Recent Labs     09/30/22  0600 10/01/22  1110 10/02/22  0814    137 137   K 3.2* 3.9 3.6    99 98*   CO2 25 27 27   BUN 22 24* 26*   CREATININE 1.0 1.4* 1.2*       PT/INR: No results for input(s): PROTIME, INR in the last 72 hours. BNP:  No results for input(s): PROBNP in the last 72 hours. TROPONIN: No results for input(s): TROPONINT in the last 72 hours. All labs, medications and tests reviewed by myself , continue all other medications of all above medical condition listed as is except for changes mentioned above. Thank you very much for consult , please call with questions.     Electronically signed by DORIS Santo CNP on 10/2/2022 at 12:20 PM

## 2022-10-02 NOTE — PROGRESS NOTES
CARDIOLOGY PROGRESS NOTE                                                  Name:  Arely Renee /Age/Sex: 1950  (71 y.o. female)   MRN & CSN:  [id number] & [id number] Admission Date/Time: 2022  9:51 PM   Location:  -A PCP: Tay De La O MD         Admit Date:  2022  Hospital Day: 10      SUBJECTIVE:   Seen patient as follow up as consultation for      S/p IR assisted pleural effusion drainage  Breathing better           Intake/Output Summary (Last 24 hours) at 10/2/2022 1338  Last data filed at 10/2/2022 1206  Gross per 24 hour   Intake 840 ml   Output 400 ml   Net 440 ml       Assessment/Plan:     Pericardial effusion/tamponade: S/p pericardiocentesis. 600 cc out. Removal of catheter. Follow-up echocardiogram does not show residual effusion. Cytology sent. Respiratory failure/shortness of breath: Multifactorial, pleural effusion/pericardial effusion s/p drainage. Symptoms improved. Supplemental oxygen. Pulmonary follow-up. Atrial fibrillation: Currently in sinus rhythm. On amiodarone/digoxin/Cardizem/Eliquis. EP evaluation  Heart failure with preserved ejection fraction: Stable. History of cancer with recurrent pleural effusions. Suspected metastatic disease. Oncology work-up, follow-up  Hyperlipidemia: Continue with statin therapy  History of severe pulm hypertension: Patient was scheduled for left and right heart cardiac catheterization. Currently not stable to undergo procedures in acute setting. Malignancy needs to be ruled out first.  Outpatient follow-up.        Past medical history:    has a past medical history of Anxiety, Breast carcinoma ([address] Cervical radiculitis, Chest pain, Depression, Diabetes type 2, controlled ([address] Family history of diabetes mellitus (DM), GERD (gastroesophageal reflux disease), Hiatal hernia, Hx of Doppler ultrasound, Hx of Doppler ultrasound, Hx of echocardiogram, Hypercalcemia, Hyperglycemia, Hyperlipidemia, Insomnia, Irritable bowel syndrome, LBP (low back pain), Lumbar herniated disc, Menopause, Migraine headache, Osteoporosis, Prediabetes, S/P colonoscopic polypectomy, and TIA (transient ischemic attack). Past surgical history:   has a past surgical history that includes Tubal ligation; Mastectomy (7/1997); Hysterectomy (5/1995); and Breast reconstruction (Right, 4/2013). Social History:   reports that she has never smoked. She has never used smokeless tobacco. She reports that she does not drink alcohol and does not use drugs. Family history:  family history includes Diabetes in her father, mother, and sister; Heart Disease in her father; High Blood Pressure in her father and mother; High Cholesterol in her sister; Other in her sister; Stroke in her paternal grandmother. OBJECTIVE:     BP (!) 118/54   Pulse 84   Temp 98.9 °F (37.2 °C) (Axillary)   Resp 20   Ht 5' 5\" (1.651 m)   Wt 134 lb 7.7 oz (61 kg)   SpO2 97%   BMI 22.38 kg/m²     Intake/Output Summary (Last 24 hours) at 10/2/2022 1338  Last data filed at 10/2/2022 1206  Gross per 24 hour   Intake 840 ml   Output 400 ml   Net 440 ml       Physical Exam:    Constitutional:  Well developed, Well nourished, No acute distress, Non-toxic appearance. HENT:  Normocephalic, Atraumatic, Bilateral external ears normal, Oropharynx moist, No oral exudates, Nose normal. Neck- Normal range of motion, No tenderness, Supple, No stridor. Eyes:  EOMI, Conjunctiva normal, No discharge. Respiratory: Fine basilar crackles  Cardiovascular S1-S2 No  Murmurs, added sounds. Normal rate rhythm. No rubs gallops. Carotid pulses and amplitude are normal no bruit noted. Pedal pulses normal femoral pulses normal.  No pedal edema  GI:  Bowel sounds normal, Soft, No tenderness  : No CVA tenderness. Musculoskeletal: No edema, No tenderness, No cyanosis, No clubbing. Back- No tenderness. Integument:  Warm, Dry, No erythema, No rash. Lymphatic:  No lymphadenopathy noted. Neurologic:  Alert & oriented x 3, No focal deficits noted.    Psychiatric:  Affect normal, Judgment normal, Mood normal.           MEDICATIONS:     insulin glargine  12 Units SubCUTAneous Nightly    insulin lispro  4 Units SubCUTAneous TID WC    predniSONE  20 mg Oral Daily    [Held by provider] furosemide  40 mg Oral Daily    aspirin  81 mg Oral q8h    docusate sodium  100 mg Oral BID    senna  1 tablet Oral Nightly    dilTIAZem  180 mg Oral Daily    PARoxetine  20 mg Oral Daily    amiodarone  200 mg Oral BID    [START ON 10/14/2022] amiodarone  200 mg Oral Daily    pantoprazole  40 mg Oral QAM AC    lidocaine PF  5 mL IntraDERmal Once    sodium chloride flush  5-40 mL IntraVENous 2 times per day    sodium chloride flush  5-40 mL IntraVENous 2 times per day    lidocaine  20 mL IntraDERmal Once    sodium chloride flush  5-40 mL IntraVENous 2 times per day    apixaban  5 mg Oral BID    [Held by provider] lisinopril  20 mg Oral Daily    mirtazapine  15 mg Oral Nightly    oxybutynin  5 mg Oral BID    rosuvastatin  10 mg Oral Nightly    insulin lispro  0-4 Units SubCUTAneous TID WC    insulin lispro  0-4 Units SubCUTAneous Nightly      sodium chloride      dextrose      sodium chloride      sodium chloride       melatonin, potassium chloride **OR** potassium alternative oral replacement **OR** potassium chloride, oxyCODONE, sodium chloride flush, sodium chloride, glucose, dextrose bolus **OR** dextrose bolus, glucagon (rDNA), dextrose, guaiFENesin-dextromethorphan, sodium chloride flush, sodium chloride, acetaminophen, ondansetron, hydrALAZINE, sodium chloride flush, sodium chloride, ondansetron **OR** [DISCONTINUED] ondansetron, polyethylene glycol, [DISCONTINUED] acetaminophen **OR** acetaminophen, ALPRAZolam  Allergies   Allergen Reactions    Codeine      \"jittery\"  Feels anxoius       Lab Data:  CBC:   Recent Labs     09/30/22  0600 10/01/22  1110   WBC 10.3 9.4   HGB 9.9* 10.1*   HCT 31.9* 32.6*   MCV 91.1 90.8  332       BMP:   Recent Labs     09/30/22  0600 10/01/22  1110 10/02/22  0814    137 137   K 3.2* 3.9 3.6    99 98*   CO2 25 27 27   BUN 22 24* 26*   CREATININE 1.0 1.4* 1.2*       LIVER PROFILE:   Recent Labs     09/30/22  0600   AST 9*   ALT 5*   BILITOT 0.6   ALKPHOS [id number] 10/2/2022 1:38 PM

## 2022-10-02 NOTE — PROGRESS NOTES
V2.0  [facility] Hospitalist Progress Note      Name:  Levar Kaur /Age/Sex: 1950  (71 y.o. female)   MRN & CSN:  [id number] & [id number] Encounter Date/Time: 10/2/2022 11:29 AM EDT    Location:  -A PCP: Neil Gibbs MD       Hospital Day: 10    Assessment and Plan:   Levar Kaur is a 71 y.o. female with past medical history of diastolic heart failure, paroxysmal atrial fibrillation, type 2 diabetes mellitus, chronic respiratory failure, recurrent admission for bilateral pleural effusions, depression, hypertension, history of invasive ductal carcinoma of the right breast  status post mastectomy, ER/DC positive, history of tobacco use and hyperlipidemia who presents with progressively worsening shortness of breath. Acute on chronic hypoxic respiratory failure  -Resolved, at baseline oxygen requirement, 2 L nasal cannula  Possible contributing factors include, pericardial effusion with tamponade physiology, compressive atelectasis due to bilateral pleural effusion. Utilize supplemental oxygen to maintain oxygen saturation above 92%. Rest of the treatment plan as below    Pericardial effusion with tamponade physiology  Status post pericardiocentesis with removal of 600 cc of pericardial fluid on   Removal of pericardiocentesis catheter 2022  Repeat limited echo on 2022, no pericardial effusion  Mild lymphocytic predominance on cytology  Negative for infection  Although this pericardial effusion can be explained by diastolic heart failure, there remains a concern about underlying autoimmune disease process given her Raynaud's phenomena. Continue ASA, colchicine discontinued by cardiology due to interaction with amiodarone. Protonix for stress ulcer prophylaxis  There still remains concern for metastatic effusion given history of breast cancer. Oncology is following, recommend PET scan as outpatient and bone biopsy.   Patient will be followed by [facility] oncology after discharge. Recurrent bilateral pleural effusion  Status postthoracentesis 9/25/2022  Repeat bilateral thoracentesis on 9/30/2022 by pulmonology  Appears to be borderline exudate  Similarly with the pericardial effusion, diastolic heart failure can explain this but the recurrence of disease and concerning cutaneous features my suspicion for autoimmune disease is high. Pulmonology following, appreciate recommendations    Paroxysmal atrial fibrillation with RVR  Currently rate controlled over the past 24 hours  Continue Eliquis  PO Amiodarone loading dose and then maintenance. Acute kidney injury-worsening  Likely in the setting of diuretics  Will hold diuretics and monitor response  Avoid nephrotoxic agents  BMP pending today    Raynaud's phenomena  Never seen a Rheumatologist due to frequent hospitalizations  Per chart review: Negative rheumatoid factor, negative Anti- Scl-70, recently negative RODOLFO, ESR 26.  Patient needs nailfold capillaroscopy. Anti-SCL 70 is moderately sensitive, 20% hence recommend getting anti-centromere, RNA polymerase III and U3-RNP, PM/Scl, or Th/To antibodies, will defer to rheumatology.   Rheumatology following, started on steroids, further recommendations pending    Superficial venous thrombosis RUE  Conservative management with cold compresses and limb elevation    Moderate malnutrition  Dietary supplementation  Dietitian following, appreciate recommendations    Normocytic anemia  No signs of overt bleed  Concern for anemia of chronic disease  Monitor CBC, today's lab pending  Transfuse if hemoglobin less than 7    Acute on chronic diastolic heart failure on admission  Currently hypovolemic  Blood pressure control  Will consider adding Jardiance upon discharge    Moderate-severe pulmonary hypertension  Elevated RVSP and tricuspid regurgitation  Recommend right heart catheterization, will discuss with cardiology  Optimization of preload and afterload, maintain euvolemic state. Chronic respiratory failure on home oxygen 2 to 3 L via nasal cannula  No PFTs on chart, recent CT angiogram chest did show very minimal emphysematous disease process but no overt obstructive disease pattern on imaging. Unsure about the etiology, could be the pulmonary hypertension. Utilize supplemental oxygen to maintain saturation above 92%  Pulmonology following, appreciate recommendations    Type 2 diabetes mellitus without long-term use of insulin  With hyperglycemia  Adjusted basal bolus insulin  Sliding scale insulin to target 140-180  Will consider adding Jardiance upon discharge    Hyperlipidemia  Continue Crestor    History of invasive ductal carcinoma of the right breast status post mastectomy  ER/AZ positive  On prior imaging there were some concern for bony metastasis  Will need bone biopsy for definitive diagnosis  Oncology following    Protonix for stress ulcer prophylaxis  Continue to monitor on stepdown unit    Diet ADULT DIET; Regular; 4 carb choices (60 gm/meal); Low Fat/Low Chol/High Fiber/REYES  ADULT ORAL NUTRITION SUPPLEMENT; Breakfast, Dinner; Low Calorie/High Protein Oral Supplement   DVT Prophylaxis [] Lovenox, []  Heparin, [] SCDs, [] Ambulation,  [x] Eliquis, [] Xarelto  [] Coumadin   Code Status Full Code   Disposition From: Home  Expected Disposition: Home  Estimated Date of Discharge: 1-2 days  Patient requires continued admission due to improvement in renal function, PT evaluation   Surrogate Decision Maker/ POA Spouse     Subjective:     Chief Complaint: Shortness of Breath (Started today; has recently been seen and had 2 paracentesis done; normally wears O2 at 3L/min and is currently requiring more to sub stain oxygen levels)       Angy Wellington is a 71 y.o. female who presents with progressively worsening shortness of breath. Patient was seen and evaluated at bedside. Patient was AAOx3. Hemodynamically stable, heart rate is well controlled.  She denies any acute overnight events or significant complaints. Will get her OOB and work with physical therapy. Objective: Intake/Output Summary (Last 24 hours) at 10/2/2022 0819  Last data filed at 10/2/2022 0500  Gross per 24 hour   Intake 480 ml   Output 400 ml   Net 80 ml        Vitals:   Vitals:    10/02/22 0759   BP: (!) 136/107   Pulse: 79   Resp: 22   Temp: 98.3 °F (36.8 °C)   SpO2: 99%       Physical Exam:   Physical Exam  Vitals reviewed. Constitutional:       Appearance: She is normal weight. She is ill-appearing. HENT:      Head: Normocephalic and atraumatic. Nose: Nose normal.      Mouth/Throat:      Mouth: Mucous membranes are dry. Pharynx: Oropharynx is clear. Eyes:      General: No scleral icterus. Conjunctiva/sclera: Conjunctivae normal.   Cardiovascular:      Rate and Rhythm: Normal rate. Rhythm irregular. Pulses: Normal pulses. Heart sounds: Murmur heard. Pulmonary:      Effort: Pulmonary effort is normal.      Breath sounds: Rales present. No wheezing or rhonchi. Comments: 2L nasal cannula oxygen  Abdominal:      General: Bowel sounds are normal. There is no distension. Palpations: Abdomen is soft. Tenderness: There is no abdominal tenderness. Musculoskeletal:         General: No deformity. Normal range of motion. Cervical back: Normal range of motion and neck supple. No rigidity or tenderness. Right lower leg: Edema present. Left lower leg: Edema present. Skin:     Coloration: Skin is not jaundiced or pale. Comments: Skin thickening bilateral upper and lower extremities   Neurological:      General: No focal deficit present. Mental Status: She is alert and oriented to person, place, and time. Mental status is at baseline.           Medications:   Medications:    insulin glargine  12 Units SubCUTAneous Nightly    insulin lispro  4 Units SubCUTAneous TID WC    predniSONE  20 mg Oral Daily    [Held by provider] furosemide  40 mg Oral Daily    aspirin  81 mg Oral q8h    docusate sodium  100 mg Oral BID    senna  1 tablet Oral Nightly    dilTIAZem  180 mg Oral Daily    PARoxetine  20 mg Oral Daily    amiodarone  200 mg Oral BID    [START ON 10/14/2022] amiodarone  200 mg Oral Daily    pantoprazole  40 mg Oral QAM AC    lidocaine PF  5 mL IntraDERmal Once    sodium chloride flush  5-40 mL IntraVENous 2 times per day    sodium chloride flush  5-40 mL IntraVENous 2 times per day    lidocaine  20 mL IntraDERmal Once    sodium chloride flush  5-40 mL IntraVENous 2 times per day    apixaban  5 mg Oral BID    [Held by provider] lisinopril  20 mg Oral Daily    mirtazapine  15 mg Oral Nightly    oxybutynin  5 mg Oral BID    rosuvastatin  10 mg Oral Nightly    insulin lispro  0-4 Units SubCUTAneous TID WC    insulin lispro  0-4 Units SubCUTAneous Nightly      Infusions:    sodium chloride      dextrose      sodium chloride      sodium chloride       PRN Meds: melatonin, 3 mg, Nightly PRN  potassium chloride, 40 mEq, PRN   Or  potassium alternative oral replacement, 40 mEq, PRN   Or  potassium chloride, 10 mEq, PRN  oxyCODONE, 2.5 mg, Q4H PRN  sodium chloride flush, 5-40 mL, PRN  sodium chloride, , PRN  glucose, 4 tablet, PRN  dextrose bolus, 125 mL, PRN   Or  dextrose bolus, 250 mL, PRN  glucagon (rDNA), 1 mg, PRN  dextrose, , Continuous PRN  guaiFENesin-dextromethorphan, 10 mL, Q4H PRN  sodium chloride flush, 5-40 mL, PRN  sodium chloride, , PRN  acetaminophen, 650 mg, Q4H PRN  ondansetron, 4 mg, Q6H PRN  hydrALAZINE, 10 mg, Q10 Min PRN  sodium chloride flush, 5-40 mL, PRN  sodium chloride, , PRN  ondansetron, 4 mg, Q8H PRN  polyethylene glycol, 17 g, Daily PRN  acetaminophen, 650 mg, Q6H PRN  ALPRAZolam, 0.25 mg, BID PRN      Labs      Recent Results (from the past 24 hour(s))   CBC with Auto Differential    Collection Time: 10/01/22 11:10 AM   Result Value Ref Range    WBC 9.4 4.0 - 10.5 K/CU MM    RBC 3.59 (L) 4.2 - 5.4 M/CU MM    Hemoglobin 10.1 (L) 12.5 - 16.0 GM/DL    Hematocrit 32.6 (L) 37 - 47 %    MCV 90.8 78 - 100 FL    MCH 28.1 27 - 31 PG    MCHC 31.0 (L) 32.0 - 36.0 %    RDW 14.9 11.7 - 14.9 %    Platelets 271 596 - 819 K/CU MM    MPV 9.9 7.5 - 11.1 FL    Differential Type AUTOMATED DIFFERENTIAL     Segs Relative 77.8 (H) 36 - 66 %    Lymphocytes % 8.8 (L) 24 - 44 %    Monocytes % 11.1 (H) 0 - 4 %    Eosinophils % 1.5 0 - 3 %    Basophils % 0.5 0 - 1 %    Segs Absolute 7.3 K/CU MM    Lymphocytes Absolute 0.8 K/CU MM    Monocytes Absolute 1.0 K/CU MM    Eosinophils Absolute 0.1 K/CU MM    Basophils Absolute 0.1 K/CU MM    Nucleated RBC % 0.0 %    Total Nucleated RBC 0.0 K/CU MM    Total Immature Neutrophil 0.03 K/CU MM    Immature Neutrophil % 0.3 0 - 0.43 %   Basic Metabolic Panel w/ Reflex to MG    Collection Time: 10/01/22 11:10 AM   Result Value Ref Range    Sodium 137 135 - 145 MMOL/L    Potassium 3.9 3.5 - 5.1 MMOL/L    Chloride 99 99 - 110 mMol/L    CO2 27 21 - 32 MMOL/L    Anion Gap 11 4 - 16    BUN 24 (H) 6 - 23 MG/DL    Creatinine 1.4 (H) 0.6 - 1.1 MG/DL    Glucose 226 (H) 70 - 99 MG/DL    Calcium 8.9 8.3 - 10.6 MG/DL    GFR Non- 37 (L) >60 mL/min/1.73m2    GFR  45 (L) >60 mL/min/1.73m2   POCT Glucose    Collection Time: 10/01/22 12:12 PM   Result Value Ref Range    POC Glucose 192 (H) 70 - 99 MG/DL   Sedimentation Rate    Collection Time: 10/01/22  1:24 PM   Result Value Ref Range    Sed Rate 59 (H) 0 - 30 MM/HR   Magnesium    Collection Time: 10/01/22  1:24 PM   Result Value Ref Range    Magnesium 2.0 1.8 - 2.4 mg/dl   POCT Glucose    Collection Time: 10/01/22  5:40 PM   Result Value Ref Range    POC Glucose 226 (H) 70 - 99 MG/DL   POCT Glucose    Collection Time: 10/01/22  8:20 PM   Result Value Ref Range    POC Glucose 239 (H) 70 - 99 MG/DL   Urinalysis    Collection Time: 10/01/22 10:10 PM   Result Value Ref Range    Color, UA YELLOW     Clarity, UA CLOUDY     Glucose, Urine NEGATIVE MG/DL    Bilirubin Urine NEGATIVE     Ketones, Urine NEGATIVE MG/DL    Specific Gravity, UA 1.025     Blood, Urine LARGE NUMBER OR AMOUNT OF     pH, Urine 6.0     Protein, UA 30 MG/DL    Urobilinogen, Urine 0.2 MG/DL    Nitrite Urine, Quantitative NEGATIVE     Leukocyte Esterase, Urine LARGE NUMBER OR AMOUNT OF    Microscopic Urinalysis    Collection Time: 10/01/22 10:10 PM   Result Value Ref Range    RBC, UA 21 /HPF    WBC, UA 1549 /HPF    Bacteria, UA NEGATIVE /HPF    Trichomonas, UA NONE SEEN /HPF    Hyaline Casts, UA >20 /LPF   POCT Glucose    Collection Time: 10/02/22  7:00 AM   Result Value Ref Range    POC Glucose 130 (H) 70 - 99 MG/DL        Imaging/Diagnostics Last 24 Hours   Echocardiogram limited    Result Date: 9/27/2022  Transthoracic Echocardiography Report (TTE)  Demographics   Patient Name       Mitchell Lopez  Date of Study       09/27/2022                     A   Date of Birth      1950         Gender              Female   Age                71 year(s)         Race                   Patient Number     [id number]         Room Number         2119   Visit Number       [id number]   Corporate ID       [id number]   Accession Number   [id number]         Margarita Lowry RDMS, T   Ordering Physician Carly Viveros MD                 Physician           Nette CASAREZ  Procedure Type of Study   TTE procedure:ECHOCARDIOGRAM LIMITED. Procedure Date Date: 09/27/2022 Start: 08:40 AM Study Location: Portable Technical Quality: Fair visualization Indications:Pericardial effusion. Patient Status: Routine Height: 65 inches Weight: 143 pounds BSA: 1.72 m2 BMI: 23.8 kg/m2 HR: 82 bpm BP: 117/65 mmHg  Conclusions   Summary  This is a limited echocardiogram.  Left ventricular systolic function is normal.  Ejection fraction is visually estimated at 50-55%.   Moderate aortic regurgitation is noted. Moderate to severe tricuspid regurgitation; RVSP: 55 mmHg. Severe PHTN. S/p pericardiocentesis with 560cc drained on 9/26/22; no evidence of any  pericardial effusion. Left pleural effusion. Signature   ------------------------------------------------------------------  Electronically signed by Elicia James MD  (Interpreting physician) on 09/27/2022 at 09:45 AM  ------------------------------------------------------------------   Findings   Left Ventricle  Left ventricular systolic function is normal.  Ejection fraction is visually estimated at 50-55%. Aortic Valve  Moderate aortic regurgitation is noted. Tricuspid Valve  Moderate to severe tricuspid regurgitation; RVSP: 55 mmHg. Severe PHTN. Pericardial Effusion  S/p pericardiocentesis with 560cc drained on 9/26/22; no evidence of any  pericardial effusion. Pleural Effusion  Left pleural effusion.   M-Mode/2D Measurements & Calculations   LV Diastolic Dimension: 9.41 cm LV Systolic Dimension: 3.1 cm  LV FS:26.4 %                    LV Volume Diastolic: 39.7 ml  LV PW Diastolic: 9.45 cm        LV Volume Systolic: 39.2 ml  LV PW Systolic: 8.28 cm         LV EDV/LV EDV Index: 74.6 ml/43 m2LV ESV/LV  Septum Diastolic: 0.83 cm       ESV Index: 29.8 ml/17 m2  Septum Systolic: 9.56 cm        EF Calculated (A4C): 60.1 %                                  EF Calculated (2D): 52 %  Doppler Measurements & Calculations                               Estimated RVSP: 55 mmHg                              Estimated RAP:3 mmHg    Estimated PASP: 40.21 mmHg TR Velocity:305 cm/s                              TR Gradient:37.21 mmHg      Echocardiogram limited    Result Date: 9/27/2022  Transthoracic Echocardiography Report (TTE)  Demographics   Patient Name       Lilian Vickers  Date of Study       09/26/2022                     A   Date of Birth      1950         Gender              Female   Age                71 year(s) Race                   Patient Number     [id number]         Room Number         2119   Visit Number       [id number]   Corporate ID       [id number]   Accession Number   [id number]         Christina Breen RDMS   Ordering Physician Deng Sena MD                 Physician           MD  Procedure Type of Study   TTE procedure:ECHOCARDIOGRAM LIMITED. Procedure Date Date: 09/26/2022 Start: 02:50 PM Study Location: OR Technical Quality: Fair visualization Indications:Pericardial effusion. Patient Status: Routine Height: 65 inches Weight: 143 pounds BSA: 1.72 m2 BMI: 23.8 kg/m2 BP: 112/39 mmHg  Conclusions   Summary  This is a limited echo during pericardiocentesis. See full report of procedure in EPIC   600cc of straw-colored fluid was drained, no pericardial effusion noted  post procedure. Signature   ------------------------------------------------------------------  Electronically signed by Bismark Urbina MD (Interpreting  physician) on 09/27/2022 at 08:57 AM  ------------------------------------------------------------------      XR CHEST PORTABLE    Result Date: 9/28/2022  EXAMINATION: ONE XRAY VIEW OF THE CHEST 9/28/2022 6:16 am COMPARISON: None available. Down time procedure. HISTORY: ORDERING SYSTEM PROVIDED HISTORY: hypoxia TECHNOLOGIST PROVIDED HISTORY: Reason for exam:->hypoxia Reason for Exam: hypoxia FINDINGS: The heart is enlarged. The mediastinal contours are normal.  There is pulmonary edema with moderate bilateral pleural effusions and associated bibasilar atelectasis. No pneumothorax. Surgical clip is noted in the right axilla. The bones are osteopenic. Cardiomegaly with pulmonary edema and moderate bilateral pleural effusions with associated bibasilar atelectasis.        Electronically signed by Josette Claude, MD on 10/2/2022 at 8:19 AM

## 2022-10-02 NOTE — PROGRESS NOTES
Per request of Dr Jameson Gutiérrez, pt to be moved via nurse ambulation today. Primary RN made aware.

## 2022-10-02 NOTE — PROGRESS NOTES
Pt was ambulated to chair in am. Tolerated well. Sat in chair for several hours until ambulated back to bed around 1200.

## 2022-10-02 NOTE — PROGRESS NOTES
Pulmonary and Critical Care  Progress Note    Subjective: The patient is comfortable in bed. Shortness of breath none  Chest pain none  Addressing respiratory complaints Patient is negative for  hemoptysis and cyanosis  CONSTITUTIONAL:  negative for fevers and chills      Past Medical History:     has a past medical history of Anxiety, Breast carcinoma ([facility] Cervical radiculitis, Chest pain, Depression, Diabetes type 2, controlled ([facility] [facility] Family history of diabetes mellitus (DM), GERD (gastroesophageal reflux disease), Hiatal hernia, Hx of Doppler ultrasound, Hx of Doppler ultrasound, Hx of echocardiogram, Hypercalcemia, Hyperglycemia, Hyperlipidemia, Insomnia, Irritable bowel syndrome, LBP (low back pain), Lumbar herniated disc, Menopause, Migraine headache, Osteoporosis, Prediabetes, S/P colonoscopic polypectomy, and TIA (transient ischemic attack). has a past surgical history that includes Tubal ligation; Mastectomy (7/1997); Hysterectomy (5/1995); and Breast reconstruction (Right, 4/2013). reports that she has never smoked. She has never used smokeless tobacco. She reports that she does not drink alcohol and does not use drugs. Family history:  family history includes Diabetes in her father, mother, and sister; Heart Disease in her father; High Blood Pressure in her father and mother; High Cholesterol in her sister; Other in her sister; Stroke in her paternal grandmother.     Allergies   Allergen Reactions    Codeine      \"jittery\"  Feels anxoius     Social History:    Reviewed; no changes    Objective:   PHYSICAL EXAM:        VITALS:  BP (!) 118/54   Pulse 84   Temp 98.9 °F (37.2 °C) (Axillary)   Resp 20   Ht 5' 5\" (1.651 m)   Wt 134 lb 7.7 oz (61 kg)   SpO2 97%   BMI 22.38 kg/m²     24HR INTAKE/OUTPUT:    Intake/Output Summary (Last 24 hours) at 10/2/2022 1226  Last data filed at 10/2/2022 1206  Gross per 24 hour   Intake 840 ml   Output 400 ml   Net 440 ml       CONSTITUTIONAL:  awake  LUNGS: decreased breath sounds, basilar crackles. CARDIOVASCULAR:  normal S1 and S2 and positive JVD  ABD:Abdomen soft, non-tender. BS normal. No masses,  No organomegaly  NEURO:Alert. DATA:    CBC:  Recent Labs     09/30/22  0600 10/01/22  1110   WBC 10.3 9.4   RBC 3.50* 3.59*   HGB 9.9* 10.1*   HCT 31.9* 32.6*    332   MCV 91.1 90.8   MCH 28.3 28.1   MCHC 31.0* 31.0*   RDW 15.1* 14.9   SEGSPCT 75.8* 77.8*      BMP:  Recent Labs     09/30/22  0600 10/01/22  1110 10/02/22  0814    137 137   K 3.2* 3.9 3.6    99 98*   CO2 25 27 27   BUN 22 24* 26*   CREATININE 1.0 1.4* 1.2*   CALCIUM 8.8 8.9 9.3   GLUCOSE 163* 226* 137*      ABG:  No results for input(s): PH, PO2ART, DFJ8LME, HCO3, BEART, O2SAT in the last 72 hours. Lab Results   Component Value Date    PROBNP 11,368 (H) 09/27/2022    PROBNP 2,712 (H) 09/23/2022    PROBNP 1,383 (H) 08/30/2022     No results found for: [facility]    Radiology Review:  Pertinent images / reports were reviewed as a part of this visit. Assessment:     Patient Active Problem List   Diagnosis    Hyperlipidemia    History of breast cancer    Lumbar herniated disc    Low back pain    GERD (gastroesophageal reflux disease)    Irritable bowel syndrome    Migraine headache    Diabetes type 2, controlled (HCC)    Hypercalcemia    Carcinoma of right female breast, unspecified estrogen receptor status, unspecified site of breast (HCC)    Peripheral edema    Chest pain    Venous insufficiency    Shortness of breath    Edema of lower extremity    Varicose veins of both legs with edema    Atrial fibrillation with RVR (HCC)    Diastolic heart failure, unspecified HF chronicity (HCC)    Acute systolic CHF (congestive heart failure) (HCC)    Acute respiratory failure with hypoxia (HCC)    Atrial flutter with rapid ventricular response (HCC)    Moderate malnutrition ([address]       Plan:   1. Overall the patient has slightly improved  2. Discussed with the family.    3. Check BNP level.    Martin Mims MD   10/2/2022  12:26 PM

## 2022-10-03 VITALS
TEMPERATURE: 98.9 F | OXYGEN SATURATION: 92 % | HEIGHT: 65 IN | HEART RATE: 76 BPM | BODY MASS INDEX: 22.41 KG/M2 | SYSTOLIC BLOOD PRESSURE: 141 MMHG | RESPIRATION RATE: 27 BRPM | DIASTOLIC BLOOD PRESSURE: 57 MMHG | WEIGHT: 134.48 LBS

## 2022-10-03 DIAGNOSIS — Z17.0 MALIGNANT NEOPLASM OF BREAST IN FEMALE, ESTROGEN RECEPTOR POSITIVE, UNSPECIFIED LATERALITY, UNSPECIFIED SITE OF BREAST (HCC): Primary | ICD-10-CM

## 2022-10-03 DIAGNOSIS — C50.111 MALIGNANT NEOPLASM OF CENTRAL PORTION OF RIGHT BREAST IN FEMALE, ESTROGEN RECEPTOR POSITIVE (HCC): ICD-10-CM

## 2022-10-03 DIAGNOSIS — Z17.0 MALIGNANT NEOPLASM OF CENTRAL PORTION OF RIGHT BREAST IN FEMALE, ESTROGEN RECEPTOR POSITIVE (HCC): ICD-10-CM

## 2022-10-03 DIAGNOSIS — C50.919 MALIGNANT NEOPLASM OF BREAST IN FEMALE, ESTROGEN RECEPTOR POSITIVE, UNSPECIFIED LATERALITY, UNSPECIFIED SITE OF BREAST (HCC): Primary | ICD-10-CM

## 2022-10-03 LAB
ANION GAP SERPL CALCULATED.3IONS-SCNC: 10 MMOL/L (ref 4–16)
BUN BLDV-MCNC: 30 MG/DL (ref 6–23)
CALCIUM SERPL-MCNC: 9.3 MG/DL (ref 8.3–10.6)
CHLORIDE BLD-SCNC: 97 MMOL/L (ref 99–110)
CO2: 26 MMOL/L (ref 21–32)
CREAT SERPL-MCNC: 1.3 MG/DL (ref 0.6–1.1)
CULTURE: ABNORMAL
CULTURE: ABNORMAL
GFR AFRICAN AMERICAN: 49 ML/MIN/1.73M2
GFR NON-AFRICAN AMERICAN: 40 ML/MIN/1.73M2
GLUCOSE BLD-MCNC: 139 MG/DL (ref 70–99)
GLUCOSE BLD-MCNC: 286 MG/DL (ref 70–99)
GLUCOSE BLD-MCNC: 348 MG/DL (ref 70–99)
GRAM SMEAR: ABNORMAL
Lab: ABNORMAL
Lab: ABNORMAL
POTASSIUM SERPL-SCNC: 4.4 MMOL/L (ref 3.5–5.1)
PRO-BNP: 8622 PG/ML
SODIUM BLD-SCNC: 133 MMOL/L (ref 135–145)
SPECIMEN: ABNORMAL
SPECIMEN: ABNORMAL

## 2022-10-03 PROCEDURE — 6370000000 HC RX 637 (ALT 250 FOR IP): Performed by: INTERNAL MEDICINE

## 2022-10-03 PROCEDURE — 94761 N-INVAS EAR/PLS OXIMETRY MLT: CPT

## 2022-10-03 PROCEDURE — 80048 BASIC METABOLIC PNL TOTAL CA: CPT

## 2022-10-03 PROCEDURE — 82962 GLUCOSE BLOOD TEST: CPT

## 2022-10-03 PROCEDURE — 97116 GAIT TRAINING THERAPY: CPT

## 2022-10-03 PROCEDURE — 6370000000 HC RX 637 (ALT 250 FOR IP): Performed by: NURSE PRACTITIONER

## 2022-10-03 PROCEDURE — APPSS60 APP SPLIT SHARED TIME 46-60 MINUTES: Performed by: NURSE PRACTITIONER

## 2022-10-03 PROCEDURE — 99232 SBSQ HOSP IP/OBS MODERATE 35: CPT | Performed by: INTERNAL MEDICINE

## 2022-10-03 PROCEDURE — 2700000000 HC OXYGEN THERAPY PER DAY

## 2022-10-03 PROCEDURE — 6370000000 HC RX 637 (ALT 250 FOR IP): Performed by: STUDENT IN AN ORGANIZED HEALTH CARE EDUCATION/TRAINING PROGRAM

## 2022-10-03 PROCEDURE — 36592 COLLECT BLOOD FROM PICC: CPT

## 2022-10-03 PROCEDURE — 83880 ASSAY OF NATRIURETIC PEPTIDE: CPT

## 2022-10-03 PROCEDURE — 99233 SBSQ HOSP IP/OBS HIGH 50: CPT | Performed by: INTERNAL MEDICINE

## 2022-10-03 PROCEDURE — 97535 SELF CARE MNGMENT TRAINING: CPT

## 2022-10-03 RX ORDER — ASPIRIN 81 MG/1
81 TABLET ORAL DAILY
Qty: 30 TABLET | Refills: 0 | Status: SHIPPED | OUTPATIENT
Start: 2022-10-03 | End: 2022-10-03 | Stop reason: SDUPTHER

## 2022-10-03 RX ORDER — INSULIN LISPRO 100 [IU]/ML
0-8 INJECTION, SOLUTION INTRAVENOUS; SUBCUTANEOUS
Qty: 7 ML | Refills: 0 | Status: SHIPPED | OUTPATIENT
Start: 2022-10-03 | End: 2022-10-10

## 2022-10-03 RX ORDER — AMIODARONE HYDROCHLORIDE 200 MG/1
200 TABLET ORAL 2 TIMES DAILY
Qty: 20 TABLET | Refills: 0 | Status: SHIPPED | OUTPATIENT
Start: 2022-10-03 | End: 2022-10-03 | Stop reason: SDUPTHER

## 2022-10-03 RX ORDER — GLIPIZIDE 10 MG/1
10 TABLET ORAL DAILY
Qty: 30 TABLET | Refills: 0 | Status: SHIPPED | OUTPATIENT
Start: 2022-10-03 | End: 2022-10-05 | Stop reason: SDUPTHER

## 2022-10-03 RX ORDER — ASPIRIN 81 MG/1
81 TABLET ORAL DAILY
Qty: 30 TABLET | Refills: 0 | Status: SHIPPED | OUTPATIENT
Start: 2022-10-03

## 2022-10-03 RX ORDER — PREDNISONE 10 MG/1
10 TABLET ORAL DAILY
Status: DISCONTINUED | OUTPATIENT
Start: 2022-10-04 | End: 2022-10-03 | Stop reason: HOSPADM

## 2022-10-03 RX ORDER — BLOOD PRESSURE TEST KIT
KIT MISCELLANEOUS
Qty: 100 EACH | Refills: 0 | Status: SHIPPED | OUTPATIENT
Start: 2022-10-03

## 2022-10-03 RX ORDER — AMIODARONE HYDROCHLORIDE 200 MG/1
200 TABLET ORAL 2 TIMES DAILY
Qty: 20 TABLET | Refills: 0 | Status: SHIPPED | OUTPATIENT
Start: 2022-10-03 | End: 2022-10-05 | Stop reason: SDUPTHER

## 2022-10-03 RX ORDER — SENNA PLUS 8.6 MG/1
1 TABLET ORAL DAILY PRN
Qty: 30 TABLET | Refills: 0 | Status: SHIPPED | OUTPATIENT
Start: 2022-10-03 | End: 2022-10-05 | Stop reason: SDUPTHER

## 2022-10-03 RX ORDER — PSEUDOEPHEDRINE HCL 30 MG
100 TABLET ORAL 2 TIMES DAILY PRN
Qty: 30 CAPSULE | Refills: 0 | Status: SHIPPED | OUTPATIENT
Start: 2022-10-03

## 2022-10-03 RX ORDER — AMIODARONE HYDROCHLORIDE 200 MG/1
200 TABLET ORAL DAILY
Qty: 30 TABLET | Refills: 0 | Status: SHIPPED | OUTPATIENT
Start: 2022-10-14 | End: 2022-10-03 | Stop reason: SDUPTHER

## 2022-10-03 RX ORDER — SENNA PLUS 8.6 MG/1
1 TABLET ORAL DAILY PRN
Qty: 30 TABLET | Refills: 0 | Status: SHIPPED | OUTPATIENT
Start: 2022-10-03 | End: 2022-10-03 | Stop reason: SDUPTHER

## 2022-10-03 RX ORDER — PREDNISONE 10 MG/1
10 TABLET ORAL DAILY
Qty: 10 TABLET | Refills: 0 | Status: SHIPPED | OUTPATIENT
Start: 2022-10-04 | End: 2022-10-03 | Stop reason: SDUPTHER

## 2022-10-03 RX ORDER — PAROXETINE HYDROCHLORIDE 20 MG/1
20 TABLET, FILM COATED ORAL DAILY
Qty: 30 TABLET | Refills: 0 | Status: SHIPPED | OUTPATIENT
Start: 2022-10-04 | End: 2022-10-03 | Stop reason: SDUPTHER

## 2022-10-03 RX ORDER — GLIPIZIDE 10 MG/1
10 TABLET ORAL DAILY
Qty: 30 TABLET | Refills: 0 | Status: SHIPPED | OUTPATIENT
Start: 2022-10-03 | End: 2022-10-03 | Stop reason: SDUPTHER

## 2022-10-03 RX ORDER — BLOOD PRESSURE TEST KIT
KIT MISCELLANEOUS
Qty: 100 EACH | Refills: 0 | Status: SHIPPED | OUTPATIENT
Start: 2022-10-03 | End: 2022-10-03 | Stop reason: SDUPTHER

## 2022-10-03 RX ORDER — AMIODARONE HYDROCHLORIDE 200 MG/1
200 TABLET ORAL DAILY
Qty: 30 TABLET | Refills: 0 | Status: SHIPPED | OUTPATIENT
Start: 2022-10-14 | End: 2022-10-05 | Stop reason: SDUPTHER

## 2022-10-03 RX ORDER — INSULIN LISPRO 100 [IU]/ML
0-8 INJECTION, SOLUTION INTRAVENOUS; SUBCUTANEOUS
Status: DISCONTINUED | OUTPATIENT
Start: 2022-10-03 | End: 2022-10-03 | Stop reason: HOSPADM

## 2022-10-03 RX ORDER — PANTOPRAZOLE SODIUM 40 MG/1
40 TABLET, DELAYED RELEASE ORAL
Qty: 30 TABLET | Refills: 0 | Status: SHIPPED | OUTPATIENT
Start: 2022-10-04 | End: 2022-10-03 | Stop reason: SDUPTHER

## 2022-10-03 RX ORDER — INSULIN LISPRO 100 [IU]/ML
0-8 INJECTION, SOLUTION INTRAVENOUS; SUBCUTANEOUS
Qty: 7 ML | Refills: 0 | Status: SHIPPED | OUTPATIENT
Start: 2022-10-03 | End: 2022-10-03 | Stop reason: SDUPTHER

## 2022-10-03 RX ORDER — DILTIAZEM HYDROCHLORIDE 180 MG/1
180 CAPSULE, COATED, EXTENDED RELEASE ORAL DAILY
Qty: 30 CAPSULE | Refills: 0 | Status: SHIPPED | OUTPATIENT
Start: 2022-10-04

## 2022-10-03 RX ORDER — PSEUDOEPHEDRINE HCL 30 MG
100 TABLET ORAL 2 TIMES DAILY PRN
Qty: 30 CAPSULE | Refills: 0 | Status: SHIPPED | OUTPATIENT
Start: 2022-10-03 | End: 2022-10-03 | Stop reason: SDUPTHER

## 2022-10-03 RX ORDER — SYRINGE-NEEDLE,INSULIN,0.5 ML 28GX1/2"
SYRINGE, EMPTY DISPOSABLE MISCELLANEOUS
Qty: 100 EACH | Refills: 3 | Status: SHIPPED | OUTPATIENT
Start: 2022-10-03

## 2022-10-03 RX ORDER — DILTIAZEM HYDROCHLORIDE 180 MG/1
180 CAPSULE, COATED, EXTENDED RELEASE ORAL DAILY
Qty: 30 CAPSULE | Refills: 0 | Status: SHIPPED | OUTPATIENT
Start: 2022-10-04 | End: 2022-10-03 | Stop reason: SDUPTHER

## 2022-10-03 RX ORDER — SYRINGE-NEEDLE,INSULIN,0.5 ML 28GX1/2"
SYRINGE, EMPTY DISPOSABLE MISCELLANEOUS
Qty: 100 EACH | Refills: 3 | Status: SHIPPED | OUTPATIENT
Start: 2022-10-03 | End: 2022-10-03 | Stop reason: SDUPTHER

## 2022-10-03 RX ORDER — PANTOPRAZOLE SODIUM 40 MG/1
40 TABLET, DELAYED RELEASE ORAL
Qty: 30 TABLET | Refills: 0 | Status: SHIPPED | OUTPATIENT
Start: 2022-10-04 | End: 2022-10-05 | Stop reason: SDUPTHER

## 2022-10-03 RX ORDER — INSULIN LISPRO 100 [IU]/ML
0-4 INJECTION, SOLUTION INTRAVENOUS; SUBCUTANEOUS NIGHTLY
Status: DISCONTINUED | OUTPATIENT
Start: 2022-10-03 | End: 2022-10-03 | Stop reason: HOSPADM

## 2022-10-03 RX ORDER — PAROXETINE HYDROCHLORIDE 20 MG/1
20 TABLET, FILM COATED ORAL DAILY
Qty: 30 TABLET | Refills: 0 | Status: SHIPPED | OUTPATIENT
Start: 2022-10-04 | End: 2022-10-05 | Stop reason: SDUPTHER

## 2022-10-03 RX ORDER — PREDNISONE 10 MG/1
10 TABLET ORAL DAILY
Qty: 10 TABLET | Refills: 0 | Status: SHIPPED | OUTPATIENT
Start: 2022-10-04 | End: 2022-10-05 | Stop reason: SDUPTHER

## 2022-10-03 RX ADMIN — ASPIRIN 81 MG: 81 TABLET, COATED ORAL at 05:32

## 2022-10-03 RX ADMIN — DOCUSATE SODIUM 100 MG: 100 CAPSULE, LIQUID FILLED ORAL at 08:05

## 2022-10-03 RX ADMIN — ASPIRIN 81 MG: 81 TABLET, COATED ORAL at 17:04

## 2022-10-03 RX ADMIN — AMIODARONE HYDROCHLORIDE 200 MG: 200 TABLET ORAL at 08:05

## 2022-10-03 RX ADMIN — PANTOPRAZOLE SODIUM 40 MG: 40 TABLET, DELAYED RELEASE ORAL at 05:32

## 2022-10-03 RX ADMIN — INSULIN LISPRO 2 UNITS: 100 INJECTION, SOLUTION INTRAVENOUS; SUBCUTANEOUS at 12:55

## 2022-10-03 RX ADMIN — PAROXETINE HYDROCHLORIDE 20 MG: 20 TABLET, FILM COATED ORAL at 08:05

## 2022-10-03 RX ADMIN — DILTIAZEM HYDROCHLORIDE 180 MG: 180 CAPSULE, COATED, EXTENDED RELEASE ORAL at 08:05

## 2022-10-03 RX ADMIN — APIXABAN 5 MG: 5 TABLET, FILM COATED ORAL at 08:05

## 2022-10-03 RX ADMIN — INSULIN LISPRO 4 UNITS: 100 INJECTION, SOLUTION INTRAVENOUS; SUBCUTANEOUS at 12:56

## 2022-10-03 RX ADMIN — OXYBUTYNIN CHLORIDE 5 MG: 5 TABLET ORAL at 08:05

## 2022-10-03 RX ADMIN — PREDNISONE 20 MG: 10 TABLET ORAL at 08:05

## 2022-10-03 ASSESSMENT — PAIN SCALES - GENERAL
PAINLEVEL_OUTOF10: 0

## 2022-10-03 ASSESSMENT — ENCOUNTER SYMPTOMS: SHORTNESS OF BREATH: 0

## 2022-10-03 NOTE — PLAN OF CARE
Problem: Discharge Planning  Goal: Discharge to home or other facility with appropriate resources  Outcome: Progressing  Flowsheets (Taken 10/3/2022 1600)  Discharge to home or other facility with appropriate resources:   Identify barriers to discharge with patient and caregiver   Refer to discharge planning if patient needs post-hospital services based on physician order or complex needs related to functional status, cognitive ability or social support system     Problem: Safety - Adult  Goal: Free from fall injury  Outcome: Progressing     Problem: Chronic Conditions and Co-morbidities  Goal: Patient's chronic conditions and co-morbidity symptoms are monitored and maintained or improved  Outcome: Progressing  Flowsheets (Taken 10/3/2022 1600)  Care Plan - Patient's Chronic Conditions and Co-Morbidity Symptoms are Monitored and Maintained or Improved:   Monitor and assess patient's chronic conditions and comorbid symptoms for stability, deterioration, or improvement   Collaborate with multidisciplinary team to address chronic and comorbid conditions and prevent exacerbation or deterioration     Problem: Pain  Goal: Verbalizes/displays adequate comfort level or baseline comfort level  Outcome: Progressing     Problem: Nutrition Deficit:  Goal: Optimize nutritional status  Outcome: Progressing     Problem: Skin/Tissue Integrity  Goal: Absence of new skin breakdown  Description: 1. Monitor for areas of redness and/or skin breakdown  2. Assess vascular access sites hourly  3. Every 4-6 hours minimum:  Change oxygen saturation probe site  4. Every 4-6 hours:  If on nasal continuous positive airway pressure, respiratory therapy assess nares and determine need for appliance change or resting period.   Outcome: Progressing

## 2022-10-03 NOTE — PLAN OF CARE
Problem: Discharge Planning  Goal: Discharge to home or other facility with appropriate resources  10/3/2022 1843 by Valencia Ellison RN  Outcome: Completed  10/3/2022 1843 by Valencia Ellison RN  Outcome: Adequate for Discharge  10/3/2022 1843 by Valencia Ellison RN  Outcome: Progressing  Flowsheets (Taken 10/3/2022 1600)  Discharge to home or other facility with appropriate resources:   Identify barriers to discharge with patient and caregiver   Refer to discharge planning if patient needs post-hospital services based on physician order or complex needs related to functional status, cognitive ability or social support system     Problem: Safety - Adult  Goal: Free from fall injury  10/3/2022 1843 by Valencia Ellison RN  Outcome: Completed  10/3/2022 1843 by Valencia Ellison RN  Outcome: Adequate for Discharge  10/3/2022 1843 by Valencia Ellison RN  Outcome: Progressing     Problem: Chronic Conditions and Co-morbidities  Goal: Patient's chronic conditions and co-morbidity symptoms are monitored and maintained or improved  10/3/2022 1843 by Valencia Ellison RN  Outcome: Completed  10/3/2022 1843 by Valencia Ellison RN  Outcome: Adequate for Discharge  10/3/2022 1843 by Valencia Ellison RN  Outcome: Progressing  Flowsheets (Taken 10/3/2022 1600)  Care Plan - Patient's Chronic Conditions and Co-Morbidity Symptoms are Monitored and Maintained or Improved:   Monitor and assess patient's chronic conditions and comorbid symptoms for stability, deterioration, or improvement   Collaborate with multidisciplinary team to address chronic and comorbid conditions and prevent exacerbation or deterioration     Problem: Pain  Goal: Verbalizes/displays adequate comfort level or baseline comfort level  10/3/2022 1843 by Valencia Ellison RN  Outcome: Completed  10/3/2022 1843 by Valencia Ellison RN  Outcome: Adequate for Discharge  10/3/2022 1843 by Valencia Ellison RN  Outcome: Progressing     Problem: Nutrition Deficit:  Goal: Optimize nutritional status  10/3/2022 1843 by Morgan Awan RN  Outcome: Completed  10/3/2022 1843 by Morgan Awan RN  Outcome: Adequate for Discharge  10/3/2022 1843 by Morgan Awan RN  Outcome: Progressing     Problem: Skin/Tissue Integrity  Goal: Absence of new skin breakdown  Description: 1. Monitor for areas of redness and/or skin breakdown  2. Assess vascular access sites hourly  3. Every 4-6 hours minimum:  Change oxygen saturation probe site  4. Every 4-6 hours:  If on nasal continuous positive airway pressure, respiratory therapy assess nares and determine need for appliance change or resting period.   10/3/2022 1843 by Morgan Awan RN  Outcome: Completed  10/3/2022 1843 by Morgan Awan RN  Outcome: Adequate for Discharge  10/3/2022 1843 by Morgan Awan RN  Outcome: Progressing

## 2022-10-03 NOTE — DISCHARGE INSTRUCTIONS
Outpatient bone marrow biopsy scheduled for Friday, Oct. 14th, 2022. Arrive at 6:30am for 8:30am appt time. Hold eliquis 48 hours prior. DO NOT eat or drink anything after midnight the night before. Take all other meds I.e. bp meds, etc. the morning of procedure with sip of water, no blood thinners though. Wound care: Coccyx cleanse with NS, apply collagen, silicone foam border change every 2 days and prn. Pt is a mild risk for skin breakdown AEB Yohan. Follow Yohan orders. Please follow up with above mentioned follow ups.

## 2022-10-03 NOTE — PROGRESS NOTES
Pt is going to be discharged to go home with her . Pt's  is at the bedside and both him and Pt were made aware that she is going to be discharged to go home this evening and they both voiced understanding of this. Went over discharge instructions with Pt and her  and they both voiced understanding of them. Instructed both of them on when and where Pt's bone marrow biopsy is and what they need to do to prepare for it- instructed Pt that she needs to be off of her Eliquis for 48 hours prior to the bone marrow biopsy & she needs to be NPO at midnight the night before the procedure and both her and her  voiced understanding of this. Secured [employer], Dr. Anastasia Little, and asked him if Pt's PICC line in her LUE can be removed before she is discharged to go home and he stated it can be removed prior to her discharge from here. Removed Pt's PICC line in her LUE and the catheter was intact upon removal and a dressing was applied over the area. She is alert & oriented x 4 and appears to be calm and in no acute distress. She states she is pain free. Her respirations are easy & unlabored on room air and her skin is warm and dry. Pt discharged to go home with her  and was escorted out to their vehicle per wheelchair per ICU Step Down Tech's assist. Pt had all of her belongings with her when she was discharged- she was wearing her clothes and shoes and had everything else she came to the hospital with her. Discharged

## 2022-10-03 NOTE — DISCHARGE INSTR - DIET

## 2022-10-03 NOTE — PROGRESS NOTES
Physical Therapy    Physical Therapy Treatment Note  Name: Douglas Knight MRN: [id number] :   1950   Date:  10/3/2022   Admission Date: 2022 Room:  -A   Restrictions/Precautions: fall risk; general precautions  Communication with other providers:  RN handoff  Subjective:  Patient states:  \"I'm feeling a lot better today\"  Pain:   Location, Type, Intensity (0/10 to 10/10):  denies pain  Objective:    Observation:  Semifowlers, awake, alert, agreeable. She is feeling better, breathing is less effortful, and pt is more able to complete inc OOB activities. Tele, bed alarm in place on arrival.  Treatment, including education/measures:  Supine <-> sit: SBA  Seated balance: good  Sit <-> stand: CGA  Standing balance: fair + ; standing ~ 10 mins during completion of ambulation, ADL's in BR w/ OT assist and PT at sup level  Stand <-> sit: CGA  Positioned for comfort and pressure relief ; all needs met, left in chair, chair alarmed, gait belt for OOB, RN handoff    Gait Training:  Cues were given for safety, sequence, device management, balance, posture, awareness, path. 85 ft using FWW at SBA ; dec stuart, inc UE reliance, fatigues quickly, dec step length, grossly steady and reciprocal ; inc time and effort    Assessment / Impression:    Pt tolerating well and presenting better this session. She is moving well, less effortfully, endurance cont to limit.  provides good support, they are interested in inc support from home health at home. Will cont to follow and assist in DC planning.      Patient's tolerance of treatment:  good  Barriers to improvement:  comorbid conditions; readmissions; endurance  Plan for Next Session:    Cont w/ est DC plan (home w/ assist +HH PT)  Progress functional mobility, functional strength, positional tolerance, ambulation distance    Time in:  1045  Time out:  1102  Timed treatment minutes:  15 (GT)  Total treatment time:  17    Previously filed items:  Social/Functional History  Lives With: Spouse  Type of Home: Condo  Home Layout: One level  Home Access: Level entry  Bathroom Shower/Tub: Shower chair with back, Tub/Shower unit  Bathroom Toilet: Standard  Bathroom Equipment: Toilet raiser, Grab bars in shower  Has the patient had two or more falls in the past year or any fall with injury in the past year?: Yes  ADL Assistance: Needs assistance ( assists with intermittent ADLs)  Homemaking Assistance: Needs assistance  Ambulation Assistance: Independent  Transfer Assistance: Independent  Additional Comments: 3L O2 at baseline  Patient Goals   Patient Goals : return to [address] [place] [address]  Time Frame for Short Term Goals: 1 week  Short Term Goal 1: pt will complete bed mobility at [facility]  Short Term Goal 2: pt will complete transfers at [facility] 3: pt will ambulate 125 ft using LRAD at [facility]    Electronically signed by:    Jayson Menon PT, DPT  10/3/2022, 12:53 PM

## 2022-10-03 NOTE — ADT AUTH CERT
Utilization Reviews       Heart Failure - Care Day 7 (9/30/2022) by Saurav Davis RN       Review Status Review Entered   Completed 10/3/2022 1554       Created By   Saurav Davis RN      Criteria Review      Care Day: 7 Care Date: 9/30/2022 Level of Care: ICU    Guideline Day 2    Level Of Care    (X) Floor    10/3/2022 3:53 PM EDT by Esther Harper      ICU    Clinical Status    ( ) * Hemodynamic stability    10/3/2022 3:53 PM EDT by Esther Harper      98.9, , RR 34, /78, 96% on NC 3LPM    , 120, 130  RR 34, 30, 31    (X) * Mental status at baseline    10/3/2022 3:53 PM EDT by Esther Harper      A&O    ( ) * No evidence of myocardial ischemia    ( ) * Cardiac rate and rhythm acceptable    10/3/2022 3:53 PM EDT by Ressie Mix with RVR    ( ) * Oxygenation at baseline or improved    10/3/2022 3:53 PM EDT by Esther Harper      NC 3LPM    ( ) * Pulmonary edema absent or improved    Activity    ( ) Advance activity as tolerated    10/3/2022 3:53 PM EDT by Esther Harper      bedrest    Routes    (X) Oral diet    10/3/2022 3:54 PM EDT by Esther Harper      regular diet    Interventions    (X) * Pulmonary catheter absent    10/3/2022 3:54 PM EDT by Esther Harper      no pulmonary catheter    (X) Possible electrolytes    10/3/2022 3:54 PM EDT by Esther aHrper      Potassium 3.2  Mag 1.7  Glucose 163, 170, 151    (X) Oxygen    10/3/2022 3:54 PM EDT by Esther Harper      NC 3LPM    Medications    (X) Diuretics    10/3/2022 3:53 PM EDT by Esther Harper      Lasix 40mg PO BID    * Milestone   Additional Notes   DATE: 9/30/2022 Care day 7      PERTINENT UPDATES:   Patient had an eventful overnight. After switching to p.o. amiodarone, her heart rate was uncontrolled and she remained in RVR with heart rate up to 150. This morning she was tired and sleepy. Stated that she did not sleep overnight.   was present at bedside, frustrated at current care. Discussed future plan       Paracentesis   INTRAOPERATIVE:                US/FLUORO: u/s guided          SPECIMENS: Right pleural: 700cc clear yellow fluid removed; specimen sent to lab   left pleural: 700cc clear dark yellow fluid removed; specimen se      VITALS:   98.9, , RR 34, /78, 96% on NC 3LPM      , 120, 130   RR 34, 30, 31         ABNL/PERTINENT LABS/RADIOLOGY/DIAGNOSTIC STUDIES:   ALT 5   AST 9   HGB 9.9   HCT 31.9      EKG: Afib with rapid RVR            Fluid Type: PLEURAL FLUID   Glucose, Fluid: 181   LD, Fluid: 107   RBC, Fluid: 2,000   Lymphocytes, Body Fluid: 61   Monocyte Count, Fluid: 37   Neutrophil Count, Fluid: 2   pH, Fluid: 8.5   Protein, Fluid: 2.7   WBC, Fluid: 314   Mesothelial, Fluid: 11      CXR: Status post bilateral thoracentesis with improved effusions and lung base   aeration. No pneumothorax. PHYSICAL EXAM:   LUNGS:Decreased air entry bilateral bases   Abd-soft, BS+,NT   Ext- 1 + pedal edema   CVS-s1s2, no murmurs      MD CONSULTS/ASSESSMENT AND PLAN:   Attending note:   Acute on chronic hypoxic respiratory failure   -Improving, decreasing oxygen requirement   Possible contributing factors include, pericardial effusion with tamponade physiology, compressive atelectasis due to bilateral pleural effusion, decompensated heart failure with preserved ejection fraction. Utilize supplemental oxygen to maintain oxygen saturation above 92%. Rest of the treatment plan as below       Pericardial effusion with tamponade physiology   Status post pericardiocentesis with removal of 600 cc of pericardial fluid on 9/26   Removal of pericardiocentesis catheter 9/27/2022   Mild lymphocytic predominance on cytology   Negative for infection   Although this pericardial effusion can be explained by diastolic heart failure, there remains a concern about underlying autoimmune disease process given her Raynaud's phenomena.    Continue ASA, colchicine discontinued by cardiology due to interaction with amiodarone. Protonix for stress ulcer prophylaxis   There still remains concern for metastatic effusion given history of breast cancer. Oncology is following, recommend PET scan as outpatient and bone biopsy. Patient will be followed by [facility] oncology after discharge. Repeat limited echo reviewed. Recurrent bilateral pleural effusion   Status postthoracentesis 9/25/2022   Appears to be borderline exudate   Similarly with the pericardial effusion, diastolic heart failure can explain this but the recurrence of disease and concerning cutaneous features my suspicion for autoimmune disease is high. Pulmonology following, ordered repeat thoracentesis       Paroxysmal atrial fibrillation with RVR   Uncontrolled rate overnight, for some unknown reason RN paged his were not going to the on-call night physician. Discussed with unit manager to fix this issue. Continue Eliquis   PO Amiodarone loading dose and then maintenance. Digoxin loading dose ordered by cardiology       Acute kidney injury   Likely in the setting of diuretics   BMP and UA pending, discussed with RN regarding getting the labs done. Avoid nephrotoxic agents       Raynaud's phenomena   Never seen a Rheumatologist due to frequent hospitalizations   Per chart review: Negative rheumatoid factor, negative Anti- Scl-70, recently negative RODOLFO, ESR 26. Rheumatology consulted, confusion regarding the provider to be consulted. With the help of administration new consult sent to Dr. Robbin Crenshaw. Patient needs nailfold capillaroscopy. Anti-SCL 70 is moderately sensitive, 20% hence recommend getting anti-centromere, RNA polymerase III and U3-RNP, PM/Scl, or Th/To antibodies, will defer to rheumatology. Superficial venous thrombosis RUE   Conservative management with cold compresses and limb elevation, nursing order placed.        Normocytic anemia   No signs of overt bleed   Concern for anemia of chronic disease   Monitor CBC   Transfuse if hemoglobin less than 7       Acute on chronic diastolic heart failure on admission   -Improving and appears clinically euvolemic   Blood pressure control       Moderate-severe pulmonary hypertension   Elevated RVSP and tricuspid regurgitation   Discussed with cardiology for the possibility of right heart catheterization for further classification of disease process   Optimization of preload and afterload, maintain euvolemic state. Chronic respiratory failure on home oxygen 2 to 3 L via nasal cannula   No PFTs on chart, recent CT angiogram chest did show very minimal emphysematous disease process but no overt obstructive disease pattern on imaging. Unsure about the etiology, could be the pulmonary hypertension.    Utilize supplemental oxygen to maintain saturation above 92%   Pulmonology following, appreciate recommendations       Type 2 diabetes mellitus without long-term use of insulin   With hyperglycemia   Adjusted basal bolus insulin   Sliding scale insulin to target 140-180       Hyperlipidemia   Continue Crestor       History of invasive ductal carcinoma of the right breast status post mastectomy   ER/AR positive   On prior imaging there were some concern for bony metastasis   Will need bone biopsy for definitive diagnosis   Oncology following       Pulmonary note:   Acute on chronic Hypoxic resp failure- improving   Bilateral Pleural effusions s/p thoracentesis- recurrent   Recurrent right pleural effusion   Diastolic dysfunction   Afib on Eliquis   H/o Breast ca with mets to T12, L1   Pericardial effusion s/p pericardiocentesis   Moderate Malnutrition       Bilateral Thoracentesis   Diuresis   ICS   OOB   Keep sats > 92%   CHF optimization   CXR post procedure   Await placement   C.w present management   Continue to monitor on stepdown unit      MEDICATIONS:   Albumin 25g IV q8h   Amiodarone 200mg PO BID   Eliquis 5mg PO BID   ASA 650mg PO q8h   ASA 81mg PO q8h   Digoxin 250mcg IV q4h   Diltiazem 180mg PO daily   Mag sulfate 2000mg IV x 1   Ditropan 5mg PO BID   Protonix 40mg PO daily   Crestor 10mg PO nightly   Tylenol 650mg PO q4h PRN x 1   Klor con 40meq PO PRN x 1         ORDERS:Regular diet, daily weight, IS, I&O, telemetry

## 2022-10-03 NOTE — PROGRESS NOTES
out:  1102  Timed treatment minutes:  15  Total treatment time:  17    Electronically signed by:    Corry Bright OT, OTR/L  10/3/2022, 1:36 PM    '

## 2022-10-03 NOTE — PROGRESS NOTES
Pulmonary and Critical Care  Progress Note      VITALS:  BP (!) 118/54   Pulse 80   Temp 98.9 °F (37.2 °C)   Resp 22   Ht 5' 5\" (1.651 m)   Wt 134 lb 7.7 oz (61 kg)   SpO2 92%   BMI 22.38 kg/m²     Subjective:   CHIEF COMPLAINT :SOB     HPI:                The patient is a 71 y.o. female is lying in the bed. She is not in acute resp distress. Objective:   PHYSICAL EXAM:    LUNGS:Decreased air entry bilateral bases  Abd-soft, BS+,NT  Ext- 1 + pedal edema  CVS-s1s2, no murmurs      DATA:    CBC:  Recent Labs     10/01/22  1110   WBC 9.4   RBC 3.59*   HGB 10.1*   HCT 32.6*      MCV 90.8   MCH 28.1   MCHC 31.0*   RDW 14.9   SEGSPCT 77.8*      BMP:  Recent Labs     10/01/22  1110 10/02/22  0814    137   K 3.9 3.6   CL 99 98*   CO2 27 27   BUN 24* 26*   CREATININE 1.4* 1.2*   CALCIUM 8.9 9.3   GLUCOSE 226* 137*      ABG:  No results for input(s): PH, PO2ART, VJH3CBN, HCO3, BEART, O2SAT in the last 72 hours.   BNP  No results found for: BNP   D-Dimer:  No results found for: [facility]   Radiology: Small bilateral pleural effusions      Assessment/Plan     Patient Active Problem List    Diagnosis Date Noted    Moderate malnutrition ([address] 09/30/2022     Priority: Medium    Atrial flutter with rapid ventricular response ([address] 09/29/2022     Priority: Medium    Acute systolic CHF (congestive heart failure) ([address] 09/24/2022     Priority: Medium    Acute respiratory failure with hypoxia ([address] 09/24/2022     Priority: Medium    Diastolic heart failure, unspecified HF chronicity ([address] 08/30/2022     Priority: Medium    Atrial fibrillation with RVR ([address] 08/18/2022     Priority: Medium    Varicose veins of both legs with edema 06/28/2022     Priority: Medium    Venous insufficiency 06/07/2022     Priority: Medium    Shortness of breath 06/07/2022     Priority: Medium    Edema of lower extremity 06/07/2022     Priority: Medium    Chest pain 05/12/2022     Priority: Medium    Peripheral edema 03/21/2022    Carcinoma

## 2022-10-03 NOTE — PLAN OF CARE
Problem: Discharge Planning  Goal: Discharge to home or other facility with appropriate resources  10/3/2022 1843 by Kimberly Hill RN  Outcome: Adequate for Discharge  10/3/2022 1843 by Kimberly Hill RN  Outcome: Progressing  Flowsheets (Taken 10/3/2022 1600)  Discharge to home or other facility with appropriate resources:   Identify barriers to discharge with patient and caregiver   Refer to discharge planning if patient needs post-hospital services based on physician order or complex needs related to functional status, cognitive ability or social support system     Problem: Safety - Adult  Goal: Free from fall injury  10/3/2022 1843 by Kimberly Hill RN  Outcome: Adequate for Discharge  10/3/2022 1843 by Kimberly Hill RN  Outcome: Progressing     Problem: Chronic Conditions and Co-morbidities  Goal: Patient's chronic conditions and co-morbidity symptoms are monitored and maintained or improved  10/3/2022 1843 by Kimberly Hill RN  Outcome: Adequate for Discharge  10/3/2022 1843 by Kimberly Hill RN  Outcome: Progressing  Flowsheets (Taken 10/3/2022 1600)  Care Plan - Patient's Chronic Conditions and Co-Morbidity Symptoms are Monitored and Maintained or Improved:   Monitor and assess patient's chronic conditions and comorbid symptoms for stability, deterioration, or improvement   Collaborate with multidisciplinary team to address chronic and comorbid conditions and prevent exacerbation or deterioration     Problem: Pain  Goal: Verbalizes/displays adequate comfort level or baseline comfort level  10/3/2022 1843 by Kimberly Hill RN  Outcome: Adequate for Discharge  10/3/2022 1843 by Kimberly Hill RN  Outcome: Progressing     Problem: Nutrition Deficit:  Goal: Optimize nutritional status  10/3/2022 1843 by Kimberly Hill RN  Outcome: Adequate for Discharge  10/3/2022 1843 by Kimberly Hill RN  Outcome: Progressing     Problem: Skin/Tissue Integrity  Goal: Absence of new skin breakdown  Description: 1. Monitor for areas of redness and/or skin breakdown  2. Assess vascular access sites hourly  3. Every 4-6 hours minimum:  Change oxygen saturation probe site  4. Every 4-6 hours:  If on nasal continuous positive airway pressure, respiratory therapy assess nares and determine need for appliance change or resting period.   10/3/2022 1843 by Zainab Schwarz RN  Outcome: Adequate for Discharge  10/3/2022 1843 by Zainab Schwarz RN  Outcome: Progressing

## 2022-10-03 NOTE — PROGRESS NOTES
Patient states she feels better . C/o constipation. On exam, thickening  of skin on arms distal to the wrist and legs distal to knees. No joint effusion. Anticentromere pending. C3C4 pending. Has suggestion of pulmonary HTN on ECHO. Will differ to pulmonary to start on meds for pulmonary HTN which is most likely related to scleroderma. Will decrease prednisone to 10 mg daily before discharge. Will f/u in office in 2 weeks.

## 2022-10-03 NOTE — PROGRESS NOTES
Cardiology Progress Note     Today's Plan: [facility]     Admit Date:  9/23/2022    Consult reason/ Seen today for: pericardial effusion    Subjective and  Overnight Events:  Breathing has improved, denies any chest pain. History of Presenting Illness:    Chief complain on admission : 71 y. o.year old who is admitted for  Chief Complaint   Patient presents with    Shortness of Breath     Started today; has recently been seen and had 2 paracentesis done; normally wears O2 at 3L/min and is currently requiring more to sub stain oxygen levels        Past medical history:    has a past medical history of Anxiety, Breast carcinoma ([facility] [address] Cervical radiculitis, Chest pain, Depression, Diabetes type 2, controlled ([facility] [address] Family history of diabetes mellitus (DM), GERD (gastroesophageal reflux disease), Hiatal hernia, Hx of Doppler ultrasound, Hx of Doppler ultrasound, Hx of echocardiogram, Hypercalcemia, Hyperglycemia, Hyperlipidemia, Insomnia, Irritable bowel syndrome, LBP (low back pain), Lumbar herniated disc, Menopause, Migraine headache, Osteoporosis, Prediabetes, S/P colonoscopic polypectomy, and TIA (transient ischemic attack). Past surgical history:   has a past surgical history that includes Tubal ligation; Mastectomy (7/1997); Hysterectomy (5/1995); and Breast reconstruction (Right, 4/2013). Social History:   reports that she has never smoked. She has never used smokeless tobacco. She reports that she does not drink alcohol and does not use drugs. Family history:  family history includes Diabetes in her father, mother, and sister; Heart Disease in her father; High Blood Pressure in her father and mother; High Cholesterol in her sister; Other in her sister; Stroke in her paternal grandmother.     Allergies   Allergen Reactions    Codeine      \"jittery\"  Feels anxoius       Review of Systems:  Review of Systems   Respiratory:  Negative for shortness of breath. Cardiovascular:  Negative for chest pain, palpitations and leg swelling. Musculoskeletal: Negative. Skin: Negative. Neurological:  Negative for dizziness and weakness. All other systems reviewed and are negative. BP (!) 118/54   Pulse 80   Temp 98.9 °F (37.2 °C)   Resp 22   Ht 5' 5\" (1.651 m)   Wt 134 lb 7.7 oz (61 kg)   SpO2 92%   BMI 22.38 kg/m²     Intake/Output Summary (Last 24 hours) at 10/3/2022 1100  Last data filed at 10/3/2022 0655  Gross per 24 hour   Intake 1360 ml   Output 600 ml   Net 760 ml       Physical Exam:  Physical Exam  Constitutional:       Appearance: She is well-developed. Cardiovascular:      Rate and Rhythm: Normal rate and regular rhythm. Pulses: Intact distal pulses. Dorsalis pedis pulses are 2+ on the right side and 2+ on the left side. Posterior tibial pulses are 2+ on the right side and 2+ on the left side. Heart sounds: Normal heart sounds, S1 normal and S2 normal.   Pulmonary:      Effort: Pulmonary effort is normal.      Breath sounds: Normal breath sounds. Musculoskeletal:         General: Normal range of motion. Skin:     General: Skin is warm and dry. Neurological:      Mental Status: She is alert and oriented to person, place, and time.         Medications:    [START ON 10/4/2022] predniSONE  10 mg Oral Daily    insulin glargine  12 Units SubCUTAneous Nightly    insulin lispro  4 Units SubCUTAneous TID WC    [Held by provider] furosemide  40 mg Oral Daily    aspirin  81 mg Oral q8h    docusate sodium  100 mg Oral BID    senna  1 tablet Oral Nightly    dilTIAZem  180 mg Oral Daily    PARoxetine  20 mg Oral Daily    amiodarone  200 mg Oral BID    [START ON 10/14/2022] amiodarone  200 mg Oral Daily    pantoprazole  40 mg Oral QAM AC    lidocaine PF  5 mL IntraDERmal Once    sodium chloride flush  5-40 mL IntraVENous 2 times per day    sodium chloride flush  5-40 mL IntraVENous 2 times per day lidocaine  20 mL IntraDERmal Once    sodium chloride flush  5-40 mL IntraVENous 2 times per day    apixaban  5 mg Oral BID    [Held by provider] lisinopril  20 mg Oral Daily    mirtazapine  15 mg Oral Nightly    oxybutynin  5 mg Oral BID    rosuvastatin  10 mg Oral Nightly    insulin lispro  0-4 Units SubCUTAneous TID WC    insulin lispro  0-4 Units SubCUTAneous Nightly      sodium chloride      dextrose      sodium chloride      sodium chloride       melatonin, potassium chloride **OR** potassium alternative oral replacement **OR** potassium chloride, oxyCODONE, sodium chloride flush, sodium chloride, glucose, dextrose bolus **OR** dextrose bolus, glucagon (rDNA), dextrose, guaiFENesin-dextromethorphan, sodium chloride flush, sodium chloride, acetaminophen, ondansetron, hydrALAZINE, sodium chloride flush, sodium chloride, ondansetron **OR** [DISCONTINUED] ondansetron, polyethylene glycol, [DISCONTINUED] acetaminophen **OR** acetaminophen, ALPRAZolam    Lab Data:  CBC:   Recent Labs     10/01/22  1110   WBC 9.4   HGB 10.1*   HCT 32.6*   MCV 90.8        BMP:   Recent Labs     10/01/22  1110 10/02/22  0814    137   K 3.9 3.6   CL 99 98*   CO2 27 27   BUN 24* 26*   CREATININE 1.4* 1.2*     PT/INR: No results for input(s): PROTIME, INR in the last 72 hours. BNP:    Recent Labs     10/03/22  0555   PROBNP 8,622*     TROPONIN: No results for input(s): TROPONINT in the last 72 hours. Impression:  Principal Problem:    Acute systolic CHF (congestive heart failure) (HCC)  Active Problems:    Acute respiratory failure with hypoxia (HCC)    Atrial flutter with rapid ventricular response (HCC)    Moderate malnutrition ([facility] [address]  Resolved Problems:    * No resolved hospital problems. *             All labs, medications and tests reviewed by myself, continue all other medications of all above medical condition listed as is except for changes mentioned above.     Thank you   Please call with questions. Electronically signed by Viola Christensen. Ruth Palafoxhank, APRN - CNP on 10/3/2022 at 11:00 AM             CARDIOLOGY ATTENDING ADDENDUM    I have seen, spoken to and examined this patient personally, independent of the NP/PAC. I have reviewed the hospital care given to date and reviewed all pertinent labs and imaging. I have spoken with patient, nursing staff and provided written and verbal instructions . The above note has been reviewed. I have spent substantive amount of time in formulating patient care. Physical Exam:    General:   Awake, alert  Head:normal  Eye:normal  Chest:   Clear to auscultation  0 Basilar crackles   Cardiovascular:  S1S2 IR  Abdomen: soft   Extremities:   0 edema  Pulses; palpable      MEDICAL DECISION MAKING :         Assessment / Plan / Recommendation:     Pericardial effusion/tamponade: S/p pericardiocentesis. 600 cc out. Removal of catheter. Follow-up echocardiogram does not show residual effusion. Cytology of pericardial fluid does not show malignancy ! Respiratory failure/shortness of breath: Multifactorial, pleural effusion/pericardial effusion s/p drainage. Symptoms improved. Supplemental oxygen. Pulmonary follow-up. Atrial fibrillation: PAF  On amiodarone/digoxin/Cardizem/Eliquis. EP evaluation  Scleroderma per Rheumatology , likely leading to Pulm HTN - On steriods   Heart failure with preserved ejection fraction: Stable. History of cancer with recurrent pleural effusions. Suspected metastatic disease. Oncology work-up, follow-up. Planning OP PET scan. Hyperlipidemia: Continue with statin therapy  History of severe pulm hypertension: Patient was scheduled for left and right heart cardiac catheterization.  Plan for LHC/RHC in 1-2 months once stable           Dr. Gabe Ta MD

## 2022-10-03 NOTE — DISCHARGE SUMMARY
V2.0  Discharge Summary    Name:  Sheila Love /Age/Sex: 1950 (71 y.o. female)   Admit Date: 2022  Discharge Date: 10/3/22    MRN & CSN:  [id number] & [id number] Encounter Date and Time 10/3/22 3:34 PM EDT    Attending:  Ta Uriarte MD Discharging Provider: Ta Uriarte MD       Hospital Course:     Brief HPI: Sheila Love is a 71 y.o. female with past medical history of diastolic heart failure, paroxysmal atrial fibrillation, type 2 diabetes mellitus, chronic respiratory failure, recurrent admission for bilateral pleural effusions, depression, hypertension, history of invasive ductal carcinoma of the right breast  status post mastectomy, ER/MS positive, history of tobacco use and hyperlipidemia who presents with progressively worsening shortness of breath. Brief Problem Based Course:     Acute on chronic hypoxic respiratory failure upon admission  -Resolved, at baseline oxygen requirement, 2 L nasal cannula  Possible contributing factors include, pericardial effusion with tamponade physiology, compressive atelectasis due to bilateral pleural effusion. Utilize supplemental oxygen to maintain oxygen saturation above 92%. Rest of management was as follows:-     Pericardial effusion with tamponade physiology  Status post pericardiocentesis with removal of 600 cc of pericardial fluid on   Removal of pericardiocentesis catheter 2022  Repeat limited echo on 2022, no pericardial effusion  This could be related to possible autoimmune etiology with scleroderma being highest on the differential. There still remains concern for metastatic effusion given history of breast cancer. Bone biopsy scheduled for , instructions regarding anticoagulation provided. Oncology aware, will schedule for PET scan after that.      Recurrent bilateral pleural effusion  Status postthoracentesis 2022  Repeat bilateral thoracentesis on 2022 by pulmonology  High suspicion for autoimmune phenomena contributing. Pulmonology following, appreciate recommendations  Outpatient follow-up with pulmonology     Paroxysmal atrial fibrillation with RVR  Achieved rate control  Continue Eliquis  PO Amiodarone loading dose and then maintenance     Acute kidney injury-improving, almost at baseline  Likely in the setting of diuretics  Will hold diuretics and monitor response  Avoid nephrotoxic agents  BMP in 3 days at primary care physician's office     Raynaud's phenomena  Per chart review: Negative rheumatoid factor, negative Anti- Scl-70, recently negative RODOLFO, ESR 26.  Patient needs nailfold capillaroscopy. Anti-SCL 70 is moderately sensitive, 20% hence recommend getting anti-centromere, RNA polymerase III and U3-RNP, PM/Scl, or Th/To antibodies, will defer to rheumatology. Rheumatology following, started on steroids, outpatient follow-up in 1 week. Superficial venous thrombosis RUE  Conservative management with cold compresses and limb elevation     Moderate malnutrition  Dietary supplementation     Normocytic anemia  No signs of overt bleed  Concern for anemia of chronic disease  Monitor CBC in 3 days at primary care physician's office     Chronic HFpEF  Currently hypovolemic  Blood pressure control     Moderate-severe pulmonary hypertension  Elevated RVSP and tricuspid regurgitation  Recommend right heart catheterization, cardiology plans to do it outpatient. I had a detailed discussion with patient that she might need specialized care at a pulmonary hypertension center. Optimization of preload and afterload, maintain euvolemic state.      Type 2 diabetes mellitus without long-term use of insulin  With hyperglycemia due to steroid use  Increase glipizide to 10 mg daily and continue metformin upon discharge  Provided with insulin sliding scale  Defer to primary care physician for further management     Hyperlipidemia  Continue Crestor     History of invasive ductal carcinoma of the right breast status post mastectomy  ER/LA positive  On prior imaging there were some concern for bony metastasis  Bone biopsy scheduled for October 14, 2022  Oncology will follow up as outpatient    The patient expressed appropriate understanding of, and agreement with the discharge recommendations, medications, and plan. Consults this admission:  IP CONSULT TO HOSPITALIST  IP CONSULT TO HEART FAILURE NURSE/COORDINATOR  IP CONSULT TO DIETITIAN  IP CONSULT TO CARDIOLOGY  IP CONSULT TO ONCOLOGY  IP CONSULT TO PULMONOLOGY  IP CONSULT TO INTERVENTIONAL RADIOLOGY  IP CONSULT TO IV TEAM  IP CONSULT TO IV TEAM  IP CONSULT TO ELECTROPHYSIOLOGY  IP CONSULT TO IV TEAM  IP CONSULT TO DIETITIAN  IP CONSULT TO DIETITIAN  IP CONSULT TO RHEUMATOLOGY  IP CONSULT TO HOME CARE NEEDS    Subjective    Patient was seen and evaluated at bedside this am.  Patient was alert oriented x3. Hemodynamically stable. Off oxygen, saturating above 92%. Normal sinus rhythm on telemetry. Patient had 2 bowel movements overnight.  was present at bedside. Extensively discussed discharge instructions and the plan with him.     Discharge Diagnosis:     Acute on chronic hypoxic respiratory failure  Bilateral pleural effusion  Pericardial effusion  Moderate to severe pulmonary hypertension  Chronic heart failure with preserved ejection fraction   Acute kidney injury  Raynaud's phenomena  Paroxysmal atrial fibrillation with rapid ventricular response      Discharge Instruction:   Follow up appointments: Primary care physician, rheumatology, pulmonology, cardiology, Oncology  Primary care physician: Wisam Gonzales MD within 1 weeks  Diet: cardiac diet , dietary supplementation  Activity: activity as tolerated  Disposition: Discharged to:   []Home, [x]HHC, []SNF, []Acute Rehab, []Hospice   Condition on discharge: Stable  Labs and Tests to be Followed up as an outpatient by PCP or Specialist: CBC, BMP, magnesium, phosphorus in 3 days    Discharge Medications:        Medication List        START taking these medications      Alcohol Swabs Pads  Use before administering insulin     * amiodarone 200 MG tablet  Commonly known as: CORDARONE  Take 1 tablet by mouth 2 times daily for 20 doses     * amiodarone 200 MG tablet  Commonly known as: CORDARONE  Take 1 tablet by mouth daily  Start taking on: October 14, 2022     aspirin 81 MG EC tablet  Take 1 tablet by mouth daily     docusate 100 MG Caps  Commonly known as: COLACE, DULCOLAX  Take 100 mg by mouth 2 times daily as needed for Constipation     glucose 4 g chewable tablet  Take 4 tablets by mouth as needed for Low blood sugar     INS SYRINGE/NEEDLE .5CC/28G 28G X 1/2\" 0.5 ML Misc  Commonly known as: AIMSCO INS SYR MX-COM .5CC/28G  As mentioned in Insulin dosing     insulin lispro 100 UNIT/ML Soln injection vial  Commonly known as: HUMALOG  Inject 0-8 Units into the skin 3 times daily (with meals) Glucose: Dose:  No Insulin 200-249 2 Units 250-299 4 Units 300-349 6 Units Over 349 8 Units and notify physician     pantoprazole 40 MG tablet  Commonly known as: PROTONIX  Take 1 tablet by mouth every morning (before breakfast)  Start taking on: October 4, 2022     PARoxetine 20 MG tablet  Commonly known as: PAXIL  Take 1 tablet by mouth daily  Start taking on: October 4, 2022     predniSONE 10 MG tablet  Commonly known as: DELTASONE  Take 1 tablet by mouth daily for 10 days  Start taking on: October 4, 2022     senna 8.6 MG tablet  Commonly known as: SENOKOT  Take 1 tablet by mouth daily as needed for Constipation           * This list has 2 medication(s) that are the same as other medications prescribed for you. Read the directions carefully, and ask your doctor or other care provider to review them with you.                 CHANGE how you take these medications      dilTIAZem 180 MG extended release capsule  Commonly known as: CARDIZEM CD  Take 1 capsule by mouth daily  Start taking on: October 4, 2022  What changed:   medication strength  how much to take     glipiZIDE 10 MG tablet  Commonly known as: GLUCOTROL  Take 1 tablet by mouth daily  What changed:   medication strength  how much to take            CONTINUE taking these medications      ALPRAZolam 0.25 MG tablet  Commonly known as: Xanax  Take 1 tablet by mouth 2 times daily as needed for Anxiety for up to 30 days. apixaban 5 MG Tabs tablet  Commonly known as: ELIQUIS  Take 1 tablet by mouth 2 times daily     blood glucose test strips  Test twice a day & as needed for symptoms of irregular blood glucose.      Lancets Misc  1 each by Does not apply route 2 times daily     metFORMIN 500 MG tablet  Commonly known as: GLUCOPHAGE  Take 1 tablet by mouth every morning (before breakfast)     mirtazapine 15 MG tablet  Commonly known as: Remeron  Take 1 tablet by mouth nightly     oxybutynin 5 MG tablet  Commonly known as: DITROPAN  Take 1 tablet by mouth 2 times daily     rosuvastatin 10 MG tablet  Commonly known as: CRESTOR  Take 1 tablet by mouth nightly            STOP taking these medications      allopurinol 100 MG tablet  Commonly known as: ZYLOPRIM     amLODIPine-benazepril 5-10 MG per capsule  Commonly known as: Lotrel     lisinopril 20 MG tablet  Commonly known as: PRINIVIL;ZESTRIL     metoprolol tartrate 50 MG tablet  Commonly known as: LOPRESSOR               Where to Get Your Medications        These medications were sent to [address] [place] [address], [address] [facility] [address] [address]      Phone: [phone]   Alcohol Swabs Pads  amiodarone 200 MG tablet  amiodarone 200 MG tablet  aspirin 81 MG EC tablet  dilTIAZem 180 MG extended release capsule  docusate 100 MG Caps  glipiZIDE 10 MG tablet  glucose 4 g chewable tablet  INS SYRINGE/NEEDLE .5CC/28G 28G X 1/2\" 0.5 ML Misc  insulin lispro 100 UNIT/ML Soln injection vial  pantoprazole 40 MG tablet  PARoxetine 20 MG tablet  predniSONE 10 MG tablet  senna 8.6 MG tablet        Objective Findings at Discharge:   BP (!) 118/54   Pulse 80   Temp 98.9 °F (37.2 °C)   Resp 26   Ht 5' 5\" (1.651 m)   Wt 134 lb 7.7 oz (61 kg)   SpO2 92%   BMI 22.38 kg/m²       Physical Exam:   Physical Exam  Vitals reviewed. Constitutional:       Appearance: Normal appearance. She is normal weight. HENT:      Head: Normocephalic and atraumatic. Nose: Nose normal.      Mouth/Throat:      Mouth: Mucous membranes are dry. Pharynx: Oropharynx is clear. Eyes:      General: No scleral icterus. Conjunctiva/sclera: Conjunctivae normal.   Cardiovascular:      Rate and Rhythm: Normal rate and regular rhythm. Pulses: Normal pulses. Heart sounds: Murmur heard. Pulmonary:      Effort: Pulmonary effort is normal.      Breath sounds: No wheezing, rhonchi or rales. Abdominal:      General: Bowel sounds are normal. There is no distension. Palpations: Abdomen is soft. Tenderness: There is no abdominal tenderness. Musculoskeletal:         General: No deformity. Normal range of motion. Cervical back: Neck supple. No tenderness. Right lower leg: Edema present. Left lower leg: Edema present. Comments: Bilateral upper and lower extremity skin thickness   Lymphadenopathy:      Cervical: No cervical adenopathy. Skin:     Coloration: Skin is not jaundiced or pale. Neurological:      General: No focal deficit present. Mental Status: She is alert and oriented to person, place, and time. Mental status is at baseline. Labs and Imaging   XR CHEST PORTABLE    Result Date: 9/30/2022  EXAMINATION: ONE XRAY VIEW OF THE CHEST 9/30/2022 2:04 pm COMPARISON: Chest radiograph 09/29/2022.  HISTORY: ORDERING SYSTEM PROVIDED HISTORY: Post bilateral thoracentesis TECHNOLOGIST PROVIDED HISTORY: Reason for exam:->Post bilateral thoracentesis Reason for Exam: Post bilateral thoracentesis Additional signs and symptoms: Post bilateral thoracentesis Relevant Medical/Surgical History: Post bilateral thoracentesis FINDINGS: Single view provided. Left PICC with tip in the proximal superior vena cava. Stable enlarged cardiac silhouette. Status post bilateral thoracentesis with improved effusions. Mild residual effusions. There is improved bibasilar atelectasis. No pneumothorax or free subdiaphragmatic air. No subcutaneous emphysema. Status post bilateral thoracentesis with improved effusions and lung base aeration. No pneumothorax. CBC:   Recent Labs     10/01/22  1110   WBC 9.4   HGB 10.1*        BMP:    Recent Labs     10/01/22  1110 10/02/22  0814 10/03/22  1330    137 133*   K 3.9 3.6 4.4   CL 99 98* 97*   CO2 27 27 26   BUN 24* 26* 30*   CREATININE 1.4* 1.2* 1.3*   GLUCOSE 226* 137* 348*     Hepatic: No results for input(s): AST, ALT, ALB, BILITOT, ALKPHOS in the last 72 hours. Lipids:   Lab Results   Component Value Date/Time    CHOL 148 09/25/2022 02:49 AM    CHOL 190 06/10/2022 08:40 AM    HDL 32 09/25/2022 02:49 AM    TRIG 250 09/25/2022 02:49 AM     Hemoglobin A1C:   Lab Results   Component Value Date/Time    LABA1C 6.5 08/18/2022 07:10 AM     TSH:   Lab Results   Component Value Date/Time    TSH 2.90 07/21/2022 09:55 AM     Troponin:   Lab Results   Component Value Date/Time    TROPONINT 0.038 09/24/2022 06:00 PM    TROPONINT 0.068 09/24/2022 04:57 AM    TROPONINT 0.012 09/23/2022 10:02 PM     Lactic Acid: No results for input(s): LACTA in the last 72 hours.   BNP:   Recent Labs     10/03/22  0555   PROBNP 8,622*     UA:  Lab Results   Component Value Date/Time    NITRU NEGATIVE 10/01/2022 10:10 PM    COLORU YELLOW 10/01/2022 10:10 PM    PHUR 5.0 06/07/2012 12:00 AM    WBCUA 1549 10/01/2022 10:10 PM    RBCUA 21 10/01/2022 10:10 PM    MUCUS RARE 08/18/2022 12:15 PM    TRICHOMONAS NONE SEEN 10/01/2022 10:10 PM    BACTERIA NEGATIVE 10/01/2022 10:10 PM CLARITYU CLOUDY 10/01/2022 10:10 PM    SPECGRAV 1.025 10/01/2022 10:10 PM    LEUKOCYTESUR LARGE NUMBER OR AMOUNT OF 10/01/2022 10:10 PM    UROBILINOGEN 0.2 10/01/2022 10:10 PM    BILIRUBINUR NEGATIVE 10/01/2022 10:10 PM    BILIRUBINUR Negative 06/07/2012 12:00 AM    BLOODU LARGE NUMBER OR AMOUNT OF 10/01/2022 10:10 PM    GLUCOSEU Negative 06/07/2012 12:00 AM    KETUA NEGATIVE 10/01/2022 10:10 PM     Urine Cultures: No results found for: LABURIN  Blood Cultures: No results found for: BC  No results found for: BLOODCULT2  Organism: No results found for: ORG    Time Spent Discharging patient 35 minutes    Electronically signed by Tavon Adamson MD on 10/3/2022 at 3:34 PM

## 2022-10-03 NOTE — PROGRESS NOTES
ONCOLOGY HEMATOLOGY CARE ([place])  PROGRESS NOTE      10/3/2022  1:13 PM    Patient:    Marilee Boucher  : 1950   71 y.o. MRN: [id number]  Admitted: 2022  9:51 PM ATT: Nico Herndon MD   [id number]  AdmitDx: Pleural effusion [J90]  Acute respiratory failure with hypoxia ([address] [R38.85]  Acute systolic CHF (congestive heart failure) ([address] [I50.21]  PCP: Alban Quijano MD      Patient was seen and examined today. She was asleep most of the time  Feels weak and tired  Cold/chills  Breathing better though    Had a lengthy discussion with  and daughter    22, 22: Pericardial fluid and pleural fluid cytology negative for malignancy    22: CXR:  Status post bilateral thoracentesis with improved effusions and lung base   aeration. No pneumothorax. She was admitted to [facility] in 2022 with symptoms of shortness of breath. Echocardiogram with moderate pericardial effusion. Was also found to have bilateral pleural effusion. Underwent thoracentesis. Cytology negative for any malignancy. No lung nodules or lymphadenopathy. CAT scan of the chest at that time. She was admitted to [facility] in 2022. She had a bone scan performed on 2022 which revealed osseous metastatic disease in multiple ribs and vertebral bodies and also possibly left hemipelvis. Findings suspicious for metastatic disease. CT scan of the abdomen pelvis was also done on September 15, 2022 which revealed hypodense lesions within the liver. Sclerosis within the L1 vertebrae. Subsequently an MRI of the abdomen revealed hemangiomas. She had another thoracentesis on 2022, cytology negative for any malignancy. CA 27-29 was 19  was 83 CA 19-9 was less than 0.8 ferritin of 135 iron saturations of 9% D37 019 folic acid 99.6 CEA 4.5     Currently admitted with worsening shortness of breath.   Some chest discomfort. He is on home oxygen. Poor appetite. No weight loss. Denied any abdominal pain. No joint pains or bone pains. 9/23/2022 CBC WBC of 13.5 hemoglobin of 11.4 hematocrit 35. 4MCV of 90 and platelets of 355  CMP with sodium 135 potassium 3.8 creatinine 1.1 albumin 3.3 rest of the LFTs within normal limits. Chest x-ray with bilateral worsening opacities and pleural effusions and pulmonary edema. PHYSICAL EXAM :    Vitals: BP (!) 118/54   Pulse 80   Temp 98.9 °F (37.2 °C)   Resp 22   Ht 5' 5\" (1.651 m)   Wt 134 lb 7.7 oz (61 kg)   SpO2 92%   BMI 22.38 kg/m²     CONSTITUTIONAL: awake, alert, tired appearing, asleep  EYES: palor  ENT: ATNC  NECK: No JVD  HEMATOLOGIC/LYMPHATIC: no cervical, supraclavicular or axillary lymphadenopathy   LUNGS:CTAB, poor effort though  CARDIOVASCULAR: s1s2 rrr no murmurs  ABDOMEN: soft ntnd bs pos  NEUROLOGIC: GI  SKIN: No rash  EXTREMITIES: no LE edema bilaterally  Right breast with reconstruction changes  Left breast with no mass, no lad  LABORATORY RESULTS  CBC:   Recent Labs     10/01/22  1110   WBC 9.4   HGB 10.1*        BMP:    Recent Labs     10/01/22  1110 10/02/22  0814    137   K 3.9 3.6   CL 99 98*   CO2 27 27   BUN 24* 26*   CREATININE 1.4* 1.2*   GLUCOSE 226* 137*     Hepatic: No results for input(s): AST, ALT, ALB, BILITOT, ALKPHOS in the last 72 hours. INR: No results for input(s): INR in the last 72 hours. RADIOLOGY REPORTS  Noted    ASSESSMENT & RECOMMENDATIONS:  History of invasive ductal carcinoma the right breast in 1999 status postmastectomy, ER/GA positive looks like, received adjuvant chemotherapy followed by endocrine therapy for 10 years. Now with a recurrent bilateral pleural effusions,  pericardial effusion. Ca 27-29 negative. Thoracentesis twice negative for any malignancy in the past and this time as well. Outside Bone scan with bone lesions. MRI abdomen done outside with no liver lesions.  Pericardial fluid and pleural fluid cytology neg for malignancy. PET ordered as OP  Bone biopsy scheduled for October 14  Will follow up as OP after that for further treatment planning    Pericardial fluid and recurrent pleural fluid: ?etiology unclear, ? CTD, CHF from PHTN? . Has been evaluated by rheumatology. Plan to start prednisone. Is also followed by cardiology and pulmonology     AFIB RVR: Is on eliquis     Anemia, most probably multifactorial.  Note B12 levels low as well. on replacement. Will follow as OP. Continue other medical care     ECOG Performance Status (ECOG Scale 0-4): 1-2      We will continue to follow the patient as OP. Discussed with Dr Marisela Smyth, Hospitalist.     Thank you for allowing us to participate in the care of this patient.       [address]

## 2022-10-03 NOTE — CARE COORDINATION
Dr Bolanos Fail to [facility] desk stating that he will discharge the pt and he wants her to have home care. CM called the pt who was in agreement and had no preference of home care companies. Home address was verified. PS sent to Dr Cici Pratt to write the home care order. CM faxed requested documentation to [address].

## 2022-10-04 ENCOUNTER — TELEPHONE (OUTPATIENT)
Dept: INTERNAL MEDICINE CLINIC | Age: 72
End: 2022-10-04

## 2022-10-04 ENCOUNTER — CARE COORDINATION (OUTPATIENT)
Dept: CASE MANAGEMENT | Age: 72
End: 2022-10-04

## 2022-10-04 DIAGNOSIS — E78.2 MIXED HYPERLIPIDEMIA: ICD-10-CM

## 2022-10-04 DIAGNOSIS — R06.02 SHORTNESS OF BREATH: Primary | ICD-10-CM

## 2022-10-04 PROCEDURE — 1111F DSCHRG MED/CURRENT MED MERGE: CPT | Performed by: INTERNAL MEDICINE

## 2022-10-04 NOTE — LETTER
Demian Kareem  [address], [address]        [address] [place] [address]           10/07/22     Dear Diego Sandhu,    We have on file that you are currently taking rosuvastatin 10mg daily. If you are no longer taking or taking differently, please call us at the number below so that we can discuss this and update your medication profile. This medication is filled and ready for you at [place]. Please pick this medication up as soon as possible, if you have not already done so. It appears that this medication has not been filled at proper times. We are worried you might be missing doses or not taking it as directed. It is important that you take your medications regularly and try not to miss a single dose.     Some ways to help you remember to take and refill your medications are to:  · Use a pill box, set an alarm, and/or keep your medication near something that you do every day  · Ask your pharmacy if they participate in [facility]", a program where you can  all of your medications on the same day  · Ask your pharmacy if you can be set up with automatic refill, where they will automatically refill your prescription when it is due and let you know it's ready to     Sincerely,   Charisse Jean, PharmD, BCACP, [address] [facility], toll free: [phone], option 1

## 2022-10-04 NOTE — CARE COORDINATION
[facility] Care Transitions Initial Follow Up Call    Call within 2 business days of discharge: Yes    Care Transition Nurse contacted the patient by telephone to perform post hospital discharge assessment. Verified name and  with patient as identifiers. Provided introduction to self, and explanation of the Care Transition Nurse role. Patient: Sheila Love Patient : 1950   MRN: [id number]  Reason for Admission: sob  Discharge Date: 10/3/22 RARS: Readmission Risk Score: 22.1      Last Discharge  Street       Date Complaint Diagnosis Description Type Department Provider    22 Shortness of Breath Acute respiratory failure with hypoxia ([facility] [address] . .. ED to Hosp-Admission (Discharged) (ADMITTED) Manuel Galindo MD; Mickie Francis, ... Challenges to be reviewed by the provider   Additional needs identified to be addressed with provider: No  medications-10 days Amiodarone beginning today 10/4/22 through 10/14/22? Method of communication with provider:  at [facility] 10/5/22 by pt./  . CTN call to Peg today and she says she is feeling ok. She feels overwhelmed by all of her meds & appts. That have to be scheduled. This writer expressed empathy and told her this call is to help and support her. Denies inc. Sob, chest pain, n/v/d, dizziness, fever, chills. DYSPNEA ON EXERTION noted. c/o hands, legs & feet swollen since in the hospital.  Wt.=134 OHJ=[id number] d/t steroids. She says her  is giving her insulin as directed. [facility]) contacted and they will be calling her later today to schedule a visit. PCP HFU 10/5/22 and her  will take her. Offered to help schedule HFU w/ Rheumatology, Oncology & Pulm but she said her  would like to do that.    Bone marrow bx scheduled for 10/14/22 instructions reviewed on AVS. She will ask PCP tomorrow If she needs to hold the aspirin along w/ Eliquis 48 h prior to this test. Meds reviewed w/  Lidia Bright and are correct. Amiodarone 200mg bid x 10 days-> will double check w/ PCP tomorrow. Eating, drinking & sleeping ok. Denies problems w/ urination/bowels. No other concerns voiced at this time. CTN # given to pt. DUY Alicia Care Transitions [phone]      Care Transition Nurse reviewed discharge instructions, medical action plan, and red flags with patient who verbalized understanding. The patient was given an opportunity to ask questions and does not have any further questions or concerns at this time. Were discharge instructions available to patient? Yes. Reviewed appropriate site of care based on symptoms and resources available to patient including: PCP  Specialist  Home health  When to call 911. The patient agrees to contact the PCP office for questions related to their healthcare. Advance Care Planning:   Does patient have an Advance Directive: not on file. Medication reconciliation was performed with family, who verbalizes understanding of administration of home medications. Medications reviewed, 1111F entered: yes    Was patient discharged with a pulse oximeter? no    Non-face-to-face services provided:  Scheduled appointment with PCP-10/5/22  Scheduled appointment with Specialist-10/14/22 Oncology  Obtained and reviewed discharge summary and/or continuity of care documents  Education of patient/family/caregiver/guardian to support self-management- pharmacy contacted to call pt.  Re: use of insulin pen    Offered patient enrollment in the Remote Patient Monitoring (RPM) program for in-home monitoring: NA.    Care Transitions 24 Hour Call    Schedule Follow Up Appointment with PCP: Completed  Do you have a copy of your discharge instructions?: Yes  Do you have all of your prescriptions and are they filled?: Yes  Have you been contacted by a [facility] Pharmacist?: No  Have you scheduled your follow up appointment?: Yes  How are you going to get to your appointment?: Car - family or friend to transport  Do you feel like you have everything you need to keep you well at home?: Yes  Care Transitions Interventions  Disease Specific Clinic: Completed      Disease Association: Completed               Follow Up  Future Appointments   Date Time Provider Drake Iris   10/5/2022 10:00 AM Terrence Dial MD [address] IM MMA   10/14/2022  8:30 AM [facility] CT ROOM 2 [address] [facility] [address] [facility] Radiolo   10/18/2022  3:50 PM Shobha Jacob MD [facility] Heart MMA   10/20/2022 10:00 AM [facility] PET MOBILE [address] [facility] PET [facility] Imaging   10/25/2022 10:45 AM Terrence Dial MD [address] E S IM MMA   12/20/2022  9:45 AM Garrett Salcedo MD [facility] Heart MMA   1/6/2023  2:00 PM SCHEDULE, [address] [facility] MMA   8/16/2023 10:45 AM Yelitza Dunbar MD [facility] Heart MMA   9/12/2023  8:00 AM [address] FPS AWV LPN [address] E S IM MMA       Care Transition Nurse provided contact information. Plan for follow-up call in 1-2 days based on severity of symptoms and risk factors. Plan for next call: symptom management-sob, swelling, wt.?, FBS? fatigue?, appetite, BM  follow-up appointment-10/5/22 PCP ->review  medication management-insulin pen, asa stopped before bone marrow bx?     Alda Esqueda, RN

## 2022-10-04 NOTE — TELEPHONE ENCOUNTER
[facility] CLINICAL PHARMACY REVIEW: REFERRAL    Attempt made to reach both patient and  per care coordinator's request. Left message asking for return call. Referral request/information  Aura Pan RN  P s Clinical Pharmacy Clinical Staff:   \"Hello! This pt. Was d/c'd yesterday with Humalog insulin pen per sliding scale with meals while on steroids. She did not receive instructions on how to use correctly. Her  is going to give her the injections and he has some questions on how it should work. Would you be so kind and call him re: the proper use of the pen please? Thank much! Sydnee Eric BSN, CTN\"    Patient was prescribed Humalog (insulin lispro) U100 upon discharge. Was given sliding scale to follow:  No Insulin, 200-249 2 Units, 250-299 4 Units, 300-349 6 Units, Over 349 8 Units and notify physician. Insulin vial listed on medication list but care coordinator mentioned pens so would need to clarify and update product as appropriate. As patient's glipizide dose was also increased upon discharge, will want to stress monitoring BG. Writer also wanted to discuss rosuvastatin adherence with patient as she was identified by [facility] as having a SUPD care gap. Rosuvastatin 10mg daily on patient's medication list but insurance has no record of filling and reconciled dispense report shows medication last filled in 2020.  (Further details in prior note within this encounter.)    Rogena Harada, PharmD, BCACP, [facility]  Department, toll free: [phone], option 1

## 2022-10-04 NOTE — TELEPHONE ENCOUNTER
[facility] Transitions Initial Follow Up Call    Outreach made within 2 business days of discharge: Yes    Patient: Veronica Gomez Patient : 1950   MRN: [id number]  Reason for Admission: There are no discharge diagnoses documented for the most recent discharge. Discharge Date: 10/3/22       Spoke with: [facility] MANAGER    Discharge department/facility: [facility]    TCM Interactive Patient Contact:  Was patient able to fill all prescriptions: Yes  Was patient instructed to bring all medications to the follow-up visit: Yes  Is patient taking all medications as directed in the discharge summary?  Yes  Does patient understand their discharge instructions: Yes  Does patient have questions or concerns that need addressed prior to 7-14 day follow up office visit: NO - NONE    Scheduled appointment with PCP within 7-14 days    Follow Up  Future Appointments   Date Time Provider Drake Simmons   10/5/2022 10:00 AM MD Shamir Wilson E S IM MMA   10/14/2022  8:30 AM [facility] CT ROOM 2 [facility] Radiolo   10/18/2022  3:50 PM Amber Banegas MD [facility] Heart MMA   10/25/2022 10:45 AM MD Shamir Wilson E S IM MMA   2022  9:45 AM Ingrid Woods MD [facility] Heart MMA   2023  2:00 PM SCHEDULE, [address] [facility] [place] MMA   2023 10:45 AM Anaid Rowley MD [facility] Heart MMA   2023  8:00 AM [address] [place] FPS AWV LPN [address] [place] E S IM MMA       Aracelis Gómez MA

## 2022-10-04 NOTE — TELEPHONE ENCOUNTER
[facility] [place] HEALTH CLINICAL PHARMACY REVIEW: STATIN THERAPY  Identified statin use in persons with diabetes (SUPD) care gap per [facility]; Records dated: 09/24/2022    Last Office Visit: 09/23/2022  Pharmacy: [facility]    Patient also appears to be taking: amlodipine-benazepril, glipizide, metformin, insulin    Allergies   Allergen Reactions    Codeine \"jittery\" Feels anxoius     ASSESSMENT  ACEi/ARB ADHERENCE    Insurance Records claims through 09/24/2022 (YTD [place] =  100%; Potential Fail Date: 11/12/2022 ):   Amlodipine-benazepril 5-10mg daily last filled on 09/12/2022 for 30 day supply. Next refill due: 10/12/2022    Per [employer] Portal: same as above    Amlodipine-benazepril discontinued upon recent discharge from hospital on 10/03/2022    BP Readings from Last 3 Encounters:   10/03/22 (!) 141/57   08/30/22 126/66   08/23/22 (!) 128/56     Lab Results   Component Value Date    CREATININE 1.3 (H) 10/03/2022     Estimated Creatinine Clearance: 36 mL/min (A) (based on SCr of 1.3 mg/dL (H)). Lab Results   Component Value Date/Time    LABGLOM 40 10/03/2022 01:30 PM     DIABETES ADHERENCE    Insurance Records claims through 09/24/2022 (YTD [place] =  87%; Potential Fail Date: 10/22/2022 ):   Glipizide 5mg daily last filled on 05/20/2022 for 90 day supply. Next refill due: 08/18/2022  Metformin 500mg daily last filled on 06/10/2022 for 90 day supply.  Next refill due: 09/08/2022    Per [employer] Portal: same as above for both meds    Last glipizide 10mg daily Rx sent on 10/03/2022 for 30ds with 0 refills - glipizide increased from 5mg daily to 10mg daily upon discharge from hospital on 10/03/2022  Last metformin Rx sent on 05/06/2022 for 90ds with 1 refill    Lab Results   Component Value Date    LABA1C 6.5 (H) 08/18/2022    LABA1C 6.8 06/10/2022    LABA1C 5.9 02/16/2022     NOTE A1c <9%    STATIN GAP IDENTIFIED    Per chart review, patient is prescribed: rosuvastatin 10mg daily    Last Rx sent on 02/16/2022 for 90ds with 1 refill - may need to Rx as has not been filled within 6 months    Per Reconciled Dispense Report as of 10/03/2022:  Rosuvastatin last filled on 08/18/2020 for 90 day supply     Lab Results   Component Value Date    CHOL 148 09/25/2022    TRIG 250 (H) 09/25/2022    HDL 32 (L) 09/25/2022    LDLCALC 66 09/25/2022     ALT   Date Value Ref Range Status   09/30/2022 5 (L) 10 - 40 U/L Final     AST   Date Value Ref Range Status   09/30/2022 9 (L) 15 - 37 IU/L Final     The 10-year ASCVD risk score (Елена CAMPBELL, et al., 2019) is: 23%    Values used to calculate the score:      Age: 71 years      Sex: Female      Is Non- : No      Diabetic: Yes      Tobacco smoker: No      Systolic Blood Pressure: 086 mmHg      Is BP treated: No      HDL Cholesterol: 32 MG/DL      Total Cholesterol: 148 MG/DL     2022 ADA Guidelines - Primary Prevention in patients aged 40-75 years:   No history of ASCVD: Moderate-intensity statin is recommended. History of ASCVD or 10-year ASCVD risk >20%: High-intensity statin is recommended. Multiple ASCVD risk factors (dyslipidemia, obesity, hypertension, smoking, family history of premature coronary disease, chronic kidney disease, presence of albuminuria, duration of diabetes) or aged 50-70 years: Consider high-intensity statin. PLAN  The following are interventions that have been identified:   - Patient overdue refilling rosuvastatin and active on home medication list.   - Medication education per referral    Renal function most recently from hospitalization, monitor closely as may need to adjust medications in future if does not stabilize. Monitor lows with increased glipizide dose. Most recent LDL <70: There is no established lower limit of LDL-C.  A meta-analysis published in [address] Cardiology in 2018 found that lowering of LDL-C to as low as 20 mg/dL is associated with further reduced cardiovascular risk, with no increase in adverse outcomes (including myalgias, myositis, elevation in the level of aminotransferases, new-onset diabetes, hemorrhagic stroke, or cancer). Will reach out to patient for insulin pen referral and document in new note.      Future Appointments   Date Time Provider Drake Iris   10/5/2022 10:00 AM Alban Quijano MD [address] [place] E S IM MMA   10/14/2022  8:30 AM [facility] CT ROOM 2 [facility] CT [facility] Radiolo   10/18/2022  3:50 PM Albino Choi MD [facility] Heart MMA   10/20/2022 10:00 AM [facility] PET MOBILE [facility] [address] PET [facility] Imaging   10/25/2022 10:45 AM MD Shamir Cruz E S IM MMA   12/20/2022  9:45 AM Soraya Bhagat MD [facility] Heart MMA   1/6/2023  2:00 PM SCHEDULE, [facility] VASCULAR [facility] MMA   8/16/2023 10:45 AM Rasheed Keating MD [facility] Heart MMA   9/12/2023  8:00 AM [address] [place] FPS AWV LPN [address] [place] E S IM MMA     Ernestina Diallo, PharmD, BCACP, [address]   Department, toll free: [phone], option 1

## 2022-10-04 NOTE — TELEPHONE ENCOUNTER
[facility] CLINICAL PHARMACY REVIEW: STATIN THERAPY    Routing to PharmD for statin follow-up.      Future Appointments   Date Time Provider Drake Crossi   10/5/2022 10:00 AM Irina Cristina MD [address] [place] E S IM MMA   10/14/2022  8:30 AM [facility] CT ROOM 2 [facility] CT [facility] Radiolo   10/18/2022  3:50 PM Ze Saenz  [address] Heart MMA   10/25/2022 10:45 AM Irina Cristina MD [address] E S IM MMA   12/20/2022  9:45 AM Shannan Sampson  [address] Heart MMA   1/6/2023  2:00 PM SCHEDULE, [address] VASCULAR [facility]   8/16/2023 10:45 AM Moi Park  [address] Heart MMA   9/12/2023  8:00 AM [address] [place] FPS AWV  [address] Clinical Pharmacy  Phone: toll free [phone]

## 2022-10-05 ENCOUNTER — TELEMEDICINE (OUTPATIENT)
Dept: INTERNAL MEDICINE CLINIC | Age: 72
End: 2022-10-05

## 2022-10-05 ENCOUNTER — CARE COORDINATION (OUTPATIENT)
Dept: CASE MANAGEMENT | Age: 72
End: 2022-10-05

## 2022-10-05 ENCOUNTER — HOSPITAL ENCOUNTER (OUTPATIENT)
Age: 72
Setting detail: SPECIMEN
Discharge: HOME | End: 2022-10-05
Payer: MEDICARE

## 2022-10-05 DIAGNOSIS — F32.0 CURRENT MILD EPISODE OF MAJOR DEPRESSIVE DISORDER WITHOUT PRIOR EPISODE (HCC): ICD-10-CM

## 2022-10-05 DIAGNOSIS — M34.9 SCLERODERMA (HCC): ICD-10-CM

## 2022-10-05 DIAGNOSIS — I48.91 ATRIAL FIBRILLATION WITH RVR (HCC): ICD-10-CM

## 2022-10-05 DIAGNOSIS — Z09 HOSPITAL DISCHARGE FOLLOW-UP: ICD-10-CM

## 2022-10-05 DIAGNOSIS — C50.911 CARCINOMA OF RIGHT FEMALE BREAST, UNSPECIFIED ESTROGEN RECEPTOR STATUS, UNSPECIFIED SITE OF BREAST (HCC): ICD-10-CM

## 2022-10-05 DIAGNOSIS — I31.4 PERICARDIAL TAMPONADE: Primary | ICD-10-CM

## 2022-10-05 DIAGNOSIS — K21.9 GASTROESOPHAGEAL REFLUX DISEASE WITHOUT ESOPHAGITIS: ICD-10-CM

## 2022-10-05 DIAGNOSIS — E44.0 MODERATE MALNUTRITION (HCC): ICD-10-CM

## 2022-10-05 DIAGNOSIS — E11.9 CONTROLLED TYPE 2 DIABETES MELLITUS WITHOUT COMPLICATION, WITHOUT LONG-TERM CURRENT USE OF INSULIN (HCC): ICD-10-CM

## 2022-10-05 LAB
BACTERIA: NEGATIVE /HPF
BILIRUBIN URINE: NEGATIVE MG/DL
BLOOD, URINE: ABNORMAL
CLARITY: CLEAR
COLOR: YELLOW
GLUCOSE, URINE: NEGATIVE MG/DL
KETONES, URINE: NEGATIVE MG/DL
LEUKOCYTE ESTERASE, URINE: ABNORMAL
NITRITE URINE, QUANTITATIVE: POSITIVE
PH, URINE: 6 (ref 5–8)
PROTEIN UA: 30 MG/DL
RBC URINE: 33 /HPF (ref 0–6)
SPECIFIC GRAVITY UA: 1.01 (ref 1–1.03)
SQUAMOUS EPITHELIAL: 1 /HPF
TRANSITIONAL EPITHELIAL: <1 /HPF
TRICHOMONAS: ABNORMAL /HPF
UROBILINOGEN, URINE: NORMAL MG/DL (ref 0.2–1)
WBC CLUMP: ABNORMAL /HPF
WBC UA: 389 /HPF (ref 0–5)

## 2022-10-05 PROCEDURE — 1111F DSCHRG MED/CURRENT MED MERGE: CPT | Performed by: INTERNAL MEDICINE

## 2022-10-05 PROCEDURE — 81001 URINALYSIS AUTO W/SCOPE: CPT

## 2022-10-05 PROCEDURE — 99496 TRANSJ CARE MGMT HIGH F2F 7D: CPT | Performed by: INTERNAL MEDICINE

## 2022-10-05 PROCEDURE — 87088 URINE BACTERIA CULTURE: CPT

## 2022-10-05 PROCEDURE — 87086 URINE CULTURE/COLONY COUNT: CPT

## 2022-10-05 PROCEDURE — 87186 SC STD MICRODIL/AGAR DIL: CPT

## 2022-10-05 RX ORDER — PREDNISONE 10 MG/1
10 TABLET ORAL DAILY
Qty: 10 TABLET | Refills: 0 | Status: SHIPPED | OUTPATIENT
Start: 2022-10-05 | End: 2022-10-15

## 2022-10-05 RX ORDER — PAROXETINE HYDROCHLORIDE 20 MG/1
20 TABLET, FILM COATED ORAL DAILY
Qty: 30 TABLET | Refills: 2 | Status: SHIPPED | OUTPATIENT
Start: 2022-10-05

## 2022-10-05 RX ORDER — AMIODARONE HYDROCHLORIDE 200 MG/1
200 TABLET ORAL DAILY
Qty: 30 TABLET | Refills: 2 | Status: SHIPPED | OUTPATIENT
Start: 2022-10-14

## 2022-10-05 RX ORDER — PANTOPRAZOLE SODIUM 40 MG/1
40 TABLET, DELAYED RELEASE ORAL
Qty: 30 TABLET | Refills: 2 | Status: SHIPPED | OUTPATIENT
Start: 2022-10-05

## 2022-10-05 RX ORDER — GLIPIZIDE 10 MG/1
10 TABLET ORAL
Qty: 60 TABLET | Refills: 2 | Status: SHIPPED | OUTPATIENT
Start: 2022-10-05

## 2022-10-05 RX ORDER — SENNA PLUS 8.6 MG/1
1 TABLET ORAL DAILY PRN
Qty: 30 TABLET | Refills: 2 | Status: SHIPPED | OUTPATIENT
Start: 2022-10-05 | End: 2022-11-04

## 2022-10-05 NOTE — CARE COORDINATION
[facility] Care Transitions Follow Up Call    Care Transition Nurse contacted the family by telephone to follow up after admission on 22. Verified name and  with patient as identifiers. Patient: Shruthi Portillo  Patient : 1950   MRN: [id number]  Reason for Admission: sob  Discharge Date: 10/3/22 RARS: Readmission Risk Score: 22.1      Needs to be reviewed by the provider   Additional needs identified to be addressed with provider: No  none             Method of communication with provider: none. CTN call to [facility] today. VV w/ PCP today and they feel better about the medications & treatment plan. Stopping insulin coverage and taking Glipizide 10 mg bid and checking BLOOD SUGAR in the morning only to keep it between 100-200. Bone Marrow bx 10/14/22  Cardio appt. 10/18/22   PET scan 10/20/22  PCP working on Rheumatology referral.  Labs ordered to be done this week. Peg is still weak today but denied inc. Sob, chest pain, swelling, n/v/d, dizziness. No other concerns voiced at this time. Addressed changes since last contact:  home health care-[facility] seeing pt. 10/5/22  Discussed follow-up appointments. If no appointment was previously scheduled, appointment scheduling offered: Yes. Is follow up appointment scheduled within 7 days of discharge? Yes.     Follow Up  Future Appointments   Date Time Provider Drake Simmons   10/14/2022  8:30 AM [facility] CT ROOM 2 [facility] CT [facility] Radiolo   10/18/2022  3:50 PM Ely Peraza MD [facility] Heart MMA   10/20/2022 10:00 AM [facility] PET MOBILE [facility] PET [facility] Imaging   10/25/2022 10:45 AM Darwin Baer MD [facility] E S IM MMA   2022  9:45 AM Scotty Wynn MD [facility] Heart MMA   2023  2:00 PM SCHEDULE, [facility] VASCULAR [facility] MMA   2023 10:45 AM Steve Wynn MD [facility] Heart MMA   2023  8:00 AM [facility] AWV LPN [facility] E S IM MMA     Non-BS follow up appointment(s):     Care Transition Nurse reviewed discharge instructions, medical action plan, and red flags with family and discussed any barriers to care and/or understanding of plan of care after discharge. Discussed appropriate site of care based on symptoms and resources available to patient including: PCP  Specialist  Home health  When to call 911. The family agrees to contact the PCP office for questions related to their healthcare. Advance Care Planning:   not on file. Patients top risk factors for readmission: functional physical ability, lack of knowledge about disease, medical condition-CHF, PAF, DM, HTN, pleural effusion, pericardial effusion w/ tamponade, medication management, multiple health system providers, and polypharmacy  Interventions to address risk factors: Scheduled appointment with PCP-10/5/22, Scheduled appointment with Specialist-10/18/22 cardio, and Reviewed and followed up on pending diagnostic tests and treatments-bone marrow bx 10/14; PET scan 10/20/22; labs    Offered patient enrollment in the Remote Patient Monitoring (RPM) program for in-home monitoring: NA.     Care Transitions Subsequent and Final Call    Schedule Follow Up Appointment with PCP: Completed  Subsequent and Final Calls  Do you have any ongoing symptoms?: Yes  Onset of Patient-reported symptoms: In the past 7 days  Patient-reported symptoms: Shortness of Breath  Interventions for patient-reported symptoms: Notified PCP/Physician  Have your medications changed?: Yes  Patient Reports: Glipizide increased; Insulin coverage stopped  Do you have any questions related to your medications?: No  Do you currently have any active services?: Yes  Are you currently active with any services?: Home Health  Identified Barriers: Lack of Education, Stress  Care Transitions Interventions  Disease Specific Clinic: Completed      Disease Association: Completed      Other Interventions:             Care Transition Nurse provided contact information for future needs.  Plan for follow-up call in 1-2 days based on severity of symptoms and risk factors.   Plan for next call: symptom management-sob, FBS, wt., appetite, swelling, chest pain?,weakness    Isabella Dunbar RN

## 2022-10-05 NOTE — PROGRESS NOTES
Post-Discharge Transitional Care  Follow Up      Ratna Lance   YOB: 1950    Date of Office Visit:  10/5/2022  Date of Hospital Admission: 9/23/22  Date of Hospital Discharge: 10/3/22  Risk of hospital readmission (high >=14%. Medium >=10%) :Readmission Risk Score: 22.1      Care management risk score Rising risk (score 2-5) and Complex Care (Scores >=6): No Risk Score On File     Non face to face  following discharge, date last encounter closed (first attempt may have been earlier): 10/04/2022    Call initiated 2 business days of discharge: Yes    ASSESSMENT/PLAN:   Pericardial tamponade- IMPROVED S/P PERICARDIOCENTESIS AND CYTOLOGY HAS NOT INDICATED MALIGNANCY CAUSE SO MAY BE FR SCLERODERMA. FOR CONT F/U CARDIO AND RHEUM. -     Fabian Diaz MD, Cardiology, [facility]  Atrial fibrillation with RVR ([facility] - STABILIZED NOW ON MEDS AND WE'LL RF AMIODARONE, WILL NEED APPROPRIATE MONITORING ALSO PER CARDIOLOGY WHO STARTED MEDICATION  -     amiodarone (CORDARONE) 200 MG tablet; Take 1 tablet by mouth daily, Disp-30 tablet, R-2Normal  -     Fabian Diaz MD, Cardiology, [facility]  -     Comprehensive Metabolic Panel; Future  -     CBC with Auto Differential; Future  Moderate malnutrition ([address]- TO TRY TO ADV DIET AS MARLINE. Controlled type 2 diabetes mellitus without complication, without long-term current use of insulin (HCC) - WE'LL TRY TO WEAN OFF INSULIN W INCR GLIPIZIDE, FIRST 10MG AM AND 5MG PM BUT IF GLC IN AM STILL >200 OVER NEXT 3-4D THEN INCR TO 10MG BID. INSTRUCTED KRISTY TO ALSO CALL IF GLC <100 AND HOLD GLIPIZIDE  -     glipiZIDE (GLUCOTROL) 10 MG tablet; Take 1 tablet by mouth 2 times daily (before meals), Disp-60 tablet, R-2Normal  -     Comprehensive Metabolic Panel; Future  -     CBC with Auto Differential; Future  -     Urinalysis with Microscopic;  Future  Scleroderma ([facility]- REFER F/U [facility] AND CONT PRED TILL HE RESUMES MANAGEMENT OF PRED.    -     External Referral To Rheumatology  -     predniSONE (DELTASONE) 10 MG tablet; Take 1 tablet by mouth daily for 10 days, Disp-10 tablet, R-0Normal  Gastroesophageal reflux disease without esophagitis - GERD controlled on meds and will refill, monitor for any recurrent or worsening sx. -     pantoprazole (PROTONIX) 40 MG tablet; Take 1 tablet by mouth every morning (before breakfast), Disp-30 tablet, R-2Normal  Carcinoma of right female breast, unspecified estrogen receptor status, unspecified site of breast ([facility]- F/U DR HARINI HOGAN, W METS DZ. Luz Maria Hughes MD, Hematology Oncology, [facility]  Current mild episode of major depressive disorder without prior episode ([facility]  -     PARoxetine (PAXIL) 20 MG tablet; Take 1 tablet by mouth daily, Disp-30 tablet, R-2Normal  Hospital discharge follow-up  -     MO DISCHARGE MEDS RECONCILED W/ CURRENT OUTPATIENT MED LIST  ALSO CONT MONITORING THR HHC. SHE IS DEBILITATED AND WILL NEED PT THR HHC. NEEDS WALKER TOO    Cyrus Monroy was evaluated today and a DME order was entered for a standard walker because she requires this to successfully complete daily living tasks of eating, bathing, toileting, personal cares, ambulating, grooming, hygiene, dressing upper body, dressing lower body, meal preparation, and taking own medications. A standard walker is necessary due to the patient's impaired ambulation and mobility restrictions and she can ambulate only by using a walker instead of a lesser assistive device, such as a cane or crutch. The need for this equipment was discussed with the patient and she understands and is in agreement. Medical Decision Making: high complexity  No follow-ups on file.     On this date 10/5/2022 I have spent 25 minutes reviewing previous notes, test results and face to face with the patient discussing the diagnosis and importance of compliance with the treatment plan as well as documenting on the day of the visit. Subjective:   HPI:  Follow up of Hospital problems/diagnosis(es): SEE BELOW    Inpatient course: Discharge summary reviewed- see chart. DISCHARGED TWO DAYS AGO. DC SUMMARY:  Brief HPI: Jonathan Hoff is a 71 y.o. female with past medical history of diastolic heart failure, paroxysmal atrial fibrillation, type 2 diabetes mellitus, chronic respiratory failure, recurrent admission for bilateral pleural effusions, depression, hypertension, history of invasive ductal carcinoma of the right breast 1999 status post mastectomy, ER/TN positive, history of tobacco use and hyperlipidemia who presents with progressively worsening shortness of breath. Brief Problem Based Course:      Acute on chronic hypoxic respiratory failure upon admission  -Resolved, at baseline oxygen requirement, 2 L nasal cannula  Possible contributing factors include, pericardial effusion with tamponade physiology, compressive atelectasis due to bilateral pleural effusion. Utilize supplemental oxygen to maintain oxygen saturation above 92%. Rest of management was as follows:-     Pericardial effusion with tamponade physiology  Status post pericardiocentesis with removal of 600 cc of pericardial fluid on 9/26  Removal of pericardiocentesis catheter 9/27/2022  Repeat limited echo on 9/30/2022, no pericardial effusion  This could be related to possible autoimmune etiology with scleroderma being highest on the differential. There still remains concern for metastatic effusion given history of breast cancer. Bone biopsy scheduled for October 14, instructions regarding anticoagulation provided. Oncology aware, will schedule for PET scan after that. Recurrent bilateral pleural effusion  Status postthoracentesis 9/25/2022  Repeat bilateral thoracentesis on 9/30/2022 by pulmonology  High suspicion for autoimmune phenomena contributing.   Pulmonology following, appreciate recommendations  Outpatient follow-up with pulmonology Paroxysmal atrial fibrillation with RVR  Achieved rate control  Continue Eliquis  PO Amiodarone loading dose and then maintenance     Acute kidney injury-improving, almost at baseline  Likely in the setting of diuretics  Will hold diuretics and monitor response  Avoid nephrotoxic agents  BMP in 3 days at primary care physician's office     Raynaud's phenomena  Per chart review: Negative rheumatoid factor, negative Anti- Scl-70, recently negative RODOLFO, ESR 26.  Patient needs nailfold capillaroscopy. Anti-SCL 70 is moderately sensitive, 20% hence recommend getting anti-centromere, RNA polymerase III and U3-RNP, PM/Scl, or Th/To antibodies, will defer to rheumatology. Rheumatology following, started on steroids, outpatient follow-up in 1 week. Superficial venous thrombosis RUE  Conservative management with cold compresses and limb elevation     Moderate malnutrition  Dietary supplementation     Normocytic anemia  No signs of overt bleed  Concern for anemia of chronic disease  Monitor CBC in 3 days at primary care physician's office     Chronic HFpEF  Currently hypovolemic  Blood pressure control     Moderate-severe pulmonary hypertension  Elevated RVSP and tricuspid regurgitation  Recommend right heart catheterization, cardiology plans to do it outpatient. I had a detailed discussion with patient that she might need specialized care at a pulmonary hypertension center. Optimization of preload and afterload, maintain euvolemic state.      Type 2 diabetes mellitus without long-term use of insulin  With hyperglycemia due to steroid use  Increase glipizide to 10 mg daily and continue metformin upon discharge  Provided with insulin sliding scale  Defer to primary care physician for further management     Hyperlipidemia  Continue Crestor     History of invasive ductal carcinoma of the right breast status post mastectomy  ER/NM positive  On prior imaging there were some concern for bony metastasis  Bone biopsy scheduled for October 14, 2022  Oncology will follow up as outpatient        Interval history/Current status:   HAD ADMIT FOR AFIB RVR AND PERICARDIAL TAMPONADE, ALSO PL EFFUSION. DISCUSSED W DR Blair GENAO EARLIER TODAY AND IS NOW ON PRED 10MG DAILY. SUGARS HIGH ~MID 200S AND ON INSULIN SLIDING SCALE. TID QAC,  NO INSULIN, 200-249 2 UNITS, 250-299 4 UNITS, 300-349 IS 6 UNITS, >349 TAKE 8 UNITS  ** we discussed trying to wean off insulin, we'll try incr glipizide. STILL VERY WEAK AND HAS HHC TO COME TODAY, WILL NEED PT EVAL. Afib, is on amiodarone, needs rf. Not due for f/u Dr Lm García till Dec so we'll refer    Patient Active Problem List   Diagnosis    Hyperlipidemia    History of breast cancer    Lumbar herniated disc    Low back pain    GERD (gastroesophageal reflux disease)    Irritable bowel syndrome    Migraine headache    Diabetes type 2, controlled ([address]    Hypercalcemia    Carcinoma of right female breast, unspecified estrogen receptor status, unspecified site of breast ([address]    Peripheral edema    Chest pain    Venous insufficiency    Shortness of breath    Edema of lower extremity    Varicose veins of both legs with edema    Atrial fibrillation with RVR (HCC)    Diastolic heart failure, unspecified HF chronicity (HCC)    Acute systolic CHF (congestive heart failure) ([address]    Acute respiratory failure with hypoxia (HCC)    Atrial flutter with rapid ventricular response ([address]    Moderate malnutrition ([address]       Medications listed as ordered at the time of discharge from hospital     Medication List            Accurate as of October 5, 2022 11:44 AM. If you have any questions, ask your nurse or doctor.                 CONTINUE taking these medications      Alcohol Swabs Pads  Use before administering insulin     amiodarone 200 MG tablet  Commonly known as: CORDARONE  Take 1 tablet by mouth daily  Start taking on: October 14, 2022     apixaban 5 MG Tabs tablet  Commonly known as: ELIQUIS  Take 1 tablet by mouth 2 times daily     aspirin 81 MG EC tablet  Take 1 tablet by mouth daily     blood glucose test strips  Test twice a day & as needed for symptoms of irregular blood glucose.      dilTIAZem 180 MG extended release capsule  Commonly known as: CARDIZEM CD  Take 1 capsule by mouth daily     docusate 100 MG Caps  Commonly known as: COLACE, DULCOLAX  Take 100 mg by mouth 2 times daily as needed (constipation)     glipiZIDE 10 MG tablet  Commonly known as: GLUCOTROL  Take 1 tablet by mouth daily     glucose 4 g chewable tablet  Take 4 tablets by mouth as needed for Low blood sugar     INS SYRINGE/NEEDLE .5CC/28G 28G X 1/2\" 0.5 ML Misc  Commonly known as: AIMSCO INS SYR MX-COM .5CC/28G  As mentioned in Insulin dosing     insulin lispro 100 UNIT/ML Soln injection vial  Commonly known as: HUMALOG  Inject 0-8 Units into the skin 3 times daily (with meals) Glucose: Dose:  No Insulin 200-249 2 Units 250-299 4 Units 300-349 6 Units Over 349 8 Units and notify physician     Lancets Misc  1 each by Does not apply route 2 times daily     metFORMIN 500 MG tablet  Commonly known as: GLUCOPHAGE  Take 1 tablet by mouth every morning (before breakfast)     mirtazapine 15 MG tablet  Commonly known as: Remeron  Take 1 tablet by mouth nightly     oxybutynin 5 MG tablet  Commonly known as: DITROPAN  Take 1 tablet by mouth 2 times daily     pantoprazole 40 MG tablet  Commonly known as: PROTONIX  Take 1 tablet by mouth every morning (before breakfast)     PARoxetine 20 MG tablet  Commonly known as: PAXIL  Take 1 tablet by mouth daily     predniSONE 10 MG tablet  Commonly known as: DELTASONE  Take 1 tablet by mouth daily for 10 days     rosuvastatin 10 MG tablet  Commonly known as: CRESTOR  Take 1 tablet by mouth nightly     senna 8.6 MG tablet  Commonly known as: SENOKOT  Take 1 tablet by mouth daily as needed for Constipation                Medications marked \"taking\" at this time  Outpatient Medications Marked as Taking for the 10/5/22 encounter (Telemedicine) with Maxime Hayes MD   Medication Sig Dispense Refill    Alcohol Swabs PADS Use before administering insulin 100 each 0    dilTIAZem (CARDIZEM CD) 180 MG extended release capsule Take 1 capsule by mouth daily 30 capsule 0    glipiZIDE (GLUCOTROL) 10 MG tablet Take 1 tablet by mouth daily 30 tablet 0    INS SYRINGE/NEEDLE .5CC/28G (AIMSCO INS SYR MX-COM .5CC/28G) 28G X 1/2\" 0.5 ML MISC As mentioned in Insulin dosing 100 each 3    [START ON 10/14/2022] amiodarone (CORDARONE) 200 MG tablet Take 1 tablet by mouth daily 30 tablet 0    aspirin 81 MG EC tablet Take 1 tablet by mouth daily 30 tablet 0    docusate (COLACE, DULCOLAX) 100 MG CAPS Take 100 mg by mouth 2 times daily as needed (constipation) 30 capsule 0    glucose 4 g chewable tablet Take 4 tablets by mouth as needed for Low blood sugar 60 tablet 3    insulin lispro (HUMALOG) 100 UNIT/ML SOLN injection vial Inject 0-8 Units into the skin 3 times daily (with meals) Glucose: Dose:  No Insulin 200-249 2 Units 250-299 4 Units 300-349 6 Units Over 349 8 Units and notify physician 7 mL 0    pantoprazole (PROTONIX) 40 MG tablet Take 1 tablet by mouth every morning (before breakfast) 30 tablet 0    PARoxetine (PAXIL) 20 MG tablet Take 1 tablet by mouth daily 30 tablet 0    predniSONE (DELTASONE) 10 MG tablet Take 1 tablet by mouth daily for 10 days 10 tablet 0    senna (SENOKOT) 8.6 MG tablet Take 1 tablet by mouth daily as needed for Constipation 30 tablet 0    mirtazapine (REMERON) 15 MG tablet Take 1 tablet by mouth nightly 30 tablet 3    oxybutynin (DITROPAN) 5 MG tablet Take 1 tablet by mouth 2 times daily 60 tablet 3    apixaban (ELIQUIS) 5 MG TABS tablet Take 1 tablet by mouth 2 times daily 60 tablet 1    metFORMIN (GLUCOPHAGE) 500 MG tablet Take 1 tablet by mouth every morning (before breakfast) 90 tablet 1    rosuvastatin (CRESTOR) 10 MG tablet Take 1 tablet by mouth nightly 90 tablet 1

## 2022-10-05 NOTE — TELEPHONE ENCOUNTER
[facility] HEALTH CLINICAL PHARMACY REVIEW: REFERRAL    Second attempt to reach patient or . Left message asking for return call. Will notify Jarad Dumont RN that unable to reach. Will continue to follow after PCP visit today to see if rosuvastatin refills sent.      Juanis Sam, PharmD, BCACP, [address]   Department, toll free: [phone], option 1

## 2022-10-06 DIAGNOSIS — N30.00 ACUTE CYSTITIS WITHOUT HEMATURIA: Primary | ICD-10-CM

## 2022-10-06 LAB
COMPLEMENT C3: 114 MG/DL (ref 90–180)
COMPLEMENT C4: 23 MG/DL (ref 10–40)

## 2022-10-06 RX ORDER — CIPROFLOXACIN 250 MG/1
250 TABLET, FILM COATED ORAL 2 TIMES DAILY
Qty: 14 TABLET | Refills: 0 | Status: SHIPPED | OUTPATIENT
Start: 2022-10-06 | End: 2022-10-13

## 2022-10-06 NOTE — RESULT ENCOUNTER NOTE
Please call pt, she still has a significant bladder infection so I've called in cipro 250mg po bid for a week. ADD URINE CULTURE, THEN WE NEED RECHECK UA W HHC ALSO IN A WEEK.   thx

## 2022-10-06 NOTE — CONSULTS
[address], [address] [facility]                                  CONSULTATION    PATIENT NAME: Rodolfo Mina                :        1950  MED REC NO:   [id number]                          ROOM:         ACCOUNT NO:   [de-identified]                           ADMIT DATE: 2022  PROVIDER:     Davie Xavier MD    HISTORY OF PRESENT ILLNESS:  The patient is a 78-year-old female who has  been admitted here with complaints of shortness of breath. The patient  has been experiencing Raynaud's for the last number of years and I have  been consulted to rule out an underlying connective tissue disorder. PAST MEDICAL HISTORY:  The patient carries a past medical history of  diastolic heart failure, diabetes, hypertension, history of invasive  ductal carcinoma of the right breast.  On further questioning, the  patient complains of arthralgias involving the joints of the hands and  the feet. According to the patient, the problem has been going on for  the last 6-8 months. Denies any oral ulcers, malar rashes, chest pain,  hematuria, melena, hematemesis. Past medical history is remarkable for  history of hypertension, pleuropericardial effusion, diabetes, and  atrial fibrillation. The patient has had pericardiocentesis done in the  past.    PAST SURGICAL HISTORY:  Remarkable for hysterectomy and breast  reconstruction. SOCIAL HISTORY:  Denies any smoking or alcohol use. FAMILY HISTORY:  Unremarkable for diagnosis of lupus or rheumatoid. PHYSICAL EXAMINATION:  MUSCULOSKELETAL:  On examination, the patient's musculoskeletal  examination reveals pain on palpation of the small joints of the hands  and wrists. SKIN:  Examination reveals thickening of the skin with skin tightening  distally. The patient has skin changes of the lower extremity as well  with skin thickening and tightening.   No vasculitic skin lesions. HEENT:  No oral ulcers or malar rashes. Dry oral mucosa. CHEST:  No rubs audible. Decreased breath sounds at the bases. LABORATORY STUDIES:  Review of labs reveal a WBC count of 10.3,  hemoglobin is 9.9, creatinine is 1 mg/dL. Rheumatoid factor is  negative. SSA, SSB antibodies are negative. RODOLFO done previously was  reported in the negative range and ACL antibody is negative. ASSESSMENT AND PLAN:  Differential diagnosis in this patient with  Raynaud's and skin changes of the upper and lower extremities include a  limited form of scleroderma versus amyloidosis. We will obtain  anticentromere antibodies, C3-C4 double-stranded DNA. As per the  patient, the symptoms have been going on for quite some time. We will  try to obtain old records for previous workup, underlying pulmonary  hypertension needs to be ruled out. We will continue to follow. We  will start the patient on low-dose steroids until all the lab work is  available.     Maribel Virgen MD    D: 10/01/2022 9:27:52       T: 10/01/2022 9:32:20     /[email or web address]  Job#: [id number]     Doc#: [id number]    CC:

## 2022-10-07 ENCOUNTER — CARE COORDINATION (OUTPATIENT)
Dept: CASE MANAGEMENT | Age: 72
End: 2022-10-07

## 2022-10-07 DIAGNOSIS — N30.00 ACUTE CYSTITIS WITHOUT HEMATURIA: Primary | ICD-10-CM

## 2022-10-07 LAB
CULTURE: ABNORMAL
CULTURE: ABNORMAL
Lab: ABNORMAL
SPECIMEN: ABNORMAL

## 2022-10-07 RX ORDER — ROSUVASTATIN CALCIUM 10 MG/1
10 TABLET, COATED ORAL NIGHTLY
Qty: 90 TABLET | Refills: 1 | Status: SHIPPED | OUTPATIENT
Start: 2022-10-07

## 2022-10-07 NOTE — TELEPHONE ENCOUNTER
Dr. Roe Fuller MD,     Rosuvastatin Rx  at pharmacy. Order pended for your convenience. Last visit: 10/05/2022, Next visit: 10/25/2022    See encounter note(s) below for complete details. Please let me know if you have any questions.       Thank you,  Grecia Pineda, PharmD, BCACP, [address]   Department, toll free: [phone], option 1    =======================================================    For Pharmacy Admin Tracking Only  Recommendation Provided To: Provider: 1 via Note to Provider  Intervention Detail: New Rx: 1, reason: Improve Adherence, Needs Additional Therapy  Gap Closed?: No   Intervention Accepted By: Provider: 1  Time Spent (min):  75

## 2022-10-07 NOTE — CARE COORDINATION
[facility] Care Transitions Follow Up Call    Care Transition Nurse contacted the patient by telephone to follow up after admission. Verified name and  with patient as identifiers. Patient: Echo Pulse  Patient : 1950   MRN: <[id number]>  Reason for Admission: 2022 - 10/3/2022 [address] [facility] [address] Resp failure, Pericardial effusion with tamponade s/p pericardiocentesis, UTI  Discharge Date: 10/3/22 RARS: Readmission Risk Score: 22.1      Needs to be reviewed by the provider   Additional needs identified to be addressed with provider: No  none             Method of communication with provider: none. Peg reports she has started Cipro for new UTI. Reports urinary burning w/ strong urine odor. States urine is clear mod yellow. No abd pain, N/V, fever, chills, or flu-like symptoms. Alert and answers questions appropriately. Feeling \"pretty weak\" reports generalized weakness. Using walker. Has some exertional SOB and reports home sat 94% on room air. States no longer using the O2. BLE edema pedal to calf. No wt gains today's wt 133 lbs. Reports fair appetite and good hydration. Shares she is having home BS range 160-310. Has been on steroids. Agreeable to RD referral for low carb no added low sodium diet education/support. No acute chest pain/pressure, pain w/ deeper inspiration, palpitations, dizziness. Current [facility]. Denies any med refill or home needs. Addressed changes since last contact:  none  Discussed follow-up appointments. If no appointment was previously scheduled, appointment scheduling offered: Yes. Is follow up appointment scheduled within 7 days of discharge? Yes.     Follow Up  Future Appointments   Date Time Provider Drake Simmons   10/14/2022  8:30 AM [facility]   10/18/2022  3:50 PM Ysabel Colmenares MD [facility] MMA   10/20/2022 10:00 AM [facility] PET MOBILE [facility] PET [facility] Imaging   10/25/2022 10:45 AM Neo Johnson MD [address] IM MMA   10/26/2022  3:15 PM [facility], MED ONC NURSE [facility] MED ONC [place]   10/26/2022  3:30 PM Sandoval Mike MD [address] MMA   12/20/2022  9:45 AM [address] MD [facility] Heart MMA   1/6/2023  2:00 PM SCHEDULE, [facility]C VASCULAR SPR [facility] MMA   8/16/2023 10:45 AM [address] Inf[address] MD [facility] MMA   9/12/2023  8:00 AM [facility] LPN [address] IM MMA     Non-BS follow up appointment(s):     Care Transition Nurse reviewed red flags with patient and discussed any barriers to care and/or understanding of plan of care after discharge. Discussed appropriate site of care based on symptoms and resources available to patient including: When to call 911. The patient agrees to contact the PCP office for questions related to their healthcare. Advance Care Planning: Renée Morales primary decision maker [phone]       Patients top risk factors for readmission:  Edema  Interventions to address risk factors: Enc to elevate legs at rest, Instructed     Offered patient enrollment in the Remote Patient Monitoring (RPM) program for in-home monitoring: NA.     Care Transitions Subsequent and Final Call    Subsequent and Final Calls  Do you have any ongoing symptoms?: Yes  Patient-reported symptoms: Other, Shortness of Breath  Have your medications changed?: Yes  Patient Reports: Added Cipro  Do you have any questions related to your medications?: No  Do you currently have any active services?: Yes  Are you currently active with any services?: Home Health  Do you have any needs or concerns that I can assist you with?: No  Identified Barriers: Lack of Education  Care Transitions Interventions  Disease Specific Clinic: Completed      Disease Association: Completed      Other Interventions:             Care Transition Nurse provided contact information for future needs. Plan for follow-up call in 5-7 days based on severity of symptoms and risk factors.   Plan for next call:  CT FU, Check edema and wt gains, Check home BS range, UTI on Cipro.     Marylin Ellsworth RN

## 2022-10-10 ENCOUNTER — HOSPITAL ENCOUNTER (INPATIENT)
Age: 72
LOS: 3 days | Discharge: ANOTHER HOSPITAL | DRG: 371 | End: 2022-10-13
Attending: STUDENT IN AN ORGANIZED HEALTH CARE EDUCATION/TRAINING PROGRAM | Admitting: STUDENT IN AN ORGANIZED HEALTH CARE EDUCATION/TRAINING PROGRAM
Payer: MEDICARE

## 2022-10-10 ENCOUNTER — APPOINTMENT (OUTPATIENT)
Dept: GENERAL RADIOLOGY | Age: 72
DRG: 371 | End: 2022-10-10
Payer: MEDICARE

## 2022-10-10 ENCOUNTER — CARE COORDINATION (OUTPATIENT)
Dept: CARE COORDINATION | Age: 72
End: 2022-10-10

## 2022-10-10 ENCOUNTER — APPOINTMENT (OUTPATIENT)
Dept: CT IMAGING | Age: 72
DRG: 371 | End: 2022-10-10
Payer: MEDICARE

## 2022-10-10 DIAGNOSIS — J90 PLEURAL EFFUSION: ICD-10-CM

## 2022-10-10 DIAGNOSIS — J96.01 ACUTE RESPIRATORY FAILURE WITH HYPOXIA (HCC): Primary | ICD-10-CM

## 2022-10-10 DIAGNOSIS — J18.9 PNEUMONIA OF RIGHT LOWER LOBE DUE TO INFECTIOUS ORGANISM: Primary | ICD-10-CM

## 2022-10-10 DIAGNOSIS — I50.23 ACUTE ON CHRONIC SYSTOLIC CONGESTIVE HEART FAILURE (HCC): ICD-10-CM

## 2022-10-10 PROBLEM — I50.33 ACUTE ON CHRONIC DIASTOLIC CHF (CONGESTIVE HEART FAILURE) (HCC): Status: ACTIVE | Noted: 2022-10-10

## 2022-10-10 LAB
ALBUMIN SERPL-MCNC: 3.4 GM/DL (ref 3.4–5)
ALP BLD-CCNC: 112 IU/L (ref 40–129)
ALT SERPL-CCNC: 24 U/L (ref 10–40)
ANION GAP SERPL CALCULATED.3IONS-SCNC: 13 MMOL/L (ref 4–16)
AST SERPL-CCNC: 19 IU/L (ref 15–37)
BASE EXCESS: 3 (ref 0–2.4)
BILIRUB SERPL-MCNC: 0.5 MG/DL (ref 0–1)
BUN BLDV-MCNC: 33 MG/DL (ref 6–23)
CALCIUM SERPL-MCNC: 10 MG/DL (ref 8.3–10.6)
CHLORIDE BLD-SCNC: 99 MMOL/L (ref 99–110)
CO2: 19 MMOL/L (ref 21–32)
COMMENT: ABNORMAL
CREAT SERPL-MCNC: 1.3 MG/DL (ref 0.6–1.1)
DIFFERENTIAL TYPE: ABNORMAL
EKG ATRIAL RATE: 72 BPM
EKG DIAGNOSIS: NORMAL
EKG P AXIS: 12 DEGREES
EKG P-R INTERVAL: 132 MS
EKG Q-T INTERVAL: 476 MS
EKG QRS DURATION: 70 MS
EKG QTC CALCULATION (BAZETT): 521 MS
EKG R AXIS: 68 DEGREES
EKG T AXIS: 70 DEGREES
EKG VENTRICULAR RATE: 72 BPM
GFR AFRICAN AMERICAN: 49 ML/MIN/1.73M2
GFR NON-AFRICAN AMERICAN: 40 ML/MIN/1.73M2
GLUCOSE BLD-MCNC: 191 MG/DL (ref 70–99)
GLUCOSE BLD-MCNC: 204 MG/DL (ref 70–99)
GLUCOSE BLD-MCNC: 274 MG/DL (ref 70–99)
HCO3 VENOUS: 21.3 MMOL/L (ref 19–25)
HCT VFR BLD CALC: 31.1 % (ref 37–47)
HEMOGLOBIN: 9.8 GM/DL (ref 12.5–16)
LYMPHOCYTES ABSOLUTE: 0.2 K/CU MM
LYMPHOCYTES RELATIVE PERCENT: 2 % (ref 24–44)
MAGNESIUM: 2.1 MG/DL (ref 1.8–2.4)
MCH RBC QN AUTO: 28.8 PG (ref 27–31)
MCHC RBC AUTO-ENTMCNC: 31.5 % (ref 32–36)
MCV RBC AUTO: 91.5 FL (ref 78–100)
MONOCYTES ABSOLUTE: 0.5 K/CU MM
MONOCYTES RELATIVE PERCENT: 4 % (ref 0–4)
O2 SAT, VEN: 93.1 % (ref 50–70)
PCO2, VEN: 36 MMHG (ref 38–52)
PDW BLD-RTO: 16.3 % (ref 11.7–14.9)
PH VENOUS: 7.38 (ref 7.32–7.42)
PLATELET # BLD: 359 K/CU MM (ref 140–440)
PMV BLD AUTO: 9.8 FL (ref 7.5–11.1)
PO2, VEN: 137 MMHG (ref 28–48)
POTASSIUM SERPL-SCNC: 4.7 MMOL/L (ref 3.5–5.1)
PRO-BNP: ABNORMAL PG/ML
RBC # BLD: 3.4 M/CU MM (ref 4.2–5.4)
SEGMENTED NEUTROPHILS ABSOLUTE COUNT: 11.4 K/CU MM
SEGMENTED NEUTROPHILS RELATIVE PERCENT: 94 % (ref 36–66)
SODIUM BLD-SCNC: 131 MMOL/L (ref 135–145)
TOTAL PROTEIN: 6.5 GM/DL (ref 6.4–8.2)
TROPONIN T: 0.01 NG/ML
TROPONIN T: <0.01 NG/ML
WBC # BLD: 12.1 K/CU MM (ref 4–10.5)

## 2022-10-10 PROCEDURE — 80053 COMPREHEN METABOLIC PANEL: CPT

## 2022-10-10 PROCEDURE — 1200000000 HC SEMI PRIVATE

## 2022-10-10 PROCEDURE — 36415 COLL VENOUS BLD VENIPUNCTURE: CPT

## 2022-10-10 PROCEDURE — 2580000003 HC RX 258: Performed by: STUDENT IN AN ORGANIZED HEALTH CARE EDUCATION/TRAINING PROGRAM

## 2022-10-10 PROCEDURE — 6360000002 HC RX W HCPCS: Performed by: STUDENT IN AN ORGANIZED HEALTH CARE EDUCATION/TRAINING PROGRAM

## 2022-10-10 PROCEDURE — 93005 ELECTROCARDIOGRAM TRACING: CPT | Performed by: PHYSICIAN ASSISTANT

## 2022-10-10 PROCEDURE — 94761 N-INVAS EAR/PLS OXIMETRY MLT: CPT

## 2022-10-10 PROCEDURE — 6360000002 HC RX W HCPCS: Performed by: PHYSICIAN ASSISTANT

## 2022-10-10 PROCEDURE — 71250 CT THORAX DX C-: CPT

## 2022-10-10 PROCEDURE — 6370000000 HC RX 637 (ALT 250 FOR IP): Performed by: NURSE PRACTITIONER

## 2022-10-10 PROCEDURE — 82805 BLOOD GASES W/O2 SATURATION: CPT

## 2022-10-10 PROCEDURE — 71045 X-RAY EXAM CHEST 1 VIEW: CPT

## 2022-10-10 PROCEDURE — 6370000000 HC RX 637 (ALT 250 FOR IP): Performed by: STUDENT IN AN ORGANIZED HEALTH CARE EDUCATION/TRAINING PROGRAM

## 2022-10-10 PROCEDURE — 99285 EMERGENCY DEPT VISIT HI MDM: CPT

## 2022-10-10 PROCEDURE — 93010 ELECTROCARDIOGRAM REPORT: CPT | Performed by: INTERNAL MEDICINE

## 2022-10-10 PROCEDURE — 84484 ASSAY OF TROPONIN QUANT: CPT

## 2022-10-10 PROCEDURE — 82962 GLUCOSE BLOOD TEST: CPT

## 2022-10-10 PROCEDURE — 85027 COMPLETE CBC AUTOMATED: CPT

## 2022-10-10 PROCEDURE — 83735 ASSAY OF MAGNESIUM: CPT

## 2022-10-10 PROCEDURE — 83880 ASSAY OF NATRIURETIC PEPTIDE: CPT

## 2022-10-10 PROCEDURE — 85007 BL SMEAR W/DIFF WBC COUNT: CPT

## 2022-10-10 RX ORDER — SODIUM CHLORIDE 0.9 % (FLUSH) 0.9 %
5-40 SYRINGE (ML) INJECTION PRN
Status: DISCONTINUED | OUTPATIENT
Start: 2022-10-10 | End: 2022-10-14 | Stop reason: HOSPADM

## 2022-10-10 RX ORDER — POLYETHYLENE GLYCOL 3350 17 G/17G
17 POWDER, FOR SOLUTION ORAL DAILY PRN
Status: DISCONTINUED | OUTPATIENT
Start: 2022-10-10 | End: 2022-10-14 | Stop reason: HOSPADM

## 2022-10-10 RX ORDER — INSULIN LISPRO 100 [IU]/ML
0-8 INJECTION, SOLUTION INTRAVENOUS; SUBCUTANEOUS
Status: DISCONTINUED | OUTPATIENT
Start: 2022-10-10 | End: 2022-10-13

## 2022-10-10 RX ORDER — ROSUVASTATIN CALCIUM 5 MG/1
10 TABLET, COATED ORAL NIGHTLY
Status: DISCONTINUED | OUTPATIENT
Start: 2022-10-10 | End: 2022-10-14 | Stop reason: HOSPADM

## 2022-10-10 RX ORDER — ASPIRIN 81 MG/1
81 TABLET ORAL DAILY
Status: DISCONTINUED | OUTPATIENT
Start: 2022-10-11 | End: 2022-10-14 | Stop reason: HOSPADM

## 2022-10-10 RX ORDER — FUROSEMIDE 10 MG/ML
40 INJECTION INTRAMUSCULAR; INTRAVENOUS DAILY
Status: DISCONTINUED | OUTPATIENT
Start: 2022-10-11 | End: 2022-10-11

## 2022-10-10 RX ORDER — DOCUSATE SODIUM 100 MG/1
100 CAPSULE, LIQUID FILLED ORAL 2 TIMES DAILY PRN
Status: DISCONTINUED | OUTPATIENT
Start: 2022-10-10 | End: 2022-10-14 | Stop reason: HOSPADM

## 2022-10-10 RX ORDER — DEXTROSE MONOHYDRATE 100 MG/ML
1000 INJECTION, SOLUTION INTRAVENOUS CONTINUOUS PRN
Status: DISCONTINUED | OUTPATIENT
Start: 2022-10-10 | End: 2022-10-14 | Stop reason: HOSPADM

## 2022-10-10 RX ORDER — INSULIN LISPRO 100 [IU]/ML
3 INJECTION, SOLUTION INTRAVENOUS; SUBCUTANEOUS
Status: DISCONTINUED | OUTPATIENT
Start: 2022-10-10 | End: 2022-10-13

## 2022-10-10 RX ORDER — FUROSEMIDE 10 MG/ML
20 INJECTION INTRAMUSCULAR; INTRAVENOUS ONCE
Status: COMPLETED | OUTPATIENT
Start: 2022-10-10 | End: 2022-10-10

## 2022-10-10 RX ORDER — OXYBUTYNIN CHLORIDE 5 MG/1
5 TABLET ORAL 2 TIMES DAILY
Status: DISCONTINUED | OUTPATIENT
Start: 2022-10-10 | End: 2022-10-14 | Stop reason: HOSPADM

## 2022-10-10 RX ORDER — CIPROFLOXACIN 500 MG/1
500 TABLET, FILM COATED ORAL EVERY 12 HOURS SCHEDULED
Status: DISCONTINUED | OUTPATIENT
Start: 2022-10-10 | End: 2022-10-10

## 2022-10-10 RX ORDER — ACETAMINOPHEN 650 MG/1
650 SUPPOSITORY RECTAL EVERY 6 HOURS PRN
Status: DISCONTINUED | OUTPATIENT
Start: 2022-10-10 | End: 2022-10-14 | Stop reason: HOSPADM

## 2022-10-10 RX ORDER — ACETAMINOPHEN 325 MG/1
650 TABLET ORAL EVERY 6 HOURS PRN
Status: DISCONTINUED | OUTPATIENT
Start: 2022-10-10 | End: 2022-10-14 | Stop reason: HOSPADM

## 2022-10-10 RX ORDER — PANTOPRAZOLE SODIUM 40 MG/1
40 TABLET, DELAYED RELEASE ORAL
Status: DISCONTINUED | OUTPATIENT
Start: 2022-10-11 | End: 2022-10-14 | Stop reason: HOSPADM

## 2022-10-10 RX ORDER — PAROXETINE HYDROCHLORIDE 20 MG/1
20 TABLET, FILM COATED ORAL DAILY
Status: DISCONTINUED | OUTPATIENT
Start: 2022-10-11 | End: 2022-10-14 | Stop reason: HOSPADM

## 2022-10-10 RX ORDER — MIRTAZAPINE 15 MG/1
15 TABLET, FILM COATED ORAL NIGHTLY
Status: DISCONTINUED | OUTPATIENT
Start: 2022-10-10 | End: 2022-10-14 | Stop reason: HOSPADM

## 2022-10-10 RX ORDER — DEXTROSE MONOHYDRATE 100 MG/ML
INJECTION, SOLUTION INTRAVENOUS CONTINUOUS PRN
Status: DISCONTINUED | OUTPATIENT
Start: 2022-10-10 | End: 2022-10-10 | Stop reason: SDUPTHER

## 2022-10-10 RX ORDER — SODIUM CHLORIDE 0.9 % (FLUSH) 0.9 %
5-40 SYRINGE (ML) INJECTION EVERY 12 HOURS SCHEDULED
Status: DISCONTINUED | OUTPATIENT
Start: 2022-10-10 | End: 2022-10-14 | Stop reason: HOSPADM

## 2022-10-10 RX ORDER — INSULIN GLARGINE 100 [IU]/ML
5 INJECTION, SOLUTION SUBCUTANEOUS NIGHTLY
Status: DISCONTINUED | OUTPATIENT
Start: 2022-10-10 | End: 2022-10-11

## 2022-10-10 RX ORDER — INSULIN LISPRO 100 [IU]/ML
0-4 INJECTION, SOLUTION INTRAVENOUS; SUBCUTANEOUS NIGHTLY
Status: DISCONTINUED | OUTPATIENT
Start: 2022-10-10 | End: 2022-10-13

## 2022-10-10 RX ORDER — DILTIAZEM HYDROCHLORIDE 180 MG/1
180 CAPSULE, COATED, EXTENDED RELEASE ORAL DAILY
Status: DISCONTINUED | OUTPATIENT
Start: 2022-10-10 | End: 2022-10-14 | Stop reason: HOSPADM

## 2022-10-10 RX ORDER — SODIUM CHLORIDE 9 MG/ML
INJECTION, SOLUTION INTRAVENOUS PRN
Status: DISCONTINUED | OUTPATIENT
Start: 2022-10-10 | End: 2022-10-14 | Stop reason: HOSPADM

## 2022-10-10 RX ADMIN — INSULIN LISPRO 3 UNITS: 100 INJECTION, SOLUTION INTRAVENOUS; SUBCUTANEOUS at 18:28

## 2022-10-10 RX ADMIN — FUROSEMIDE 20 MG: 10 INJECTION, SOLUTION INTRAVENOUS at 15:22

## 2022-10-10 RX ADMIN — INSULIN GLARGINE 5 UNITS: 100 INJECTION, SOLUTION SUBCUTANEOUS at 21:08

## 2022-10-10 RX ADMIN — INSULIN LISPRO 2 UNITS: 100 INJECTION, SOLUTION INTRAVENOUS; SUBCUTANEOUS at 18:28

## 2022-10-10 RX ADMIN — MEROPENEM 500 MG: 500 INJECTION, POWDER, FOR SOLUTION INTRAVENOUS at 21:08

## 2022-10-10 RX ADMIN — MIRTAZAPINE 15 MG: 15 TABLET, FILM COATED ORAL at 21:06

## 2022-10-10 RX ADMIN — ROSUVASTATIN CALCIUM 10 MG: 5 TABLET, COATED ORAL at 21:06

## 2022-10-10 RX ADMIN — DILTIAZEM HYDROCHLORIDE 180 MG: 180 CAPSULE, COATED, EXTENDED RELEASE ORAL at 21:06

## 2022-10-10 RX ADMIN — OXYBUTYNIN CHLORIDE 5 MG: 5 TABLET ORAL at 21:06

## 2022-10-10 ASSESSMENT — PAIN DESCRIPTION - LOCATION: LOCATION: COCCYX

## 2022-10-10 ASSESSMENT — PAIN SCALES - GENERAL: PAINLEVEL_OUTOF10: 3

## 2022-10-10 ASSESSMENT — PAIN - FUNCTIONAL ASSESSMENT: PAIN_FUNCTIONAL_ASSESSMENT: 0-10

## 2022-10-10 ASSESSMENT — LIFESTYLE VARIABLES
HOW OFTEN DO YOU HAVE A DRINK CONTAINING ALCOHOL: NEVER
HOW MANY STANDARD DRINKS CONTAINING ALCOHOL DO YOU HAVE ON A TYPICAL DAY: PATIENT DOES NOT DRINK
HOW MANY STANDARD DRINKS CONTAINING ALCOHOL DO YOU HAVE ON A TYPICAL DAY: PATIENT DOES NOT DRINK
HOW OFTEN DO YOU HAVE A DRINK CONTAINING ALCOHOL: NEVER

## 2022-10-10 NOTE — ED TRIAGE NOTES
Patient presents to ED with complaints of SOB since yesterday. Patient states she had a pleural effusion done last week and is afraid the fluid is backing up again. Patient is requesting a chest xray. Patient currently on 4L of O2 via NC; usually on 2L at home when needed.

## 2022-10-10 NOTE — H&P
History and Physical      Name:  Wray Crigler DOB/Age/Sex: 1950  (71 y.o. female)   MRN & CSN:  [id number] & [id number] Admission Date/Time: 10/10/2022  1:00 PM   Location:  ED17/ED-17 PCP: Vernell Sol MD       Hospital Day: 1           Assessment and Plan:   # Acute on chronic diastolic heart failure -noted for bilateral pleural effusion. proBNP 13,423, worsening lower extremity swelling. TTE 2022 showed EF 55%, moderate AR moderate TR. She was taken off Lasix on recent admission last week. Dosed with IV Lasix in ED. Cardiology has been consulted for eval/recs.    -Continue IV diuresis, resume patient's calcium channel blocker, repeat trops, supplemental O2, daily wts, strict I/O, cardiac diet. Place on tele. Consult Cardiology    # Recurrent bilateral pleural effusion -required thoracentesis , ,  and  recently. Suspicion for autoimmune immune phenomena. Recent cytology negative for malignancy. Followed by Pulmonology and Rheumatology as outpatient.    -Will obtain CT chest to eval pleural effusion noted on chest x-ray. Likely will require repeat thoracentesis. # Acute on chronic respiratory failure with hypoxia in setting of above -RA sat 85%. Shad required O2 @ 2 L nasal cannula. She was weaned off her O2 on recent admission last week. Patient currently requiring 4 L of oxygen per nasal cannula. Continuous pulse Ox monitoring, likely will need discharged on home O2    # Acute kidney injury on CKD stage III -Baseline creatinine around 1.0. Creatinine 1.0 2022 peaked at 1.4 on recent admission 10/1/2022 currently is 1.3, suspect cardiorenal component -monitor renal function, caution with IV diuresis, Hold Metformin  Check PVR r/o urinary retention, Avoid nephrotoxins, NSAIDS, IV contrast studies, keep MAP>65.  Renal dose medications for current GFR, RFP in am. Consider Nephrology consultation    # Prolonged QTC - on EKG, avoid QTC prolonging agents, monitor electrolytes, amio continuation per Cardiology recommendation    # Hyponatremia -suspect due to hypervolemia, will monitor CHEM panel, check urine studies    # Leukocytosis -patient afebrile, no apparent infectious source noted check UA, monitor CBC    # Paroxysmal atrial fibrillation on AC, diltiazem, amiodarone, monitor on tele, HR controlled      # Recent pericardial effusion status post pericardiocentesis last week - Repeat TTE showed resolution of pericardial effusion, Monitored per Cardiology. # Pulmonary hypertension -noted on TTE 9/29/22/ Pt to undergo right heart catheterization for further evaluation per Cardiology. Respiratory status stable      Other chronic medical conditions-resume home medications unless contraindicated  # History of breast CA s/p mastectomy 1999, [place] received adjuvant chemotherapy followed by endocrine therapy for 10 years. Concern for active malignancy. Per Oncology notation  she had bone scan performed on September 14, 2022 which revealed osseous metastatic disease in multiple ribs and vertebral bodies and also possibly left hemipelvis. Patient was evaluated last week by heme-onc, she is scheduled for outpatient bone biopsy October 14th   # Essential hypertension -resume current antihypertensives  # Diabetes mellitus type 2 -holding metformin, manage on corrective insulin, monitor need for basal insulin, monitor blood sugars before meals and at bedtime, ADA diet, A1c in a.m.   # Mixed hyperlipidemia - on statin  # History of Raynaud's, concern for scleroderma -patient was evaluated by rheumatology last week on recent admission patient is undergoing further testing per Rheumatology  # Chronic anemia - Hgb stable 9-10 baseline, monitor CBC  # Hx anxiety - Resume home anxiolytics  # Hx tobacco abuse     Present on Admission:   Acute on chronic diastolic CHF (congestive heart failure) ([facility] [address]             Diet No diet orders on file   DVT Prophylaxis [] Lovenox, [] Heparin, [] SCDs, [] Ambulation  [x] Long term AC   GI Prophylaxis [x] PPI,  [] H2 Blocker,  [] Carafate,  [] Diet/Tube Feeds   Code Status Full code    Disposition Admit to med surg. Patient plans to return home upon discharge         Chief Complaint: Shortness of Breath (Had pleural effusion done last week; afraid the fluid is building up again )      History obtained from EHR and patient   History of Present Illness:   Libertad Fuller is a 71 y.o. female  with history of paroxysmal atrial fibrillation on AC, chronic diastolic heart failure, pulmonary hypertension, recurrent pleural effusions, history of Raynaud's, history of breast CA s/p mastectomy, chemotherapy and endocrine therapy, essential hypertension, diabetes mellitus type 2 among other comorbidities who presents with shortness of breath. The patient was recently hospitalized 9/23/2022 through 10/3/2022 for acute on chronic respiratory failure. She had findings of pericardial effusion s/p pericardiocentesis and required thoracentesis for her recurrent pleural effusion. Oncology, Cardiology and Rheumatology were consulted. Her cytology was negative for malignacy on her pericardial and pleural fluid analysis. There was concern for scleroderma and she is to undergo outpatient work-up. Oncology plans for outpatient bone marrow biopsy to rule out underlying malignancy. She previously had required home O2 at 2 L however this was weaned off on that admission and her Lasix was discontinued. She states over the past 24 to 48 hours she had worsening shortness of breath, worsening orthopnea and increase in her lower extremity swelling. She denies associated chest pain nausea vomiting diaphoresis; although she does note bilateral shoulder discomfort. She states her symptoms were worse with exertion. She states her symptoms were similar to prior when she required a thoracentesis. The patient reports compliance with her cardiac medications.   She denies recent infections, fever, chills or cough. She denies any other acute issues. She presented to the emergency department for evaluation afebrile pulse 81 respiration 14 blood pressure 156/55 O2 sat 85% on room air. She was placed on supplemental O2 at 4 L per nasal cannula. Chest x-ray was noted for increased bilateral pleural effusions with bibasilar pulmonary opacities. EKG showed ischemia,  Chem panel showed sodium 131 potassium 4.7 chloride 99 CO2 19 BUN 33 creatinine 1.3 GFR 40 glucose 274 calcium 10. Troponin negative x1, BNP 13,423. CBC showed WBC 12.1, hemoglobin 9.8, hematocrit 31.1, platelets 025. The patient was dosed with IV Lasix 20 mg. She has been admitted for further evaluation and treatment. Ten point ROS: reviewed and are negative, unless as noted in above HPI. Objective:   No intake or output data in the 24 hours ending 10/10/22 1548     Vitals:   Vitals:    10/10/22 1414 10/10/22 1435 10/10/22 1448 10/10/22 1450   BP:   (!) 147/53    Pulse: 71 68 69    Resp: (!) 33 (!) 33 29    Temp:   97.9 °F (36.6 °C)    TempSrc:   Oral    SpO2: 94% 99% 97%    Weight:    126 lb (57.2 kg)   Height:    5' 5\" (1.651 m)       Physical Exam: 10/10/22     GEN -Awake  appearing female, in NAD. Appears given age. EYES -anicteric, conjunctiva pink. HENT -Head is normocephalic, atraumatic. MM are moist. No evidence of thrush. NECK -Supple, no apparent thyromegaly or masses. RESP -few fine crackles L>R, no wheezes. Decreaed breath sounds Symmetric chest movement   C/V -S1/S2 auscultated. RRR without appreciable M/R/G. No JVD. Cap refill <3 sec. BLE edema. GI -Abdomen is soft non distended, non tender to palpation. + BS. No masses or guarding.  -No CVA/ flank tenderness. LYMPH- No petechiae or ecchymoses. MS -No gross joint deformities. SKIN -Normal coloration, warm, dry. NEURO-Cranial nerves appear grossly intact, normal speech, no lateralizing weakness.   PSYC-Awake, alert, oriented x 4. Appropriate affect. Past Medical History:      Past Medical History:   Diagnosis Date    Anxiety     Breast carcinoma ([facility] [address] 1997    R breast - MAMMOGRAM ANNUALLY, NEXT DUE 5/2020. Cervical radiculitis     Right     Chest pain     Depression     Diabetes type 2, controlled ([facility] [address] 2016    w two random sugars >126    Family history of diabetes mellitus (DM)     Mother and Father    GERD (gastroesophageal reflux disease)     UGI- sm HH , done at [address] [place] [address],[address] 3/26/14    Hiatal hernia     Hx of Doppler ultrasound 05/17/2022    Normal bilateral lower extremity ankle brachial indices. Hx of Doppler ultrasound 05/17/2022    Significant reflux noted of the Right GSV SFJ (0.7s), GSV Knee (2.0s). Significant reflux noted in the Left GSV SFJ (1.8s). Hx of echocardiogram 05/12/2022    EF is estimated at 55-60%. Mitral annular calcification is present. Mild mitral, tricuspid and moderate aortic regurgitation is present. Mild Pulmonary hypertension noted with RVSP of 43mmHg. Hypercalcemia     mild ~11, iPTH nl 8/2021    Hyperglycemia     Hyperlipidemia     Insomnia     Irritable bowel syndrome     LBP (low back pain)     Lumbar herniated disc     L L4-5 HNP noted on MRI    Menopause     female exam every 2 yrs, due for recheck w me 2015    Migraine headache     ON TOPAMAX    Osteoporosis     Prediabetes     a1c 6.4 in Dec 2014    S/P colonoscopic polypectomy 06/07/2019    Dr Villarreal [facility], recheck 5 yrs    TIA (transient ischemic attack)     INACTIVE PROBLEM       PMH reviewed  Past Surgical  History:    has a past surgical history that includes Tubal ligation; Mastectomy (7/1997); Hysterectomy (5/1995); and Breast reconstruction (Right, 4/2013). Surgical history reviewed  Family  History:   family history includes Diabetes in her father, mother, and sister; Heart Disease in her father; High Blood Pressure in her father and mother; High Cholesterol in her sister;  Other in her sister; Stroke in her paternal grandmother. Family history reviewed  Social History:     Social History     Socioeconomic History    Marital status:      Spouse name: None    Number of children: None    Years of education: None    Highest education level: None   Occupational History    Occupation: - [employer]     Comment: retired Sept 2015   Tobacco Use    Smoking status: Never    Smokeless tobacco: Never   Substance and Sexual Activity    Alcohol use: No    Drug use: No     Social Determinants of Health     Financial Resource Strain: Low Risk     Difficulty of Paying Living Expenses: Not hard at all   Food Insecurity: No Food Insecurity    Worried About [address] in the Last Year: Never true    [address] in the Last Year: Never true   Transportation Needs: No Transportation Needs    Lack of Transportation (Medical): No    Lack of Transportation (Non-Medical): No   Physical Activity: Inactive    Days of Exercise per Week: 0 days    Minutes of Exercise per Session: 0 min   Stress: No Stress Concern Present    Feeling of Stress : Not at all   Intimate Partner Violence: Not At Risk    Fear of Current or Ex-Partner: No    Emotionally Abused: No    Physically Abused: No    Sexually Abused: No   Housing Stability: Low Risk     Unable to Pay for Housing in the Last Year: No    Number of Places Lived in the Last Year: 1    Unstable Housing in the Last Year: No      reports that she has never smoked. She has never used smokeless tobacco.   reports no history of alcohol use. reports no history of drug use. Social history reviewed  Allergies: Allergies   Allergen Reactions    Codeine      \"jittery\"  Feels anxoius       Home Medications:     Prior to Admission medications    Medication Sig Start Date End Date Taking?  Authorizing Provider   rosuvastatin (CRESTOR) 10 MG tablet Take 1 tablet by mouth nightly 10/7/22   Sonu Bermudez MD   ciprofloxacin (CIPRO) 250 MG tablet Take 1 tablet by mouth 2 times daily for 7 days 10/6/22 10/13/22  Maxime Hayes MD   glipiZIDE (GLUCOTROL) 10 MG tablet Take 1 tablet by mouth 2 times daily (before meals)  Patient taking differently: Take 10 mg by mouth daily (with breakfast) 10/5/22   Maxime Hayes MD   amiodarone (CORDARONE) 200 MG tablet Take 1 tablet by mouth daily 10/14/22   Maxime Hayes MD   pantoprazole (PROTONIX) 40 MG tablet Take 1 tablet by mouth every morning (before breakfast) 10/5/22   Maxime Hayes MD   PARoxetine (PAXIL) 20 MG tablet Take 1 tablet by mouth daily 10/5/22   Maxime Hayes MD   predniSONE (DELTASONE) 10 MG tablet Take 1 tablet by mouth daily for 10 days 10/5/22 10/15/22  Maxime Hayes MD   senna (SENOKOT) 8.6 MG tablet Take 1 tablet by mouth daily as needed for Constipation  Patient taking differently: Take 1 tablet by mouth daily 10/5/22 11/4/22  Maxime Hayes MD   Alcohol Swabs PADS Use before administering insulin 10/3/22   Myra De Los Santos MD   dilTIAZem (CARDIZEM CD) 180 MG extended release capsule Take 1 capsule by mouth daily 10/4/22   Myra De Los Santos MD   INS SYRINGE/NEEDLE .5CC/28G ([employer] INS SYR MX-COM .5CC/28G) 28G X 1/2\" 0.5 ML MISC As mentioned in Insulin dosing 10/3/22   Myra De Los Santos MD   aspirin 81 MG EC tablet Take 1 tablet by mouth daily 10/3/22   Myra De Los Santos MD   docusate (COLACE, DULCOLAX) 100 MG CAPS Take 100 mg by mouth 2 times daily as needed (constipation) 10/3/22   Myra De Los Santos MD   glucose 4 g chewable tablet Take 4 tablets by mouth as needed for Low blood sugar 10/3/22   Myra De Los Santos MD   mirtazapine (REMERON) 15 MG tablet Take 1 tablet by mouth nightly 9/23/22   Maxime Hayes MD   oxybutynin (DITROPAN) 5 MG tablet Take 1 tablet by mouth 2 times daily 9/6/22   Maxime Hayes MD   apixaban (ELIQUIS) 5 MG TABS tablet Take 1 tablet by mouth 2 times daily 8/23/22   Ivette Lopez MD   amLODIPine-benazepril (LOTREL) 5-10 MG per capsule Take 1 capsule by mouth in the morning. . 8/16/22 8/23/22  Yelitza Dunbar MD   allopurinol (ZYLOPRIM) 100 MG tablet Take 2 tablets by mouth in the morning. Patient not taking: No sig reported 7/21/22 8/23/22  Terrence Dial MD   metFORMIN (GLUCOPHAGE) 500 MG tablet Take 1 tablet by mouth every morning (before breakfast) 5/6/22   Terrence Dial MD   Lancets MISC 1 each by Does not apply route 2 times daily 2/16/22   Terrence Dial MD   blood glucose monitor strips Test twice a day & as needed for symptoms of irregular blood glucose. 2/16/22   Terrence Dial MD         Medications:   Medications:    Infusions:   PRN Meds:     Data:     Laboratory this visit:  Reviewed  Recent Labs     10/10/22  1340   WBC 12.1*   HGB 9.8*   HCT 31.1*         Recent Labs     10/10/22  1340   *   K 4.7   CL 99   CO2 19*   BUN 33*   CREATININE 1.3*     Recent Labs     10/10/22  1340   AST 19   ALT 24   BILITOT 0.5   ALKPHOS 112     No results for input(s): INR in the last 72 hours. Radiology this visit:  Reviewed. Echocardiogram limited    Result Date: 9/30/2022  Transthoracic Echocardiography Report (TTE)  Demographics   Patient Name       Janae Sever  Date of Study       09/29/2022                     A   Date of Birth      1950         Gender              Female   Age                71 year(s)         Race                   Patient Number     [id number]         Room Number         2025   Visit Number       [id number]   Corporate ID       [id number]   Accession Number   [id number]         Sonographer   Ordering Physician Jose Booth MD        Physician           Nette CASAREZ  Procedure Type of Study   TTE procedure:ECHOCARDIOGRAM LIMITED. Procedure Date Date: 09/29/2022 Start: 03:11 PM Study Location: [address] [place] [address] - Echo Lab Indications:Congestive heart failure.  Height: 65 inches Weight: 143 pounds BSA: 1.72 m2 BMI: 23.8 kg/m2  Conclusions   Summary  This is a limited echocardiogram.  Left ventricular systolic function is normal.  Ejection fraction is visually estimated at 55%. Moderate aortic regurgitation; PHT: 334 msec. Moderate tricuspid regurgitation; RVSP: 50 mmHg. Severe PHTN. No evidence of any pericardial effusion. Pleural effusion present. Signature   ------------------------------------------------------------------  Electronically signed by Veronica Burkett MD  (Interpreting physician) on 09/30/2022 at 11:56 AM  ------------------------------------------------------------------   Findings   Left Ventricle  Left ventricular systolic function is normal.  Ejection fraction is visually estimated at 55%. Aortic Valve  Moderate aortic regurgitation; PHT: 334 msec. Tricuspid Valve  Moderate tricuspid regurgitation; RVSP: 50 mmHg. Severe PHTN. Pericardial Effusion  No evidence of any pericardial effusion. Pleural Effusion  Pleural effusion present.   M-Mode/2D Measurements & Calculations   LV Diastolic Dimension: 8.38 cm LV Systolic Dimension: 5.87 cm  LV FS:39.2 %                    LV Volume Diastolic: 84 ml  LV PW Diastolic: 6.05 cm        LV Volume Systolic: 38 ml  LV PW Systolic: 1.13 cm         LV EDV/LV EDV Index: 84 ml/49 m2LV ESV/LV  Septum Diastolic: 7.89 cm       ESV Index: 38 ml/22 m2  Septum Systolic: 3.01 cm        EF Calculated (A4C): 54.8 %                                  EF Calculated (2D): 69.7 %   LV Area Diastolic: 98.2 cm2     LV Length: 8.38 cm  LV Area Systolic: 92.8 cm2  Doppler Measurements & Calculations                               Estimated RVSP: 50 mmHg                              Estimated RAP:3 mmHg    AV P1/2t: 334 msec   Estimated PASP: 14.02 mmHg TR Velocity:166 cm/s                              TR Gradient:11.02 mmHg      Echocardiogram limited    Result Date: 9/27/2022  Transthoracic Echocardiography Report (TTE)  Demographics   Patient Name Zarina Gonsalez  Date of Study       09/27/2022                     A   Date of Birth      1950         Gender              Female   Age                71 year(s)         Race                   Patient Number     [id number]         Room Number         2119   Visit Number       [id number]   Corporate ID       [id number]   Accession Number   [id number]         Johnathan Salguero, RVT   Ordering Physician Kiersten Wood MD                 Physician           Nette CASAREZ  Procedure Type of Study   TTE procedure:ECHOCARDIOGRAM LIMITED. Procedure Date Date: 09/27/2022 Start: 08:40 AM Study Location: Portable Technical Quality: Fair visualization Indications:Pericardial effusion. Patient Status: Routine Height: 65 inches Weight: 143 pounds BSA: 1.72 m2 BMI: 23.8 kg/m2 HR: 82 bpm BP: 117/65 mmHg  Conclusions   Summary  This is a limited echocardiogram.  Left ventricular systolic function is normal.  Ejection fraction is visually estimated at 50-55%. Moderate aortic regurgitation is noted. Moderate to severe tricuspid regurgitation; RVSP: 55 mmHg. Severe PHTN. S/p pericardiocentesis with 560cc drained on 9/26/22; no evidence of any  pericardial effusion. Left pleural effusion. Signature   ------------------------------------------------------------------  Electronically signed by Fernando Castañeda MD  (Interpreting physician) on 09/27/2022 at 09:45 AM  ------------------------------------------------------------------   Findings   Left Ventricle  Left ventricular systolic function is normal.  Ejection fraction is visually estimated at 50-55%. Aortic Valve  Moderate aortic regurgitation is noted. Tricuspid Valve  Moderate to severe tricuspid regurgitation; RVSP: 55 mmHg. Severe PHTN.    Pericardial Effusion  S/p pericardiocentesis with 560cc drained on 9/26/22; no evidence of any  pericardial effusion. Pleural Effusion  Left pleural effusion. M-Mode/2D Measurements & Calculations   LV Diastolic Dimension: 2.45 cm LV Systolic Dimension: 3.1 cm  LV FS:26.4 %                    LV Volume Diastolic: 04.4 ml  LV PW Diastolic: 6.36 cm        LV Volume Systolic: 97.2 ml  LV PW Systolic: 2.52 cm         LV EDV/LV EDV Index: 74.6 ml/43 m2LV ESV/LV  Septum Diastolic: 2.67 cm       ESV Index: 29.8 ml/17 m2  Septum Systolic: 1.56 cm        EF Calculated (A4C): 60.1 %                                  EF Calculated (2D): 52 %  Doppler Measurements & Calculations                               Estimated RVSP: 55 mmHg                              Estimated RAP:3 mmHg    Estimated PASP: 40.21 mmHg TR Velocity:305 cm/s                              TR Gradient:37.21 mmHg      Echocardiogram limited    Result Date: 9/27/2022  Transthoracic Echocardiography Report (TTE)  Demographics   Patient Name       Shayna Friedman  Date of Study       09/26/2022                     A   Date of Birth      1950         Gender              Female   Age                71 year(s)         Race                   Patient Number     [id number]         Room Number         2119   Visit Number       [id number]   Corporate ID       [id number]   Accession Number   [id number]         Garth Ruaon RDMS   Ordering Physician Erasmo Salcedo MD                 Physician           MD  Procedure Type of Study   TTE procedure:ECHOCARDIOGRAM LIMITED. Procedure Date Date: 09/26/2022 Start: 02:50 PM Study Location: OR Technical Quality: Fair visualization Indications:Pericardial effusion.  Patient Status: Routine Height: 65 inches Weight: 143 pounds BSA: 1.72 m2 BMI: 23.8 kg/m2 BP: 112/39 mmHg  Conclusions   Summary  This is a limited echo during pericardiocentesis. See full report of procedure in EPIC   600cc of straw-colored fluid was drained, no pericardial effusion noted  post procedure. Signature   ------------------------------------------------------------------  Electronically signed by Acacia Aparicio MD (Interpreting  physician) on 09/27/2022 at 08:57 AM  ------------------------------------------------------------------      Echocardiogram limited    Result Date: 9/25/2022  Transthoracic Echocardiography Report (TTE)  Demographics   Patient Name       Suzanna Zazueta  Date of Study       09/25/2022                     A   Date of Birth      1950         Gender              Female   Age                71 year(s)         Race                   Patient Number     [id number]         Room Number         3113   Visit Number       [id number]   Corporate ID       [id number]   Accession Number   [id number]         Jacklyn Cerda RN, [facility]   Ordering Physician Tobias Cordova MD    Interpreting        Macie Griffith MD  Procedure Type of Study   TTE procedure:ECHOCARDIOGRAM LIMITED. Procedure Date Date: 09/25/2022 Start: 04:08 PM Study Location: Portable Technical Quality: Adequate visualization Indications:Pericardial effusion. Patient Status: ASAP Height: 65 inches Weight: 143 pounds BSA: 1.72 m2 BMI: 23.8 kg/m2 Rhythm: Sinus rhythm HR: 92 bpm BP: 145/59 mmHg  Conclusions   Summary  This is a limited echocardiogram.  Left ventricular systolic function is normal.  Ejection fraction is visually estimated at 50-55%. Moderate aortic regurgitation ( ms). Mild to moderate tricuspid regurgitation. RVSP is 50 mmHg. Mild to moderate pulmonic regurgitation. Moderate pericardial effusion. There are some early signs of cardiac  tamponade.  Doppler surrogate shows positive pulsus paradoxis and there is  mild early diastolic collapse of the right ventricle. Small bilateral pleural effusions s/o thoracentesis. Signature   ------------------------------------------------------------------  Electronically signed by Mamie Brown MD  (Interpreting physician) on 09/25/2022 at 08:00 PM  ------------------------------------------------------------------   Findings   Left Ventricle  Left ventricular systolic function is normal.  Ejection fraction is visually estimated at 50-55%. Aortic Valve  Moderate aortic regurgitation ( ms). Tricuspid Valve  Mild to moderate tricuspid regurgitation. RVSP is 50 mmHg. Pulmonic Valve  Mild to moderate pulmonic regurgitation. Pericardial Effusion  Moderate pericardial effusion. There are some early signs of cardiac tamponade. Doppler surrogate shows  positive pulsus paradoxis and there is mild early diastolic collapse of the  right ventricle. Pleural Effusion  Small bilateral pleural effusions s/o thoracentesis. Doppler Measurements & Calculations                               Estimated RVSP: 50 mmHg                              Estimated RAP:3 mmHg    AV P1/2t: 267 msec   Estimated PASP: 44.47 mmHg TR Velocity:322 cm/s                              TR Gradient:41.47 mmHg      CT CHEST W CONTRAST    Result Date: 9/13/2022  ** ORIGINAL REPORT ** EXAMINATION: CT-ANGIO CHEST PE PROTOCOL [id number] DATE OF EXAM:  9/13/2022 8:19 AM DEMOGRAPHICS: 71years old Female INDICATION:              Contrast utilized and relevant clinical information: History:  Shortness of Breath. Number of Series/Images:  5. Contrast Medication(s): IOHEXOL 350 MG/ML IV SOLN Amount Administered = 67 mL COMPARISON: No existing relevant imaging study corresponding to the same anatomical region is available. TECHNIQUE: The study was performed with the rapid administration of intravenous contrast timed to best evaluate and opacify the pulmonary arteries and their branches.  Axial source 3 mm images were obtained. 3-D reconstructions were performed. Imaging and processing were performed per the institutional PE evaluation protocol. DOSE OPTIMIZATION: CT radiation dose optimization techniques (automated exposure control, and use of iterative reconstruction techniques, or adjustment of the mA and/or kV according to patient size) were used to limit patient radiation dose. FINDINGS: CT CHEST: Thyroid: The thyroid is unremarkable. Systemic arterial Vasculature: The thoracic aorta and arch vasculature have a normal contrasted appearance and are normal size and contour. There is no evidence for dissection. Coronary calcifications. Pulmonary arterial vasculature: The pulmonary arterial vasculature demonstrates good contrast opacification. There is no evidence for a focal filling defect. There is no evidence for focal stenosis or aneurysmal dilatation. 3-D reconstructed images: MIP images confirm and further demonstrate the findings from the axial source images. No additional focal abnormalities are seen. Heart: The heart size is normal.  Small amount of pericardial effusion. Mediastinum: No pathologically enlarged mediastinal lymph nodes are seen. No focal mass lesions are identified. There is no evidence for hiatal hernia. Pleura: Large bilateral pleural effusions. Tracheobronchial tree: The tracheobronchial tree is clear. No focal abnormalities are seen. Lungs : Segmental atelectasis in bilateral lung bases. There is near complete atelectasis of right middle. Upper abdomen: The visualized upper abdomen is grossly unremarkable. Bones: No significant bony abnormalities are noted. Chest wall: Status post right mastectomy with flap. [IMPRESSION] 1. No evidence of acute pulmonary embolus. 2.  Large bilateral pleural effusions with compressive atelectasis at bilateral lung bases left worse than right. There is near complete atelectasis of the right middle lobe. 3.  Small pericardial effusion.  4.  Coronary calcifications. This dictation was created with voice recognition software. While attempts have been made to review the dictation as it is transcribed, on occasion the spoken word can be misinterpreted by the technology leading to omissions or inappropriate words, phrases or sentences. Electronically Signed by: Ursula Boeck, MD, 9/13/2022 8:38 AM ** ADDENDUM: #1 ** On further evaluation, multiple osseous lesions identified involving the rib cage including left posterior fourth rib, right second rib laterally concerning for metastatic disease given the history of primary malignancy. These findings were discussed with the referring physician at 1:25 PM on 9/13/2022 Electronically Signed by: Ursula Boeck, MD, 9/13/2022 1:25 PM     MRI ABDOMEN W WO CONTRAST    Result Date: 9/15/2022  Indication:     Liver lesion, &gt; 1cm, history of malignancy  Demographics: 71years old Female Contrast Utilized: Contrast Medication(s): GADOTERATE MEGLUMINE 0.5 MMOL/ML (376.9 MG/ML) INTRAVENOUS SOLUTION Amount Administered = 12 mL Clinical Info:  History:  Liver lesion, &gt; 1cm, history of malignancy, difficulty with breathholds. Number of Series/Images:  16.  Date of Service: 9/15/2022 10:29 PM TECHNIQUE: Multisequence, multiplanar MRI of the abdomen was performed both before and after the IV administration of contrast. MRCP protocol was utilized. COMPARISON: None FINDINGS: Soft tissues: No significant abnormalities are noted in the soft tissues, vasculature, or body wall. Bilateral pleural effusions. Bones: The L1 vertebral body shows abnormal low and high signal intensity in it on T2 equivalent images. Abnormality is also noted in the inferolateral lateral vertebral body of T11. These correlate with bone scan findings and are worrisome for metastatic disease. Abdominal cavity: No evidence of free air or free fluid. No peritoneal masses are noted. Liver: Normal signal intensity.   The small abnormalities noted in the left lobe of the liver have characteristics typical of hemangiomas. Each measures about 2.9 cm. They enhance peripherally in a nodular fashion and are still showing contrast enhancement on the delayed images. No vascular abnormalities. Normal enhancement pattern. No biliary abnormalities. Spleen: Normal size and signal intensity. No masses are identified. Normal enhancement pattern. Adrenals: Normal size and signal intensity. No masses. Normal enhancement pattern. Pancreas: Normal signal intensity. No masses. No ductal dilatation. Normal enhancement. Kidneys: Normal size and signal intensity. No masses. Normal enhancement. Bowel: Normal caliber. No abnormalities identified. [IMPRESSION] 1. Abnormalities noted on the computed tomography scan in the liver proved to represent hemangiomas, a benign process requiring no further follow-up. 2.  Bony abnormalities at T11 and L1 suggestive of metastatic disease. 3.  Bilateral pleural effusions. Electronically Signed by: Virginia Sotomayor MD, 9/15/2022 11:03 PM Not Validated     NM Bone Scan Whole Body    Result Date: 9/14/2022  STUDY: NM-BONE SCAN, WHOLE BODY INDICATION: 71years old Female with history of fall and breast cancer. Evaluation for osseous metastatic disease. TECHNIQUE: The patient received 21.9 mCi of Tc-99m MDP injected intravenously. Three hours later, anterior and posterior whole body planar views were obtained, as well as multiple spot views. COMPARISON: Chest x-ray dated 9/13/2022, CTA chest PE protocol dated 9/13/2022, however no additional bone scans are available for comparison. FINDINGS: There is increased radiotracer uptake within the right second lateral rib, left fourth posterior rib, right lateral eighth rib, T11, T12 and L1 vertebral bodies and left ilium. There is also increased radiotracer uptake within multiple joints. Both kidneys and the urinary bladder are visualized and appear unremarkable.  [IMPRESSION] Findings consistent with osseous metastatic disease within multiple ribs and vertebral bodies and possibly left pelvis. Findings consistent with degenerative changes within multiple joints. Dictated by: Daniel Malhotra (Resident), DO I, Leida Zavaleta MD, have reviewed the images and agree with the above interpretation. Electronically Signed by: Leida Zavaleta MD, 9/14/2022 1:33 PM 7 - Indicated     US Chest Pleural Space    Result Date: 9/13/2022  EXAMINATION: US-CHEST (FLUID MARKING) DATE OF EXAM:  9/13/2022 9:50 AM DEMOGRAPHICS: 71years old Female INDICATION:     Quantify fluid for possible thoracentesis     Reason for Exam:  Quantify fluid for possible thoracentesis. COMPARISON: No existing relevant imaging study corresponding to the same anatomical region is available. [IMPRESSION] FINDINGS/[IMPRESSION] There is approximately 900 ml of pleural fluid estimated within the posterior right hemithorax. The posterior right hemithorax was not marked for possible thoracentesis. There is approximately 968 ml of pleural fluid estimated within the posterior left hemithorax. The posterior left hemithorax was not marked for possible thoracentesis. This dictation was created with voice recognition software. While attempts have been made to review the dictation as it is transcribed, on occasion the spoken word can be misinterpreted by the technology leading to omissions or inappropriate words, phrases or sentences. Electronically Signed by: Aurora Fofana, 9/13/2022 9:58 AM     CT ABDOMEN PELVIS W IV CONTRAST    Result Date: 9/15/2022  PROCEDURE: CT-ABD/PELVIS W IV CONTRAST ONLY DEMOGRAPHICS: 71years old Female INDICATION:     Breast cancer, invasive, stage IV, initial workup      Contrast utilized and relevant clinical information: History:  Breast cancer, invasive, stage IV, initial workup. Number of Series/Images:  4.   Contrast Medication(s): IOHEXOL 350 MG/ML IV SOLN Amount Administered = 50 mL COMPARISON:    TECHNIQUE: Contiguous axial slices of the abdomen and pelvis were submitted after the IV administration of contrast. Additional coronal reformatted images were submitted. CT radiation dose optimization techniques (automated exposure control, and use of iterative reconstruction techniques, or adjustment of the mA and/or kV according to patient size) were used to limit patient radiation dose. FINDINGS: CT ABDOMEN: Slices through lung bases show large right and mild/moderate left pleural effusion. Adjacent airspace disease mostly linear favored to be atelectasis. Moderate pericardial effusion with mild cardiomegaly Liver shows low-density lesion left hepatic lobe 2.5 x 1.6 cm with possible directly adjacent lesion posterior to this measuring 1.2 cm. Ill-defined low-density lesion in the left hepatic lobe estimated to measure 1.7 x 1.5 cm. Small hypodensity in the central right lobe is identified. The spines are concerning for metastases. This structure measures 6 mm Pancreas and spleen within normal limits allowing for splenic granulomatous calcifications Adrenal glands within normal limits Kidneys are symmetric Calcification of nondilated abdominal aorta No focal bladder abnormality Uterus not visualized likely surgically absent. A few diverticula in the sigmoid and descending colon without adjacent inflammation. Small and large bowel nondilated. Surgical clips along the right rectus abdominis are identified. There is a normal caliber air-filled appendix. No pathologically enlarged retroperitoneal or mesenteric adenopathy based on size criteria. No evidence of free air. Evaluation of bone windows shows levo scarring curvature lumbar spine. Mixed lysis and sclerosis involving the L1 vertebral body is noted concerning for metastatic disease. [IMPRESSION] Hypodense lesions within the liver particularly left lobe concerning for evidence of metastatic disease 2. Sclerosis within the L1 vertebral body may indicate osseous metastasis.  This was demonstrated on the recent bone scan. 3. Ancillary findings as above   Please see above for additional details Electronically Signed by: Keyonna Batista MD, 9/15/2022 9:21 AM 8 - Indicated     XR CHEST PORTABLE    Result Date: 10/10/2022  EXAMINATION: ONE XRAY VIEW OF THE CHEST 10/10/2022 1:07 pm COMPARISON: 10/02/2022 HISTORY: ORDERING SYSTEM PROVIDED HISTORY: chest pain TECHNOLOGIST PROVIDED HISTORY: Reason for exam:->chest pain Reason for Exam: chest pain    sob FINDINGS: Cardiomegaly again noted. Increased bilateral pleural effusions. Bibasilar pulmonary opacities. No pneumothorax. Increased bilateral pleural effusions with bibasilar pulmonary opacities that could represent atelectasis or infection     XR CHEST PORTABLE    Result Date: 10/2/2022  EXAMINATION: ONE XRAY VIEW OF THE CHEST 10/2/2022 1:34 pm COMPARISON: 09/30/2022 HISTORY: ORDERING SYSTEM PROVIDED HISTORY: monitor pleural effusions TECHNOLOGIST PROVIDED HISTORY: Reason for exam:->monitor pleural effusions Reason for Exam: Shortness of Breath FINDINGS: There is cardiomegaly. Bilateral pleural effusions with bibasilar atelectasis. There is interstitial prominence throughout both lungs suggesting interstitial pulmonary edema. Cardiomegaly is noted. Cardiomediastinal silhouette and bony thorax are unchanged. Left subclavian line with tip in the SVC. No pneumothorax. Cardiomegaly with bilateral effusions and interstitial pulmonary edema suggesting mild CHF. Stable exam.     XR CHEST PORTABLE    Result Date: 9/30/2022  EXAMINATION: ONE XRAY VIEW OF THE CHEST 9/30/2022 2:04 pm COMPARISON: Chest radiograph 09/29/2022.  HISTORY: ORDERING SYSTEM PROVIDED HISTORY: Post bilateral thoracentesis TECHNOLOGIST PROVIDED HISTORY: Reason for exam:->Post bilateral thoracentesis Reason for Exam: Post bilateral thoracentesis Additional signs and symptoms: Post bilateral thoracentesis Relevant Medical/Surgical History: Post bilateral thoracentesis FINDINGS: Single view provided. Left PICC with tip in the proximal superior vena cava. Stable enlarged cardiac silhouette. Status post bilateral thoracentesis with improved effusions. Mild residual effusions. There is improved bibasilar atelectasis. No pneumothorax or free subdiaphragmatic air. No subcutaneous emphysema. Status post bilateral thoracentesis with improved effusions and lung base aeration. No pneumothorax. XR CHEST PORTABLE    Result Date: 9/29/2022  EXAMINATION: ONE XRAY VIEW OF THE CHEST 9/29/2022 2:45 am COMPARISON: 09/28/2022, 09/26/2022 HISTORY: ORDERING SYSTEM PROVIDED HISTORY: bilateral pleural effusion TECHNOLOGIST PROVIDED HISTORY: Reason for exam:->bilateral pleural effusion Reason for Exam: bilateral pleural effusion FINDINGS: Vascular congestion, basilar opacities and pleural effusions are again demonstrated. Vascular congestion appears less prominent in the interval. No pneumothorax identified. Left PICC line remains in place. Interval improved appearance of vascular congestion. Similar appearance of basilar airspace disease and effusions. XR CHEST PORTABLE    Result Date: 9/28/2022  EXAMINATION: ONE XRAY VIEW OF THE CHEST 9/28/2022 4:29 pm COMPARISON: 09/28/2022 HISTORY: ORDERING SYSTEM PROVIDED HISTORY: picc line placement TECHNOLOGIST PROVIDED HISTORY: Reason for exam:->picc line placement Reason for Exam: picc line placement Additional signs and symptoms: na Relevant Medical/Surgical History: na FINDINGS: Cardiomediastinal silhouette is stable. Similar bilateral pleural effusions and bibasilar airspace opacities. No pneumothorax. No gross bony abnormality. Left PICC tip projects at the mid SVC. Left PICC tip projects at the mid SVC. Otherwise stable chest.     XR CHEST PORTABLE    Result Date: 9/28/2022  EXAMINATION: ONE XRAY VIEW OF THE CHEST 9/28/2022 6:16 am COMPARISON: None available. Down time procedure.  HISTORY: ORDERING SYSTEM PROVIDED HISTORY: hypoxia TECHNOLOGIST PROVIDED HISTORY: Reason for exam:->hypoxia Reason for Exam: hypoxia FINDINGS: The heart is enlarged. The mediastinal contours are normal.  There is pulmonary edema with moderate bilateral pleural effusions and associated bibasilar atelectasis. No pneumothorax. Surgical clip is noted in the right axilla. The bones are osteopenic. Cardiomegaly with pulmonary edema and moderate bilateral pleural effusions with associated bibasilar atelectasis. XR CHEST PORTABLE    Result Date: 9/26/2022  EXAMINATION: ONE XRAY VIEW OF THE CHEST 9/26/2022 10:34 am COMPARISON: 09/25/2022. HISTORY: ORDERING SYSTEM PROVIDED HISTORY: Hypoxia TECHNOLOGIST PROVIDED HISTORY: Reason for exam:->Hypoxia Reason for Exam: Hypoxia FINDINGS: The cardiac silhouette is indeterminate. There is worsening bibasilar opacity, right greater than left. There is suspected right-sided moderate-sized pleural effusion. No evidence of pneumothorax is seen. Worsening bibasilar opacities, right greater than left with suspected moderate size right-sided pleural effusion. XR CHEST PORTABLE    Result Date: 9/25/2022  EXAMINATION: ONE XRAY VIEW OF THE CHEST 9/25/2022 1:14 pm COMPARISON: Chest radiograph 09/25/2022, 09/23/2022. HISTORY: ORDERING SYSTEM PROVIDED HISTORY: r/o pneumothorax TECHNOLOGIST PROVIDED HISTORY: Reason for exam:->r/o pneumothorax Reason for Exam: r/o pneumothorax FINDINGS: Single upright view provided. Stable mediastinal and cardiac silhouettes. Status post thoracentesis with improved bilateral pleural effusions and lung base aeration. Mild persistent bilateral effusions and lower lobe/lung base consolidation. No evidence of a pneumothorax. No free subdiaphragmatic air. Decreased bone mineral density. 1. Improved bilateral pleural effusions and lung base aeration. 2. No large pneumothorax. The questionable right apical trace pneumothorax cannot be identified on the upright view.      XR CHEST PORTABLE    Result Date: 9/25/2022  EXAMINATION: ONE XRAY VIEW OF THE CHEST 9/25/2022 12:17 pm COMPARISON: Chest radiograph 09/23/2022. HISTORY: ORDERING SYSTEM PROVIDED HISTORY: check for pneumothorax TECHNOLOGIST PROVIDED HISTORY: Reason for exam:->check for pneumothorax Reason for Exam: check for pneumothorax FINDINGS: Single view provided. Enlarged cardiac silhouette. Stable central pulmonary vascular prominence and perihilar interstitial reticulonodular densities which may represent edema. Interval improvement of bilateral pleural effusions with improved lung base aeration. Persistent bibasilar consolidation and mild. There is a subtle lucency in right hemithorax apex which could represent a trace pneumothorax. 1. Status post bilateral thoracentesis with improved effusions and lung base aeration. 2. Question trace right apical pneumothorax. 3. Mild residual bibasilar atelectasis and pleural effusions. XR CHEST PORTABLE    Result Date: 9/24/2022  EXAMINATION: ONE XRAY VIEW OF THE CHEST 9/23/2022 9:58 pm COMPARISON: August 23, 2022 HISTORY: ORDERING SYSTEM PROVIDED HISTORY: dyspnea TECHNOLOGIST PROVIDED HISTORY: Reason for exam:->dyspnea Reason for Exam: shortness of breath Additional signs and symptoms: dyspnea FINDINGS: No lines or tubes. Stable cardiomediastinal silhouette. The lungs show interval decrease in lung volume with worsening basilar opacities, bilateral effusions and pulmonary edema. No pneumothorax. No acute or aggressive osseous lesion. 1. Worsening basilar opacities, bilateral effusions and pulmonary edema suggestive of congestive heart failure. Superimposed pneumonia would be difficult to exclude. US THORACENTESIS    Result Date: 9/13/2022  EXAMINATION: US-THORACENTESIS FOR ASPIRATION W/IMAGING [id number] DATE OF EXAM:  9/13/2022 1:14 PM DEMOGRAPHICS: 71years old Female INDICATION:     thorocentesis     Reason for Exam:  thorocentesis.  COMPARISON: Ultrasound chest fluid marking from 9/13/2022. PROCEDURE PERFORMED BY: Astrid Salazar PA-C ATTENDING RADIOLOGIST(Direct Supervision) : Dr. Stoney Riddle: The patient was brought to the sonography suite and placed on the table in an upright sitting position. Written Informed consent was obtained after the risks (bleeding, infection, pneumothorax, postoperative pain) and benefits were discussed with the patient. An appropriate access site was chosen in the Left posterior chest utilizing real-time sonographic guidance. A procedural timeout was performed. The overlying area was then marked and prepped and draped in sterile fashion. Local anesthesia was achieved using 1% lidocaine. A small dermatotomy was made utilizing an 11 blade. Then, an 6 [employer] Yancy [id number] catheter was then advanced directly into the pleural space. There was immediate return of wilma-colored fluid. 1100 mL was evacuated. No immediate complications were encountered. The catheter was removed and hemostasis was achieved with direct pressure. The patient left the sonography suite in stable, unchanged condition. A post-procedure chest x-ray will be obtained. Permanent images were obtained and stored. [IMPRESSION] Technically successful ultrasound-guided Left thoracentesis. Films reviewed, interpreted and dictated by Dr. Carol Ann Valle. Transcribed by Astrid Salazar PA-C. This dictation was created with voice recognition software. While attempts have been made to review the dictation as it is transcribed, on occasion the spoken word can be misinterpreted by the technology leading to omissions or inappropriate words, phrases or sentences. Dictated by: Astrid Salazar (Radiology KIM), Rina BARNETT MD, have reviewed the images and agree with the above interpretation.  Electronically Signed by: Rina Lozano MD, 9/13/2022 3:46 PM     VL DUP UPPER EXTREMITY VENOUS RIGHT    Result Date: 9/30/2022  EXAMINATION: DUPLEX ULTRASOUND OF THE RIGHT UPPER EXTREMITY FOR DVT, 9/28/2022 10:45 pm TECHNIQUE: Duplex ultrasound using B-mode/gray scaled imaging and Doppler spectral analysis and color flow was obtained of the deep venous structures of the upper right extremity. COMPARISON: None. HISTORY: ORDERING SYSTEM PROVIDED HISTORY: RUE Swelling, r/o DVT TECHNOLOGIST PROVIDED HISTORY: Reason for exam:->RUE Swelling, r/o DVT FINDINGS: Nonocclusive thrombus is identified in the cephalic vein above the elbow and antecubital fossa. The thrombus appears occlusive to near occlusive within the forearm. There is normal flow and compressibility of the remaining visualized venous structures. There is no evidence of echogenic thrombus. The veins demonstrate good compressibility with normal color flow study and spectral analysis. A right thyroid lobe nodule measures 10 x 9 x 7 mm. Thrombus identified in the cephalic vein just above the elbow extending throughout the forearm. Findings were discussed with Dr. Russell Díaz on 9/29/2022 at 12:33 a.m. [employer] XR Chest 1 VW    Result Date: 9/13/2022  EXAMINATION: XR-CHEST SINGLE (COUNT) DATE OF EXAM:  9/13/2022 1:46 PM DEMOGRAPHICS: 71years old Female INDICATION:     Post Thoracentesis-PleurX Placement     History:  Post Thoracentesis-PleurX Placement. Number of Series/Images:  1. COMPARISON: 9/13/2022 TECHNIQUE: Single AP portable chest radiograph was obtained. FINDINGS: Cardiomediastinal silhouette is obscured by right pleural effusion and underlying atelectasis or consolidation unchanged. Improved left pleural effusion. Left basilar opacities unchanged. No definite pneumothorax seen. There is no acute osseous abnormality. [IMPRESSION] 1.  RIGHT pleural effusion and underlying atelectasis or consolidation unchanged. Improved left pleural effusion. Left basilar opacities unchanged. No definite pneumothorax seen    This dictation was created with voice recognition software.   While attempts have been made to review the dictation as it is transcribed, on occasion the spoken word can be misinterpreted by the technology leading to omissions or inappropriate words, phrases or sentences. Electronically Signed by: Bj Bowen, 9/13/2022 2:02 PM     US CHEST INCLUDING MEDIASTINUM    Result Date: 9/25/2022  EXAMINATION: ULTRASOUND OF THE CHEST 9/25/2022 12:07 pm COMPARISON: Chest radiograph done 09/23/2022. HISTORY: ORDERING SYSTEM PROVIDED HISTORY: Bilateral Thoracenteis at the bedside at 10:30 AM tomorrow TECHNOLOGIST PROVIDED HISTORY: Reason for exam:->Bilateral Thoracenteis at the bedside at 10:30 AM tomorrow FINDINGS/IMPRESSION: Skin marking was performed for bedside thoracentesis. Bilateral pleural effusions are demonstrated.            EKG this visit:  personally reviewed         Electronically signed by DORIS Srinivasan CNP on 10/10/2022 at 3:48 PM

## 2022-10-10 NOTE — CARE COORDINATION
CM - Pt is a readmission risk , and will be admitted --Ms Mitchell Reeves meets the admission criterion with no other alternatives to be considered.      Jennifer Michael / Yohan Fonseca / [id number]

## 2022-10-10 NOTE — CARE COORDINATION
Yann Krueger regarding Dietitian referral. Pt answered, RD explained reason for call and role in care. Pt requested RD call back on a different day. Will contact pt in one week and follow up as appropriate.          [address] [facility], [address] [place] [address]

## 2022-10-10 NOTE — ED NOTES
ED TO INPATIENT SBAR HANDOFF    Patient Name: Noe Boyer   :  1950  71 y.o. MRN:  [id number]  Preferred Name  Peg  ED Room #:  ED17/ED-17  Family/Caregiver Present yes   Restraints no   Sitter no   Sepsis Risk Score Sepsis Risk Score: 1.64    Situation  Code Status: Prior No additional code details. Allergies: Codeine  Weight: Patient Vitals for the past 96 hrs (Last 3 readings):   Weight   10/10/22 1300 126 lb (57.2 kg)     Arrived from: home  Chief Complaint:   Chief Complaint   Patient presents with    Shortness of Breath     Had pleural effusion done last week; afraid the fluid is building up again      Hospital Problem/Diagnosis:  Active Problems:    * No active hospital problems. *  Resolved Problems:    * No resolved hospital problems.  *    Imaging:   XR CHEST PORTABLE   Final Result   Increased bilateral pleural effusions with bibasilar pulmonary opacities that   could represent atelectasis or infection           Abnormal labs:   Abnormal Labs Reviewed   CBC WITH AUTO DIFFERENTIAL - Abnormal; Notable for the following components:       Result Value    WBC 12.1 (*)     RBC 3.40 (*)     Hemoglobin 9.8 (*)     Hematocrit 31.1 (*)     MCHC 31.5 (*)     RDW 16.3 (*)     All other components within normal limits   COMPREHENSIVE METABOLIC PANEL - Abnormal; Notable for the following components:    Sodium 131 (*)     CO2 19 (*)     BUN 33 (*)     Creatinine 1.3 (*)     Glucose 274 (*)     GFR Non- 40 (*)     GFR  49 (*)     All other components within normal limits   BRAIN NATRIURETIC PEPTIDE - Abnormal; Notable for the following components:    Pro-BNP 13,423 (*)     All other components within normal limits   BLOOD GAS, VENOUS - Abnormal; Notable for the following components:    pCO2, Jeovanny 36 (*)     pO2, Jeovanny 137 (*)     Base Excess 3 (*)     O2 Sat, Jeovanny 93.1 (*)     All other components within normal limits     Critical values: yes     Abnormal Assessment Findings: [address] admissions in last 30 days? yes   History:   Past Medical History:   Diagnosis Date    Anxiety     Breast carcinoma ([facility] [address] 1997    R breast - MAMMOGRAM ANNUALLY, NEXT DUE 5/2020. Cervical radiculitis     Right     Chest pain     Depression     Diabetes type 2, controlled ([facility] [address] 2016    w two random sugars >126    Family history of diabetes mellitus (DM)     Mother and Father    GERD (gastroesophageal reflux disease)     UGI- sm HH , done at [address] [place] [address],[address] 3/26/14    Hiatal hernia     Hx of Doppler ultrasound 05/17/2022    Normal bilateral lower extremity ankle brachial indices. Hx of Doppler ultrasound 05/17/2022    Significant reflux noted of the Right GSV SFJ (0.7s), GSV Knee (2.0s). Significant reflux noted in the Left GSV SFJ (1.8s). Hx of echocardiogram 05/12/2022    EF is estimated at 55-60%. Mitral annular calcification is present. Mild mitral, tricuspid and moderate aortic regurgitation is present. Mild Pulmonary hypertension noted with RVSP of 43mmHg.     Hypercalcemia     mild ~11, iPTH nl 8/2021    Hyperglycemia     Hyperlipidemia     Insomnia     Irritable bowel syndrome     LBP (low back pain)     Lumbar herniated disc     L L4-5 HNP noted on MRI    Menopause     female exam every 2 yrs, due for recheck w me 2015    Migraine headache     ON TOPAMAX    Osteoporosis     Prediabetes     a1c 6.4 in Dec 2014    S/P colonoscopic polypectomy 06/07/2019    Dr Isabell Mims, recheck 5 yrs    TIA (transient ischemic attack)     INACTIVE PROBLEM       Assessment    Vitals/MEWS:        Vitals:    10/10/22 1300   BP: (!) 156/55   Pulse: 81   Resp: 14   Temp: 98.7 °F (37.1 °C)   SpO2: (!) 85%   Weight: 126 lb (57.2 kg)     FiO2 (%): nasal cannula for respiratory distress  O2 Flow Rate:      Cardiac Rhythm:    Pain Assessment: 3 [x] Verbal [] Bita Ready Scale  Pain Scale:    Last documented pain score (0-10 scale)    Last documented pain medication administered: n/a  Mental Status: oriented, alert, coherent, logical, thought processes intact, and able to concentrate and follow conversation  C-SSRS: Risk of Suicide: No Risk  Bedside swallow:    Hans Coma Scale (GCS): Hans Coma Scale  Eye Opening: Spontaneous  Best Verbal Response: Oriented  Best Motor Response: Obeys commands  Hans Coma Scale Score: 15  Active LDA's:    PO Status: Regular  Pertinent or High Risk Medications/Drips: no   If Yes, please provide details: n/a  Pending Blood Product Administration: no     Blood Cultures: see ED pt care timeline or ED Event log    Recommendation    Pending orders n/a  Plan for Discharge (if known): Additional Comments: Patient was on home oxygen for 3 weeks but was told she no loner needed to be on it after her pleural effusion last week. She became SOB yesterday and put herself back on it.     If any further questions, please call Sending RN at [phone]    Electronically signed by: Electronically signed by Sindi Yin RN on 10/10/2022 at 2:39 PM      Sindi Yin [facility]  10/10/22 [id number]

## 2022-10-10 NOTE — ED NOTES
Medication History  [facility]    Patient Name: Sheila Love 1950     Medication history has been completed by: Ciara Fitch CPhT    Source(s) of information: patient,  and insurance claims     Primary Care Physician: Sonu Bermudez MD     Pharmacy: [facility]    Allergies as of 10/10/2022 - Fully Reviewed 10/10/2022   Allergen Reaction Noted    Codeine          Prior to Admission medications    Medication Sig Start Date End Date Taking?  Authorizing Provider   rosuvastatin (CRESTOR) 10 MG tablet Take 1 tablet by mouth nightly 10/7/22   Sonu Bermudez MD   ciprofloxacin (CIPRO) 250 MG tablet Take 1 tablet by mouth 2 times daily for 7 days 10/6/22 10/13/22  Sonu Bermudez MD   glipiZIDE (GLUCOTROL) 10 MG tablet Take 1 tablet by mouth 2 times daily (before meals)  Patient taking differently: Take 10 mg by mouth daily (with breakfast) 10/5/22   Sonu Bermudez MD   amiodarone (CORDARONE) 200 MG tablet Take 1 tablet by mouth daily 10/14/22   Sonu Bermudez MD   pantoprazole (PROTONIX) 40 MG tablet Take 1 tablet by mouth every morning (before breakfast) 10/5/22   Sonu Bermudez MD   PARoxetine (PAXIL) 20 MG tablet Take 1 tablet by mouth daily 10/5/22   Sonu Bermudez MD   predniSONE (DELTASONE) 10 MG tablet Take 1 tablet by mouth daily for 10 days 10/5/22 10/15/22  Sonu Bermudez MD   senna (SENOKOT) 8.6 MG tablet Take 1 tablet by mouth daily as needed for Constipation  Patient taking differently: Take 1 tablet by mouth daily 10/5/22 11/4/22  Sonu Bermudez MD   Alcohol Swabs PADS Use before administering insulin 10/3/22   Ta Uriarte MD   dilTIAZem (CARDIZEM CD) 180 MG extended release capsule Take 1 capsule by mouth daily 10/4/22   Ta Uriarte MD   INS SYRINGE/NEEDLE .5CC/28G ([employer] INS SYR MX-COM .5CC/28G) 28G X 1/2\" 0.5 ML MISC As mentioned in Insulin dosing 10/3/22   Ta Uriarte MD   aspirin 81 MG EC tablet Take 1 tablet by mouth daily 10/3/22   Elizabeth Tang MD   docusate (COLACE, DULCOLAX) 100 MG CAPS Take 100 mg by mouth 2 times daily as needed (constipation) 10/3/22   Elizabeth Tang MD   glucose 4 g chewable tablet Take 4 tablets by mouth as needed for Low blood sugar 10/3/22   Elizabeth Tang MD   mirtazapine (REMERON) 15 MG tablet Take 1 tablet by mouth nightly 9/23/22   Carlos Alberto Basilio MD   oxybutynin (DITROPAN) 5 MG tablet Take 1 tablet by mouth 2 times daily 9/6/22   Carlos Alberto Basilio MD   apixaban (ELIQUIS) 5 MG TABS tablet Take 1 tablet by mouth 2 times daily 8/23/22   Neida Amato MD   metFORMIN (GLUCOPHAGE) 500 MG tablet Take 1 tablet by mouth every morning (before breakfast) 5/6/22   Carlos Alberto Basilio MD   Lancets MISC 1 each by Does not apply route 2 times daily 2/16/22   Carlos Alberto Basilio MD   blood glucose monitor strips Test twice a day & as needed for symptoms of irregular blood glucose. 2/16/22   Carlos Alberto Basilio MD     Medications added or changed (ex. new medication, dosage change, interval change, formulation change):  Glipizide frequnecy changed from BID to daily    Medications removed from list (include reason, ex. noncompliance, medication cost, therapy complete etc.):   Humalog patient states she is not taking this , states discontinued per Dr. Aaron Gomez  Lisinopril discontinued 10/03/22  Metoprolol tartrate discontinued 10/03/22  Allopurinol discontinued 08/23/22  Lotrel discontinued 08/23/22    Comments:  Medication list reviewed with patient,  and insurance claims verified. Patient states she is out of her Eliquis therapy, last dose 10/08/22.   Claims for alprazolam not taking  Claims for colchicine not taking  Claims for furosemide not taking  Claims for hydroxyzine not taking  Claims for trazodone not taking    To my knowledge the above medication history is accurate as of 10/10/2022 2:59 PM.   Mary León, [facility]   10/10/2022 2:59 PM

## 2022-10-10 NOTE — ED PROVIDER NOTES
EKG normal sinus rhythm, ventricular rate of 72, DC interval 132, QRS duration 70, QT/QTc 476/521, no ST elevation noted.      Malik Ochoa DO  10/10/22 [id number]

## 2022-10-10 NOTE — ED PROVIDER NOTES
EMERGENCY DEPARTMENT ENCOUNTER    [facility] EMERGENCY DEPARTMENT        TRIAGE CHIEF COMPLAINT:   Shortness of Breath (Had pleural effusion done last week; afraid the fluid is building up again )      [place]:  Douglas nKight is a 71 y.o. female that presents with shortness of breath and hypoxia. Context is patient states that she was discharged from hospital after a 10-day stay for similar complaints at the end of September 2022. Was diagnosed with mild bilateral pleural effusions underwent bilateral thoracocentesis. She states that when she was discharged she was discontinued off of her 2 L nasal cannula as well as her diuretics because \"they told me I would need it anymore. \"  Over the last several days, she has had return of shortness of breath and orthopnea without cough, fever or pleuritic chest pain. She is concerned that she may be retaining fluid again. Does have a history of CHF, no anticoagulation therapy. No increasing swelling of the lower legs however family does state that patient is being evaluated for possible autoimmune disease/scleroderma because \"the skin on her legs is very thick and hard. \"  No fever or chills, hemoptysis, rash or other abdominal/urinary complaints.   Patient was reportedly 60% on room air by  last evening, was 85% on 2 L here and is now currently on 4 L nasal cannula and feeling improvement of shortness of breath on higher nasal cannula O2 therapy    ROS:  General:  No fevers, no chill   Cardiovascular:   See HPI  Respiratory:  See HPI  Gastrointestinal:  No pain, no nausea, no vomiting, no diarrhea  Musculoskeletal:  No muscle pain, no joint pain  Skin:  No rash, no pruritis, no easy bruising  Neurologic:  No speech problems, no headache, no extremity numbness, no extremity tingling, no extremity weakness  Psychiatric:  No anxiety  Genitourinary:  No dysuria, no hematuria  Endocrine:  No unexpected weight gain, no unexpected weight loss  Extremities:  No edema    Past Medical History:   Diagnosis Date    Anxiety     Breast carcinoma ([facility] [address] 1997    R breast - MAMMOGRAM ANNUALLY, NEXT DUE 5/2020. Cervical radiculitis     Right     Chest pain     Depression     Diabetes type 2, controlled ([facility] [address] 2016    w two random sugars >126    Family history of diabetes mellitus (DM)     Mother and Father    GERD (gastroesophageal reflux disease)     UGI- sm HH , done at [address] [place] [address],[address] 3/26/14    Hiatal hernia     Hx of Doppler ultrasound 05/17/2022    Normal bilateral lower extremity ankle brachial indices. Hx of Doppler ultrasound 05/17/2022    Significant reflux noted of the Right GSV SFJ (0.7s), GSV Knee (2.0s). Significant reflux noted in the Left GSV SFJ (1.8s). Hx of echocardiogram 05/12/2022    EF is estimated at 55-60%. Mitral annular calcification is present. Mild mitral, tricuspid and moderate aortic regurgitation is present. Mild Pulmonary hypertension noted with RVSP of 43mmHg.     Hypercalcemia     mild ~11, iPTH nl 8/2021    Hyperglycemia     Hyperlipidemia     Insomnia     Irritable bowel syndrome     LBP (low back pain)     Lumbar herniated disc     L L4-5 HNP noted on MRI    Menopause     female exam every 2 yrs, due for recheck w me 2015    Migraine headache     ON TOPAMAX    Osteoporosis     Prediabetes     a1c 6.4 in Dec 2014    S/P colonoscopic polypectomy 06/07/2019    Dr Lorena Hercules, recheck 5 yrs    TIA (transient ischemic attack)     INACTIVE PROBLEM     Past Surgical History:   Procedure Laterality Date    BREAST RECONSTRUCTION Right 4/2013    Dr Janusz Monahan (CERVIX STATUS UNKNOWN)  5/1995    LIANG/BSO    MASTECTOMY  7/1997    Right breast for breast cancer    TUBAL LIGATION       Family History   Problem Relation Age of Onset    High Blood Pressure Mother     Diabetes Mother     Diabetes Father     Heart Disease Father         open heart surgery    High Blood Pressure Father     Diabetes Sister     High Cholesterol Sister     Other Sister         hypochondriac    Stroke Paternal Grandmother      Social History     Socioeconomic History    Marital status:      Spouse name: Not on file    Number of children: Not on file    Years of education: Not on file    Highest education level: Not on file   Occupational History    Occupation: - [employer]     Comment: retired Sept 2015   Tobacco Use    Smoking status: Never    Smokeless tobacco: Never   Substance and Sexual Activity    Alcohol use: No    Drug use: No    Sexual activity: Not on file   Other Topics Concern    Not on file   Social History Narrative    Not on file     Social Determinants of Health     Financial Resource Strain: Low Risk     Difficulty of Paying Living Expenses: Not hard at all   Food Insecurity: No Food Insecurity    Worried About Running Out of Food in the Last Year: Never true    [address] in the Last Year: Never true   Transportation Needs: No Transportation Needs    Lack of Transportation (Medical): No    Lack of Transportation (Non-Medical): No   Physical Activity: Inactive    Days of Exercise per Week: 0 days    Minutes of Exercise per Session: 0 min   Stress: No Stress Concern Present    Feeling of Stress : Not at all   Social Connections: Not on file   Intimate Partner Violence: Not At Risk    Fear of Current or Ex-Partner: No    Emotionally Abused: No    Physically Abused: No    Sexually Abused: No   Housing Stability: Low Risk     Unable to Pay for Housing in the Last Year: No    Number of Places Lived in the Last Year: 1    Unstable Housing in the Last Year: No     No current facility-administered medications for this encounter.      Current Outpatient Medications   Medication Sig Dispense Refill    rosuvastatin (CRESTOR) 10 MG tablet Take 1 tablet by mouth nightly 90 tablet 1    ciprofloxacin (CIPRO) 250 MG tablet Take 1 tablet by mouth 2 times daily for 7 days 14 tablet 0    glipiZIDE (GLUCOTROL) 10 MG tablet Take 1 tablet by mouth 2 times daily (before meals) (Patient taking differently: Take 10 mg by mouth daily (with breakfast)) 60 tablet 2    [START ON 10/14/2022] amiodarone (CORDARONE) 200 MG tablet Take 1 tablet by mouth daily 30 tablet 2    pantoprazole (PROTONIX) 40 MG tablet Take 1 tablet by mouth every morning (before breakfast) 30 tablet 2    PARoxetine (PAXIL) 20 MG tablet Take 1 tablet by mouth daily 30 tablet 2    predniSONE (DELTASONE) 10 MG tablet Take 1 tablet by mouth daily for 10 days 10 tablet 0    senna (SENOKOT) 8.6 MG tablet Take 1 tablet by mouth daily as needed for Constipation (Patient taking differently: Take 1 tablet by mouth daily) 30 tablet 2    Alcohol Swabs PADS Use before administering insulin 100 each 0    dilTIAZem (CARDIZEM CD) 180 MG extended release capsule Take 1 capsule by mouth daily 30 capsule 0    INS SYRINGE/NEEDLE .5CC/28G (AIMSCO INS SYR MX-COM .5CC/28G) 28G X 1/2\" 0.5 ML MISC As mentioned in Insulin dosing 100 each 3    aspirin 81 MG EC tablet Take 1 tablet by mouth daily 30 tablet 0    docusate (COLACE, DULCOLAX) 100 MG CAPS Take 100 mg by mouth 2 times daily as needed (constipation) 30 capsule 0    glucose 4 g chewable tablet Take 4 tablets by mouth as needed for Low blood sugar 60 tablet 3    mirtazapine (REMERON) 15 MG tablet Take 1 tablet by mouth nightly 30 tablet 3    oxybutynin (DITROPAN) 5 MG tablet Take 1 tablet by mouth 2 times daily 60 tablet 3    apixaban (ELIQUIS) 5 MG TABS tablet Take 1 tablet by mouth 2 times daily 60 tablet 1    metFORMIN (GLUCOPHAGE) 500 MG tablet Take 1 tablet by mouth every morning (before breakfast) 90 tablet 1    Lancets MISC 1 each by Does not apply route 2 times daily 100 each 0    blood glucose monitor strips Test twice a day & as needed for symptoms of irregular blood glucose.  100 strip 3     Allergies   Allergen Reactions    Codeine      \"jittery\"  Feels anxoius       Nursing Notes Reviewed  PHYSICAL EXAM VITAL SIGNS: BP (!) 147/53   Pulse 69   Temp 97.9 °F (36.6 °C) (Oral)   Resp 29   Ht 5' 5\" (1.651 m)   Wt 126 lb (57.2 kg)   SpO2 97%   BMI 20.97 kg/m²    Constitutional:  Well developed, thin elderly female, uncomfortable appearing but nontoxic  Head:  Normocephalic, Atraumatic  Eyes:  JOVAN. Sclera clear. Conjunctiva normal, No discharge. Neck/Lymphatics: Supple, no JVD, No lymphadenopathy  Cardiovascular:  RRR, Normal S1 & S2     Peripheral Vascular: Distal pulses 2+, Capillary refill <2seconds  Respiratory:  Respirations moderately labored, 28 breaths/min, speaking 2-3 word sentences. 97% on 4 L. Lungs with coarse air exchange with scattered crackles throughout. No retractions or accessory muscle use. No stridor. Abdomen: Bowel sounds normal in all quadrants, Soft, Non tender/Nondistended, No palpable abdominal masses. Musculoskeletal: BUE/BLE symmetrical without atrophy or deformities. Calves are supple nontender. Trace pitting edema to the lower legs. There is palpable \"thickening\" of the skin in the lower abdomen which is chronic per   Integument:  Warm, Dry, Intact, Skin turgor and texture normal  Neurologic:  Alert & oriented x3 , No focal deficits noted. Cranial nerves II through XII grossly intact. No slurred speech. No facial droop. Normal gross motor coordination & motor strength bilateral upper and lower extremities.     Psychiatric:  Affect appropriate      I have reviewed and interpreted all of the currently available lab results from this visit (if applicable):  Results for orders placed or performed during the hospital encounter of 10/10/22   CBC with Auto Differential   Result Value Ref Range    WBC 12.1 (H) 4.0 - 10.5 K/CU MM    RBC 3.40 (L) 4.2 - 5.4 M/CU MM    Hemoglobin 9.8 (L) 12.5 - 16.0 GM/DL    Hematocrit 31.1 (L) 37 - 47 %    MCV 91.5 78 - 100 FL    MCH 28.8 27 - 31 PG    MCHC 31.5 (L) 32.0 - 36.0 %    RDW 16.3 (H) 11.7 - 14.9 %    Platelets 938 523 - 426 K/CU MM    MPV 9.8 7.5 - 11.1 FL    Segs Relative 94.0 (H) 36 - 66 %    Lymphocytes % 2.0 (L) 24 - 44 %    Monocytes % 4.0 0 - 4 %    Segs Absolute 11.4 K/CU MM    Lymphocytes Absolute 0.2 K/CU MM    Monocytes Absolute 0.5 K/CU MM    Differential Type MANUAL DIFFERENTIAL    Comprehensive Metabolic Panel   Result Value Ref Range    Sodium 131 (L) 135 - 145 MMOL/L    Potassium 4.7 3.5 - 5.1 MMOL/L    Chloride 99 99 - 110 mMol/L    CO2 19 (L) 21 - 32 MMOL/L    BUN 33 (H) 6 - 23 MG/DL    Creatinine 1.3 (H) 0.6 - 1.1 MG/DL    Glucose 274 (H) 70 - 99 MG/DL    Calcium 10.0 8.3 - 10.6 MG/DL    Albumin 3.4 3.4 - 5.0 GM/DL    Total Protein 6.5 6.4 - 8.2 GM/DL    Total Bilirubin 0.5 0.0 - 1.0 MG/DL    ALT 24 10 - 40 U/L    AST 19 15 - 37 IU/L    Alkaline Phosphatase 112 40 - 129 IU/L    GFR Non- 40 (L) >60 mL/min/1.73m2    GFR  49 (L) >60 mL/min/1.73m2    Anion Gap 13 4 - 16   Troponin   Result Value Ref Range    Troponin T <0.010 <0.01 NG/ML   Brain Natriuretic Peptide   Result Value Ref Range    Pro-BNP 13,423 (H) <300 PG/ML   Blood Gas, Venous   Result Value Ref Range    pH, Jeovanny 7.38 7.32 - 7.42    pCO2, Jeovanny 36 (L) 38 - 52 mmHG    pO2, Jeovanny 137 (H) 28 - 48 mmHG    Base Excess 3 (H) 0 - 2.4    HCO3, Venous 21.3 19 - 25 MMOL/L    O2 Sat, Jeovanny 93.1 (H) 50 - 70 %    Comment VBG    EKG 12 Lead   Result Value Ref Range    Ventricular Rate 72 BPM    Atrial Rate 72 BPM    P-R Interval 132 ms    QRS Duration 70 ms    Q-T Interval 476 ms    QTc Calculation (Bazett) 521 ms    P Axis 12 degrees    R Axis 68 degrees    T Axis 70 degrees    Diagnosis       Normal sinus rhythm  Low voltage QRS  Cannot rule out Anterior infarct , age undetermined  Prolonged QT  Abnormal ECG  When compared with ECG of 30-SEP-2022 00:16,  Sinus rhythm has replaced Atrial fibrillation  Vent.  rate has decreased BY  55 BPM  ST now depressed in Anterior leads          Radiographs (if obtained):  [] The following radiograph was interpreted by myself in the absence of a radiologist:   [] Radiologist's Report Reviewed:  XR CHEST PORTABLE   Final Result   Increased bilateral pleural effusions with bibasilar pulmonary opacities that   could represent atelectasis or infection         CT CHEST WO CONTRAST    (Results Pending)           XR CHEST PORTABLE (Final result)  Result time 10/10/22 13:24:17  Final result by Willy Eubanks MD (10/10/22 13:24:17)                Impression:    Increased bilateral pleural effusions with bibasilar pulmonary opacities that   could represent atelectasis or infection             Narrative:    EXAMINATION:   ONE XRAY VIEW OF THE CHEST     10/10/2022 1:07 pm     COMPARISON:   10/02/2022     HISTORY:   ORDERING SYSTEM PROVIDED HISTORY: chest pain   TECHNOLOGIST PROVIDED HISTORY:   Reason for exam:->chest pain   Reason for Exam: chest pain    sob     FINDINGS:   Cardiomegaly again noted. Increased bilateral pleural effusions. Bibasilar   pulmonary opacities. No pneumothorax. EKG Interpretation  Please see ED physician's note - Dr. Parish Quintanilla - for EKG interpretation        Chart review shows recent radiographs:  Echocardiogram limited    Result Date: 9/30/2022  Transthoracic Echocardiography Report (TTE)  Demographics   Patient Name       Rafaela Carpio  Date of Study       09/29/2022                     A   Date of Birth      1950         Gender              Female   Age                71 year(s)         Race                   Patient Number     [id number]         Room Number         2025   Visit Number       [id number]   Corporate ID       [id number]   Accession Number   [id number]         Sonographer   Ordering Physician Андрей Mark MD        Physician           Nette CASAREZ  Procedure Type of Study   TTE procedure:ECHOCARDIOGRAM LIMITED.   Procedure Date Date: 09/29/2022 Start: 03:11 PM Study Location: [facility] - Echo Lab Indications:Congestive heart failure. Height: 65 inches Weight: 143 pounds BSA: 1.72 m2 BMI: 23.8 kg/m2  Conclusions   Summary  This is a limited echocardiogram.  Left ventricular systolic function is normal.  Ejection fraction is visually estimated at 55%. Moderate aortic regurgitation; PHT: 334 msec. Moderate tricuspid regurgitation; RVSP: 50 mmHg. Severe PHTN. No evidence of any pericardial effusion. Pleural effusion present. Signature   ------------------------------------------------------------------  Electronically signed by Halina Ames MD  (Interpreting physician) on 09/30/2022 at 11:56 AM  ------------------------------------------------------------------   Findings   Left Ventricle  Left ventricular systolic function is normal.  Ejection fraction is visually estimated at 55%. Aortic Valve  Moderate aortic regurgitation; PHT: 334 msec. Tricuspid Valve  Moderate tricuspid regurgitation; RVSP: 50 mmHg. Severe PHTN. Pericardial Effusion  No evidence of any pericardial effusion. Pleural Effusion  Pleural effusion present.   M-Mode/2D Measurements & Calculations   LV Diastolic Dimension: 6.94 cm LV Systolic Dimension: 1.01 cm  LV FS:39.2 %                    LV Volume Diastolic: 84 ml  LV PW Diastolic: 5.33 cm        LV Volume Systolic: 38 ml  LV PW Systolic: 2.63 cm         LV EDV/LV EDV Index: 84 ml/49 m2LV ESV/LV  Septum Diastolic: 1.46 cm       ESV Index: 38 ml/22 m2  Septum Systolic: 5.06 cm        EF Calculated (A4C): 54.8 %                                  EF Calculated (2D): 69.7 %   LV Area Diastolic: 51.6 cm2     LV Length: 8.38 cm  LV Area Systolic: 38.4 cm2  Doppler Measurements & Calculations                               Estimated RVSP: 50 mmHg                              Estimated RAP:3 mmHg    AV P1/2t: 334 msec   Estimated PASP: 14.02 mmHg TR Velocity:166 cm/s                              TR Gradient:11.02 mmHg      Echocardiogram limited    Result Date: 9/27/2022  Transthoracic Echocardiography Report (TTE)  Demographics   Patient Name       Kyung Born  Date of Study       09/27/2022                     A   Date of Birth      1950         Gender              Female   Age                71 year(s)         Race                   Patient Number     [id number]         Room Number         2119   Visit Number       [id number]   Corporate ID       [id number]   Accession Number   [id number]         Gris Link RDMS, T   Ordering Physician Osiel Severino MD                 Physician           Nette CASAREZ  Procedure Type of Study   TTE procedure:ECHOCARDIOGRAM LIMITED. Procedure Date Date: 09/27/2022 Start: 08:40 AM Study Location: Portable Technical Quality: Fair visualization Indications:Pericardial effusion. Patient Status: Routine Height: 65 inches Weight: 143 pounds BSA: 1.72 m2 BMI: 23.8 kg/m2 HR: 82 bpm BP: 117/65 mmHg  Conclusions   Summary  This is a limited echocardiogram.  Left ventricular systolic function is normal.  Ejection fraction is visually estimated at 50-55%. Moderate aortic regurgitation is noted. Moderate to severe tricuspid regurgitation; RVSP: 55 mmHg. Severe PHTN. S/p pericardiocentesis with 560cc drained on 9/26/22; no evidence of any  pericardial effusion. Left pleural effusion. Signature   ------------------------------------------------------------------  Electronically signed by Masood Burger MD  (Interpreting physician) on 09/27/2022 at 09:45 AM  ------------------------------------------------------------------   Findings   Left Ventricle  Left ventricular systolic function is normal.  Ejection fraction is visually estimated at 50-55%. Aortic Valve  Moderate aortic regurgitation is noted.    Tricuspid Valve  Moderate to severe tricuspid regurgitation; RVSP: 55 mmHg. Severe PHTN. Pericardial Effusion  S/p pericardiocentesis with 560cc drained on 9/26/22; no evidence of any  pericardial effusion. Pleural Effusion  Left pleural effusion. M-Mode/2D Measurements & Calculations   LV Diastolic Dimension: 8.74 cm LV Systolic Dimension: 3.1 cm  LV FS:26.4 %                    LV Volume Diastolic: 44.1 ml  LV PW Diastolic: 8.76 cm        LV Volume Systolic: 98.3 ml  LV PW Systolic: 8.84 cm         LV EDV/LV EDV Index: 74.6 ml/43 m2LV ESV/LV  Septum Diastolic: 8.48 cm       ESV Index: 29.8 ml/17 m2  Septum Systolic: 6.03 cm        EF Calculated (A4C): 60.1 %                                  EF Calculated (2D): 52 %  Doppler Measurements & Calculations                               Estimated RVSP: 55 mmHg                              Estimated RAP:3 mmHg    Estimated PASP: 40.21 mmHg TR Velocity:305 cm/s                              TR Gradient:37.21 mmHg      Echocardiogram limited    Result Date: 9/27/2022  Transthoracic Echocardiography Report (TTE)  Demographics   Patient Name       Francisca Sepulveda  Date of Study       09/26/2022                     A   Date of Birth      1950         Gender              Female   Age                71 year(s)         Race                   Patient Number     [id number]         Room Number         2119   Visit Number       [id number]   Corporate ID       [id number]   Accession Number   [id number]         Monica Pitts RDMS   Ordering Physician Isaac Escalona MD                 Physician           MD  Procedure Type of Study   TTE procedure:ECHOCARDIOGRAM LIMITED. Procedure Date Date: 09/26/2022 Start: 02:50 PM Study Location: OR Technical Quality: Fair visualization Indications:Pericardial effusion.  Patient Status: Routine Height: 65 inches Weight: 143 pounds BSA: 1.72 m2 BMI: 23.8 kg/m2 BP: 112/39 mmHg  Conclusions   Summary  This is a limited echo during pericardiocentesis. See full report of procedure in EPIC   600cc of straw-colored fluid was drained, no pericardial effusion noted  post procedure. Signature   ------------------------------------------------------------------  Electronically signed by Ita Mcdonald MD (Interpreting  physician) on 09/27/2022 at 08:57 AM  ------------------------------------------------------------------      Echocardiogram limited    Result Date: 9/25/2022  Transthoracic Echocardiography Report (TTE)  Demographics   Patient Name       Kathy Mcdonald  Date of Study       09/25/2022                     A   Date of Birth      1950         Gender              Female   Age                71 year(s)         Race                   Patient Number     [id number]         Room Number         3113   Visit Number       [id number]   Corporate ID       [id number]   Accession Number   [id number]         Renetta Middleton RN, [facility]   Ordering Physician Yovany Mattson MD, MD  Procedure Type of Study   TTE procedure:ECHOCARDIOGRAM LIMITED. Procedure Date Date: 09/25/2022 Start: 04:08 PM Study Location: Portable Technical Quality: Adequate visualization Indications:Pericardial effusion. Patient Status: ASAP Height: 65 inches Weight: 143 pounds BSA: 1.72 m2 BMI: 23.8 kg/m2 Rhythm: Sinus rhythm HR: 92 bpm BP: 145/59 mmHg  Conclusions   Summary  This is a limited echocardiogram.  Left ventricular systolic function is normal.  Ejection fraction is visually estimated at 50-55%. Moderate aortic regurgitation ( ms). Mild to moderate tricuspid regurgitation. RVSP is 50 mmHg. Mild to moderate pulmonic regurgitation.   Moderate pericardial effusion. There are some early signs of cardiac  tamponade. Doppler surrogate shows positive pulsus paradoxis and there is  mild early diastolic collapse of the right ventricle. Small bilateral pleural effusions s/o thoracentesis. Signature   ------------------------------------------------------------------  Electronically signed by Milad vEerett MD  (Interpreting physician) on 09/25/2022 at 08:00 PM  ------------------------------------------------------------------   Findings   Left Ventricle  Left ventricular systolic function is normal.  Ejection fraction is visually estimated at 50-55%. Aortic Valve  Moderate aortic regurgitation ( ms). Tricuspid Valve  Mild to moderate tricuspid regurgitation. RVSP is 50 mmHg. Pulmonic Valve  Mild to moderate pulmonic regurgitation. Pericardial Effusion  Moderate pericardial effusion. There are some early signs of cardiac tamponade. Doppler surrogate shows  positive pulsus paradoxis and there is mild early diastolic collapse of the  right ventricle. Pleural Effusion  Small bilateral pleural effusions s/o thoracentesis. Doppler Measurements & Calculations                               Estimated RVSP: 50 mmHg                              Estimated RAP:3 mmHg    AV P1/2t: 267 msec   Estimated PASP: 44.47 mmHg TR Velocity:322 cm/s                              TR Gradient:41.47 mmHg      CT CHEST W CONTRAST    Result Date: 9/13/2022  ** ORIGINAL REPORT ** EXAMINATION: CT-ANGIO CHEST PE PROTOCOL [id number] DATE OF EXAM:  9/13/2022 8:19 AM DEMOGRAPHICS: 71years old Female INDICATION:              Contrast utilized and relevant clinical information: History:  Shortness of Breath. Number of Series/Images:  5. Contrast Medication(s): IOHEXOL 350 MG/ML IV SOLN Amount Administered = 67 mL COMPARISON: No existing relevant imaging study corresponding to the same anatomical region is available.  TECHNIQUE: The study was performed with the rapid administration of intravenous contrast timed to best evaluate and opacify the pulmonary arteries and their branches. Axial source 3 mm images were obtained. 3-D reconstructions were performed. Imaging and processing were performed per the institutional PE evaluation protocol. DOSE OPTIMIZATION: CT radiation dose optimization techniques (automated exposure control, and use of iterative reconstruction techniques, or adjustment of the mA and/or kV according to patient size) were used to limit patient radiation dose. FINDINGS: CT CHEST: Thyroid: The thyroid is unremarkable. Systemic arterial Vasculature: The thoracic aorta and arch vasculature have a normal contrasted appearance and are normal size and contour. There is no evidence for dissection. Coronary calcifications. Pulmonary arterial vasculature: The pulmonary arterial vasculature demonstrates good contrast opacification. There is no evidence for a focal filling defect. There is no evidence for focal stenosis or aneurysmal dilatation. 3-D reconstructed images: MIP images confirm and further demonstrate the findings from the axial source images. No additional focal abnormalities are seen. Heart: The heart size is normal.  Small amount of pericardial effusion. Mediastinum: No pathologically enlarged mediastinal lymph nodes are seen. No focal mass lesions are identified. There is no evidence for hiatal hernia. Pleura: Large bilateral pleural effusions. Tracheobronchial tree: The tracheobronchial tree is clear. No focal abnormalities are seen. Lungs : Segmental atelectasis in bilateral lung bases. There is near complete atelectasis of right middle. Upper abdomen: The visualized upper abdomen is grossly unremarkable. Bones: No significant bony abnormalities are noted. Chest wall: Status post right mastectomy with flap. [IMPRESSION] 1. No evidence of acute pulmonary embolus.  2.  Large bilateral pleural effusions with compressive atelectasis at bilateral lung bases left worse than right. There is near complete atelectasis of the right middle lobe. 3.  Small pericardial effusion. 4.  Coronary calcifications. This dictation was created with voice recognition software. While attempts have been made to review the dictation as it is transcribed, on occasion the spoken word can be misinterpreted by the technology leading to omissions or inappropriate words, phrases or sentences. Electronically Signed by: Pham Haines MD, 9/13/2022 8:38 AM ** ADDENDUM: #1 ** On further evaluation, multiple osseous lesions identified involving the rib cage including left posterior fourth rib, right second rib laterally concerning for metastatic disease given the history of primary malignancy. These findings were discussed with the referring physician at 1:25 PM on 9/13/2022 Electronically Signed by: Pham Haines MD, 9/13/2022 1:25 PM     MRI ABDOMEN W WO CONTRAST    Result Date: 9/15/2022  Indication:     Liver lesion, &gt; 1cm, history of malignancy  Demographics: 71years old Female Contrast Utilized: Contrast Medication(s): GADOTERATE MEGLUMINE 0.5 MMOL/ML (376.9 MG/ML) INTRAVENOUS SOLUTION Amount Administered = 12 mL Clinical Info:  History:  Liver lesion, &gt; 1cm, history of malignancy, difficulty with breathholds. Number of Series/Images:  16.  Date of Service: 9/15/2022 10:29 PM TECHNIQUE: Multisequence, multiplanar MRI of the abdomen was performed both before and after the IV administration of contrast. MRCP protocol was utilized. COMPARISON: None FINDINGS: Soft tissues: No significant abnormalities are noted in the soft tissues, vasculature, or body wall. Bilateral pleural effusions. Bones: The L1 vertebral body shows abnormal low and high signal intensity in it on T2 equivalent images. Abnormality is also noted in the inferolateral lateral vertebral body of T11. These correlate with bone scan findings and are worrisome for metastatic disease.  Abdominal cavity: No evidence of free air or free fluid. No peritoneal masses are noted. Liver: Normal signal intensity. The small abnormalities noted in the left lobe of the liver have characteristics typical of hemangiomas. Each measures about 2.9 cm. They enhance peripherally in a nodular fashion and are still showing contrast enhancement on the delayed images. No vascular abnormalities. Normal enhancement pattern. No biliary abnormalities. Spleen: Normal size and signal intensity. No masses are identified. Normal enhancement pattern. Adrenals: Normal size and signal intensity. No masses. Normal enhancement pattern. Pancreas: Normal signal intensity. No masses. No ductal dilatation. Normal enhancement. Kidneys: Normal size and signal intensity. No masses. Normal enhancement. Bowel: Normal caliber. No abnormalities identified. [IMPRESSION] 1. Abnormalities noted on the computed tomography scan in the liver proved to represent hemangiomas, a benign process requiring no further follow-up. 2.  Bony abnormalities at T11 and L1 suggestive of metastatic disease. 3.  Bilateral pleural effusions. Electronically Signed by: Dario Sullivan MD, 9/15/2022 11:03 PM Not Validated     NM Bone Scan Whole Body    Result Date: 9/14/2022  STUDY: NM-BONE SCAN, WHOLE BODY INDICATION: 71years old Female with history of fall and breast cancer. Evaluation for osseous metastatic disease. TECHNIQUE: The patient received 21.9 mCi of Tc-99m MDP injected intravenously. Three hours later, anterior and posterior whole body planar views were obtained, as well as multiple spot views. COMPARISON: Chest x-ray dated 9/13/2022, CTA chest PE protocol dated 9/13/2022, however no additional bone scans are available for comparison. FINDINGS: There is increased radiotracer uptake within the right second lateral rib, left fourth posterior rib, right lateral eighth rib, T11, T12 and L1 vertebral bodies and left ilium. There is also increased radiotracer uptake within multiple joints.  Both kidneys and the urinary bladder are visualized and appear unremarkable. [IMPRESSION] Findings consistent with osseous metastatic disease within multiple ribs and vertebral bodies and possibly left pelvis. Findings consistent with degenerative changes within multiple joints. Dictated by: Cheryl Lawrence (Resident)DO CAMPOS, Domi Owens MD, have reviewed the images and agree with the above interpretation. Electronically Signed by: Dmoi Owens MD, 9/14/2022 1:33 PM 7 - Indicated     US Chest Pleural Space    Result Date: 9/13/2022  EXAMINATION: US-CHEST (FLUID MARKING) DATE OF EXAM:  9/13/2022 9:50 AM DEMOGRAPHICS: 71years old Female INDICATION:     Quantify fluid for possible thoracentesis     Reason for Exam:  Quantify fluid for possible thoracentesis. COMPARISON: No existing relevant imaging study corresponding to the same anatomical region is available. [IMPRESSION] FINDINGS/[IMPRESSION] There is approximately 900 ml of pleural fluid estimated within the posterior right hemithorax. The posterior right hemithorax was not marked for possible thoracentesis. There is approximately 968 ml of pleural fluid estimated within the posterior left hemithorax. The posterior left hemithorax was not marked for possible thoracentesis. This dictation was created with voice recognition software. While attempts have been made to review the dictation as it is transcribed, on occasion the spoken word can be misinterpreted by the technology leading to omissions or inappropriate words, phrases or sentences. Electronically Signed by: Florecita Rivera, 9/13/2022 9:58 AM     CT ABDOMEN PELVIS W IV CONTRAST    Result Date: 9/15/2022  PROCEDURE: CT-ABD/PELVIS W IV CONTRAST ONLY DEMOGRAPHICS: 71years old Female INDICATION:     Breast cancer, invasive, stage IV, initial workup      Contrast utilized and relevant clinical information: History:  Breast cancer, invasive, stage IV, initial workup. Number of Series/Images:  4.   Contrast Medication(s): IOHEXOL 350 MG/ML IV SOLN Amount Administered = 50 mL COMPARISON:    TECHNIQUE: Contiguous axial slices of the abdomen and pelvis were submitted after the IV administration of contrast. Additional coronal reformatted images were submitted. CT radiation dose optimization techniques (automated exposure control, and use of iterative reconstruction techniques, or adjustment of the mA and/or kV according to patient size) were used to limit patient radiation dose. FINDINGS: CT ABDOMEN: Slices through lung bases show large right and mild/moderate left pleural effusion. Adjacent airspace disease mostly linear favored to be atelectasis. Moderate pericardial effusion with mild cardiomegaly Liver shows low-density lesion left hepatic lobe 2.5 x 1.6 cm with possible directly adjacent lesion posterior to this measuring 1.2 cm. Ill-defined low-density lesion in the left hepatic lobe estimated to measure 1.7 x 1.5 cm. Small hypodensity in the central right lobe is identified. The spines are concerning for metastases. This structure measures 6 mm Pancreas and spleen within normal limits allowing for splenic granulomatous calcifications Adrenal glands within normal limits Kidneys are symmetric Calcification of nondilated abdominal aorta No focal bladder abnormality Uterus not visualized likely surgically absent. A few diverticula in the sigmoid and descending colon without adjacent inflammation. Small and large bowel nondilated. Surgical clips along the right rectus abdominis are identified. There is a normal caliber air-filled appendix. No pathologically enlarged retroperitoneal or mesenteric adenopathy based on size criteria. No evidence of free air. Evaluation of bone windows shows levo scarring curvature lumbar spine. Mixed lysis and sclerosis involving the L1 vertebral body is noted concerning for metastatic disease.  [IMPRESSION] Hypodense lesions within the liver particularly left lobe concerning for evidence of metastatic disease 2. Sclerosis within the L1 vertebral body may indicate osseous metastasis. This was demonstrated on the recent bone scan. 3. Ancillary findings as above   Please see above for additional details Electronically Signed by: Pam Rossi MD, 9/15/2022 9:21 AM 8 - Indicated     XR CHEST PORTABLE    Result Date: 10/10/2022  EXAMINATION: ONE XRAY VIEW OF THE CHEST 10/10/2022 1:07 pm COMPARISON: 10/02/2022 HISTORY: ORDERING SYSTEM PROVIDED HISTORY: chest pain TECHNOLOGIST PROVIDED HISTORY: Reason for exam:->chest pain Reason for Exam: chest pain    sob FINDINGS: Cardiomegaly again noted. Increased bilateral pleural effusions. Bibasilar pulmonary opacities. No pneumothorax. Increased bilateral pleural effusions with bibasilar pulmonary opacities that could represent atelectasis or infection     XR CHEST PORTABLE    Result Date: 10/2/2022  EXAMINATION: ONE XRAY VIEW OF THE CHEST 10/2/2022 1:34 pm COMPARISON: 09/30/2022 HISTORY: ORDERING SYSTEM PROVIDED HISTORY: monitor pleural effusions TECHNOLOGIST PROVIDED HISTORY: Reason for exam:->monitor pleural effusions Reason for Exam: Shortness of Breath FINDINGS: There is cardiomegaly. Bilateral pleural effusions with bibasilar atelectasis. There is interstitial prominence throughout both lungs suggesting interstitial pulmonary edema. Cardiomegaly is noted. Cardiomediastinal silhouette and bony thorax are unchanged. Left subclavian line with tip in the SVC. No pneumothorax. Cardiomegaly with bilateral effusions and interstitial pulmonary edema suggesting mild CHF. Stable exam.     XR CHEST PORTABLE    Result Date: 9/30/2022  EXAMINATION: ONE XRAY VIEW OF THE CHEST 9/30/2022 2:04 pm COMPARISON: Chest radiograph 09/29/2022.  HISTORY: ORDERING SYSTEM PROVIDED HISTORY: Post bilateral thoracentesis TECHNOLOGIST PROVIDED HISTORY: Reason for exam:->Post bilateral thoracentesis Reason for Exam: Post bilateral thoracentesis Additional signs and symptoms: Post bilateral thoracentesis Relevant Medical/Surgical History: Post bilateral thoracentesis FINDINGS: Single view provided. Left PICC with tip in the proximal superior vena cava. Stable enlarged cardiac silhouette. Status post bilateral thoracentesis with improved effusions. Mild residual effusions. There is improved bibasilar atelectasis. No pneumothorax or free subdiaphragmatic air. No subcutaneous emphysema. Status post bilateral thoracentesis with improved effusions and lung base aeration. No pneumothorax. XR CHEST PORTABLE    Result Date: 9/29/2022  EXAMINATION: ONE XRAY VIEW OF THE CHEST 9/29/2022 2:45 am COMPARISON: 09/28/2022, 09/26/2022 HISTORY: ORDERING SYSTEM PROVIDED HISTORY: bilateral pleural effusion TECHNOLOGIST PROVIDED HISTORY: Reason for exam:->bilateral pleural effusion Reason for Exam: bilateral pleural effusion FINDINGS: Vascular congestion, basilar opacities and pleural effusions are again demonstrated. Vascular congestion appears less prominent in the interval. No pneumothorax identified. Left PICC line remains in place. Interval improved appearance of vascular congestion. Similar appearance of basilar airspace disease and effusions. XR CHEST PORTABLE    Result Date: 9/28/2022  EXAMINATION: ONE XRAY VIEW OF THE CHEST 9/28/2022 4:29 pm COMPARISON: 09/28/2022 HISTORY: ORDERING SYSTEM PROVIDED HISTORY: picc line placement TECHNOLOGIST PROVIDED HISTORY: Reason for exam:->picc line placement Reason for Exam: picc line placement Additional signs and symptoms: na Relevant Medical/Surgical History: na FINDINGS: Cardiomediastinal silhouette is stable. Similar bilateral pleural effusions and bibasilar airspace opacities. No pneumothorax. No gross bony abnormality. Left PICC tip projects at the mid SVC. Left PICC tip projects at the mid SVC.   Otherwise stable chest.     XR CHEST PORTABLE    Result Date: 9/28/2022  EXAMINATION: ONE XRAY VIEW OF THE CHEST 9/28/2022 6:16 am COMPARISON: None available. Down time procedure. HISTORY: ORDERING SYSTEM PROVIDED HISTORY: hypoxia TECHNOLOGIST PROVIDED HISTORY: Reason for exam:->hypoxia Reason for Exam: hypoxia FINDINGS: The heart is enlarged. The mediastinal contours are normal.  There is pulmonary edema with moderate bilateral pleural effusions and associated bibasilar atelectasis. No pneumothorax. Surgical clip is noted in the right axilla. The bones are osteopenic. Cardiomegaly with pulmonary edema and moderate bilateral pleural effusions with associated bibasilar atelectasis. XR CHEST PORTABLE    Result Date: 9/26/2022  EXAMINATION: ONE XRAY VIEW OF THE CHEST 9/26/2022 10:34 am COMPARISON: 09/25/2022. HISTORY: ORDERING SYSTEM PROVIDED HISTORY: Hypoxia TECHNOLOGIST PROVIDED HISTORY: Reason for exam:->Hypoxia Reason for Exam: Hypoxia FINDINGS: The cardiac silhouette is indeterminate. There is worsening bibasilar opacity, right greater than left. There is suspected right-sided moderate-sized pleural effusion. No evidence of pneumothorax is seen. Worsening bibasilar opacities, right greater than left with suspected moderate size right-sided pleural effusion. XR CHEST PORTABLE    Result Date: 9/25/2022  EXAMINATION: ONE XRAY VIEW OF THE CHEST 9/25/2022 1:14 pm COMPARISON: Chest radiograph 09/25/2022, 09/23/2022. HISTORY: ORDERING SYSTEM PROVIDED HISTORY: r/o pneumothorax TECHNOLOGIST PROVIDED HISTORY: Reason for exam:->r/o pneumothorax Reason for Exam: r/o pneumothorax FINDINGS: Single upright view provided. Stable mediastinal and cardiac silhouettes. Status post thoracentesis with improved bilateral pleural effusions and lung base aeration. Mild persistent bilateral effusions and lower lobe/lung base consolidation. No evidence of a pneumothorax. No free subdiaphragmatic air. Decreased bone mineral density. 1. Improved bilateral pleural effusions and lung base aeration.  2. No large pneumothorax. The questionable right apical trace pneumothorax cannot be identified on the upright view. XR CHEST PORTABLE    Result Date: 9/25/2022  EXAMINATION: ONE XRAY VIEW OF THE CHEST 9/25/2022 12:17 pm COMPARISON: Chest radiograph 09/23/2022. HISTORY: ORDERING SYSTEM PROVIDED HISTORY: check for pneumothorax TECHNOLOGIST PROVIDED HISTORY: Reason for exam:->check for pneumothorax Reason for Exam: check for pneumothorax FINDINGS: Single view provided. Enlarged cardiac silhouette. Stable central pulmonary vascular prominence and perihilar interstitial reticulonodular densities which may represent edema. Interval improvement of bilateral pleural effusions with improved lung base aeration. Persistent bibasilar consolidation and mild. There is a subtle lucency in right hemithorax apex which could represent a trace pneumothorax. 1. Status post bilateral thoracentesis with improved effusions and lung base aeration. 2. Question trace right apical pneumothorax. 3. Mild residual bibasilar atelectasis and pleural effusions. XR CHEST PORTABLE    Result Date: 9/24/2022  EXAMINATION: ONE XRAY VIEW OF THE CHEST 9/23/2022 9:58 pm COMPARISON: August 23, 2022 HISTORY: ORDERING SYSTEM PROVIDED HISTORY: dyspnea TECHNOLOGIST PROVIDED HISTORY: Reason for exam:->dyspnea Reason for Exam: shortness of breath Additional signs and symptoms: dyspnea FINDINGS: No lines or tubes. Stable cardiomediastinal silhouette. The lungs show interval decrease in lung volume with worsening basilar opacities, bilateral effusions and pulmonary edema. No pneumothorax. No acute or aggressive osseous lesion. 1. Worsening basilar opacities, bilateral effusions and pulmonary edema suggestive of congestive heart failure. Superimposed pneumonia would be difficult to exclude.      US THORACENTESIS    Result Date: 9/13/2022  EXAMINATION: US-THORACENTESIS FOR ASPIRATION W/IMAGING 92459 DATE OF EXAM:  9/13/2022 1:14 PM DEMOGRAPHICS: 72 years old Female INDICATION:     thorocentesis     Reason for Exam:  thorocentesis. COMPARISON: Ultrasound chest fluid marking from 9/13/2022. PROCEDURE PERFORMED BY: Prabhjot Marcus PA-C ATTENDING RADIOLOGIST(Direct Supervision) : Dr. Dash [facility]: The patient was brought to the sonography suite and placed on the table in an upright sitting position. Written Informed consent was obtained after the risks (bleeding, infection, pneumothorax, postoperative pain) and benefits were discussed with the patient. An appropriate access site was chosen in the Left posterior chest utilizing real-time sonographic guidance. A procedural timeout was performed. The overlying area was then marked and prepped and draped in sterile fashion. Local anesthesia was achieved using 1% lidocaine. A small dermatotomy was made utilizing an 11 blade. Then, an 6 [employer] [id number] catheter was then advanced directly into the pleural space. There was immediate return of wilma-colored fluid. 1100 mL was evacuated. No immediate complications were encountered. The catheter was removed and hemostasis was achieved with direct pressure. The patient left the sonography suite in stable, unchanged condition. A post-procedure chest x-ray will be obtained. Permanent images were obtained and stored. [IMPRESSION] Technically successful ultrasound-guided Left thoracentesis. Films reviewed, interpreted and dictated by Dr. Saurabh Chu. Transcribed by Prabhjot Marcus PA-C. This dictation was created with voice recognition software. While attempts have been made to review the dictation as it is transcribed, on occasion the spoken word can be misinterpreted by the technology leading to omissions or inappropriate words, phrases or sentences. Dictated by: Prabhjot Marcus (Radiology KIM)KIM Maura Batter, MD, have reviewed the images and agree with the above interpretation.  Electronically Signed by: Aliza Lorenzo MD, 9/13/2022 3:46 PM     VL DUP UPPER EXTREMITY VENOUS RIGHT    Result Date: 9/30/2022  EXAMINATION: DUPLEX ULTRASOUND OF THE RIGHT UPPER EXTREMITY FOR DVT, 9/28/2022 10:45 pm TECHNIQUE: Duplex ultrasound using B-mode/gray scaled imaging and Doppler spectral analysis and color flow was obtained of the deep venous structures of the upper right extremity. COMPARISON: None. HISTORY: ORDERING SYSTEM PROVIDED HISTORY: RUE Swelling, r/o DVT TECHNOLOGIST PROVIDED HISTORY: Reason for exam:->RUE Swelling, r/o DVT FINDINGS: Nonocclusive thrombus is identified in the cephalic vein above the elbow and antecubital fossa. The thrombus appears occlusive to near occlusive within the forearm. There is normal flow and compressibility of the remaining visualized venous structures. There is no evidence of echogenic thrombus. The veins demonstrate good compressibility with normal color flow study and spectral analysis. A right thyroid lobe nodule measures 10 x 9 x 7 mm. Thrombus identified in the cephalic vein just above the elbow extending throughout the forearm. Findings were discussed with Dr. Tara Villeda on 9/29/2022 at 12:33 a.m. Rico Patella XR Chest 1 VW    Result Date: 9/13/2022  EXAMINATION: XR-CHEST SINGLE (COUNT) DATE OF EXAM:  9/13/2022 1:46 PM DEMOGRAPHICS: 71years old Female INDICATION:     Post Thoracentesis-PleurX Placement     History:  Post Thoracentesis-PleurX Placement. Number of Series/Images:  1. COMPARISON: 9/13/2022 TECHNIQUE: Single AP portable chest radiograph was obtained. FINDINGS: Cardiomediastinal silhouette is obscured by right pleural effusion and underlying atelectasis or consolidation unchanged. Improved left pleural effusion. Left basilar opacities unchanged. No definite pneumothorax seen. There is no acute osseous abnormality. [IMPRESSION] 1.  RIGHT pleural effusion and underlying atelectasis or consolidation unchanged. Improved left pleural effusion. Left basilar opacities unchanged.  No definite pneumothorax seen    This dictation was created with voice recognition software. While attempts have been made to review the dictation as it is transcribed, on occasion the spoken word can be misinterpreted by the technology leading to omissions or inappropriate words, phrases or sentences. Electronically Signed by: Vivi Bean, 9/13/2022 2:02 PM     US CHEST INCLUDING MEDIASTINUM    Result Date: 9/25/2022  EXAMINATION: ULTRASOUND OF THE CHEST 9/25/2022 12:07 pm COMPARISON: Chest radiograph done 09/23/2022. HISTORY: ORDERING SYSTEM PROVIDED HISTORY: Bilateral Thoracenteis at the bedside at 10:30 AM tomorrow TECHNOLOGIST PROVIDED HISTORY: Reason for exam:->Bilateral Thoracenteis at the bedside at 10:30 AM tomorrow FINDINGS/IMPRESSION: Skin marking was performed for bedside thoracentesis. Bilateral pleural effusions are demonstrated. ED COURSE & MEDICAL DECISION MAKING       Vital signs and nursing notes reviewed during ED course. I have independently evaluated this patient . Supervising physician - Dr. Carlo Calderon - was present in ED and available for consultation throughout entirety of patient's care. All pertinent Lab data and radiographic results reviewed with patient at bedside. The patient and/or the family were informed of the results of any tests/labs/imaging, the treatment plan, and time was allotted to answer questions. Clinical Impression:  1. Acute respiratory failure with hypoxia ([address]    2. Pleural effusion    3. Acute on chronic systolic congestive heart failure [facility])      Patient presents for shortness of breath. On exam, pleasant 28-year-old female, noted tachypnea, 97% on 4 L which is an increase ox requirement from her baseline. Speaking 2-3 word sentences, coarse air change with scattered crackles throughout. No stridor. On exam is nonsurgical.  Trace pitting edema to the lower legs. Calves are otherwise supple nontender. Patient placed on telemetry and continuous pulse ox monitoring.   Is having improvement of her applicable):  No follow-up provider specified.     Disposition medications (if applicable):  New Prescriptions    No medications on file         (Please note that portions of this note may have been completed with a voice recognition program. Efforts were made to edit the dictations but occasionally words are mis-transcribed.)         Tenisha Quinones PA-C  10/10/22 [id number]

## 2022-10-11 ENCOUNTER — APPOINTMENT (OUTPATIENT)
Dept: INTERVENTIONAL RADIOLOGY/VASCULAR | Age: 72
DRG: 371 | End: 2022-10-11
Payer: MEDICARE

## 2022-10-11 ENCOUNTER — APPOINTMENT (OUTPATIENT)
Dept: GENERAL RADIOLOGY | Age: 72
DRG: 371 | End: 2022-10-11
Payer: MEDICARE

## 2022-10-11 PROBLEM — I48.0 PAF (PAROXYSMAL ATRIAL FIBRILLATION) (HCC): Status: ACTIVE | Noted: 2022-10-11

## 2022-10-11 PROBLEM — E43 SEVERE MALNUTRITION (HCC): Chronic | Status: ACTIVE | Noted: 2022-10-11

## 2022-10-11 LAB
ANION GAP SERPL CALCULATED.3IONS-SCNC: 12 MMOL/L (ref 4–16)
BASOPHILS ABSOLUTE: 0.1 K/CU MM
BASOPHILS RELATIVE PERCENT: 0.6 % (ref 0–1)
BUN BLDV-MCNC: 30 MG/DL (ref 6–23)
CALCIUM SERPL-MCNC: 9.4 MG/DL (ref 8.3–10.6)
CHLORIDE BLD-SCNC: 101 MMOL/L (ref 99–110)
CHOLESTEROL: 150 MG/DL
CO2: 24 MMOL/L (ref 21–32)
CREAT SERPL-MCNC: 1.4 MG/DL (ref 0.6–1.1)
DIFFERENTIAL TYPE: ABNORMAL
EKG ATRIAL RATE: 77 BPM
EKG ATRIAL RATE: 83 BPM
EKG DIAGNOSIS: NORMAL
EKG DIAGNOSIS: NORMAL
EKG P AXIS: 40 DEGREES
EKG P-R INTERVAL: 132 MS
EKG Q-T INTERVAL: 274 MS
EKG Q-T INTERVAL: 354 MS
EKG QRS DURATION: 68 MS
EKG QRS DURATION: 76 MS
EKG QTC CALCULATION (BAZETT): 392 MS
EKG QTC CALCULATION (BAZETT): 400 MS
EKG R AXIS: 40 DEGREES
EKG R AXIS: 79 DEGREES
EKG T AXIS: 222 DEGREES
EKG T AXIS: 39 DEGREES
EKG VENTRICULAR RATE: 123 BPM
EKG VENTRICULAR RATE: 77 BPM
EOSINOPHILS ABSOLUTE: 0.1 K/CU MM
EOSINOPHILS RELATIVE PERCENT: 0.5 % (ref 0–3)
GFR AFRICAN AMERICAN: 45 ML/MIN/1.73M2
GFR NON-AFRICAN AMERICAN: 37 ML/MIN/1.73M2
GLUCOSE BLD-MCNC: 111 MG/DL (ref 70–99)
GLUCOSE BLD-MCNC: 125 MG/DL (ref 70–99)
GLUCOSE BLD-MCNC: 189 MG/DL (ref 70–99)
GLUCOSE BLD-MCNC: 318 MG/DL (ref 70–99)
GLUCOSE BLD-MCNC: 350 MG/DL (ref 70–99)
HCT VFR BLD CALC: 32.9 % (ref 37–47)
HDLC SERPL-MCNC: 36 MG/DL
HEMOGLOBIN: 10.2 GM/DL (ref 12.5–16)
IMMATURE NEUTROPHIL %: 2.5 % (ref 0–0.43)
LDL CHOLESTEROL CALCULATED: 58 MG/DL
LYMPHOCYTES ABSOLUTE: 1 K/CU MM
LYMPHOCYTES RELATIVE PERCENT: 9 % (ref 24–44)
MAGNESIUM: 1.9 MG/DL (ref 1.8–2.4)
MCH RBC QN AUTO: 28.5 PG (ref 27–31)
MCHC RBC AUTO-ENTMCNC: 31 % (ref 32–36)
MCV RBC AUTO: 91.9 FL (ref 78–100)
MONOCYTES ABSOLUTE: 0.8 K/CU MM
MONOCYTES RELATIVE PERCENT: 7.1 % (ref 0–4)
NUCLEATED RBC %: 0 %
OSMOLALITY URINE: 337 MOS/L (ref 292–1090)
OSMOLALITY: 300 MOS/L (ref 280–300)
PDW BLD-RTO: 17.1 % (ref 11.7–14.9)
PLATELET # BLD: 394 K/CU MM (ref 140–440)
PMV BLD AUTO: 9.4 FL (ref 7.5–11.1)
POTASSIUM SERPL-SCNC: 4.4 MMOL/L (ref 3.5–5.1)
PRO-BNP: ABNORMAL PG/ML
PROTEIN UA: NEGATIVE MG/DL
RBC # BLD: 3.58 M/CU MM (ref 4.2–5.4)
SEGMENTED NEUTROPHILS ABSOLUTE COUNT: 8.8 K/CU MM
SEGMENTED NEUTROPHILS RELATIVE PERCENT: 80.3 % (ref 36–66)
SODIUM BLD-SCNC: 137 MMOL/L (ref 135–145)
SODIUM URINE: 90 MMOL/L (ref 35–167)
TOTAL IMMATURE NEUTOROPHIL: 0.27 K/CU MM
TOTAL NUCLEATED RBC: 0 K/CU MM
TRIGL SERPL-MCNC: 280 MG/DL
WBC # BLD: 10.9 K/CU MM (ref 4–10.5)

## 2022-10-11 PROCEDURE — 32555 ASPIRATE PLEURA W/ IMAGING: CPT

## 2022-10-11 PROCEDURE — 84443 ASSAY THYROID STIM HORMONE: CPT

## 2022-10-11 PROCEDURE — 2580000003 HC RX 258: Performed by: STUDENT IN AN ORGANIZED HEALTH CARE EDUCATION/TRAINING PROGRAM

## 2022-10-11 PROCEDURE — 93308 TTE F-UP OR LMTD: CPT

## 2022-10-11 PROCEDURE — 83540 ASSAY OF IRON: CPT

## 2022-10-11 PROCEDURE — 6370000000 HC RX 637 (ALT 250 FOR IP): Performed by: NURSE PRACTITIONER

## 2022-10-11 PROCEDURE — 84300 ASSAY OF URINE SODIUM: CPT

## 2022-10-11 PROCEDURE — 93005 ELECTROCARDIOGRAM TRACING: CPT | Performed by: NURSE PRACTITIONER

## 2022-10-11 PROCEDURE — 0W9B3ZZ DRAINAGE OF LEFT PLEURAL CAVITY, PERCUTANEOUS APPROACH: ICD-10-PCS | Performed by: RADIOLOGY

## 2022-10-11 PROCEDURE — 83930 ASSAY OF BLOOD OSMOLALITY: CPT

## 2022-10-11 PROCEDURE — 82962 GLUCOSE BLOOD TEST: CPT

## 2022-10-11 PROCEDURE — APPNB60 APP NON BILLABLE TIME 46-60 MINS: Performed by: NURSE PRACTITIONER

## 2022-10-11 PROCEDURE — 83735 ASSAY OF MAGNESIUM: CPT

## 2022-10-11 PROCEDURE — 99223 1ST HOSP IP/OBS HIGH 75: CPT | Performed by: INTERNAL MEDICINE

## 2022-10-11 PROCEDURE — 80061 LIPID PANEL: CPT

## 2022-10-11 PROCEDURE — 71045 X-RAY EXAM CHEST 1 VIEW: CPT

## 2022-10-11 PROCEDURE — 80048 BASIC METABOLIC PNL TOTAL CA: CPT

## 2022-10-11 PROCEDURE — 99222 1ST HOSP IP/OBS MODERATE 55: CPT | Performed by: INTERNAL MEDICINE

## 2022-10-11 PROCEDURE — 94761 N-INVAS EAR/PLS OXIMETRY MLT: CPT

## 2022-10-11 PROCEDURE — 94760 N-INVAS EAR/PLS OXIMETRY 1: CPT

## 2022-10-11 PROCEDURE — 84484 ASSAY OF TROPONIN QUANT: CPT

## 2022-10-11 PROCEDURE — 93010 ELECTROCARDIOGRAM REPORT: CPT | Performed by: INTERNAL MEDICINE

## 2022-10-11 PROCEDURE — 83880 ASSAY OF NATRIURETIC PEPTIDE: CPT

## 2022-10-11 PROCEDURE — 6370000000 HC RX 637 (ALT 250 FOR IP): Performed by: STUDENT IN AN ORGANIZED HEALTH CARE EDUCATION/TRAINING PROGRAM

## 2022-10-11 PROCEDURE — 83935 ASSAY OF URINE OSMOLALITY: CPT

## 2022-10-11 PROCEDURE — 81003 URINALYSIS AUTO W/O SCOPE: CPT

## 2022-10-11 PROCEDURE — 2700000000 HC OXYGEN THERAPY PER DAY

## 2022-10-11 PROCEDURE — 1200000000 HC SEMI PRIVATE

## 2022-10-11 PROCEDURE — 83550 IRON BINDING TEST: CPT

## 2022-10-11 PROCEDURE — 0W993ZZ DRAINAGE OF RIGHT PLEURAL CAVITY, PERCUTANEOUS APPROACH: ICD-10-PCS | Performed by: RADIOLOGY

## 2022-10-11 PROCEDURE — 85025 COMPLETE CBC W/AUTO DIFF WBC: CPT

## 2022-10-11 PROCEDURE — 2580000003 HC RX 258: Performed by: NURSE PRACTITIONER

## 2022-10-11 PROCEDURE — 6360000002 HC RX W HCPCS: Performed by: STUDENT IN AN ORGANIZED HEALTH CARE EDUCATION/TRAINING PROGRAM

## 2022-10-11 PROCEDURE — 6360000002 HC RX W HCPCS: Performed by: NURSE PRACTITIONER

## 2022-10-11 PROCEDURE — 36415 COLL VENOUS BLD VENIPUNCTURE: CPT

## 2022-10-11 RX ORDER — INSULIN GLARGINE 100 [IU]/ML
10 INJECTION, SOLUTION SUBCUTANEOUS NIGHTLY
Status: DISCONTINUED | OUTPATIENT
Start: 2022-10-12 | End: 2022-10-13

## 2022-10-11 RX ORDER — FUROSEMIDE 20 MG/1
20 TABLET ORAL DAILY
Status: DISCONTINUED | OUTPATIENT
Start: 2022-10-12 | End: 2022-10-14 | Stop reason: HOSPADM

## 2022-10-11 RX ORDER — PREDNISONE 20 MG/1
20 TABLET ORAL DAILY
Status: DISCONTINUED | OUTPATIENT
Start: 2022-10-11 | End: 2022-10-14 | Stop reason: HOSPADM

## 2022-10-11 RX ORDER — INSULIN GLARGINE 100 [IU]/ML
5 INJECTION, SOLUTION SUBCUTANEOUS ONCE
Status: COMPLETED | OUTPATIENT
Start: 2022-10-11 | End: 2022-10-11

## 2022-10-11 RX ORDER — AMIODARONE HYDROCHLORIDE 200 MG/1
200 TABLET ORAL DAILY
Status: DISCONTINUED | OUTPATIENT
Start: 2022-10-11 | End: 2022-10-14 | Stop reason: HOSPADM

## 2022-10-11 RX ADMIN — PAROXETINE HYDROCHLORIDE 20 MG: 20 TABLET, FILM COATED ORAL at 08:23

## 2022-10-11 RX ADMIN — INSULIN LISPRO 6 UNITS: 100 INJECTION, SOLUTION INTRAVENOUS; SUBCUTANEOUS at 18:37

## 2022-10-11 RX ADMIN — INSULIN LISPRO 3 UNITS: 100 INJECTION, SOLUTION INTRAVENOUS; SUBCUTANEOUS at 18:37

## 2022-10-11 RX ADMIN — MEROPENEM 500 MG: 500 INJECTION, POWDER, FOR SOLUTION INTRAVENOUS at 21:28

## 2022-10-11 RX ADMIN — SODIUM CHLORIDE, PRESERVATIVE FREE 10 ML: 5 INJECTION INTRAVENOUS at 21:20

## 2022-10-11 RX ADMIN — AMIODARONE HYDROCHLORIDE 200 MG: 200 TABLET ORAL at 15:53

## 2022-10-11 RX ADMIN — PREDNISONE 20 MG: 20 TABLET ORAL at 11:43

## 2022-10-11 RX ADMIN — APIXABAN 5 MG: 5 TABLET, FILM COATED ORAL at 21:20

## 2022-10-11 RX ADMIN — INSULIN LISPRO 4 UNITS: 100 INJECTION, SOLUTION INTRAVENOUS; SUBCUTANEOUS at 21:24

## 2022-10-11 RX ADMIN — OXYBUTYNIN CHLORIDE 5 MG: 5 TABLET ORAL at 08:23

## 2022-10-11 RX ADMIN — ROSUVASTATIN CALCIUM 10 MG: 5 TABLET, COATED ORAL at 21:20

## 2022-10-11 RX ADMIN — FUROSEMIDE 40 MG: 10 INJECTION, SOLUTION INTRAMUSCULAR; INTRAVENOUS at 07:53

## 2022-10-11 RX ADMIN — INSULIN GLARGINE 5 UNITS: 100 INJECTION, SOLUTION SUBCUTANEOUS at 21:24

## 2022-10-11 RX ADMIN — ASPIRIN 81 MG: 81 TABLET, COATED ORAL at 08:23

## 2022-10-11 RX ADMIN — INSULIN GLARGINE 5 UNITS: 100 INJECTION, SOLUTION SUBCUTANEOUS at 23:07

## 2022-10-11 RX ADMIN — MIRTAZAPINE 15 MG: 15 TABLET, FILM COATED ORAL at 21:20

## 2022-10-11 RX ADMIN — OXYBUTYNIN CHLORIDE 5 MG: 5 TABLET ORAL at 21:20

## 2022-10-11 RX ADMIN — SODIUM CHLORIDE, PRESERVATIVE FREE 10 ML: 5 INJECTION INTRAVENOUS at 08:24

## 2022-10-11 RX ADMIN — MEROPENEM 500 MG: 500 INJECTION, POWDER, FOR SOLUTION INTRAVENOUS at 08:28

## 2022-10-11 RX ADMIN — PANTOPRAZOLE SODIUM 40 MG: 40 TABLET, DELAYED RELEASE ORAL at 05:48

## 2022-10-11 RX ADMIN — SODIUM CHLORIDE 25 ML: 9 INJECTION, SOLUTION INTRAVENOUS at 21:27

## 2022-10-11 RX ADMIN — DILTIAZEM HYDROCHLORIDE 180 MG: 180 CAPSULE, COATED, EXTENDED RELEASE ORAL at 08:23

## 2022-10-11 ASSESSMENT — PAIN SCALES - WONG BAKER
WONGBAKER_NUMERICALRESPONSE: 0

## 2022-10-11 ASSESSMENT — ENCOUNTER SYMPTOMS
VOMITING: 0
CHEST TIGHTNESS: 0
CONSTIPATION: 0
PHOTOPHOBIA: 0
ABDOMINAL PAIN: 0
SHORTNESS OF BREATH: 1
BLOOD IN STOOL: 0
COUGH: 0
DIARRHEA: 0
NAUSEA: 0
BACK PAIN: 0
COLOR CHANGE: 0
EYE PAIN: 0
WHEEZING: 0

## 2022-10-11 ASSESSMENT — PAIN SCALES - GENERAL
PAINLEVEL_OUTOF10: 0
PAINLEVEL_OUTOF10: 0

## 2022-10-11 NOTE — CONSULTS
Electrophysiology Consult Note      Reason for consultation:  PAF    Chief complaint : Shortness of breath    Referring physician: Allyn Sethi      Primary care physician: Terrence Dial MD      History of Present Illness:     Mariano Honeycutt is a 71year old female with a history of breast carcinoma, diabetes type 2, hypertension, TIA, atrial fibrillation, and recurrent bilateral pleural effusions presents with complaints of gradual worsening shortness of breath, orthopnea, and lower leg swelling. Patient has had multiple admissions for reoccurring bilateral pleural effusions. Most recent admission was 2 weeks ago. Patient was found to have bilateral pleural effusions this admission also. She had a thoracentesis this morning with the right lung -1550 cc of fluid and the left lung -1300 cc of fluid. Patient was noted to be in sinus rhythm on admission however reverted to atrial fibrillation. Patient is on cardizem, amiodarone, and eliquis at home. After the thoracentesis today, patient now in sinus rhythm. Amiodarone was held at admission. Patient noted to have atrial fibrillation last admission. Amiodarone was initiated at that time. When patient was discharged she was in sinus rhythm. Patient denies chest pain, palpitations, lightheadedness, dizziness edema or syncope. On admission , K 4.7, creatinine 1.3, magnesium 2.1, ProBNP 14998, troponin <0.010 and 0.011. Patient was first diagnosed with atrial fibrillation in August. Patient has not had cardioversion in the past.           Pastmedical history:   Past Medical History:   Diagnosis Date    Anxiety     Breast carcinoma ([address] 1997    R breast - MAMMOGRAM ANNUALLY, NEXT DUE 5/2020.     Cervical radiculitis     Right     Chest pain     Depression     Diabetes type 2, controlled ([address] 2016    w two random sugars >126    Family history of diabetes mellitus (DM)     Mother and Father    GERD (gastroesophageal reflux disease)     UGI- sm HH , done at [address] [place] [address],[address] 3/26/14    Hiatal hernia     Hx of Doppler ultrasound 05/17/2022    Normal bilateral lower extremity ankle brachial indices. Hx of Doppler ultrasound 05/17/2022    Significant reflux noted of the Right GSV SFJ (0.7s), GSV Knee (2.0s). Significant reflux noted in the Left GSV SFJ (1.8s). Hx of echocardiogram 05/12/2022    EF is estimated at 55-60%. Mitral annular calcification is present. Mild mitral, tricuspid and moderate aortic regurgitation is present. Mild Pulmonary hypertension noted with RVSP of 43mmHg. Hypercalcemia     mild ~11, iPTH nl 8/2021    Hyperglycemia     Hyperlipidemia     Insomnia     Irritable bowel syndrome     LBP (low back pain)     Lumbar herniated disc     L L4-5 HNP noted on MRI    Menopause     female exam every 2 yrs, due for recheck w me 2015    Migraine headache     ON TOPAMAX    Osteoporosis     Prediabetes     a1c 6.4 in Dec 2014    S/P colonoscopic polypectomy 06/07/2019    Dr Gregorio Santos, recheck 5 yrs    TIA (transient ischemic attack)     INACTIVE PROBLEM       Surgical history :   Past Surgical History:   Procedure Laterality Date    BREAST RECONSTRUCTION Right 4/2013    Dr Lexus Blake ([facility])  5/1995    LIANG/BSO    MASTECTOMY  7/1997    Right breast for breast cancer    TUBAL LIGATION         Family history:   Family History   Problem Relation Age of Onset    High Blood Pressure Mother     Diabetes Mother     Diabetes Father     Heart Disease Father         open heart surgery    High Blood Pressure Father     Diabetes Sister     High Cholesterol Sister     Other Sister         hypochondriac    Stroke Paternal Grandmother        Social history :  reports that she has never smoked. She has never used smokeless tobacco. She reports that she does not drink alcohol and does not use drugs.     Allergies   Allergen Reactions    Codeine      \"jittery\"  Feels anxoius       No current facility-administered medications on file prior to encounter.      Current Outpatient Medications on File Prior to Encounter   Medication Sig Dispense Refill    rosuvastatin (CRESTOR) 10 MG tablet Take 1 tablet by mouth nightly 90 tablet 1    ciprofloxacin (CIPRO) 250 MG tablet Take 1 tablet by mouth 2 times daily for 7 days 14 tablet 0    glipiZIDE (GLUCOTROL) 10 MG tablet Take 1 tablet by mouth 2 times daily (before meals) (Patient taking differently: Take 10 mg by mouth daily (with breakfast)) 60 tablet 2    [START ON 10/14/2022] amiodarone (CORDARONE) 200 MG tablet Take 1 tablet by mouth daily 30 tablet 2    pantoprazole (PROTONIX) 40 MG tablet Take 1 tablet by mouth every morning (before breakfast) 30 tablet 2    PARoxetine (PAXIL) 20 MG tablet Take 1 tablet by mouth daily 30 tablet 2    predniSONE (DELTASONE) 10 MG tablet Take 1 tablet by mouth daily for 10 days 10 tablet 0    senna (SENOKOT) 8.6 MG tablet Take 1 tablet by mouth daily as needed for Constipation (Patient taking differently: Take 1 tablet by mouth daily) 30 tablet 2    Alcohol Swabs PADS Use before administering insulin 100 each 0    dilTIAZem (CARDIZEM CD) 180 MG extended release capsule Take 1 capsule by mouth daily (Patient not taking: Reported on 10/10/2022) 30 capsule 0    INS SYRINGE/NEEDLE .5CC/28G ([employer] INS SYR MX-COM .5CC/28G) 28G X 1/2\" 0.5 ML MISC As mentioned in Insulin dosing 100 each 3    aspirin 81 MG EC tablet Take 1 tablet by mouth daily 30 tablet 0    docusate (COLACE, DULCOLAX) 100 MG CAPS Take 100 mg by mouth 2 times daily as needed (constipation) 30 capsule 0    glucose 4 g chewable tablet Take 4 tablets by mouth as needed for Low blood sugar 60 tablet 3    mirtazapine (REMERON) 15 MG tablet Take 1 tablet by mouth nightly 30 tablet 3    oxybutynin (DITROPAN) 5 MG tablet Take 1 tablet by mouth 2 times daily 60 tablet 3    apixaban (ELIQUIS) 5 MG TABS tablet Take 1 tablet by mouth 2 times daily 60 tablet 1    [DISCONTINUED] amLODIPine-benazepril (LOTREL) 5-10 MG per capsule Take 1 capsule by mouth in the morning. . 30 capsule 3    [DISCONTINUED] allopurinol (ZYLOPRIM) 100 MG tablet Take 2 tablets by mouth in the morning. (Patient not taking: No sig reported) 180 tablet 1    metFORMIN (GLUCOPHAGE) 500 MG tablet Take 1 tablet by mouth every morning (before breakfast) 90 tablet 1    Lancets MISC 1 each by Does not apply route 2 times daily 100 each 0    blood glucose monitor strips Test twice a day & as needed for symptoms of irregular blood glucose. 100 strip 3       Review of Systems:   Review of Systems   Constitutional:  Positive for activity change and fatigue. Negative for chills and fever. HENT:  Negative for congestion, ear pain and tinnitus. Eyes:  Negative for photophobia, pain and visual disturbance. Respiratory:  Positive for shortness of breath. Negative for cough, chest tightness and wheezing. Cardiovascular:  Positive for leg swelling. Negative for chest pain and palpitations. Gastrointestinal:  Negative for abdominal pain, blood in stool, constipation, diarrhea, nausea and vomiting. Endocrine: Negative for cold intolerance and heat intolerance. Genitourinary:  Negative for dysuria, flank pain and hematuria. Musculoskeletal:  Negative for arthralgias, back pain, myalgias and neck stiffness. Skin:  Negative for color change and rash. Allergic/Immunologic: Negative for food allergies. Neurological:  Negative for dizziness, light-headedness, numbness and headaches. Hematological:  Does not bruise/bleed easily. Psychiatric/Behavioral:  Negative for agitation, behavioral problems and confusion.           Examination:      Vitals:    10/11/22 0440 10/11/22 0753 10/11/22 0916 10/11/22 0920   BP:  135/78 (!) 145/63 (!) 142/66   Pulse: (!) 116 (!) 111 88 (!) 114   Resp: 24 20 18 18   Temp:  97.4 °F (36.3 °C)     TempSrc:  Oral     SpO2: 95% 94%  99%   Weight:       Height:            Body mass index is 20.97 kg/m². Physical Exam  Constitutional:       General: She is not in acute distress. Appearance: She is not ill-appearing or diaphoretic. HENT:      Head: Normocephalic and atraumatic. Right Ear: External ear normal.      Left Ear: External ear normal.      Nose: No congestion. Mouth/Throat:      Mouth: Mucous membranes are moist.   Eyes:      Extraocular Movements: Extraocular movements intact. Conjunctiva/sclera: Conjunctivae normal.      Pupils: Pupils are equal, round, and reactive to light. Cardiovascular:      Rate and Rhythm: Normal rate and regular rhythm. Heart sounds: No murmur heard. Pulmonary:      Effort: Pulmonary effort is normal. No respiratory distress. Breath sounds: Rhonchi and rales present. No wheezing. Comments: Decrease breath sounds basally  Abdominal:      General: Abdomen is flat. Bowel sounds are normal. There is no distension. Palpations: Abdomen is soft. Tenderness: There is no abdominal tenderness. Musculoskeletal:         General: No tenderness. Cervical back: Normal range of motion. No tenderness. Right lower leg: No edema. Left lower leg: No edema. Skin:     General: Skin is warm. Neurological:      General: No focal deficit present. Mental Status: She is alert and oriented to person, place, and time. Cranial Nerves: No cranial nerve deficit.    Psychiatric:         Mood and Affect: Mood normal.             CBC:   Lab Results   Component Value Date/Time    WBC 10.9 10/11/2022 11:50 AM    HGB 10.2 10/11/2022 11:50 AM    HCT 32.9 10/11/2022 11:50 AM     10/11/2022 11:50 AM     Lipids:  Lab Results   Component Value Date    CHOL 148 09/25/2022    TRIG 250 (H) 09/25/2022    HDL 32 (L) 09/25/2022    LDLCALC 66 09/25/2022    LDLDIRECT 87 06/10/2022     PT/INR:   Lab Results   Component Value Date/Time    INR 1.07 09/25/2022 02:49 AM        BMP:    Lab Results   Component Value Date     (L) 10/10/2022    K 4.7 10/10/2022    CL 99 10/10/2022    CO2 19 (L) 10/10/2022    BUN 33 (H) 10/10/2022     CMP:   Lab Results   Component Value Date    AST 19 10/10/2022    PROT 6.5 10/10/2022    BILITOT 0.5 10/10/2022    ALKPHOS 112 10/10/2022     TSH:    Lab Results   Component Value Date/Time    TSH 2.90 07/21/2022 09:55 AM       EKGINTERPRETATION - EKG Interpretation:        IMPRESSION / RECOMMENDATIONS:     PAF  Recurrent bilateral pleural effusions  CKD  History of breast cancer sp mastectomy  HTN  DM-2    Etiology of pleural effusions unclear also recently she had pericardial effusion too - ? malignancy versus autoimmune disorder. Patient also having heart failure issues too. ? Constrictive etiology  Patient is spontaneously converted from atrial fibrillation to sinus rhythm previously recommended to continue with amiodarone which probably is at least keeping the patient in PAF rather than persistent atrial fibrillation. Continue with amiodarone and Cardizem for now    Amiodarone was stopped because of there is concern of QT prolongation. EKG of the QT prolongation noted there was some baseline issues and the QT was erroneously calculated prolonged on EKG. Repeat EKG within normal limits we will continue with the amiodarone    Please keep potassium between 4 to 4.5 and magnesium between 2 to 2.2    There is discussion of transfer to higher level of care because of recurrent pleural effusions      Thanks again for allowing me to participate in care of this patient. Please call me if you have any questions. With best regards. Edison Torres MD, 10/11/2022 1:37 PM     Please note this report has been partially produced using speech recognition software and may contain errors related to that system including errors in grammar, punctuation, and spelling, as well as words and phrases that may be inappropriate.  If there are any questions or concerns please feel free to contact the dictating provider for clarification.

## 2022-10-11 NOTE — CONSULTS
Chart reviewed  Full note to follow                      Name:  Anna Brown /Age/Sex: 1950  (71 y.o. female)   MRN & CSN:  [id number] & [id number] Admission Date/Time: 10/10/2022  1:00 PM   Location:  [address] [place] [address] PCP: Randolph Skinner MD       Hospital Day: 2          Referring physician:  Dylan Osborn MD         Reason for consultation: HFpEF        Thanks for referral.    Information source: Patient    CC; short of breath      HPI:   Thank you for involving me in taking  care of Anna Brown who  is a 71 y. o.year  Old female  Presents with history of atrial fibrillation, diabetes, CA breast with metastatic disease, pleural effusion, pericardial effusion had a pericardiocentesis done on the last admission had multiple thoracentesis done has been seen by EP for atrial fibrillation as well was in sinus however keeps on going back into A. fib now readmitted complains of increasing dyspnea and has. Effusions and will probably going thoracentesis again, there is a question of HFpEF and she was scheduled for a right and left heart cath in the recent past however that was not performed given that she had large pleural effusions as well                     Past medical history:    has a past medical history of Anxiety, Breast carcinoma ([address] Cervical radiculitis, Chest pain, Depression, Diabetes type 2, controlled ([address] Family history of diabetes mellitus (DM), GERD (gastroesophageal reflux disease), Hiatal hernia, Hx of Doppler ultrasound, Hx of Doppler ultrasound, Hx of echocardiogram, Hypercalcemia, Hyperglycemia, Hyperlipidemia, Insomnia, Irritable bowel syndrome, LBP (low back pain), Lumbar herniated disc, Menopause, Migraine headache, Osteoporosis, Prediabetes, S/P colonoscopic polypectomy, and TIA (transient ischemic attack). Past surgical history:   has a past surgical history that includes Tubal ligation; Mastectomy (1997);  Hysterectomy (1995); and Breast reconstruction (Right, 4/2013). Social History:   reports that she has never smoked. She has never used smokeless tobacco. She reports that she does not drink alcohol and does not use drugs. Family history:  family history includes Diabetes in her father, mother, and sister; Heart Disease in her father; High Blood Pressure in her father and mother; High Cholesterol in her sister; Other in her sister; Stroke in her paternal grandmother.     Allergies   Allergen Reactions    Codeine      \"jittery\"  Feels anxoius       sodium chloride flush 0.9 % injection 5-40 mL, 2 times per day  sodium chloride flush 0.9 % injection 5-40 mL, PRN  0.9 % sodium chloride infusion, PRN  polyethylene glycol (GLYCOLAX) packet 17 g, Daily PRN  acetaminophen (TYLENOL) tablet 650 mg, Q6H PRN   Or  acetaminophen (TYLENOL) suppository 650 mg, Q6H PRN  furosemide (LASIX) injection 40 mg, Daily  insulin glargine (LANTUS) injection vial 5 Units, Nightly  insulin lispro (HUMALOG) injection vial 3 Units, TID WC  insulin lispro (HUMALOG) injection vial 0-8 Units, TID WC  insulin lispro (HUMALOG) injection vial 0-4 Units, Nightly  glucose chewable tablet 16 g, PRN  dextrose bolus 10% 125 mL, PRN   Or  dextrose bolus 10% 250 mL, PRN  glucagon (rDNA) injection 1 mg, PRN  dextrose 10 % infusion, Continuous PRN  meropenem (MERREM) 500 mg IVPB (mini-bag), Q12H  aspirin EC tablet 81 mg, Daily  docusate sodium (COLACE) capsule 100 mg, BID PRN  mirtazapine (REMERON) tablet 15 mg, Nightly  oxybutynin (DITROPAN) tablet 5 mg, BID  pantoprazole (PROTONIX) tablet 40 mg, QAM AC  PARoxetine (PAXIL) tablet 20 mg, Daily  rosuvastatin (CRESTOR) tablet 10 mg, Nightly  apixaban (ELIQUIS) tablet 5 mg, BID  dilTIAZem (CARDIZEM CD) extended release capsule 180 mg, Daily    Current Facility-Administered Medications   Medication Dose Route Frequency Provider Last Rate Last Admin    sodium chloride flush 0.9 % injection 5-40 mL  5-40 mL IntraVENous 2 times per day Burton Guevara, APRN - CNP   10 mL at 10/11/22 0824    sodium chloride flush 0.9 % injection 5-40 mL  5-40 mL IntraVENous PRN Yvonne Wilder, APRN - CNP        0.9 % sodium chloride infusion   IntraVENous PRN Yvonne Wilder, APRN - CNP        polyethylene glycol (GLYCOLAX) packet 17 g  17 g Oral Daily PRN Yvonne Wilder, APRN - CNP        acetaminophen (TYLENOL) tablet 650 mg  650 mg Oral Q6H PRN Yvonne Wilder, APRN - CNP        Or    acetaminophen (TYLENOL) suppository 650 mg  650 mg Rectal Q6H PRN Yvonne Wilder, APRN - CNP        furosemide (LASIX) injection 40 mg  40 mg IntraVENous Daily Yvonne Wilder, APRN - CNP   40 mg at 10/11/22 0753    insulin glargine (LANTUS) injection vial 5 Units  5 Units SubCUTAneous Nightly Dontrell Pride MD   5 Units at 10/10/22 2108    insulin lispro (HUMALOG) injection vial 3 Units  3 Units SubCUTAneous TID  Dontrell Pride MD   3 Units at 10/10/22 1828    insulin lispro (HUMALOG) injection vial 0-8 Units  0-8 Units SubCUTAneous TID JONI Pride MD   2 Units at 10/10/22 1828    insulin lispro (HUMALOG) injection vial 0-4 Units  0-4 Units SubCUTAneous Nightly Dontrell Pride MD        glucose chewable tablet 16 g  4 tablet Oral PRN Dontrell Pride MD        dextrose bolus 10% 125 mL  125 mL IntraVENous PRN Dontrell Pride MD        Or    dextrose bolus 10% 250 mL  250 mL IntraVENous PRN Dontrell Pride MD        glucagon (rDNA) injection 1 mg  1 mg SubCUTAneous PRN Dontrell Pride MD        dextrose 10 % infusion  1,000 mL IntraVENous Continuous PRN Dontrell Pride MD        meropenem (MERREM) 500 mg IVPB (mini-bag)  500 mg IntraVENous Q12H Dontrell Pride MD 33.3 mL/hr at 10/11/22 0828 500 mg at 10/11/22 4448    aspirin EC tablet 81 mg  81 mg Oral Daily Yvonne Wilder, APRN - CNP   81 mg at 10/11/22 6660    docusate sodium (COLACE) capsule 100 mg  100 mg Oral BID PRN Yvonne Wilder, APRN - CNP        mirtazapine (REMERON) tablet 15 mg  15 mg Oral Nightly Yvonne Wilder, APRN - CNP 15 mg at 10/10/22 2106    oxybutynin (DITROPAN) tablet 5 mg  5 mg Oral BID Caprice Martin APRN - CNP   5 mg at 10/11/22 5761    pantoprazole (PROTONIX) tablet 40 mg  40 mg Oral QAM AC Caprice Martin, APRN - CNP   40 mg at 10/11/22 0548    PARoxetine (PAXIL) tablet 20 mg  20 mg Oral Daily Caprice WatersNARCISO cassidyN - CNP   20 mg at 10/11/22 6923    rosuvastatin (CRESTOR) tablet 10 mg  10 mg Oral Nightly Caprice Waterse, APRN - CNP   10 mg at 10/10/22 2106    apixaban (ELIQUIS) tablet 5 mg  5 mg Oral BID Caprice Mario, APRN - CNP        dilTIAZem (CARDIZEM CD) extended release capsule 180 mg  180 mg Oral Daily Caprice Martin, APRN - CNP   180 mg at 10/11/22 7362     Review of Systems:  All 14 systems reviewed, all negative except for shortness of breath    Physical Examination:    /78   Pulse (!) 111   Temp 97.4 °F (36.3 °C) (Oral)   Resp 20   Ht 5' 5\" (1.651 m)   Wt 126 lb (57.2 kg)   SpO2 94%   BMI 20.97 kg/m²      Wt Readings from Last 3 Encounters:   10/10/22 126 lb (57.2 kg)   10/02/22 134 lb 7.7 oz (61 kg)   08/23/22 143 lb 4.8 oz (65 kg)     Body mass index is 20.97 kg/m². General Appearance: Fair  Head: normocephalic     Eyes: normal, noninjected conjunctiva    ENT: normal mucosa, noninjected throat, normal     NECK: No JVP  No thyromegaly        Cardiovascular: No thrills palpated   Auscultation: Normal S1 and S2, no murmur   carotid bruit no   Abdominal Aorta no bruit    Respiratory:    Breath sounds diminished bilaterally    Extremities: Trace edema clubbing ,   no cyanosis    SKIN: Warm and well perfused, no pallor or cyanosis    Vascular exam:  Pedal Pulses: Palp bilaterally        Abdomen:  No masses or tenderness. No organomegaly noted. Neurological:  Oriented to time, place, and person   No focal neurological deficit noted.   Psychiatric:normal mood, no anxiety    Lab Review   Recent Results (from the past 24 hour(s))   Blood Gas, Venous    Collection Time: 10/10/22  1:15 PM Result Value Ref Range    pH, Jeovanny 7.38 7.32 - 7.42    pCO2, Jeovanny 36 (L) 38 - 52 mmHG    pO2, Jeovanny 137 (H) 28 - 48 mmHG    Base Excess 3 (H) 0 - 2.4    HCO3, Venous 21.3 19 - 25 MMOL/L    O2 Sat, Jeovanny 93.1 (H) 50 - 70 %    Comment VBG    EKG 12 Lead    Collection Time: 10/10/22  1:21 PM   Result Value Ref Range    Ventricular Rate 72 BPM    Atrial Rate 72 BPM    P-R Interval 132 ms    QRS Duration 70 ms    Q-T Interval 476 ms    QTc Calculation (Bazett) 521 ms    P Axis 12 degrees    R Axis 68 degrees    T Axis 70 degrees    Diagnosis       Normal sinus rhythm  Low voltage QRS  Cannot rule out Anterior infarct , age undetermined  Prolonged QT  Abnormal ECG  When compared with ECG of 30-SEP-2022 00:16,  Sinus rhythm has replaced Atrial fibrillation  Vent.  rate has decreased BY  55 BPM  ST now depressed in Anterior leads  Confirmed by CHUY Davis ([id number]) on 10/10/2022 8:03:34 PM     CBC with Auto Differential    Collection Time: 10/10/22  1:40 PM   Result Value Ref Range    WBC 12.1 (H) 4.0 - 10.5 K/CU MM    RBC 3.40 (L) 4.2 - 5.4 M/CU MM    Hemoglobin 9.8 (L) 12.5 - 16.0 GM/DL    Hematocrit 31.1 (L) 37 - 47 %    MCV 91.5 78 - 100 FL    MCH 28.8 27 - 31 PG    MCHC 31.5 (L) 32.0 - 36.0 %    RDW 16.3 (H) 11.7 - 14.9 %    Platelets 154 727 - 550 K/CU MM    MPV 9.8 7.5 - 11.1 FL    Segs Relative 94.0 (H) 36 - 66 %    Lymphocytes % 2.0 (L) 24 - 44 %    Monocytes % 4.0 0 - 4 %    Segs Absolute 11.4 K/CU MM    Lymphocytes Absolute 0.2 K/CU MM    Monocytes Absolute 0.5 K/CU MM    Differential Type MANUAL DIFFERENTIAL    Comprehensive Metabolic Panel    Collection Time: 10/10/22  1:40 PM   Result Value Ref Range    Sodium 131 (L) 135 - 145 MMOL/L    Potassium 4.7 3.5 - 5.1 MMOL/L    Chloride 99 99 - 110 mMol/L    CO2 19 (L) 21 - 32 MMOL/L    BUN 33 (H) 6 - 23 MG/DL    Creatinine 1.3 (H) 0.6 - 1.1 MG/DL    Glucose 274 (H) 70 - 99 MG/DL    Calcium 10.0 8.3 - 10.6 MG/DL    Albumin 3.4 3.4 - 5.0 GM/DL    Total Protein 6.5 6.4 - 8.2 GM/DL    Total Bilirubin 0.5 0.0 - 1.0 MG/DL    ALT 24 10 - 40 U/L    AST 19 15 - 37 IU/L    Alkaline Phosphatase 112 40 - 129 IU/L    GFR Non- 40 (L) >60 mL/min/1.73m2    GFR  49 (L) >60 mL/min/1.73m2    Anion Gap 13 4 - 16   Troponin    Collection Time: 10/10/22  1:40 PM   Result Value Ref Range    Troponin T <0.010 <0.01 NG/ML   Brain Natriuretic Peptide    Collection Time: 10/10/22  1:40 PM   Result Value Ref Range    Pro-BNP 13,423 (H) <300 PG/ML   Magnesium    Collection Time: 10/10/22  1:40 PM   Result Value Ref Range    Magnesium 2.1 1.8 - 2.4 mg/dl   POCT Glucose    Collection Time: 10/10/22  5:31 PM   Result Value Ref Range    POC Glucose 204 (H) 70 - 99 MG/DL   POCT Glucose    Collection Time: 10/10/22  7:37 PM   Result Value Ref Range    POC Glucose 191 (H) 70 - 99 MG/DL   Troponin    Collection Time: 10/10/22  8:15 PM   Result Value Ref Range    Troponin T 0.011 (H) <0.01 NG/ML   EKG 12 lead    Collection Time: 10/11/22  7:19 AM   Result Value Ref Range    Ventricular Rate 123 BPM    Atrial Rate 83 BPM    QRS Duration 68 ms    Q-T Interval 274 ms    QTc Calculation (Bazett) 392 ms    R Axis 79 degrees    T Axis 222 degrees    Diagnosis       Atrial fibrillation with rapid ventricular response  Low voltage QRS  Nonspecific T wave abnormality  Abnormal ECG  When compared with ECG of 10-OCT-2022 13:21,  Atrial fibrillation has replaced Sinus rhythm  Vent. rate has increased BY  51 BPM     POCT Glucose    Collection Time: 10/11/22  7:38 AM   Result Value Ref Range    POC Glucose 125 (H) 70 - 99 MG/DL           Assessment/Recommendations:     -Possible HFpEF patient's EF has been normal the pleural effusions etiology still unclear and can be contributory disease or malignancy we will check the cytology report on that, patient's BNP is markedly elevated to 13,000 as well, will definitely benefit from)-cardiac catheterization for further evaluation  -Patient still in A. fib and has been anticoagulated Eliquis  we will get the EP consultation for further clarification  -Pericardial effusion we will check a limited echo to see if there is any recurrence of pericardial effusion , also will check for effusive constrictive pericarditis  -Pleural effusion as mentioned etiology still unclear it can be secondary to variety of reasons i.e. malignancy or connective tissue disease  I have discussed the case with rheumatology and they are working her up for scleroderma and there is a high likelihood she might have developed still awaiting the anticentromere antibody test on her however given her presentation there is a high likelihood and he suggested to put her on high-dose steroids and in future she might need to Imuran  -Hyperlipidemia on Crestor 10 mg p.o. daily  -Patient also has pulmonary hypertension with pulmonary artery systolic pressure of 55 mmHg  -Once her current status is resolved probably will benefit from right and left cardiac catheterization as scheduled initially         Benjie Riggs MD, 10/11/2022 9:04 AM       Please note this report has been partially produced using speech recognition software and may contain errors related to that system including errors in grammar, punctuation, and spelling, as well as words and phrases that may be inappropriate. If there are any questions or concerns please feel free to contact the dictating provider for clarification.

## 2022-10-11 NOTE — CONSULTS
Comprehensive Nutrition Assessment    Type and Reason for Visit:  Initial, Positive Nutrition Screen, Consult, Patient Education (Heart failure Diet ed)    Nutrition Recommendations/Plan:   Heart Failure Diet Ed not appropriate at this time, Counseled pt on High Calorie, High Protein Foods (NCM)    Liberalize diet to Heart Healthy diet with Sodium restriction to encourage PO intakes   Send snacks and offer double protein portions to increase PO intakes. In pt does not meet 50% of PO intakes consider EN if within POC   Monitor weights, PO intakes, Labs     Malnutrition Assessment:  Malnutrition Status:  Severe malnutrition (10/11/22 1431)    Context:  Chronic Illness     Findings of the 6 clinical characteristics of malnutrition:  Energy Intake:  Mild decrease in energy intake (Comment)  Weight Loss:  Greater than 5% over 1 month     Body Fat Loss:  Severe body fat loss Fat Overlying Ribs   Muscle Mass Loss:  Severe muscle mass loss Clavicles (pectoralis & deltoids), Scapula (trapezius)  Fluid Accumulation:  No significant fluid accumulation     Strength:  Not Performed    Nutrition Assessment:    Admit on 10/10 for CHF. PMH of breast cancer, T2DM, GERD, HLD, IBS, Osteoporosis. Current diet 4 carb/meal, Heart Healthy and Low Sodium. Pt reports poor appetite and weight loss due to SOB and not feeling well. No nausea/vomitting or constipation/diarrhea. States she does not like the taste of oral nutrition supplements and does not want to trial any. UBW of 156lbs on 8/2022. Some recent weight loss could be due to thoracentesis. NFPE finding show severe muscle wasting and severe fat loss. Continue to monitor at high nutrition risk. Nutrition Related Findings:    Labs: Na 131, BUN 30, GFR 37, Creatine 1.4, Glu 189 Meds: mirtazapine, lasix, prednisone Wound Type: None       Current Nutrition Intake & Therapies:    Average Meal Intake: Unable to assess  Average Supplements Intake: None Ordered  ADULT DIET;  Regular; Low Fat/Low Chol/High Fiber/2 gm Na; Offer double protein at meals    Anthropometric Measures:  Height: 5' 5\" (165.1 cm)  Ideal Body Weight (IBW): 125 lbs (57 kg)    Admission Body Weight: 126 lb 1.7 oz (57.2 kg)  Current Body Weight: 126 lb 1.7 oz (57.2 kg), 100.9 % IBW.  Weight Source: Stated  Current BMI (kg/m2): 21  Usual Body Weight: 152 lb (68.9 kg) (8/10/2022)  % Weight Change (Calculated): -17  Weight Adjustment For: No Adjustment  BMI Categories: Underweight (BMI less than 22) age over 72    Estimated Daily Nutrient Needs:  Energy Requirements Based On: Kcal/kg  Weight Used for Energy Requirements: Current  Energy (kcal/day): 3589-4081 (30-35 kcal/kg)  Weight Used for Protein Requirements: Ideal  Protein (g/day): 68-85 (1.2-1.5 g/kg)  Method Used for Fluid Requirements: 1 ml/kcal  Fluid (ml/day): 2000-6699    Nutrition Diagnosis:   Severe malnutrition, In context of chronic illness related to increase demand for energy/nutrients as evidenced by weight loss greater than or equal to 5% in 1 month, severe muscle loss, severe loss of subcutaneous fat, poor intake prior to admission    Nutrition Interventions:   Food and/or Nutrient Delivery: Modify Current Diet, Continue Current Diet  Nutrition Education/Counseling: Counseling completed (High Calorie, High Protein foods (NCM))  Coordination of Nutrition Care: Continue to monitor while inpatient  Plan of Care discussed with:     Goals:  Goals: PO intake 50% or greater, by next RD assessment       Nutrition Monitoring and Evaluation:   Behavioral-Environmental Outcomes: None Identified  Food/Nutrient Intake Outcomes: None Identified  Physical Signs/Symptoms Outcomes: Biochemical Data, GI Status, Nausea or Vomiting, Fluid Status or Edema, Hemodynamic Status, Nutrition Focused Physical Findings    Discharge Planning:    Continue Oral Nutrition [address], Dietetic Intern   Contact: [id number]

## 2022-10-11 NOTE — CARE COORDINATION
Reviewed chart and spoke with pt''s  d/t pt sleeping about discharge needs/plans. Pt lives with her , he has been assisting her with ADL's and transportation. Pt over last 3 months has been becoming more and more immobile. In beginning of Aug.  She was walking 1 1/2 miles a day, now just going to [place] she becomes very weak and SOB. Pt has a PCP and insurance. He states this is her 4-5 admission in 3 months and would like her to go to [facility] or [facility]. He states has been asking for [facility] referral and she has not even got an outpt referral.  She has seen Cardiology and Rheumatology in [address] but would like to see a specialist at [facility] or [facility] d/t pt's rapid decline. Spoke with pt's RN Lanie Bragg and she is going to send PS to Dr Kamlesh Levin.

## 2022-10-11 NOTE — OR NURSING
PROCEDURE PERFORMED: bilateral thoracentesis    PRIMARY INDICATION FOR PROCEDURE: bilateral pleural effusions    INFORMED CONSENT:  Obtained prior to procedure. Consent placed in chart. TONG SCORE PRE PROCEDURE:   9     PT TRANSPORTED FROM:   4118                                 TO THE IR ROOM:   Small                    ASSESSMENT: Pt alert & oriented on 4LNC. Able to move all extremities    BARRIER PRECAUTIONS & STERILE TECHNIQUE:               Pt positioned sitting on side of bed for comfort. Warm blankets given. Pt placed on Cardiac Monitor. Pt prepped and draped in a sterile fashion with chlorhexadine.     TIME OUT:  0913    PAIN/LOCAL ANESTHESIA/SEDATION MANAGEMENT:           Local: Lidocaine 1% given by Dr. Jimenez Dys:           ACCESS TIME: 0915          US/FLUORO: US 5 images          CATHETER USED: OneStep x2           STERILE DRESSINGS: guaze & tegaderm x2    SPECIMENS: 1550cc clear wilma pleural fluid removed from right; 1300cc clear wilma pleural fluid removed from left    EBL:  <1cc        FOLLOW- UP X-RAY: Stat ordered     COMPLICATIONS/ OUTCOME: Pt tolerated well    TONG SCORE POST PROCEDURE:  9        REPORT CALLED TO: Alem GARCIA

## 2022-10-12 LAB
ANION GAP SERPL CALCULATED.3IONS-SCNC: 9 MMOL/L (ref 4–16)
BUN BLDV-MCNC: 31 MG/DL (ref 6–23)
CALCIUM SERPL-MCNC: 8.8 MG/DL (ref 8.3–10.6)
CHLORIDE BLD-SCNC: 104 MMOL/L (ref 99–110)
CO2: 26 MMOL/L (ref 21–32)
CREAT SERPL-MCNC: 1.5 MG/DL (ref 0.6–1.1)
GFR AFRICAN AMERICAN: 41 ML/MIN/1.73M2
GFR NON-AFRICAN AMERICAN: 34 ML/MIN/1.73M2
GLUCOSE BLD-MCNC: 116 MG/DL (ref 70–99)
GLUCOSE BLD-MCNC: 118 MG/DL (ref 70–99)
GLUCOSE BLD-MCNC: 238 MG/DL (ref 70–99)
GLUCOSE BLD-MCNC: 312 MG/DL (ref 70–99)
GLUCOSE BLD-MCNC: 340 MG/DL (ref 70–99)
MAGNESIUM: 2 MG/DL (ref 1.8–2.4)
POTASSIUM SERPL-SCNC: 4.2 MMOL/L (ref 3.5–5.1)
SODIUM BLD-SCNC: 139 MMOL/L (ref 135–145)
TROPONIN T: 0.02 NG/ML
TSH HIGH SENSITIVITY: 4.33 UIU/ML (ref 0.27–4.2)

## 2022-10-12 PROCEDURE — 99233 SBSQ HOSP IP/OBS HIGH 50: CPT | Performed by: INTERNAL MEDICINE

## 2022-10-12 PROCEDURE — 82962 GLUCOSE BLOOD TEST: CPT

## 2022-10-12 PROCEDURE — 2580000003 HC RX 258: Performed by: NURSE PRACTITIONER

## 2022-10-12 PROCEDURE — 6360000002 HC RX W HCPCS: Performed by: STUDENT IN AN ORGANIZED HEALTH CARE EDUCATION/TRAINING PROGRAM

## 2022-10-12 PROCEDURE — 94761 N-INVAS EAR/PLS OXIMETRY MLT: CPT

## 2022-10-12 PROCEDURE — 6370000000 HC RX 637 (ALT 250 FOR IP): Performed by: NURSE PRACTITIONER

## 2022-10-12 PROCEDURE — 1200000000 HC SEMI PRIVATE

## 2022-10-12 PROCEDURE — 80048 BASIC METABOLIC PNL TOTAL CA: CPT

## 2022-10-12 PROCEDURE — 36415 COLL VENOUS BLD VENIPUNCTURE: CPT

## 2022-10-12 PROCEDURE — 2580000003 HC RX 258: Performed by: STUDENT IN AN ORGANIZED HEALTH CARE EDUCATION/TRAINING PROGRAM

## 2022-10-12 PROCEDURE — 83735 ASSAY OF MAGNESIUM: CPT

## 2022-10-12 PROCEDURE — 99232 SBSQ HOSP IP/OBS MODERATE 35: CPT | Performed by: NURSE PRACTITIONER

## 2022-10-12 PROCEDURE — 6370000000 HC RX 637 (ALT 250 FOR IP): Performed by: STUDENT IN AN ORGANIZED HEALTH CARE EDUCATION/TRAINING PROGRAM

## 2022-10-12 PROCEDURE — 2700000000 HC OXYGEN THERAPY PER DAY

## 2022-10-12 RX ADMIN — INSULIN GLARGINE 10 UNITS: 100 INJECTION, SOLUTION SUBCUTANEOUS at 21:56

## 2022-10-12 RX ADMIN — OXYBUTYNIN CHLORIDE 5 MG: 5 TABLET ORAL at 21:51

## 2022-10-12 RX ADMIN — MEROPENEM 500 MG: 500 INJECTION, POWDER, FOR SOLUTION INTRAVENOUS at 10:29

## 2022-10-12 RX ADMIN — FUROSEMIDE 20 MG: 20 TABLET ORAL at 10:23

## 2022-10-12 RX ADMIN — SODIUM CHLORIDE, PRESERVATIVE FREE 10 ML: 5 INJECTION INTRAVENOUS at 11:22

## 2022-10-12 RX ADMIN — OXYBUTYNIN CHLORIDE 5 MG: 5 TABLET ORAL at 10:23

## 2022-10-12 RX ADMIN — AMIODARONE HYDROCHLORIDE 200 MG: 200 TABLET ORAL at 10:23

## 2022-10-12 RX ADMIN — DILTIAZEM HYDROCHLORIDE 180 MG: 180 CAPSULE, COATED, EXTENDED RELEASE ORAL at 10:22

## 2022-10-12 RX ADMIN — INSULIN LISPRO 3 UNITS: 100 INJECTION, SOLUTION INTRAVENOUS; SUBCUTANEOUS at 13:08

## 2022-10-12 RX ADMIN — PANTOPRAZOLE SODIUM 40 MG: 40 TABLET, DELAYED RELEASE ORAL at 05:58

## 2022-10-12 RX ADMIN — APIXABAN 5 MG: 5 TABLET, FILM COATED ORAL at 10:23

## 2022-10-12 RX ADMIN — PREDNISONE 20 MG: 20 TABLET ORAL at 10:23

## 2022-10-12 RX ADMIN — ROSUVASTATIN CALCIUM 10 MG: 5 TABLET, COATED ORAL at 21:51

## 2022-10-12 RX ADMIN — DOCUSATE SODIUM 100 MG: 100 CAPSULE, LIQUID FILLED ORAL at 21:56

## 2022-10-12 RX ADMIN — ASPIRIN 81 MG: 81 TABLET, COATED ORAL at 10:22

## 2022-10-12 RX ADMIN — DOCUSATE SODIUM 100 MG: 100 CAPSULE, LIQUID FILLED ORAL at 11:54

## 2022-10-12 RX ADMIN — PAROXETINE HYDROCHLORIDE 20 MG: 20 TABLET, FILM COATED ORAL at 10:23

## 2022-10-12 RX ADMIN — MIRTAZAPINE 15 MG: 15 TABLET, FILM COATED ORAL at 21:51

## 2022-10-12 RX ADMIN — INSULIN LISPRO 6 UNITS: 100 INJECTION, SOLUTION INTRAVENOUS; SUBCUTANEOUS at 17:54

## 2022-10-12 RX ADMIN — SODIUM CHLORIDE, PRESERVATIVE FREE 10 ML: 5 INJECTION INTRAVENOUS at 21:56

## 2022-10-12 RX ADMIN — INSULIN LISPRO 3 UNITS: 100 INJECTION, SOLUTION INTRAVENOUS; SUBCUTANEOUS at 17:54

## 2022-10-12 RX ADMIN — MEROPENEM 500 MG: 500 INJECTION, POWDER, FOR SOLUTION INTRAVENOUS at 22:03

## 2022-10-12 RX ADMIN — INSULIN LISPRO 4 UNITS: 100 INJECTION, SOLUTION INTRAVENOUS; SUBCUTANEOUS at 21:56

## 2022-10-12 RX ADMIN — APIXABAN 5 MG: 5 TABLET, FILM COATED ORAL at 21:51

## 2022-10-12 RX ADMIN — INSULIN LISPRO 2 UNITS: 100 INJECTION, SOLUTION INTRAVENOUS; SUBCUTANEOUS at 13:07

## 2022-10-12 NOTE — PROGRESS NOTES
V2.0  [facility] Hospitalist Progress Note      Name:  Cary Lozano /Age/Sex: 1950  (71 y.o. female)   MRN & CSN:  [id number] & [id number] Encounter Date/Time: 10/11/2022 9:19 PM EDT    Location:  [address] [place] [address]-A PCP: Elia Morley MD       Hospital Day: 2    Assessment and Plan:   Cary Lozano is a 71 y.o. female with past medical history of diastolic heart failure, paroxysmal atrial fibrillation, type 2 diabetes mellitus, chronic respiratory failure, recurrent admission for bilateral pleural effusions, depression, hypertension, history of invasive ductal carcinoma of the right breast  status post mastectomy, ER/MI positive, history of tobacco use and hyperlipidemia who presents with progressively worsening shortness of breath. Acute on chronic hypoxic respiratory failure  Secondary to bilateral pleural effusions and underlying pulmonary hypertension  Utilize supplemental oxygen to maintain oxygen saturation above 92%. Rest of management was as follows:-    Recurrent bilateral pleural effusion  Bilateral thoracentesis 2022  Bilateral thoracentesis on 2022   Bilateral thoracentesis 10/11/2022, removal of approximately 3L pleural fluid  High suspicion for autoimmune phenomena contributing rather than lack of diuretic use as patient was having recurrence of pleural effusion despite being on lasix before prior hospitalization.   Awaiting fluid studies  Resumed Prednisone 20mg daily  Discussed with Rheumatology, recommend second opinion    Paroxysmal atrial fibrillation with RVR upon admission  Achieved rate control  Continue Eliquis  Continue Amiodarone and Cardizem  EP and Cardiology following    ESBL E.Coli UTI   Urine Culture from 10/05/2022  Continue Meropenem to complete 5 days of therapy    Acute kidney injury  Likely in the setting of diuretic use   Will deescalate diuretics  Repeat BMP in am    Pericardial effusion with tamponade physiology  Status post pericardiocentesis with removal of 600 cc of pericardial fluid on 9/26  Removal of pericardiocentesis catheter 9/27/2022  Repeat limited echo on 9/30/2022, no pericardial effusion  Limited echocardiogram 10/11/2022: Ejection fraction 50 to 55%, RVSP 62mmHg     Raynaud's phenomena  Per chart review: Negative rheumatoid factor, negative Anti- Scl-70, recently negative RODOLFO, ESR 26. Anti-SCL 70 is moderately sensitive, 20% hence recommend getting anti-centromere, RNA polymerase III and U3-RNP, PM/Scl, or Th/To antibodies, will defer to rheumatology. Rheumatology following      Moderate malnutrition  Dietary supplementation     Normocytic anemia  No signs of overt bleed  Concern for anemia of chronic disease  Monitor CBC     Chronic HFpEF  Currently hypovolemic  Blood pressure control and volume management  BNP is elevated due to severe pulmonary hypertension     Moderate-severe pulmonary hypertension  Elevated RVSP and tricuspid regurgitation  Recommend right heart catheterization, recommend transferring to [place] center  Optimization of preload and afterload, maintain euvolemic state. Type 2 diabetes mellitus without long-term use of insulin  With hyperglycemia due to steroid use  Adjust basal bolus insulin  MDISS ACHS    Hyperlipidemia  Continue Crestor     History of invasive ductal carcinoma of the right breast status post mastectomy  ER/ME positive  On prior imaging there were some concern for bony metastasis  Bone biopsy was scheduled for October 14, 2022  Oncology will follow up as outpatient    Detailed discussion with rheumatologist.  Given the current complexity of the disease process and the need for specialized pulmonary hypertension center will transfer the patient to [facility]. Transfer process was initiated in the afternoon and the patient was accepted by Dr. Marin Lynch. Awaiting bed placement. Discussed with  at bedside, agrees with current plan. Diet ADULT DIET; Regular;  Low Fat/Low Chol/High Fiber/2 gm Na; Offer double protein at meals   DVT Prophylaxis [] Lovenox, []  Heparin, [] SCDs, [] Ambulation,  [x] Eliquis, [] Xarelto  [] Coumadin   Code Status Full Code   Disposition From: Home  Expected Disposition: Transfer to [facility]  Estimated Date of Discharge: 1 day  Patient requires continued admission due to awaiting bed placement at [address] [place] [address]   Surrogate Decision Maker/ POA Spouse     Subjective:     Chief Complaint: Shortness of Breath (Had pleural effusion done last week; afraid the fluid is building up again )       Diego Sandhu is a 71 y.o. female who presents with acute on chronic hypoxic respiratory failure. Patient was alert oriented x3, hemodynamically stable. No significant overnight events noticed. Patient was on 3 L nasal cannula oxygen upon my evaluation post thoracentesis. She stated that she felt much better after the procedure. Objective:   No intake or output data in the 24 hours ending 10/11/22 2148     Vitals:   Vitals:    10/11/22 2115   BP: (!) 149/79   Pulse:    Resp: 18   Temp: 97.7 °F (36.5 °C)   SpO2: 99%       Physical Exam:   Physical Exam  Vitals reviewed. Constitutional:       Appearance: She is normal weight. She is ill-appearing. HENT:      Head: Normocephalic and atraumatic. Nose: Nose normal.      Mouth/Throat:      Mouth: Mucous membranes are dry. Pharynx: Oropharynx is clear. Eyes:      General: No scleral icterus. Conjunctiva/sclera: Conjunctivae normal.   Cardiovascular:      Rate and Rhythm: Normal rate. Rhythm irregular. Pulses: Normal pulses. Heart sounds: Murmur heard. Pulmonary:      Effort: Pulmonary effort is normal.      Breath sounds: Rales present. No wheezing or rhonchi. Abdominal:      General: Abdomen is flat. Bowel sounds are normal. There is no distension. Palpations: Abdomen is soft. Tenderness: There is no abdominal tenderness. Musculoskeletal:         General: No deformity. Cervical back: Neck supple. No tenderness. Right lower leg: Edema present. Left lower leg: Edema present. Skin:     Coloration: Skin is not jaundiced or pale. Comments: Skin thickening B/L upper and lower distal extremeties   Neurological:      General: No focal deficit present. Mental Status: She is alert and oriented to person, place, and time. Mental status is at baseline.           Medications:   Medications:    predniSONE  20 mg Oral Daily    amiodarone  200 mg Oral Daily    [START ON 10/12/2022] furosemide  20 mg Oral Daily    [START ON 10/12/2022] insulin glargine  10 Units SubCUTAneous Nightly    insulin glargine  5 Units SubCUTAneous Once    sodium chloride flush  5-40 mL IntraVENous 2 times per day    insulin lispro  3 Units SubCUTAneous TID WC    insulin lispro  0-8 Units SubCUTAneous TID WC    insulin lispro  0-4 Units SubCUTAneous Nightly    meropenem  500 mg IntraVENous Q12H    aspirin  81 mg Oral Daily    mirtazapine  15 mg Oral Nightly    oxybutynin  5 mg Oral BID    pantoprazole  40 mg Oral QAM AC    PARoxetine  20 mg Oral Daily    rosuvastatin  10 mg Oral Nightly    apixaban  5 mg Oral BID    dilTIAZem  180 mg Oral Daily      Infusions:    sodium chloride 25 mL (10/11/22 2127)    dextrose       PRN Meds: sodium chloride flush, 5-40 mL, PRN  sodium chloride, , PRN  polyethylene glycol, 17 g, Daily PRN  acetaminophen, 650 mg, Q6H PRN   Or  acetaminophen, 650 mg, Q6H PRN  glucose, 4 tablet, PRN  dextrose bolus, 125 mL, PRN   Or  dextrose bolus, 250 mL, PRN  glucagon (rDNA), 1 mg, PRN  dextrose, 1,000 mL, Continuous PRN  docusate sodium, 100 mg, BID PRN      Labs      Recent Results (from the past 24 hour(s))   EKG 12 lead    Collection Time: 10/11/22  7:19 AM   Result Value Ref Range    Ventricular Rate 123 BPM    Atrial Rate 83 BPM    QRS Duration 68 ms    Q-T Interval 274 ms    QTc Calculation (Bazett) 392 ms    R Axis 79 degrees    T Axis 222 degrees    Diagnosis       Atrial fibrillation with rapid ventricular response  Low voltage QRS  Nonspecific T wave abnormality  Abnormal ECG  When compared with ECG of 10-OCT-2022 13:21,  Atrial fibrillation has replaced Sinus rhythm  Vent.  rate has increased BY  51 BPM  Confirmed by CHUY Felix ([phone]) on 10/11/2022 4:30:02 PM     POCT Glucose    Collection Time: 10/11/22  7:38 AM   Result Value Ref Range    POC Glucose 125 (H) 70 - 99 MG/DL   POCT Glucose    Collection Time: 10/11/22 11:38 AM   Result Value Ref Range    POC Glucose 111 (H) 70 - 99 MG/DL   Osmolality    Collection Time: 10/11/22 11:50 AM   Result Value Ref Range    Osmolality 300 280 - 300 MOS/L   Basic Metabolic Panel    Collection Time: 10/11/22 11:50 AM   Result Value Ref Range    Sodium 137 135 - 145 MMOL/L    Potassium 4.4 3.5 - 5.1 MMOL/L    Chloride 101 99 - 110 mMol/L    CO2 24 21 - 32 MMOL/L    Anion Gap 12 4 - 16    BUN 30 (H) 6 - 23 MG/DL    Creatinine 1.4 (H) 0.6 - 1.1 MG/DL    Glucose 189 (H) 70 - 99 MG/DL    Calcium 9.4 8.3 - 10.6 MG/DL    GFR Non- 37 (L) >60 mL/min/1.73m2    GFR  45 (L) >60 mL/min/1.73m2   Magnesium    Collection Time: 10/11/22 11:50 AM   Result Value Ref Range    Magnesium 1.9 1.8 - 2.4 mg/dl   Lipid Panel    Collection Time: 10/11/22 11:50 AM   Result Value Ref Range    Triglycerides 280 (H) <150 MG/DL    Cholesterol 150 <200 MG/DL    HDL 36 (L) >40 MG/DL    LDL Calculated 58 <100 MG/DL   CBC with Auto Differential    Collection Time: 10/11/22 11:50 AM   Result Value Ref Range    WBC 10.9 (H) 4.0 - 10.5 K/CU MM    RBC 3.58 (L) 4.2 - 5.4 M/CU MM    Hemoglobin 10.2 (L) 12.5 - 16.0 GM/DL    Hematocrit 32.9 (L) 37 - 47 %    MCV 91.9 78 - 100 FL    MCH 28.5 27 - 31 PG    MCHC 31.0 (L) 32.0 - 36.0 %    RDW 17.1 (H) 11.7 - 14.9 %    Platelets 212 713 - 134 K/CU MM    MPV 9.4 7.5 - 11.1 FL    Differential Type AUTOMATED DIFFERENTIAL     Segs Relative 80.3 (H) 36 - 66 %    Lymphocytes % 9.0 (L) 24 - 44 %    Monocytes % 7.1 (H) 0 - 4 %    Eosinophils % 0.5 0 - 3 %    Basophils % 0.6 0 - 1 %    Segs Absolute 8.8 K/CU MM    Lymphocytes Absolute 1.0 K/CU MM    Monocytes Absolute 0.8 K/CU MM    Eosinophils Absolute 0.1 K/CU MM    Basophils Absolute 0.1 K/CU MM    Nucleated RBC % 0.0 %    Total Nucleated RBC 0.0 K/CU MM    Total Immature Neutrophil 0.27 K/CU MM    Immature Neutrophil % 2.5 (H) 0 - 0.43 %   Brain Natriuretic Peptide    Collection Time: 10/11/22 11:50 AM   Result Value Ref Range    Pro-BNP 14,267 (H) <300 PG/ML   EKG 12 Lead    Collection Time: 10/11/22  1:50 PM   Result Value Ref Range    Ventricular Rate 77 BPM    Atrial Rate 77 BPM    P-R Interval 132 ms    QRS Duration 76 ms    Q-T Interval 354 ms    QTc Calculation (Bazett) 400 ms    P Axis 40 degrees    R Axis 40 degrees    T Axis 39 degrees    Diagnosis       Normal sinus rhythm  Nonspecific T wave abnormality  Abnormal ECG  When compared with ECG of 11-OCT-2022 07:19,  Sinus rhythm has replaced Atrial fibrillation  Vent.  rate has decreased BY  46 BPM  Confirmed by CHUY Grimes ([id number]) on 10/11/2022 4:33:26 PM     Sodium, urine, random    Collection Time: 10/11/22  2:37 PM   Result Value Ref Range    Sodium, Ur 90 35 - 167 MMOL/L   Osmolality, urine    Collection Time: 10/11/22  2:37 PM   Result Value Ref Range    Osmolality, Ur 337 292 - 1090 MOS/L   Protein, Urine    Collection Time: 10/11/22  2:37 PM   Result Value Ref Range    Protein, UA NEGATIVE NEGATIVE MG/DL   POCT Glucose    Collection Time: 10/11/22  5:29 PM   Result Value Ref Range    POC Glucose 318 (H) 70 - 99 MG/DL   POCT Glucose    Collection Time: 10/11/22  8:41 PM   Result Value Ref Range    POC Glucose 350 (H) 70 - 99 MG/DL        Imaging/Diagnostics Last 24 Hours   CT CHEST WO CONTRAST    Result Date: 10/10/2022  EXAMINATION: CT OF THE CHEST WITHOUT CONTRAST 10/10/2022 7:11 pm TECHNIQUE: CT of the chest was performed without the administration of intravenous contrast. Multiplanar reformatted images are provided for review. Automated exposure control, iterative reconstruction, and/or weight based adjustment of the mA/kV was utilized to reduce the radiation dose to as low as reasonably achievable. COMPARISON: 08/18/2022 HISTORY: ORDERING SYSTEM PROVIDED HISTORY: bilateral effusion TECHNOLOGIST PROVIDED HISTORY: Reason for exam:->bilateral effusion Reason for Exam: bilateral effusion FINDINGS: There are moderate bilateral pleural effusions with partial atelectasis of the right middle lobe, right lower lobe, left lower lobe, and lingula. A calcified granuloma is present within the right upper lobe. A nodular appearing infiltrate is present within the left upper lobe measuring 7 mm in diameter. There appear to be lytic lesions within the right 2nd and left 4th ribs. There is also a partially visualized lytic lesion within the L1 vertebral body vs Paget's disease. There is mild cardiomegaly with a mild pericardial effusion. Calcified lymph nodes are present within the mediastinum and right hilum. The patient is status post mastectomy on the right-hand side with apparent flap reconstruction surgery. Limited views of the upper abdomen demonstrate hypoattenuating lesions within the left hepatic lobe measuring up to 3 and 3.3 cm in diameter. Moderate bilateral pleural effusions. Small nodular infiltrate within the left upper lobe. Lytic rib lesions and possible lytic lesion of the L1 vertebral body, worrisome for metastases. Hypoattenuating lesions within the liver, which could reflect hemangiomas but recommend dedicated CT of the abdomen and pelvis with IV contrast for further evaluation. Findings were discussed with Nawaf Allred at 8:49 pm on 10/10/2022. XR CHEST PORTABLE    Result Date: 10/11/2022  EXAMINATION: ONE XRAY VIEW OF THE CHEST 10/11/2022 9:52 am COMPARISON: CT chest 10/10/2022, chest radiograph 10/10/2022.  HISTORY: ORDERING SYSTEM PROVIDED HISTORY: s/p bilat thoracentesis TECHNOLOGIST PROVIDED HISTORY: Reason for exam:->s/p bilat thoracentesis Reason for Exam: s/p bilat thoracentesis FINDINGS: Similar cardiomegaly. Patient is status post thoracentesis with interval decrease in the pleural effusions. Small bilateral pleural effusions persist.  Streaky bibasilar opacities favor atelectasis. Curvilinear lucencies overlying the upper chest favor skin folds and/or artifact. No discrete pneumothorax. Osseous structures are diffusely demineralized and grossly unchanged. Significantly reduced bilateral pleural effusions, status post thoracentesis. No discrete pneumothorax. XR CHEST PORTABLE    Result Date: 10/10/2022  EXAMINATION: ONE XRAY VIEW OF THE CHEST 10/10/2022 1:07 pm COMPARISON: 10/02/2022 HISTORY: ORDERING SYSTEM PROVIDED HISTORY: chest pain TECHNOLOGIST PROVIDED HISTORY: Reason for exam:->chest pain Reason for Exam: chest pain    sob FINDINGS: Cardiomegaly again noted. Increased bilateral pleural effusions. Bibasilar pulmonary opacities. No pneumothorax. Increased bilateral pleural effusions with bibasilar pulmonary opacities that could represent atelectasis or infection     IR GUIDED THORACENTESIS PLEURAL    Result Date: 10/11/2022  PROCEDURE: ULTRASOUNDGUIDED bilateral THORACENTESIS 10/11/2022 HISTORY: ORDERING SYSTEM PROVIDED HISTORY: bilateral pleural effusion TECHNOLOGIST PROVIDED HISTORY: Reason for exam:->bilateral pleural effusion Which side should the procedure be performed?->Radiologist Recommendation TECHNIQUE: Maximum sterile barrier technique including hand hygiene, skin prep and sterile ultrasound technique utilized for procedure. Sterile ultrasound technique also utilized for procedure Ultrasound guidance required to confirm presence of pleural fluid, puncture site selection, real-time intra procedural guidance. Images made for patient's medical file.   Informed consent was obtained after a detailed explanation of the procedure including risks, benefits, and alternatives. Universal protocol was performed. Following sequential ultrasound-guided puncture site selection, skin and ultrasound probe were prepped and draped in sterile fashion. 10 mL 1% lidocaine without epinephrine for local anesthesia. Sequential ultrasound-guided access of right and left pleural space is achieved using 5 [place] trocar mounted introducers. Catheter was advanced off trocar introducers and attached evacuated containers. 1550 mL clear wilma fluid removed the right pleural space. 1300 mL clear yellow fluid removed from left pleural space. Catheters removed. Dressing applied. Postprocedure chest radiograph ordered. The patient tolerated the procedure well. FINDINGS: Pre and intraprocedural images demonstrate large bilateral pleural effusions. 1550 mL clear wilma fluid removed from right pleural space. 1300 mL clear yellow fluid removed left pleural space. No complication suggested. Postprocedure chest radiograph ordered     Successful ultrasound guided bilateral thoracentesis.        Electronically signed by Tosha Reyes MD on 10/11/2022 at 9:48 PM

## 2022-10-12 NOTE — PROGRESS NOTES
Physician Progress Note      Boris Jeffrey  [facility] #:                  [id number]  :                       1950  ADMIT DATE:       10/10/2022 1:00 PM  [address] [place] DATE:  RESPONDING  PROVIDER #:        Serene Lay MD          QUERY TEXT:    Patient admitted with acute respiratory failure. If possible, please document   in progress notes and discharge summary if you are evaluating and /or treating   any of the following: The medical record reflects the following:  Risk Factors: CHF, Hx of breast cancer suspicion of metastatic disease, DM  Clinical Indicators: ASPEN criteria, greater than 5% weight loss in 1 month,   decrease in energy intake,  severe body fat loss, severe muscle mass loss, BMI   21.18  Treatment: ONS, Education, labs, dietitian consult    Thank you,  Nazia Britt [phone]    ASPEN Criteria:    https://aspenjournals. onlinelibrary. dunne. com/doi/full/10.1177/600591172025414  5  Options provided:  -- Protein calorie malnutrition severe  -- Other - I will add my own diagnosis  -- Disagree - Not applicable / Not valid  -- Disagree - Clinically unable to determine / Unknown  -- Refer to Clinical Documentation Reviewer    PROVIDER RESPONSE TEXT:    This patient has severe protein calorie malnutrition.     Query created by: Ricarda Camp on 10/12/2022 12:46 PM      Electronically signed by:  Serene Lay MD 10/12/2022 2:29 PM

## 2022-10-12 NOTE — PROGRESS NOTES
Cardiology Progress Note     Admit Date:  10/10/2022    Consult reason/ Seen today for :       Subjective and  Overnight Events : She feels that her breathing is dramatically improved after pleural tap  S/p multiple thoracentesis she was discharged not on any diuresis during her previous hospitalization  Back into sinus rhythm after thoracentesis    Chief complain on admission : 71 y. o.year old who is admitted for  Chief Complaint   Patient presents with    Shortness of Breath     Had pleural effusion done last week; afraid the fluid is building up again       Assessment / Plan:  A. fib continue anticoagulation continue amiodarone appreciate EPS evaluation and input  Recurrent pleural effusion course secondary to autoimmune condition inflammation serositis? Echo shows preserved EF consider discharging on diuretics  Continue Lasix 20 mg daily advised to watch weight at home and increase diuretics if weight is up  Rheumatology work-up  Echo does not show any evidence of restrictive or constrictive pericarditis at this stage we will hold off on left and right heart cath  HTN: stable, continue To titrate up medication as needed  DVT Prophylaxis if no contraindication    Past medical history:    has a past medical history of Anxiety, Breast carcinoma ([address] Cervical radiculitis, Chest pain, Depression, Diabetes type 2, controlled ([facility] [address] Family history of diabetes mellitus (DM), GERD (gastroesophageal reflux disease), Hiatal hernia, Hx of Doppler ultrasound, Hx of Doppler ultrasound, Hx of echocardiogram, Hypercalcemia, Hyperglycemia, Hyperlipidemia, Insomnia, Irritable bowel syndrome, LBP (low back pain), Lumbar herniated disc, Menopause, Migraine headache, Osteoporosis, Prediabetes, S/P colonoscopic polypectomy, and TIA (transient ischemic attack). Past surgical history:   has a past surgical history that includes Tubal ligation;  Mastectomy (7/1997); Hysterectomy (5/1995); and Breast reconstruction (Right, 4/2013). Social History:   reports that she has never smoked. She has never used smokeless tobacco. She reports that she does not drink alcohol and does not use drugs. Family history:  family history includes Diabetes in her father, mother, and sister; Heart Disease in her father; High Blood Pressure in her father and mother; High Cholesterol in her sister; Other in her sister; Stroke in her paternal grandmother. Allergies   Allergen Reactions    Codeine      \"jittery\"  Feels anxoius       Review of Systems:    All 14 systems were reviewed and are negative  Except for the positive findings  which as documented     BP (!) 133/50   Pulse 79   Temp 98 °F (36.7 °C) (Oral)   Resp 18   Ht 5' 5\" (1.651 m)   Wt 127 lb 4.8 oz (57.7 kg)   SpO2 98%   BMI 21.18 kg/m²     Intake/Output Summary (Last 24 hours) at 10/12/2022 1838  Last data filed at 10/12/2022 1325  Gross per 24 hour   Intake 600 ml   Output 1 ml   Net 599 ml     Physical Exam:  Constitutional:  Well developed, Well nourished, No acute distress, Non-toxic appearance. HENT:  Normocephalic, Atraumatic, Bilateral external ears normal, Oropharynx moist, No oral exudates, Nose normal. Neck- Normal range of motion, No tenderness, Supple, No stridor. Eyes:  PERRL, EOMI, Conjunctiva normal, No discharge. Respiratory:  Normal breath sounds, No respiratory distress, No wheezing, No chest tenderness. Cardiovascular:  Normal heart rate, Normal rhythm, No murmurs, No rubs, No gallops, JVP not elevated  Abdomen/GI:  Bowel sounds normal, Soft, No tenderness, No masses, No pulsatile masses. Musculoskeletal:  Intact distal pulses, No edema, No tenderness, No cyanosis, No clubbing. Good range of motion in all major joints. No tenderness to palpation or major deformities noted. Back- No tenderness. Integument:  Warm, Dry, No erythema, No rash. Lymphatic:  No lymphadenopathy noted. Neurologic:  Alert & oriented x 3, Normal motor function, Normal sensory function, No focal deficits noted. Psychiatric:  Affect  and  Mood :no change    Medications:    predniSONE  20 mg Oral Daily    amiodarone  200 mg Oral Daily    [Held by provider] furosemide  20 mg Oral Daily    insulin glargine  10 Units SubCUTAneous Nightly    sodium chloride flush  5-40 mL IntraVENous 2 times per day    insulin lispro  3 Units SubCUTAneous TID WC    insulin lispro  0-8 Units SubCUTAneous TID WC    insulin lispro  0-4 Units SubCUTAneous Nightly    meropenem  500 mg IntraVENous Q12H    aspirin  81 mg Oral Daily    mirtazapine  15 mg Oral Nightly    oxybutynin  5 mg Oral BID    pantoprazole  40 mg Oral QAM AC    PARoxetine  20 mg Oral Daily    rosuvastatin  10 mg Oral Nightly    apixaban  5 mg Oral BID    dilTIAZem  180 mg Oral Daily      sodium chloride 25 mL/hr at 10/12/22 1028    dextrose       sodium chloride flush, sodium chloride, polyethylene glycol, acetaminophen **OR** acetaminophen, glucose, dextrose bolus **OR** dextrose bolus, glucagon (rDNA), dextrose, docusate sodium    Lab Data:  CBC:   Recent Labs     10/10/22  1340 10/11/22  1150   WBC 12.1* 10.9*   HGB 9.8* 10.2*   HCT 31.1* 32.9*   MCV 91.5 91.9    394     BMP:   Recent Labs     10/10/22  1340 10/11/22  1150 10/12/22  0538   * 137 139   K 4.7 4.4 4.2   CL 99 101 104   CO2 19* 24 26   BUN 33* 30* 31*   CREATININE 1.3* 1.4* 1.5*     PT/INR: No results for input(s): PROTIME, INR in the last 72 hours. BNP:    Recent Labs     10/10/22  1340 10/11/22  1150   PROBNP 13,423* 14,267*     TROPONIN:   Recent Labs     10/10/22  1340 10/10/22  2015 10/11/22  1150   TROPONINT <0.010 0.011* 0.018*        ECHO :   echocardiogram    Assessment:  71 y. o.year old who is admitted for  Chief Complaint   Patient presents with    Shortness of Breath     Had pleural effusion done last week; afraid the fluid is building up again     , active issues as noted below:  Impression:  Principal Problem:    Acute on chronic diastolic CHF (congestive heart failure) (formerly [facility])  Active Problems:    PAF (paroxysmal atrial fibrillation) (formerly [facility])    Severe malnutrition (formerly [facility])  Resolved Problems:    * No resolved hospital problems. *            All labs, medications and tests reviewed by myself , continue all other medications of all above medical condition listed as is except for changes mentioned above. Thank you very much for consult , please call with questions.     Arlet Tabares MD, MD 10/12/2022 6:38 PM

## 2022-10-12 NOTE — PROGRESS NOTES
V2.0  [facility] Hospitalist Progress Note      Name:  Gallito Alan /Age/Sex: 1950  (71 y.o. female)   MRN & CSN:  [id number] & [id number] Encounter Date/Time: 10/12/2022 9:19 PM EDT    Location:  [address] [place] [address]-A PCP: Ezequiel Toledo MD       Hospital Day: 3    Assessment and Plan:   Gallito Alan is a 71 y.o. female with past medical history of diastolic heart failure, paroxysmal atrial fibrillation, type 2 diabetes mellitus, chronic respiratory failure, recurrent admission for bilateral pleural effusions, depression, hypertension, history of invasive ductal carcinoma of the right breast  status post mastectomy, ER/AL positive, history of tobacco use and hyperlipidemia who presents with progressively worsening shortness of breath. Acute on chronic hypoxic respiratory failure  Secondary to bilateral pleural effusions and underlying pulmonary hypertension  Utilize supplemental oxygen to maintain oxygen saturation above 92%. Rest of management was as follows:-    Recurrent bilateral pleural effusion  Bilateral thoracentesis 2022  Bilateral thoracentesis on 2022   Bilateral thoracentesis 10/11/2022, removal of approximately 3L pleural fluid  High suspicion for autoimmune phenomena contributing rather than lack of diuretic use as patient was having recurrence of pleural effusion despite being on lasix before prior hospitalization.   Continue prednisone 20mg daily  Discussed with Rheumatology, recommend second opinion    Paroxysmal atrial fibrillation with RVR upon admission  Achieved rate control  Continue Eliquis  Continue Amiodarone and Cardizem  EP and Cardiology following    ESBL E.Coli UTI   Urine Culture from 10/05/2022  Continue Meropenem to complete 5 days of therapy    Acute kidney injury, worsening  Likely in the setting of diuretic use   Will hold diuretics  Repeat BMP in am    Pericardial effusion with tamponade physiology  Status post pericardiocentesis with removal of 600 cc of pericardial fluid on 9/26  Removal of pericardiocentesis catheter 9/27/2022  Repeat limited echo on 9/30/2022, no pericardial effusion  Limited echocardiogram 10/11/2022: Ejection fraction 50 to 55%, RVSP 62mmHg     Raynaud's phenomena  Per chart review: Negative rheumatoid factor, negative Anti- Scl-70, recently negative RODOLFO, ESR 26. Anti-SCL 70 is moderately sensitive, 20% hence recommend getting anti-centromere, RNA polymerase III and U3-RNP, PM/Scl, or Th/To antibodies, will defer to rheumatology. Moderate malnutrition  Dietary supplementation     Normocytic anemia  No signs of overt bleed  Concern for anemia of chronic disease  Monitor CBC     Chronic HFpEF  Currently hypovolemic  Blood pressure control and volume management  BNP is elevated due to severe pulmonary hypertension     Moderate-severe pulmonary hypertension  Elevated RVSP and tricuspid regurgitation  Recommend right heart catheterization, recommend transferring to [facility] center  Optimization of preload and afterload, maintain euvolemic state. Type 2 diabetes mellitus without long-term use of insulin  With hyperglycemia due to steroid use  Adjust basal bolus insulin  MDISS ACHS    Hyperlipidemia  Continue Crestor     History of invasive ductal carcinoma of the right breast status post mastectomy  ER/IA positive  On prior imaging there were some concern for bony metastasis  Bone biopsy was scheduled for October 14, 2022  Oncology will follow up as outpatient    Patient is awaiting transfer at [facility], ending bed availability. Diet ADULT DIET; Regular; Low Fat/Low Chol/High Fiber/2 gm Na;  Offer double protein at meals   DVT Prophylaxis [] Lovenox, []  Heparin, [] SCDs, [] Ambulation,  [x] Eliquis, [] Xarelto  [] Coumadin   Code Status Full Code   Disposition From: Home  Expected Disposition: Transfer to [facility]  Estimated Date of Discharge: 1 day  Patient requires continued admission due to awaiting bed placement at [address] [place] [address] Maker/ POA Spouse     Subjective:     Chief Complaint: Shortness of Breath (Had pleural effusion done last week; afraid the fluid is building up again )       Gallito Alan is a 71 y.o. female who presents with acute on chronic hypoxic respiratory failure. Patient was alert oriented x3, hemodynamically stable. No significant overnight events noticed. Patient was on 2 L nasal cannula oxygen, in no acute respiratory distress. Discussed with  at bedside. Objective: Intake/Output Summary (Last 24 hours) at 10/12/2022 1712  Last data filed at 10/12/2022 1325  Gross per 24 hour   Intake 600 ml   Output 1 ml   Net 599 ml          Vitals:   Vitals:    10/12/22 1418   BP: (!) 133/50   Pulse: 79   Resp: 18   Temp: 98 °F (36.7 °C)   SpO2: 98%       Physical Exam:   Physical Exam  Vitals reviewed. Constitutional:       Appearance: She is normal weight. She is ill-appearing. HENT:      Head: Normocephalic and atraumatic. Nose: Nose normal.      Mouth/Throat:      Mouth: Mucous membranes are dry. Pharynx: Oropharynx is clear. Eyes:      General: No scleral icterus. Conjunctiva/sclera: Conjunctivae normal.   Cardiovascular:      Rate and Rhythm: Normal rate. Rhythm irregular. Pulses: Normal pulses. Heart sounds: Murmur heard. Pulmonary:      Effort: Pulmonary effort is normal.      Breath sounds: Rales present. No wheezing or rhonchi. Abdominal:      General: Abdomen is flat. Bowel sounds are normal. There is no distension. Palpations: Abdomen is soft. Tenderness: There is no abdominal tenderness. Musculoskeletal:         General: No deformity. Cervical back: Neck supple. No tenderness. Right lower leg: Edema present. Left lower leg: Edema present. Skin:     Coloration: Skin is not jaundiced or pale. Comments: Skin thickening B/L upper and lower distal extremeties   Neurological:      General: No focal deficit present.       Mental Status: She is alert and oriented to person, place, and time. Mental status is at baseline.           Medications:   Medications:    predniSONE  20 mg Oral Daily    amiodarone  200 mg Oral Daily    [Held by provider] furosemide  20 mg Oral Daily    insulin glargine  10 Units SubCUTAneous Nightly    sodium chloride flush  5-40 mL IntraVENous 2 times per day    insulin lispro  3 Units SubCUTAneous TID WC    insulin lispro  0-8 Units SubCUTAneous TID WC    insulin lispro  0-4 Units SubCUTAneous Nightly    meropenem  500 mg IntraVENous Q12H    aspirin  81 mg Oral Daily    mirtazapine  15 mg Oral Nightly    oxybutynin  5 mg Oral BID    pantoprazole  40 mg Oral QAM AC    PARoxetine  20 mg Oral Daily    rosuvastatin  10 mg Oral Nightly    apixaban  5 mg Oral BID    dilTIAZem  180 mg Oral Daily      Infusions:    sodium chloride 25 mL/hr at 10/12/22 1028    dextrose       PRN Meds: sodium chloride flush, 5-40 mL, PRN  sodium chloride, , PRN  polyethylene glycol, 17 g, Daily PRN  acetaminophen, 650 mg, Q6H PRN   Or  acetaminophen, 650 mg, Q6H PRN  glucose, 4 tablet, PRN  dextrose bolus, 125 mL, PRN   Or  dextrose bolus, 250 mL, PRN  glucagon (rDNA), 1 mg, PRN  dextrose, 1,000 mL, Continuous PRN  docusate sodium, 100 mg, BID PRN      Labs      Recent Results (from the past 24 hour(s))   POCT Glucose    Collection Time: 10/11/22  5:29 PM   Result Value Ref Range    POC Glucose 318 (H) 70 - 99 MG/DL   POCT Glucose    Collection Time: 10/11/22  8:41 PM   Result Value Ref Range    POC Glucose 350 (H) 70 - 99 MG/DL   Basic Metabolic Panel    Collection Time: 10/12/22  5:38 AM   Result Value Ref Range    Sodium 139 135 - 145 MMOL/L    Potassium 4.2 3.5 - 5.1 MMOL/L    Chloride 104 99 - 110 mMol/L    CO2 26 21 - 32 MMOL/L    Anion Gap 9 4 - 16    BUN 31 (H) 6 - 23 MG/DL    Creatinine 1.5 (H) 0.6 - 1.1 MG/DL    Glucose 116 (H) 70 - 99 MG/DL    Calcium 8.8 8.3 - 10.6 MG/DL    GFR Non- 34 (L) >60 mL/min/1.73m2    GFR  American 41 (L) >60 mL/min/1.73m2   Magnesium    Collection Time: 10/12/22  5:38 AM   Result Value Ref Range    Magnesium 2.0 1.8 - 2.4 mg/dl   POCT Glucose    Collection Time: 10/12/22  8:36 AM   Result Value Ref Range    POC Glucose 118 (H) 70 - 99 MG/DL   POCT Glucose    Collection Time: 10/12/22 11:43 AM   Result Value Ref Range    POC Glucose 238 (H) 70 - 99 MG/DL        Imaging/Diagnostics Last 24 Hours   CT CHEST WO CONTRAST    Result Date: 10/10/2022  EXAMINATION: CT OF THE CHEST WITHOUT CONTRAST 10/10/2022 7:11 pm TECHNIQUE: CT of the chest was performed without the administration of intravenous contrast. Multiplanar reformatted images are provided for review. Automated exposure control, iterative reconstruction, and/or weight based adjustment of the mA/kV was utilized to reduce the radiation dose to as low as reasonably achievable. COMPARISON: 08/18/2022 HISTORY: ORDERING SYSTEM PROVIDED HISTORY: bilateral effusion TECHNOLOGIST PROVIDED HISTORY: Reason for exam:->bilateral effusion Reason for Exam: bilateral effusion FINDINGS: There are moderate bilateral pleural effusions with partial atelectasis of the right middle lobe, right lower lobe, left lower lobe, and lingula. A calcified granuloma is present within the right upper lobe. A nodular appearing infiltrate is present within the left upper lobe measuring 7 mm in diameter. There appear to be lytic lesions within the right 2nd and left 4th ribs. There is also a partially visualized lytic lesion within the L1 vertebral body vs Paget's disease. There is mild cardiomegaly with a mild pericardial effusion. Calcified lymph nodes are present within the mediastinum and right hilum. The patient is status post mastectomy on the right-hand side with apparent flap reconstruction surgery. Limited views of the upper abdomen demonstrate hypoattenuating lesions within the left hepatic lobe measuring up to 3 and 3.3 cm in diameter.      Moderate bilateral pleural effusions. Small nodular infiltrate within the left upper lobe. Lytic rib lesions and possible lytic lesion of the L1 vertebral body, worrisome for metastases. Hypoattenuating lesions within the liver, which could reflect hemangiomas but recommend dedicated CT of the abdomen and pelvis with IV contrast for further evaluation. Findings were discussed with Janelle Barthel at 8:49 pm on 10/10/2022. XR CHEST PORTABLE    Result Date: 10/11/2022  EXAMINATION: ONE XRAY VIEW OF THE CHEST 10/11/2022 9:52 am COMPARISON: CT chest 10/10/2022, chest radiograph 10/10/2022. HISTORY: ORDERING SYSTEM PROVIDED HISTORY: s/p bilat thoracentesis TECHNOLOGIST PROVIDED HISTORY: Reason for exam:->s/p bilat thoracentesis Reason for Exam: s/p bilat thoracentesis FINDINGS: Similar cardiomegaly. Patient is status post thoracentesis with interval decrease in the pleural effusions. Small bilateral pleural effusions persist.  Streaky bibasilar opacities favor atelectasis. Curvilinear lucencies overlying the upper chest favor skin folds and/or artifact. No discrete pneumothorax. Osseous structures are diffusely demineralized and grossly unchanged. Significantly reduced bilateral pleural effusions, status post thoracentesis. No discrete pneumothorax. XR CHEST PORTABLE    Result Date: 10/10/2022  EXAMINATION: ONE XRAY VIEW OF THE CHEST 10/10/2022 1:07 pm COMPARISON: 10/02/2022 HISTORY: ORDERING SYSTEM PROVIDED HISTORY: chest pain TECHNOLOGIST PROVIDED HISTORY: Reason for exam:->chest pain Reason for Exam: chest pain    sob FINDINGS: Cardiomegaly again noted. Increased bilateral pleural effusions. Bibasilar pulmonary opacities. No pneumothorax.      Increased bilateral pleural effusions with bibasilar pulmonary opacities that could represent atelectasis or infection     IR GUIDED THORACENTESIS PLEURAL    Result Date: 10/11/2022  PROCEDURE: ULTRASOUNDGUIDED bilateral THORACENTESIS 10/11/2022 HISTORY: ORDERING SYSTEM PROVIDED HISTORY: bilateral pleural effusion TECHNOLOGIST PROVIDED HISTORY: Reason for exam:->bilateral pleural effusion Which side should the procedure be performed?->Radiologist Recommendation TECHNIQUE: Maximum sterile barrier technique including hand hygiene, skin prep and sterile ultrasound technique utilized for procedure. Sterile ultrasound technique also utilized for procedure Ultrasound guidance required to confirm presence of pleural fluid, puncture site selection, real-time intra procedural guidance. Images made for patient's medical file. Informed consent was obtained after a detailed explanation of the procedure including risks, benefits, and alternatives. Universal protocol was performed. Following sequential ultrasound-guided puncture site selection, skin and ultrasound probe were prepped and draped in sterile fashion. 10 mL 1% lidocaine without epinephrine for local anesthesia. Sequential ultrasound-guided access of right and left pleural space is achieved using 5 [place] trocar mounted introducers. Catheter was advanced off trocar introducers and attached evacuated containers. 1550 mL clear wilma fluid removed the right pleural space. 1300 mL clear yellow fluid removed from left pleural space. Catheters removed. Dressing applied. Postprocedure chest radiograph ordered. The patient tolerated the procedure well. FINDINGS: Pre and intraprocedural images demonstrate large bilateral pleural effusions. 1550 mL clear wilma fluid removed from right pleural space. 1300 mL clear yellow fluid removed left pleural space. No complication suggested. Postprocedure chest radiograph ordered     Successful ultrasound guided bilateral thoracentesis.        Electronically signed by Kenyon Tesfaye MD on 10/12/2022 at 5:12 PM

## 2022-10-12 NOTE — PROGRESS NOTES
Patient states that she is feeling improved. She is waiting on [facility] bed    Plan:   Possible HFpEF: continue to monitor fluid status. Diuretic stopped due to creatine elevation. Will check chest x ray in morning. She will need close monitoring as she has required multiple thoracentesis over the last month.    PaF: per EPS recommendations

## 2022-10-12 NOTE — PROGRESS NOTES
Heart failure education consult received. Met with patient and spouse on previous admission (10/3/22) for education. Patient awaiting transfer to [facility].

## 2022-10-12 NOTE — PROGRESS NOTES
Admit Date:  10/10/2022    Admission diagnosis / Complaint: shortness of breath      Subjective:  Ms. Eduardo Goodman is resting on the side of the bed.  is present. Patient denies cardiac complaints. Per tele review- patient having short atrial runs. Patient is waiting for transfer to [place]. Objective:   BP (!) 130/54   Pulse 77   Temp 97.5 °F (36.4 °C) (Oral)   Resp 18   Ht 5' 5\" (1.651 m)   Wt 127 lb 4.8 oz (57.7 kg)   SpO2 100%   BMI 21.18 kg/m²     Intake/Output Summary (Last 24 hours) at 10/12/2022 0955  Last data filed at 10/12/2022 9003  Gross per 24 hour   Intake 240 ml   Output --   Net 240 ml       TELEMETRY: Sinus    has a past medical history of Anxiety, Breast carcinoma ([facility] [address] Cervical radiculitis, Chest pain, Depression, Diabetes type 2, controlled ([facility] [address] Family history of diabetes mellitus (DM), GERD (gastroesophageal reflux disease), Hiatal hernia, Hx of Doppler ultrasound, Hx of Doppler ultrasound, Hx of echocardiogram, Hypercalcemia, Hyperglycemia, Hyperlipidemia, Insomnia, Irritable bowel syndrome, LBP (low back pain), Lumbar herniated disc, Menopause, Migraine headache, Osteoporosis, Prediabetes, S/P colonoscopic polypectomy, and TIA (transient ischemic attack). has a past surgical history that includes Tubal ligation; Mastectomy (7/1997); Hysterectomy (5/1995); and Breast reconstruction (Right, 4/2013).     Physical Exam:  General:  Awake, alert, NAD  Skin:  Warm and dry  Neck:  JVD not appreciated  Chest:  Clear to auscultation, respiration easy  Cardiovascular:  RRR S1S2  Abdomen:  Soft nontender  Extremities:  No edema    Medications:    predniSONE  20 mg Oral Daily    amiodarone  200 mg Oral Daily    furosemide  20 mg Oral Daily    insulin glargine  10 Units SubCUTAneous Nightly    sodium chloride flush  5-40 mL IntraVENous 2 times per day    insulin lispro  3 Units SubCUTAneous TID WC    insulin lispro  0-8 Units SubCUTAneous TID WC    insulin lispro  0-4 Units SubCUTAneous Nightly    meropenem  500 mg IntraVENous Q12H    aspirin  81 mg Oral Daily    mirtazapine  15 mg Oral Nightly    oxybutynin  5 mg Oral BID    pantoprazole  40 mg Oral QAM AC    PARoxetine  20 mg Oral Daily    rosuvastatin  10 mg Oral Nightly    apixaban  5 mg Oral BID    dilTIAZem  180 mg Oral Daily      sodium chloride 25 mL (10/11/22 2127)    dextrose       sodium chloride flush, sodium chloride, polyethylene glycol, acetaminophen **OR** acetaminophen, glucose, dextrose bolus **OR** dextrose bolus, glucagon (rDNA), dextrose, docusate sodium    Lab Data:  CBC:   Recent Labs     10/10/22  1340 10/11/22  1150   WBC 12.1* 10.9*   HGB 9.8* 10.2*   HCT 31.1* 32.9*   MCV 91.5 91.9    394     BMP:   Recent Labs     10/10/22  1340 10/11/22  1150 10/12/22  0538   * 137 139   K 4.7 4.4 4.2   CL 99 101 104   CO2 19* 24 26   BUN 33* 30* 31*   CREATININE 1.3* 1.4* 1.5*     LIVER PROFILE:   Recent Labs     10/10/22  1340   AST 19   ALT 24   BILITOT 0.5   ALKPHOS 112         Assessment and Plan    PAF  Hypercoagulable due to PAF  Recurrent bilateral pleural effusions  CKD  History of breath cancer s/p mastectomy  HTN  DM type 2    Per tele review patient having short atrial runs. Patient is asymptomatic  Continue amiodarone 200 mg daily  Continue cardizem  mg daily    Patient denies issues with bleeding. Continue eliquis 5 mg BID    K and Mag are stable  Recommend to keep K 4.0-4.5 and Mag 2.0-2.2    BP is stable. Continue lasix 20 mg daily    Diabetes management per primary team.           DORIS Briones - CNP, 10/12/2022 9:55 AM     Please note this report has been partially produced using speech recognition software and may contain errors related to that system including errors in grammar, punctuation, and spelling, as well as words and phrases that may be inappropriate. If there are any questions or concerns please feel free to contact the dictating provider for clarification.

## 2022-10-13 ENCOUNTER — APPOINTMENT (OUTPATIENT)
Dept: GENERAL RADIOLOGY | Age: 72
DRG: 371 | End: 2022-10-13
Payer: MEDICARE

## 2022-10-13 ENCOUNTER — CARE COORDINATION (OUTPATIENT)
Dept: CARE COORDINATION | Age: 72
End: 2022-10-13

## 2022-10-13 VITALS
HEIGHT: 65 IN | BODY MASS INDEX: 21.21 KG/M2 | DIASTOLIC BLOOD PRESSURE: 50 MMHG | SYSTOLIC BLOOD PRESSURE: 144 MMHG | RESPIRATION RATE: 14 BRPM | WEIGHT: 127.3 LBS | OXYGEN SATURATION: 97 % | TEMPERATURE: 98.1 F | HEART RATE: 69 BPM

## 2022-10-13 LAB
ANION GAP SERPL CALCULATED.3IONS-SCNC: 9 MMOL/L (ref 4–16)
BUN BLDV-MCNC: 36 MG/DL (ref 6–23)
CALCIUM SERPL-MCNC: 9.1 MG/DL (ref 8.3–10.6)
CHLORIDE BLD-SCNC: 104 MMOL/L (ref 99–110)
CO2: 26 MMOL/L (ref 21–32)
CREAT SERPL-MCNC: 1.5 MG/DL (ref 0.6–1.1)
GFR AFRICAN AMERICAN: 41 ML/MIN/1.73M2
GFR NON-AFRICAN AMERICAN: 34 ML/MIN/1.73M2
GLUCOSE BLD-MCNC: 197 MG/DL (ref 70–99)
GLUCOSE BLD-MCNC: 284 MG/DL (ref 70–99)
GLUCOSE BLD-MCNC: 349 MG/DL (ref 70–99)
GLUCOSE BLD-MCNC: 94 MG/DL (ref 70–99)
GLUCOSE BLD-MCNC: 97 MG/DL (ref 70–99)
IRON: 28 UG/DL (ref 37–145)
MAGNESIUM: 2 MG/DL (ref 1.8–2.4)
PCT TRANSFERRIN: 14 % (ref 10–44)
POTASSIUM SERPL-SCNC: 4.3 MMOL/L (ref 3.5–5.1)
PRO-BNP: 7286 PG/ML
SODIUM BLD-SCNC: 139 MMOL/L (ref 135–145)
TOTAL IRON BINDING CAPACITY: 204 UG/DL (ref 250–450)
UNSATURATED IRON BINDING CAPACITY: 176 UG/DL (ref 110–370)

## 2022-10-13 PROCEDURE — 94761 N-INVAS EAR/PLS OXIMETRY MLT: CPT

## 2022-10-13 PROCEDURE — 83735 ASSAY OF MAGNESIUM: CPT

## 2022-10-13 PROCEDURE — 99231 SBSQ HOSP IP/OBS SF/LOW 25: CPT | Performed by: INTERNAL MEDICINE

## 2022-10-13 PROCEDURE — 6360000002 HC RX W HCPCS: Performed by: STUDENT IN AN ORGANIZED HEALTH CARE EDUCATION/TRAINING PROGRAM

## 2022-10-13 PROCEDURE — 2700000000 HC OXYGEN THERAPY PER DAY

## 2022-10-13 PROCEDURE — 80048 BASIC METABOLIC PNL TOTAL CA: CPT

## 2022-10-13 PROCEDURE — 83880 ASSAY OF NATRIURETIC PEPTIDE: CPT

## 2022-10-13 PROCEDURE — 6370000000 HC RX 637 (ALT 250 FOR IP): Performed by: NURSE PRACTITIONER

## 2022-10-13 PROCEDURE — 36415 COLL VENOUS BLD VENIPUNCTURE: CPT

## 2022-10-13 PROCEDURE — APPSS45 APP SPLIT SHARED TIME 31-45 MINUTES: Performed by: NURSE PRACTITIONER

## 2022-10-13 PROCEDURE — 6370000000 HC RX 637 (ALT 250 FOR IP): Performed by: STUDENT IN AN ORGANIZED HEALTH CARE EDUCATION/TRAINING PROGRAM

## 2022-10-13 PROCEDURE — 82962 GLUCOSE BLOOD TEST: CPT

## 2022-10-13 PROCEDURE — 2580000003 HC RX 258: Performed by: STUDENT IN AN ORGANIZED HEALTH CARE EDUCATION/TRAINING PROGRAM

## 2022-10-13 PROCEDURE — 71045 X-RAY EXAM CHEST 1 VIEW: CPT

## 2022-10-13 PROCEDURE — 2580000003 HC RX 258: Performed by: NURSE PRACTITIONER

## 2022-10-13 RX ORDER — INSULIN LISPRO 100 [IU]/ML
0-4 INJECTION, SOLUTION INTRAVENOUS; SUBCUTANEOUS
Status: DISCONTINUED | OUTPATIENT
Start: 2022-10-13 | End: 2022-10-14 | Stop reason: HOSPADM

## 2022-10-13 RX ORDER — INSULIN GLARGINE 100 [IU]/ML
5 INJECTION, SOLUTION SUBCUTANEOUS NIGHTLY
Status: DISCONTINUED | OUTPATIENT
Start: 2022-10-13 | End: 2022-10-13

## 2022-10-13 RX ORDER — INSULIN LISPRO 100 [IU]/ML
0-4 INJECTION, SOLUTION INTRAVENOUS; SUBCUTANEOUS NIGHTLY
Status: DISCONTINUED | OUTPATIENT
Start: 2022-10-13 | End: 2022-10-14 | Stop reason: HOSPADM

## 2022-10-13 RX ADMIN — AMIODARONE HYDROCHLORIDE 200 MG: 200 TABLET ORAL at 09:53

## 2022-10-13 RX ADMIN — INSULIN LISPRO 4 UNITS: 100 INJECTION, SOLUTION INTRAVENOUS; SUBCUTANEOUS at 21:41

## 2022-10-13 RX ADMIN — ROSUVASTATIN CALCIUM 10 MG: 5 TABLET, COATED ORAL at 21:43

## 2022-10-13 RX ADMIN — PREDNISONE 20 MG: 20 TABLET ORAL at 09:53

## 2022-10-13 RX ADMIN — INSULIN LISPRO 3 UNITS: 100 INJECTION, SOLUTION INTRAVENOUS; SUBCUTANEOUS at 12:27

## 2022-10-13 RX ADMIN — DILTIAZEM HYDROCHLORIDE 180 MG: 180 CAPSULE, COATED, EXTENDED RELEASE ORAL at 09:53

## 2022-10-13 RX ADMIN — PANTOPRAZOLE SODIUM 40 MG: 40 TABLET, DELAYED RELEASE ORAL at 05:20

## 2022-10-13 RX ADMIN — APIXABAN 5 MG: 5 TABLET, FILM COATED ORAL at 09:53

## 2022-10-13 RX ADMIN — ASPIRIN 81 MG: 81 TABLET, COATED ORAL at 09:53

## 2022-10-13 RX ADMIN — PAROXETINE HYDROCHLORIDE 20 MG: 20 TABLET, FILM COATED ORAL at 09:53

## 2022-10-13 RX ADMIN — INSULIN LISPRO 2 UNITS: 100 INJECTION, SOLUTION INTRAVENOUS; SUBCUTANEOUS at 18:02

## 2022-10-13 RX ADMIN — SODIUM CHLORIDE, PRESERVATIVE FREE 10 ML: 5 INJECTION INTRAVENOUS at 09:54

## 2022-10-13 RX ADMIN — OXYBUTYNIN CHLORIDE 5 MG: 5 TABLET ORAL at 21:43

## 2022-10-13 RX ADMIN — MEROPENEM 500 MG: 500 INJECTION, POWDER, FOR SOLUTION INTRAVENOUS at 21:49

## 2022-10-13 RX ADMIN — MIRTAZAPINE 15 MG: 15 TABLET, FILM COATED ORAL at 21:43

## 2022-10-13 RX ADMIN — SODIUM CHLORIDE, PRESERVATIVE FREE 10 ML: 5 INJECTION INTRAVENOUS at 21:39

## 2022-10-13 RX ADMIN — MEROPENEM 500 MG: 500 INJECTION, POWDER, FOR SOLUTION INTRAVENOUS at 09:56

## 2022-10-13 RX ADMIN — OXYBUTYNIN CHLORIDE 5 MG: 5 TABLET ORAL at 09:53

## 2022-10-13 NOTE — PROGRESS NOTES
Received call from [facility] access center, no beds available at [facility] for patient at this time.      Luisito Humphries, BSN, RN

## 2022-10-13 NOTE — PROGRESS NOTES
Patient states that she is feeling a little worse. Her oxygen demand is better with her sitting up but when she tries to sleep it is worse. Chest x ray this morning shows worse pleural effusion on L than 2 days ago. She is [facility] bed    Plan:   Possible HFpEF: continue to monitor fluid status. Diuretic stopped due to creatine elevation. IR consult for evaluation for thoracentesis in morning again. Chest xray this morning already has moderate L pleural effusion. Pleural effusion has not responded to IV Diuresis, only increased burden on renal function, which has not recovered with holding diuretic. PAF: per EPS recommendations. Hold eliquis tonight and in morning for thoracentesis.

## 2022-10-13 NOTE — ADT AUTH CERT
Utilization Reviews       Heart Failure - Care Day 2 (10/11/2022) by Jarret Bess RN       Review Status Review Entered   Completed 10/12/2022 1132       Created By   Jarret Bess RN      Criteria Review      Care Day: 2 Care Date: 10/11/2022 Level of Care: Telemetry    Guideline Day 2    Level Of Care    (X) Floor    10/12/2022 11:32 AM EDT by Julio Johnston      acute    Clinical Status    ( ) * Hemodynamic stability    10/12/2022 11:32 AM EDT by Julio Johnston      116  158/57    (X) * Mental status at baseline    10/12/2022 11:32 AM EDT by Julio Johnston      alert and oriented to person, place, and time    ( ) * No evidence of myocardial ischemia    ( ) * Cardiac rate and rhythm acceptable    ( ) * Oxygenation at baseline or improved    10/12/2022 11:32 AM EDT by Julio Johnston      5L  94%  Nasal cannula    ( ) * Pulmonary edema absent or improved    Routes    (X) Taper parenteral medications    (X) Oral diet    10/12/2022 11:32 AM EDT by Julio Johnston      ADULT DIET; Regular; Low Fat/Low Chol/High Fiber/2 gm Na;  Offer double protein at meals    Interventions    (X) * Pulmonary catheter absent    (X) Weigh    10/12/2022 11:32 AM EDT by Julio Johnston      daily weights, strict I&Os    (X) Possible CXR, ECG, BNP    ( ) Oxygen    10/12/2022 11:32 AM EDT by Julio Johnston      5L  94%  Nasal cannula    Medications    (X) Diuretics    10/12/2022 11:32 AM EDT by Julio Johnston      furosemide (LASIX) injection 40 mg  Dose: 40 mg  Freq: DAILY Route: IV       Definitions for Care Day 2    Hemodynamic stability    ( ) Hemodynamic stability, as indicated by  1 or more  of the following :       ( ) Patient hemodynamically stable, as indicated by  ALL  of the following  (1) (2) (3) (4) (5):          (X) Hypotension absent       * Milestone   Additional Notes   DATE:    10/11         VITALS:   97.7 (36.5)  24  116  158/57  5L  94%  Nasal cannula          ABNL/PERTINENT LABS/RADIOLOGY/DIAGNOSTIC STUDIES:   10/11/22 07:38   POC Glucose: 125 (H)      10/11/22 11:38   POC Glucose: 111 (H)      10/11/22 11:50   BUN,BUNPL: 30 (H)   Creatinine: 1.4 (H)   GFR Non-: 37 (L)   GFR : 45 (L)   Glucose, Random: 189 (H)   Pro-BNP: 14,267 (H)   HDL Cholesterol: 36 (L)   Triglycerides: 280 (H)   WBC: 10.9 (H)   RBC: 3.58 (L)   Hemoglobin Quant: 10.2 (L)   Hematocrit: 32.9 (L)   MCHC: 31.0 (L)   RDW: 17.1 (H)   Lymphocyte %: 9.0 (L)   Monocytes %: 7.1 (H)   Segs Relative: 80.3 (H)   Immature Neutrophil %: 2.5 (H)      10/11/22 17:29   POC Glucose: 318 (H)      10/11/22 20:41   POC Glucose: 350 (H)      IR GUIDED THORACENTESIS PLEURAL    Successful ultrasound guided bilateral thoracentesis   1550 mL clear wilma fluid removed from right pleural space. 1300 mL clear yellow fluid removed left pleural space. No complication suggested. XR CHEST PORTABLE    Significantly reduced bilateral pleural effusions, status post thoracentesis. No discrete pneumothorax. PHYSICAL EXAM:   Constitutional:        Appearance: She is normal weight. She is ill-appearing. HENT:       Head: Normocephalic and atraumatic. Nose: Nose normal.       Mouth/Throat:       Mouth: Mucous membranes are dry. Pharynx: Oropharynx is clear. Eyes:       General: No scleral icterus. Conjunctiva/sclera: Conjunctivae normal.    Cardiovascular:       Rate and Rhythm: Normal rate. Rhythm irregular. Pulses: Normal pulses. Heart sounds: Murmur heard. Pulmonary:       Effort: Pulmonary effort is normal.       Breath sounds: Rales present. No wheezing or rhonchi. Abdominal:       General: Abdomen is flat. Bowel sounds are normal. There is no distension. Palpations: Abdomen is soft. Tenderness: There is no abdominal tenderness. Musculoskeletal:          General: No deformity. Cervical back: Neck supple. No tenderness. Right lower leg: Edema present. Left lower leg: Edema present.     Skin: Coloration: Skin is not jaundiced or pale. Comments: Skin thickening B/L upper and lower distal extremeties    Neurological:       General: No focal deficit present. Mental Status: She is alert and oriented to person, place, and time. Mental status is at baseline. MD CONSULTS/ASSESSMENT AND PLAN:   **CARDIOLOGY   ?-Possible HFpEF patient's EF has been normal the pleural effusions etiology still unclear and can be contributory disease or malignancy we will check the cytology report on that, patient's BNP is markedly elevated to 13,000 as well, will definitely benefit from)-cardiac catheterization for further evaluation   -Patient still in A. fib and has been anticoagulated Eliquis  we will get the EP consultation for further clarification   -Pericardial effusion we will check a limited echo to see if there is any recurrence of pericardial effusion , also will check for effusive constrictive pericarditis   -Pleural effusion as mentioned etiology still unclear it can be secondary to variety of reasons i.e. malignancy or connective tissue disease   I have discussed the case with rheumatology and they are working her up for scleroderma and there is a high likelihood she might have developed still awaiting the anticentromere antibody test on her however given her presentation there is a high likelihood and he suggested to put her on high-dose steroids and in future she might need to Imuran   -Hyperlipidemia on Crestor 10 mg p.o. daily   -Patient also has pulmonary hypertension with pulmonary artery systolic pressure of 55 mmHg   -Once her current status is resolved probably will benefit from right and left cardiac catheterization as scheduled initially         **ELECTROPHSYIOLOGY   Etiology of pleural effusions unclear also recently she had pericardial effusion too - ? malignancy versus autoimmune disorder. Patient also having heart failure issues too. ?  Constrictive etiology   Patient is spontaneously converted from atrial fibrillation to sinus rhythm previously recommended to continue with amiodarone which probably is at least keeping the patient in PAF rather than persistent atrial fibrillation. Continue with amiodarone and Cardizem for now   Amiodarone was stopped because of there is concern of QT prolongation. EKG of the QT prolongation noted there was some baseline issues and the QT was erroneously calculated prolonged on EKG. Repeat EKG within normal limits we will continue with the amiodarone   Please keep potassium between 4 to 4.5 and magnesium between 2 to 2.2   There is discussion of transfer to higher level of care because of recurrent pleural effusions      **IM   Acute on chronic hypoxic respiratory failure   Secondary to bilateral pleural effusions and underlying pulmonary hypertension   Utilize supplemental oxygen to maintain oxygen saturation above 92%. Rest of management was as follows:-       Recurrent bilateral pleural effusion   Bilateral thoracentesis 9/25/2022   Bilateral thoracentesis on 9/30/2022    Bilateral thoracentesis 10/11/2022, removal of approximately 3L pleural fluid   High suspicion for autoimmune phenomena contributing rather than lack of diuretic use as patient was having recurrence of pleural effusion despite being on lasix before prior hospitalization.    Awaiting fluid studies   Resumed Prednisone 20mg daily   Discussed with Rheumatology, recommend second opinion       Paroxysmal atrial fibrillation with RVR upon admission   Achieved rate control   Continue Eliquis   Continue Amiodarone and Cardizem   EP and Cardiology following       ESBL E.Coli UTI    Urine Culture from 10/05/2022   Continue Meropenem to complete 5 days of therapy       Acute kidney injury   Likely in the setting of diuretic use    Will deescalate diuretics   Repeat BMP in am       Pericardial effusion with tamponade physiology   Status post pericardiocentesis with removal of 600 cc of pericardial fluid on 9/26   Removal of pericardiocentesis catheter 9/27/2022   Repeat limited echo on 9/30/2022, no pericardial effusion   Limited echocardiogram 10/11/2022: Ejection fraction 50 to 55%, RVSP 62mmHg       Raynaud's phenomena   Per chart review: Negative rheumatoid factor, negative Anti- Scl-70, recently negative RODOLFO, ESR 26. Anti-SCL 70 is moderately sensitive, 20% hence recommend getting anti-centromere, RNA polymerase III and U3-RNP, PM/Scl, or Th/To antibodies, will defer to rheumatology. Rheumatology following        Moderate malnutrition   Dietary supplementation       Normocytic anemia   No signs of overt bleed   Concern for anemia of chronic disease   Monitor CBC       Chronic HFpEF   Currently hypovolemic   Blood pressure control and volume management   BNP is elevated due to severe pulmonary hypertension       Moderate-severe pulmonary hypertension   Elevated RVSP and tricuspid regurgitation   Recommend right heart catheterization, recommend transferring to pHTN center   Optimization of preload and afterload, maintain euvolemic state. Type 2 diabetes mellitus without long-term use of insulin   With hyperglycemia due to steroid use   Adjust basal bolus insulin   MDISS ACHS       Hyperlipidemia   Continue Crestor       History of invasive ductal carcinoma of the right breast status post mastectomy   ER/MD positive   On prior imaging there were some concern for bony metastasis   Bone biopsy was scheduled for October 14, 2022   Oncology will follow up as outpatient       Detailed discussion with rheumatologist.  Given the current complexity of the disease process and the need for specialized pulmonary hypertension center will transfer the patient to [facility]. Transfer process was initiated in the afternoon and the patient was accepted by Dr. Margarita Rangel. Awaiting bed placement. Discussed with  at bedside, agrees with current plan. MEDICATIONS:   amiodarone (CORDARONE) tablet 200 mg   Dose: 200 mg   Freq: DAILY Route: PO      apixaban (ELIQUIS) tablet 5 mg   Dose: 5 mg   Freq: 2 TIMES DAILY Route: PO      aspirin EC tablet 81 mg   Dose: 81 mg   Freq: DAILY Route: PO      dilTIAZem (CARDIZEM CD) extended release capsule 180 mg   Dose: 180 mg   Freq: DAILY Route: PO      meropenem (MERREM) 500 mg IVPB (mini-bag)   Dose: 500 mg   Freq: EVERY 12 HOURS Route: IV      mirtazapine (REMERON) tablet 15 mg   Dose: 15 mg   Freq: NIGHTLY Route: PO      oxybutynin (DITROPAN) tablet 5 mg   Dose: 5 mg   Freq: 2 TIMES DAILY Route: PO      toprazole (PROTONIX) tablet 40 mg   Dose: 40 mg   Freq: DAILY BEFORE BREAKFAST Route: PO      PARoxetine (PAXIL) tablet 20 mg   Dose: 20 mg   Freq: DAILY Route: PO      predniSONE (DELTASONE) tablet 20 mg   Dose: 20 mg   Freq: DAILY Route: PO      rosuvastatin (CRESTOR) tablet 10 mg   Dose: 10 mg   Freq: NIGHTLY Route: PO

## 2022-10-13 NOTE — PROGRESS NOTES
V2.0  [facility] [place]      Name:  Cary Lozano /Age/Sex: 1950  (71 y.o. female)   MRN & CSN:  [id number] & [id number] Encounter Date/Time: 10/13/2022 9:19 PM EDT    Location:  [address] [place] [address]-A PCP: Elia Morley MD       Hospital Day: 4    Assessment and Plan:   Cary Lozano is a 71 y.o. female with past medical history of diastolic heart failure, paroxysmal atrial fibrillation, type 2 diabetes mellitus, chronic respiratory failure, recurrent admission for bilateral pleural effusions, depression, hypertension, history of invasive ductal carcinoma of the right breast  status post mastectomy, ER/SD positive, history of tobacco use and hyperlipidemia who presents with progressively worsening shortness of breath. Acute on chronic hypoxic respiratory failure  Secondary to bilateral pleural effusions and underlying pulmonary hypertension  Utilize supplemental oxygen to maintain oxygen saturation above 92%. Rest of management was as follows:-    Recurrent bilateral pleural effusion  Bilateral thoracentesis 2022  Bilateral thoracentesis on 2022   Bilateral thoracentesis 10/11/2022, removal of approximately 3L pleural fluid  High suspicion for autoimmune phenomena contributing rather than lack of diuretic use as patient was having recurrence of pleural effusion despite being on lasix before prior hospitalization.   Continue prednisone 20mg daily  Discussed with Rheumatology, recommend second opinion    Paroxysmal atrial fibrillation with RVR upon admission  Achieved rate control  Continue Eliquis but stopped by cardiology for possible thoracentesis in am  Continue Amiodarone and Cardizem  EP and Cardiology following    ESBL E.Coli UTI   Urine Culture from 10/05/2022  Continue Meropenem to complete 5 days of therapy    Acute kidney injury, stable from yesterday  Likely in the setting of diuretic use   Will hold diuretics  Repeat BMP in am    Pericardial effusion with tamponade physiology  Status post pericardiocentesis with removal of 600 cc of pericardial fluid on 9/26  Removal of pericardiocentesis catheter 9/27/2022  Repeat limited echo on 9/30/2022, no pericardial effusion  Limited echocardiogram 10/11/2022: Ejection fraction 50 to 55%, RVSP 62mmHg     Raynaud's phenomena  Per chart review: Negative rheumatoid factor, negative Anti- Scl-70, recently negative RODOLFO, ESR 26. Anti-SCL 70 is moderately sensitive, 20% hence recommend getting anti-centromere, RNA polymerase III and U3-RNP, PM/Scl, or Th/To antibodies, will defer to rheumatology. Moderate malnutrition  Dietary supplementation     Normocytic anemia  No signs of overt bleed  Concern for anemia of chronic disease  Monitor CBC     Chronic HFpEF  Currently hypovolemic  Blood pressure control and volume management  BNP is elevated due to severe pulmonary hypertension     Moderate-severe pulmonary hypertension  Elevated RVSP and tricuspid regurgitation  Recommend right heart catheterization, recommend transferring to [facility] center  Optimization of preload and afterload, maintain euvolemic state. Type 2 diabetes mellitus without long-term use of insulin  Stable trend  DC basal bolus insulin  LDISS ACHS    Hyperlipidemia  Continue Crestor     History of invasive ductal carcinoma of the right breast status post mastectomy  ER/GA positive  On prior imaging there were some concern for bony metastasis  Bone biopsy was scheduled for October 14, 2022  Oncology will follow up as outpatient    Patient is awaiting transfer at [facility], pending bed availability. The major issue is that patient has had ESBL hence [facility] is looking at getting a private room. Diet ADULT DIET;  Regular; Low Fat/Low Chol/High Fiber/2 gm Na; 1800 ml; Offer double protein at meals   DVT Prophylaxis Ambulatory   Code Status Full Code   Disposition From: Home  Expected Disposition: Transfer to [facility]  Estimated Date of Discharge: 1 day  Patient requires continued admission due to awaiting bed placement at [facility]   Surrogate Decision Maker/ POA Spouse     Subjective:     Chief Complaint: Shortness of Breath (Had pleural effusion done last week; afraid the fluid is building up again )       Angy Wellington is a 71 y.o. female who presents with acute on chronic hypoxic respiratory failure. Patient was alert oriented x3, hemodynamically stable. No significant overnight events noticed. Patient was on 2 L nasal cannula oxygen, in no acute respiratory distress. Objective:   No intake or output data in the 24 hours ending 10/13/22 1551       Vitals:   Vitals:    10/13/22 1459   BP: (!) 125/55   Pulse: 76   Resp: 16   Temp: 97.6 °F (36.4 °C)   SpO2: 96%       Physical Exam:   Physical Exam  Vitals reviewed. Constitutional:       Appearance: She is normal weight. She is ill-appearing. HENT:      Head: Normocephalic and atraumatic. Nose: Nose normal.      Mouth/Throat:      Mouth: Mucous membranes are dry. Pharynx: Oropharynx is clear. Eyes:      General: No scleral icterus. Conjunctiva/sclera: Conjunctivae normal.   Cardiovascular:      Rate and Rhythm: Normal rate. Rhythm irregular. Pulses: Normal pulses. Heart sounds: Murmur heard. Pulmonary:      Effort: Pulmonary effort is normal.      Breath sounds: Rales present. No wheezing or rhonchi. Abdominal:      General: Abdomen is flat. Bowel sounds are normal. There is no distension. Palpations: Abdomen is soft. Tenderness: There is no abdominal tenderness. Musculoskeletal:         General: No deformity. Cervical back: Neck supple. No tenderness. Right lower leg: Edema present. Left lower leg: Edema present. Skin:     Coloration: Skin is not jaundiced or pale. Comments: Skin thickening B/L upper and lower distal extremeties   Neurological:      General: No focal deficit present. Mental Status: She is alert and oriented to person, place, and time. Mental status is at baseline.           Medications:   Medications:    insulin lispro  0-4 Units SubCUTAneous TID     insulin lispro  0-4 Units SubCUTAneous Nightly    predniSONE  20 mg Oral Daily    amiodarone  200 mg Oral Daily    [Held by provider] furosemide  20 mg Oral Daily    sodium chloride flush  5-40 mL IntraVENous 2 times per day    meropenem  500 mg IntraVENous Q12H    aspirin  81 mg Oral Daily    mirtazapine  15 mg Oral Nightly    oxybutynin  5 mg Oral BID    pantoprazole  40 mg Oral QAM AC    PARoxetine  20 mg Oral Daily    rosuvastatin  10 mg Oral Nightly    [Held by provider] apixaban  5 mg Oral BID    dilTIAZem  180 mg Oral Daily      Infusions:    sodium chloride 25 mL/hr at 10/13/22 0956    dextrose       PRN Meds: sodium chloride flush, 5-40 mL, PRN  sodium chloride, , PRN  polyethylene glycol, 17 g, Daily PRN  acetaminophen, 650 mg, Q6H PRN   Or  acetaminophen, 650 mg, Q6H PRN  glucose, 4 tablet, PRN  dextrose bolus, 125 mL, PRN   Or  dextrose bolus, 250 mL, PRN  glucagon (rDNA), 1 mg, PRN  dextrose, 1,000 mL, Continuous PRN  docusate sodium, 100 mg, BID PRN      Labs      Recent Results (from the past 24 hour(s))   POCT Glucose    Collection Time: 10/12/22  5:20 PM   Result Value Ref Range    POC Glucose 340 (H) 70 - 99 MG/DL   POCT Glucose    Collection Time: 10/12/22  8:02 PM   Result Value Ref Range    POC Glucose 312 (H) 70 - 99 MG/DL   Basic Metabolic Panel    Collection Time: 10/13/22  6:51 AM   Result Value Ref Range    Sodium 139 135 - 145 MMOL/L    Potassium 4.3 3.5 - 5.1 MMOL/L    Chloride 104 99 - 110 mMol/L    CO2 26 21 - 32 MMOL/L    Anion Gap 9 4 - 16    BUN 36 (H) 6 - 23 MG/DL    Creatinine 1.5 (H) 0.6 - 1.1 MG/DL    Glucose 97 70 - 99 MG/DL    Calcium 9.1 8.3 - 10.6 MG/DL    GFR Non- 34 (L) >60 mL/min/1.73m2    GFR  41 (L) >60 mL/min/1.73m2   Magnesium    Collection Time: 10/13/22  6:51 AM   Result Value Ref Range    Magnesium 2.0 1.8 - 2.4 mg/dl Brain Natriuretic Peptide    Collection Time: 10/13/22  6:51 AM   Result Value Ref Range    Pro-BNP 7,286 (H) <300 PG/ML   POCT Glucose    Collection Time: 10/13/22  7:43 AM   Result Value Ref Range    POC Glucose 94 70 - 99 MG/DL   POCT Glucose    Collection Time: 10/13/22 11:51 AM   Result Value Ref Range    POC Glucose 197 (H) 70 - 99 MG/DL        Imaging/Diagnostics Last 24 Hours   CT CHEST WO CONTRAST    Result Date: 10/10/2022  EXAMINATION: CT OF THE CHEST WITHOUT CONTRAST 10/10/2022 7:11 pm TECHNIQUE: CT of the chest was performed without the administration of intravenous contrast. Multiplanar reformatted images are provided for review. Automated exposure control, iterative reconstruction, and/or weight based adjustment of the mA/kV was utilized to reduce the radiation dose to as low as reasonably achievable. COMPARISON: 08/18/2022 HISTORY: ORDERING SYSTEM PROVIDED HISTORY: bilateral effusion TECHNOLOGIST PROVIDED HISTORY: Reason for exam:->bilateral effusion Reason for Exam: bilateral effusion FINDINGS: There are moderate bilateral pleural effusions with partial atelectasis of the right middle lobe, right lower lobe, left lower lobe, and lingula. A calcified granuloma is present within the right upper lobe. A nodular appearing infiltrate is present within the left upper lobe measuring 7 mm in diameter. There appear to be lytic lesions within the right 2nd and left 4th ribs. There is also a partially visualized lytic lesion within the L1 vertebral body vs Paget's disease. There is mild cardiomegaly with a mild pericardial effusion. Calcified lymph nodes are present within the mediastinum and right hilum. The patient is status post mastectomy on the right-hand side with apparent flap reconstruction surgery. Limited views of the upper abdomen demonstrate hypoattenuating lesions within the left hepatic lobe measuring up to 3 and 3.3 cm in diameter. Moderate bilateral pleural effusions.  Small nodular infiltrate within the left upper lobe. Lytic rib lesions and possible lytic lesion of the L1 vertebral body, worrisome for metastases. Hypoattenuating lesions within the liver, which could reflect hemangiomas but recommend dedicated CT of the abdomen and pelvis with IV contrast for further evaluation. Findings were discussed with Meaghan Mas at 8:49 pm on 10/10/2022. XR CHEST PORTABLE    Result Date: 10/11/2022  EXAMINATION: ONE XRAY VIEW OF THE CHEST 10/11/2022 9:52 am COMPARISON: CT chest 10/10/2022, chest radiograph 10/10/2022. HISTORY: ORDERING SYSTEM PROVIDED HISTORY: s/p bilat thoracentesis TECHNOLOGIST PROVIDED HISTORY: Reason for exam:->s/p bilat thoracentesis Reason for Exam: s/p bilat thoracentesis FINDINGS: Similar cardiomegaly. Patient is status post thoracentesis with interval decrease in the pleural effusions. Small bilateral pleural effusions persist.  Streaky bibasilar opacities favor atelectasis. Curvilinear lucencies overlying the upper chest favor skin folds and/or artifact. No discrete pneumothorax. Osseous structures are diffusely demineralized and grossly unchanged. Significantly reduced bilateral pleural effusions, status post thoracentesis. No discrete pneumothorax. XR CHEST PORTABLE    Result Date: 10/10/2022  EXAMINATION: ONE XRAY VIEW OF THE CHEST 10/10/2022 1:07 pm COMPARISON: 10/02/2022 HISTORY: ORDERING SYSTEM PROVIDED HISTORY: chest pain TECHNOLOGIST PROVIDED HISTORY: Reason for exam:->chest pain Reason for Exam: chest pain    sob FINDINGS: Cardiomegaly again noted. Increased bilateral pleural effusions. Bibasilar pulmonary opacities. No pneumothorax.      Increased bilateral pleural effusions with bibasilar pulmonary opacities that could represent atelectasis or infection     IR GUIDED THORACENTESIS PLEURAL    Result Date: 10/11/2022  PROCEDURE: ULTRASOUNDGUIDED bilateral THORACENTESIS 10/11/2022 HISTORY: ORDERING SYSTEM PROVIDED HISTORY: bilateral pleural

## 2022-10-13 NOTE — CARE COORDINATION
RD's next outreach regarding Dietitian referral scheduled for today 10/13/22. Per chart review, patient is inpatient at [facility]. Admission date 10/10/22. RD will follow up as appropriate.        [address] [facility], [address] [place] [address]

## 2022-10-13 NOTE — PROGRESS NOTES
Cardiology Progress Note     Admit Date:  10/10/2022    Consult reason/ Seen today for :       Subjective and  Overnight Events : plan for transfer to [address] [place] [address] complain on admission : 71 y. o.year old who is admitted for  Chief Complaint   Patient presents with    Shortness of Breath     Had pleural effusion done last week; afraid the fluid is building up again       Assessment / Plan:  A. fib continue anticoagulation continue amiodarone appreciate EPS evaluation and input  Recurrent pleural effusion course secondary to autoimmune condition inflammation serositis? Echo shows preserved EF consider discharging on diuretics  Continue Lasix 20 mg daily advised to watch weight at home and increase diuretics if weight is up  Rheumatology work-up  Echo does not show any evidence of restrictive or constrictive pericarditis at this stage we will hold off on left and right heart cath  HTN: stable, continue To titrate up medication as needed  DVT Prophylaxis if no contraindication  Will follow up in office     Past medical history:    has a past medical history of Anxiety, Breast carcinoma ([address] Cervical radiculitis, Chest pain, Depression, Diabetes type 2, controlled ([address] Family history of diabetes mellitus (DM), GERD (gastroesophageal reflux disease), Hiatal hernia, Hx of Doppler ultrasound, Hx of Doppler ultrasound, Hx of echocardiogram, Hypercalcemia, Hyperglycemia, Hyperlipidemia, Insomnia, Irritable bowel syndrome, LBP (low back pain), Lumbar herniated disc, Menopause, Migraine headache, Osteoporosis, Prediabetes, S/P colonoscopic polypectomy, and TIA (transient ischemic attack). Past surgical history:   has a past surgical history that includes Tubal ligation; Mastectomy (7/1997); Hysterectomy (5/1995); and Breast reconstruction (Right, 4/2013). Social History:   reports that she has never smoked.  She has never used smokeless tobacco. She reports that she does not drink alcohol and does not use drugs. Family history:  family history includes Diabetes in her father, mother, and sister; Heart Disease in her father; High Blood Pressure in her father and mother; High Cholesterol in her sister; Other in her sister; Stroke in her paternal grandmother. Allergies   Allergen Reactions    Codeine      \"jittery\"  Feels anxoius       Review of Systems:    All 14 systems were reviewed and are negative  Except for the positive findings  which as documented     BP (!) 125/55   Pulse 76   Temp 97.6 °F (36.4 °C) (Oral)   Resp 16   Ht 5' 5\" (1.651 m)   Wt 127 lb 4.8 oz (57.7 kg)   SpO2 96%   BMI 21.18 kg/m²   No intake or output data in the 24 hours ending 10/13/22 1557    Physical Exam:  Constitutional:  Well developed, Well nourished, No acute distress, Non-toxic appearance. HENT:  Normocephalic, Atraumatic, Bilateral external ears normal, Oropharynx moist, No oral exudates, Nose normal. Neck- Normal range of motion, No tenderness, Supple, No stridor. Eyes:  PERRL, EOMI, Conjunctiva normal, No discharge. Respiratory:  Normal breath sounds, No respiratory distress, No wheezing, No chest tenderness. Cardiovascular:  Normal heart rate, Normal rhythm, No murmurs, No rubs, No gallops, JVP not elevated  Abdomen/GI:  Bowel sounds normal, Soft, No tenderness, No masses, No pulsatile masses. Musculoskeletal:  Intact distal pulses, No edema, No tenderness, No cyanosis, No clubbing. Good range of motion in all major joints. No tenderness to palpation or major deformities noted. Back- No tenderness. Integument:  Warm, Dry, No erythema, No rash. Lymphatic:  No lymphadenopathy noted. Neurologic:  Alert & oriented x 3, Normal motor function, Normal sensory function, No focal deficits noted.    Psychiatric:  Affect  and  Mood :no change    Medications:    insulin lispro  0-4 Units SubCUTAneous TID WC    insulin lispro  0-4 Units SubCUTAneous Nightly    predniSONE  20 mg Oral Daily    amiodarone  200 mg Oral Daily    [Held by provider] furosemide  20 mg Oral Daily    sodium chloride flush  5-40 mL IntraVENous 2 times per day    meropenem  500 mg IntraVENous Q12H    aspirin  81 mg Oral Daily    mirtazapine  15 mg Oral Nightly    oxybutynin  5 mg Oral BID    pantoprazole  40 mg Oral QAM AC    PARoxetine  20 mg Oral Daily    rosuvastatin  10 mg Oral Nightly    [Held by provider] apixaban  5 mg Oral BID    dilTIAZem  180 mg Oral Daily      sodium chloride 25 mL/hr at 10/13/22 0956    dextrose       sodium chloride flush, sodium chloride, polyethylene glycol, acetaminophen **OR** acetaminophen, glucose, dextrose bolus **OR** dextrose bolus, glucagon (rDNA), dextrose, docusate sodium    Lab Data:  CBC:   Recent Labs     10/11/22  1150   WBC 10.9*   HGB 10.2*   HCT 32.9*   MCV 91.9        BMP:   Recent Labs     10/11/22  1150 10/12/22  0538 10/13/22  0651    139 139   K 4.4 4.2 4.3    104 104   CO2 24 26 26   BUN 30* 31* 36*   CREATININE 1.4* 1.5* 1.5*     PT/INR: No results for input(s): PROTIME, INR in the last 72 hours. BNP:    Recent Labs     10/11/22  1150 10/13/22  0651   PROBNP 14,267* 7,286*     TROPONIN:   Recent Labs     10/10/22  2015 10/11/22  1150   TROPONINT 0.011* 0.018*        ECHO :   echocardiogram    Assessment:  71 y. o.year old who is admitted for  Chief Complaint   Patient presents with    Shortness of Breath     Had pleural effusion done last week; afraid the fluid is building up again     , active issues as noted below:  Impression:  Principal Problem:    Acute on chronic diastolic CHF (congestive heart failure) (HCC)  Active Problems:    PAF (paroxysmal atrial fibrillation) (HCC)    Severe malnutrition ([facility] [address]  Resolved Problems:    * No resolved hospital problems.  *            All labs, medications and tests reviewed by myself , continue all other medications of all above medical condition listed as is except for changes mentioned above. Thank you very much for consult , please call with questions.     Radhika Butler MD, MD 10/13/2022 3:57 PM

## 2022-10-14 NOTE — PROGRESS NOTES
Pt off unit via stretcher. Escorted by [employer]. Pt belongings sent with pt and pt . No signs or symptoms of distress. Pt transferring to [facility].

## 2022-10-14 NOTE — DISCHARGE SUMMARY
V2.0  Discharge Summary    Name:  Marilee Boucher /Age/Sex: 1950 (71 y.o. female)   Admit Date: 10/10/2022  Discharge Date: 10/14/22    MRN & CSN:  [id number] & [id number] Encounter Date and Time 10/14/22 6:21 PM EDT    Attending:  No att. providers found Discharging Provider: Nico Herndon MD       Hospital Course:     Brief HPI: Marilee Boucher is a 71 y.o. female with past medical history of diastolic heart failure, paroxysmal atrial fibrillation, type 2 diabetes mellitus, chronic respiratory failure, recurrent admission for bilateral pleural effusions, depression, hypertension, history of invasive ductal carcinoma of the right breast 1999 status post mastectomy, ER/OK positive, history of tobacco use and hyperlipidemia who presents with progressively worsening shortness of breath. Brief Problem Based Course:     Acute on chronic hypoxic respiratory failure  Secondary to bilateral pleural effusions and underlying pulmonary hypertension  Utilize supplemental oxygen to maintain oxygen saturation above 92%. Rest of management was as follows:-     Recurrent bilateral pleural effusion  Bilateral thoracentesis 2022  Bilateral thoracentesis on 2022   Bilateral thoracentesis 10/11/2022, removal of approximately 3L pleural fluid  High suspicion for autoimmune phenomena contributing rather than lack of diuretic use as patient was having recurrence of pleural effusion despite being on lasix before prior hospitalization.   Continue prednisone 20mg daily  Discussed with Rheumatology, recommend second opinion     Paroxysmal atrial fibrillation with RVR upon admission  Achieved rate control  Continue Eliquis but stopped by cardiology for possible thoracentesis in am  Continue Amiodarone and Cardizem  EP and Cardiology following     ESBL E.Coli UTI   Urine Culture from 10/05/2022  Continue Meropenem to complete 5 days of therapy     Acute kidney injury, stable from yesterday  Likely in the setting of diuretic use   Will hold diuretics     Pericardial effusion with tamponade physiology  Status post pericardiocentesis with removal of 600 cc of pericardial fluid on 9/26  Removal of pericardiocentesis catheter 9/27/2022  Repeat limited echo on 9/30/2022, no pericardial effusion  Limited echocardiogram 10/11/2022: Ejection fraction 50 to 55%, RVSP 62mmHg     Raynaud's phenomena  Per chart review: Negative rheumatoid factor, negative Anti- Scl-70, recently negative RODOLFO, ESR 26. Anti-SCL 70 is moderately sensitive, 20% hence recommend getting anti-centromere, RNA polymerase III and U3-RNP, PM/Scl, or Th/To antibodies, will defer to rheumatology. Moderate malnutrition  Dietary supplementation     Normocytic anemia  No signs of overt bleed  Concern for anemia of chronic disease     Chronic HFpEF  Currently hypovolemic  Blood pressure control and volume management  BNP is elevated due to severe pulmonary hypertension     Moderate-severe pulmonary hypertension  Elevated RVSP and tricuspid regurgitation  Recommend right heart catheterization, recommend transferring to [facility] center  Optimization of preload and afterload, maintain euvolemic state. Type 2 diabetes mellitus without long-term use of insulin  Stable trend  DC basal bolus insulin  LDISS ACHS     Hyperlipidemia  Continue Crestor     History of invasive ductal carcinoma of the right breast status post mastectomy  ER/MN positive  On prior imaging there were some concern for bony metastasis  Bone biopsy was scheduled for October 14, 2022  Oncology will follow up as outpatient    Patient was accepted by Dr. aKsey Lind at [facility]. Patient's condition at discharge was stable. The patient expressed appropriate understanding of, and agreement with the discharge recommendations, medications, and plan.      Consults this admission:  IP CONSULT TO HOSPITALIST  IP CONSULT TO HEART FAILURE NURSE/COORDINATOR  IP CONSULT TO DIETITIAN  IP CONSULT TO CARDIOLOGY  IP CONSULT TO SPIRITUAL SERVICES  IP CONSULT TO ELECTROPHYSIOLOGY  IP CONSULT TO RHEUMATOLOGY  IP CONSULT TO INTERVENTIONAL RADIOLOGY    Discharge Diagnosis:   Acute on chronic hypoxic respiratory failure  Recurrent bilateral pleural effusion  Paroxysmal atrial fibrillation with RVR upon admission  ESBL E. coli UTI  Acute kidney injury    Discharge Instruction:   Per discharge from [facility]. Discharge Medications:        Medication List        CONTINUE taking these medications      Alcohol Swabs Pads  Use before administering insulin     INS SYRINGE/NEEDLE .5CC/28G 28G X 1/2\" 0.5 ML Misc  Commonly known as: AIMSCO INS SYR MX-COM .5CC/28G  As mentioned in Insulin dosing     Lancets Misc  1 each by Does not apply route 2 times daily            ASK your doctor about these medications      allopurinol 100 MG tablet  Commonly known as: ZYLOPRIM  Take 2 tablets by mouth in the morning. amiodarone 200 MG tablet  Commonly known as: CORDARONE  Take 1 tablet by mouth daily     amLODIPine-benazepril 5-10 MG per capsule  Commonly known as: Lotrel  Take 1 capsule by mouth in the morning. Chris Medin apixaban 5 MG Tabs tablet  Commonly known as: ELIQUIS  Take 1 tablet by mouth 2 times daily     aspirin 81 MG EC tablet  Take 1 tablet by mouth daily     blood glucose test strips  Test twice a day & as needed for symptoms of irregular blood glucose.      ciprofloxacin 250 MG tablet  Commonly known as: CIPRO  Take 1 tablet by mouth 2 times daily for 7 days  Ask about: Should I take this medication?     dilTIAZem 180 MG extended release capsule  Commonly known as: CARDIZEM CD  Take 1 capsule by mouth daily     docusate 100 MG Caps  Commonly known as: COLACE, DULCOLAX  Take 100 mg by mouth 2 times daily as needed (constipation)     glipiZIDE 10 MG tablet  Commonly known as: GLUCOTROL  Take 1 tablet by mouth 2 times daily (before meals)     glucose 4 g chewable tablet  Take 4 tablets by mouth as needed for Low blood sugar     metFORMIN 500 MG tablet  Commonly known as: GLUCOPHAGE  Take 1 tablet by mouth every morning (before breakfast)     mirtazapine 15 MG tablet  Commonly known as: Remeron  Take 1 tablet by mouth nightly     oxybutynin 5 MG tablet  Commonly known as: DITROPAN  Take 1 tablet by mouth 2 times daily     pantoprazole 40 MG tablet  Commonly known as: PROTONIX  Take 1 tablet by mouth every morning (before breakfast)     PARoxetine 20 MG tablet  Commonly known as: PAXIL  Take 1 tablet by mouth daily     predniSONE 10 MG tablet  Commonly known as: DELTASONE  Take 1 tablet by mouth daily for 10 days     rosuvastatin 10 MG tablet  Commonly known as: CRESTOR  Take 1 tablet by mouth nightly     senna 8.6 MG tablet  Commonly known as: SENOKOT  Take 1 tablet by mouth daily as needed for Constipation             Objective Findings at Discharge:   BP (!) 144/50   Pulse 69   Temp 98.1 °F (36.7 °C) (Oral)   Resp 14   Ht 5' 5\" (1.651 m)   Wt 127 lb 4.8 oz (57.7 kg)   SpO2 97%   BMI 21.18 kg/m²       Physical Exam:   Physical Exam  Vitals reviewed. Constitutional:       Appearance: She is normal weight. She is ill-appearing. HENT:      Head: Normocephalic and atraumatic. Nose: Nose normal.      Mouth/Throat:      Mouth: Mucous membranes are dry. Pharynx: Oropharynx is clear. Eyes:      General: No scleral icterus. Conjunctiva/sclera: Conjunctivae normal.   Cardiovascular:      Rate and Rhythm: Normal rate and regular rhythm. Pulses: Normal pulses. Heart sounds: Normal heart sounds. No murmur heard. Pulmonary:      Effort: Pulmonary effort is normal.      Breath sounds: Rales present. No wheezing or rhonchi. Abdominal:      General: Bowel sounds are normal. There is no distension. Palpations: Abdomen is soft. Tenderness: There is no abdominal tenderness. Musculoskeletal:         General: No deformity. Normal range of motion. Cervical back: Neck supple.  No rigidity. Right lower leg: No edema. Left lower leg: No edema. Skin:     Coloration: Skin is not jaundiced or pale. Comments: Bilateral distal extremity skin thickening   Neurological:      General: No focal deficit present. Mental Status: She is alert and oriented to person, place, and time. Mental status is at baseline. Labs and Imaging   CT CHEST WO CONTRAST    Result Date: 10/10/2022  EXAMINATION: CT OF THE CHEST WITHOUT CONTRAST 10/10/2022 7:11 pm TECHNIQUE: CT of the chest was performed without the administration of intravenous contrast. Multiplanar reformatted images are provided for review. Automated exposure control, iterative reconstruction, and/or weight based adjustment of the mA/kV was utilized to reduce the radiation dose to as low as reasonably achievable. COMPARISON: 08/18/2022 HISTORY: ORDERING SYSTEM PROVIDED HISTORY: bilateral effusion TECHNOLOGIST PROVIDED HISTORY: Reason for exam:->bilateral effusion Reason for Exam: bilateral effusion FINDINGS: There are moderate bilateral pleural effusions with partial atelectasis of the right middle lobe, right lower lobe, left lower lobe, and lingula. A calcified granuloma is present within the right upper lobe. A nodular appearing infiltrate is present within the left upper lobe measuring 7 mm in diameter. There appear to be lytic lesions within the right 2nd and left 4th ribs. There is also a partially visualized lytic lesion within the L1 vertebral body vs Paget's disease. There is mild cardiomegaly with a mild pericardial effusion. Calcified lymph nodes are present within the mediastinum and right hilum. The patient is status post mastectomy on the right-hand side with apparent flap reconstruction surgery. Limited views of the upper abdomen demonstrate hypoattenuating lesions within the left hepatic lobe measuring up to 3 and 3.3 cm in diameter. Moderate bilateral pleural effusions.  Small nodular infiltrate within the left upper lobe. Lytic rib lesions and possible lytic lesion of the L1 vertebral body, worrisome for metastases. Hypoattenuating lesions within the liver, which could reflect hemangiomas but recommend dedicated CT of the abdomen and pelvis with IV contrast for further evaluation. Findings were discussed with Rylan Price at 8:49 pm on 10/10/2022. XR CHEST PORTABLE    Result Date: 10/13/2022  EXAMINATION: ONE XRAY VIEW OF THE CHEST 10/11/2022 9:52 am COMPARISON: CT chest 10/10/2022, chest radiograph 10/10/2022. HISTORY: ORDERING SYSTEM PROVIDED HISTORY: s/p bilat thoracentesis TECHNOLOGIST PROVIDED HISTORY: Reason for exam:->s/p bilat thoracentesis Reason for Exam: s/p bilat thoracentesis FINDINGS: Similar cardiomegaly. Patient is status post thoracentesis with interval decrease in the pleural effusions. Small bilateral pleural effusions persist.  Streaky bibasilar opacities favor atelectasis. Curvilinear lucencies overlying the upper chest favor skin folds and/or artifact. No discrete pneumothorax. Osseous structures are diffusely demineralized and grossly unchanged. Significantly reduced bilateral pleural effusions, status post thoracentesis. No discrete pneumothorax. XR CHEST PORTABLE    Result Date: 10/10/2022  EXAMINATION: ONE XRAY VIEW OF THE CHEST 10/10/2022 1:07 pm COMPARISON: 10/02/2022 HISTORY: ORDERING SYSTEM PROVIDED HISTORY: chest pain TECHNOLOGIST PROVIDED HISTORY: Reason for exam:->chest pain Reason for Exam: chest pain    sob FINDINGS: Cardiomegaly again noted. Increased bilateral pleural effusions. Bibasilar pulmonary opacities. No pneumothorax.      Increased bilateral pleural effusions with bibasilar pulmonary opacities that could represent atelectasis or infection     IR GUIDED THORACENTESIS PLEURAL    Result Date: 10/11/2022  PROCEDURE: ULTRASOUNDGUIDED bilateral THORACENTESIS 10/11/2022 HISTORY: ORDERING SYSTEM PROVIDED HISTORY: bilateral pleural effusion TECHNOLOGIST PROVIDED HISTORY: Reason for exam:->bilateral pleural effusion Which side should the procedure be performed?->Radiologist Recommendation TECHNIQUE: Maximum sterile barrier technique including hand hygiene, skin prep and sterile ultrasound technique utilized for procedure. Sterile ultrasound technique also utilized for procedure Ultrasound guidance required to confirm presence of pleural fluid, puncture site selection, real-time intra procedural guidance. Images made for patient's medical file. Informed consent was obtained after a detailed explanation of the procedure including risks, benefits, and alternatives. Universal protocol was performed. Following sequential ultrasound-guided puncture site selection, skin and ultrasound probe were prepped and draped in sterile fashion. 10 mL 1% lidocaine without epinephrine for local anesthesia. Sequential ultrasound-guided access of right and left pleural space is achieved using 5 [place] trocar mounted introducers. Catheter was advanced off trocar introducers and attached evacuated containers. 1550 mL clear wilma fluid removed the right pleural space. 1300 mL clear yellow fluid removed from left pleural space. Catheters removed. Dressing applied. Postprocedure chest radiograph ordered. The patient tolerated the procedure well. FINDINGS: Pre and intraprocedural images demonstrate large bilateral pleural effusions. 1550 mL clear wilma fluid removed from right pleural space. 1300 mL clear yellow fluid removed left pleural space. No complication suggested. Postprocedure chest radiograph ordered     Successful ultrasound guided bilateral thoracentesis. CBC: No results for input(s): WBC, HGB, PLT in the last 72 hours.   BMP:    Recent Labs     10/12/22  0538 10/13/22  0651    139   K 4.2 4.3    104   CO2 26 26   BUN 31* 36*   CREATININE 1.5* 1.5*   GLUCOSE 116* 97     Hepatic: No results for input(s): AST, ALT, ALB, BILITOT, ALKPHOS in the last 72 hours. Lipids:   Lab Results   Component Value Date/Time    CHOL 150 10/11/2022 11:50 AM    CHOL 190 06/10/2022 08:40 AM    HDL 36 10/11/2022 11:50 AM    TRIG 280 10/11/2022 11:50 AM     Hemoglobin A1C:   Lab Results   Component Value Date/Time    LABA1C 6.5 08/18/2022 07:10 AM     TSH:   Lab Results   Component Value Date/Time    TSH 2.90 07/21/2022 09:55 AM     Troponin:   Lab Results   Component Value Date/Time    TROPONINT 0.018 10/11/2022 11:50 AM    TROPONINT 0.011 10/10/2022 08:15 PM    TROPONINT <0.010 10/10/2022 01:40 PM     Lactic Acid: No results for input(s): LACTA in the last 72 hours.   BNP:   Recent Labs     10/13/22  0651   PROBNP 7,286*     UA:  Lab Results   Component Value Date/Time    NITRU POSITIVE 10/05/2022 02:30 PM    COLORU YELLOW 10/05/2022 02:30 PM    PHUR 5.0 06/07/2012 12:00 AM    WBCUA 389 10/05/2022 02:30 PM    RBCUA 33 10/05/2022 02:30 PM    MUCUS RARE 08/18/2022 12:15 PM    TRICHOMONAS NONE SEEN 10/05/2022 02:30 PM    BACTERIA NEGATIVE 10/05/2022 02:30 PM    CLARITYU CLEAR 10/05/2022 02:30 PM    SPECGRAV 1.015 10/05/2022 02:30 PM    LEUKOCYTESUR LARGE 10/05/2022 02:30 PM    UROBILINOGEN NORMAL 10/05/2022 02:30 PM    BILIRUBINUR NEGATIVE 10/05/2022 02:30 PM    BILIRUBINUR Negative 06/07/2012 12:00 AM    BLOODU MODERATE 10/05/2022 02:30 PM    GLUCOSEU Negative 06/07/2012 12:00 AM    KETUA NEGATIVE 10/05/2022 02:30 PM     Urine Cultures: No results found for: LABURIN  Blood Cultures: No results found for: BC  No results found for: BLOODCULT2  Organism: No results found for: ORG    Time Spent Discharging patient 35 minutes    Electronically signed by Evelyn France MD on 10/14/2022 at 6:21 PM

## 2022-10-14 NOTE — PROGRESS NOTES
[facility] reported bed availability at [facility] and ETA for pickup is [phone] by [employer].

## 2022-10-17 DIAGNOSIS — E78.2 MIXED HYPERLIPIDEMIA: ICD-10-CM

## 2022-10-17 RX ORDER — ROSUVASTATIN CALCIUM 10 MG/1
TABLET, COATED ORAL
Qty: 90 TABLET | Refills: 1 | OUTPATIENT
Start: 2022-10-17

## 2022-10-19 ENCOUNTER — CARE COORDINATION (OUTPATIENT)
Dept: CARE COORDINATION | Age: 72
End: 2022-10-19

## 2022-10-19 NOTE — CARE COORDINATION
Yann Krueger regarding Dietitian referral. Pt's spouse, Nadira Jason, answered the phone- RD explained reason for call and role in care. Nadira Jason states patient is currently inpatient at [facility]. Per chart review, patient was discharged from [facility] on 10/13/22 and admitted to [facility]. RD will sign off at this time.     [address] [facility] [place], [address] [place] [address]

## 2022-10-24 LAB
CULTURE: NORMAL
FUNGUS STAIN: NORMAL
Lab: NORMAL
SPECIMEN: NORMAL

## 2022-10-25 LAB
AFB SMEAR: NORMAL
CULTURE: NORMAL
Lab: NORMAL
SPECIMEN: NORMAL

## 2022-10-25 NOTE — PROGRESS NOTES
Physician Progress Note      PATIENTDaalma WELLS #:                  [id number]  :                       1950  ADMIT DATE:       10/10/2022 1:00 PM  [facility] Daily DATE:        10/13/2022 10:17 PM  RESPONDING  PROVIDER #:        Evelyn France MD          QUERY TEXT:    Patient admitted with pleural effusions, noted to have Acute diastolic CHF . If possible, please document in progress notes and d/c summary further   specificity regarding the type/underlying cause of pleural effusion: The medical record reflects the following:  Risk Factors: CHF, Pulmonary HTN  Clinical Indicators: Dr Juanita Alonso documented \"Recurrent pleural effusion course   secondary to autoimmune condition inflammation serositis\", BNP 14,267, SOB  Treatment: Thoracentesis, prednisone, O2, labs, IV lasix    Thank you,  Leslye Pitts RN CDS Supervisor  [phone]  Options provided:  -- Pleural effusion due to CHF  -- Pleural effusion due to autoimmune condition inflammation serositis  -- Other - I will add my own diagnosis  -- Disagree - Not applicable / Not valid  -- Disagree - Clinically unable to determine / Unknown  -- Refer to Clinical Documentation Reviewer    PROVIDER RESPONSE TEXT:    Patient has a pleural effusion due to autoimmune condition inflammation   serositis.     Query created by: Manish Stark on 10/25/2022 8:02 AM      Electronically signed by:  Evleyn France MD 10/25/2022 8:05 AM

## 2024-02-14 NOTE — TELEPHONE ENCOUNTER
[facility] Transitions Initial Follow Up Call    Outreach made within 2 business days of discharge: Yes    Patient: Blanca Fischer Patient : 1950   MRN: [id number]  Reason for Admission: There are no discharge diagnoses documented for the most recent discharge. Discharge Date: 22       Spoke with: Patient    Discharge department/facility: [facility]  [facility] Interactive Patient Contact:  Was patient able to fill all prescriptions: Yes  Was patient instructed to bring all medications to the follow-up visit: Yes  Is patient taking all medications as directed in the discharge summary?  Yes  Does patient understand their discharge instructions: Yes  Does patient have questions or concerns that need addressed prior to 7-14 day follow up office visit: no    Scheduled appointment with PCP within 7-14 days    Follow Up  Future Appointments   Date Time Provider Drake Simmons   2022  9:45 AM Anca Ybarra MD [facility] E S IM [facility]   2022  1:45 PM Rochelle Posey MD [facility]   2022  3:00 PM Joseph Andujar MD [facility]  [address]   2022  3:30 PM SCHEDULE, [facility] MED ONC LAB [facility] MED ONC [place]   2022  7:00 AM CATH LAB [address] [place] [address] [facility] CATH [facility]   2022  9:45 AM Rich Hull MD [facility]   2023  2:00 PM SCHEDULE, [address] [facility]   2023 10:30 AM Rich Hull MD [facility]   2023  8:00 AM [facility] FPS AWV LPN [facility] E S IM [address] [facility] [place]
Standing/Walking/Toileting/Moving from bed to chair